# Patient Record
Sex: MALE | Race: WHITE | NOT HISPANIC OR LATINO | Employment: OTHER | URBAN - METROPOLITAN AREA
[De-identification: names, ages, dates, MRNs, and addresses within clinical notes are randomized per-mention and may not be internally consistent; named-entity substitution may affect disease eponyms.]

---

## 2017-01-02 ENCOUNTER — NURSE TRIAGE (OUTPATIENT)
Dept: ADMINISTRATIVE | Facility: CLINIC | Age: 67
End: 2017-01-02

## 2017-01-02 NOTE — TELEPHONE ENCOUNTER
Reason for Disposition   Caller has already spoken with another triager or PCP AND has further questions AND triager able to answer questions.    Protocols used: ST NO CONTACT OR DUPLICATE CONTACT CALL-A-AH

## 2017-01-03 RX ORDER — PREDNISONE 10 MG/1
10 TABLET ORAL DAILY
Qty: 30 TABLET | Refills: 0 | Status: SHIPPED | OUTPATIENT
Start: 2017-01-03 | End: 2017-01-27 | Stop reason: SDUPTHER

## 2017-01-03 RX ORDER — PREDNISONE 10 MG/1
10 TABLET ORAL DAILY
COMMUNITY
End: 2017-01-03 | Stop reason: SDUPTHER

## 2017-01-04 ENCOUNTER — TELEPHONE (OUTPATIENT)
Dept: PSYCHIATRY | Facility: CLINIC | Age: 67
End: 2017-01-04

## 2017-01-04 RX ORDER — BUSPIRONE HYDROCHLORIDE 5 MG/1
5 TABLET ORAL 2 TIMES DAILY
Qty: 1 TABLET | Refills: 1
Start: 2017-01-04 | End: 2017-04-18 | Stop reason: SDUPTHER

## 2017-01-04 NOTE — TELEPHONE ENCOUNTER
Spoke to the patient - he reports high anxiety recently and panic attacks;  High stressors related to mother's back surgery, his own health issues (fatigue, not getting out of bed); his mother has asked him go back to his home in [place]    Pt is only taking buspar 5 mg once daily at 2 pm.   He suspects it makes him dizzy - pt is taking multiple medications including clonazepam from PCP, opiates.       - advised pt to try to titrate buspar to 5 mg BID; current dose is too low; pt is taking multiple medications, unclear if dizziness is related to Buspar or more likely polypharmacy   - recommend follow up with PCP for health issues - he reports he is tired of being referred to other doctors   - Call to report any worsening of symptoms or problems with the medication

## 2017-01-04 NOTE — TELEPHONE ENCOUNTER
Patient called an stated he been having a lot of anxiety attacks lately and not sure if can adjust some of his medication to help with this. Please advise or call

## 2017-01-06 DIAGNOSIS — L03.115 CELLULITIS OF RIGHT LOWER EXTREMITY: ICD-10-CM

## 2017-01-06 RX ORDER — CLINDAMYCIN HYDROCHLORIDE 300 MG/1
CAPSULE ORAL
Qty: 20 CAPSULE | Refills: 0 | Status: SHIPPED | OUTPATIENT
Start: 2017-01-06 | End: 2017-01-27 | Stop reason: SDUPTHER

## 2017-01-06 RX ORDER — CLINDAMYCIN PHOSPHATE 10 UG/ML
LOTION TOPICAL
Qty: 60 ML | Refills: 2 | Status: SHIPPED | OUTPATIENT
Start: 2017-01-06 | End: 2017-09-12

## 2017-01-06 NOTE — TELEPHONE ENCOUNTER
----- Message from Janki Downs sent at 1/6/2017 12:22 PM CST -----  Contact: Patient  Patient needs refill on Clindamycin, 300 mg caps sent to [facility] in [place]. Please  call patient at [phone].

## 2017-01-12 ENCOUNTER — TELEPHONE (OUTPATIENT)
Dept: PHARMACY | Facility: CLINIC | Age: 67
End: 2017-01-12

## 2017-01-12 NOTE — TELEPHONE ENCOUNTER
F/U call placed to patient to assess Harvoni treatment start date    Patient has not initiated Harvoni treatment. He repeatedly states he has a lot on his plate: he has not checked mail in 8 days, his dog, he lost his keys, etc. I inquire if the patient wishes to proceed with treatment and he acknowledges he does but he does know know when he can start. He became quite upset while speaking with me and stated he was having an anxiety attack and requested we disconnect the call which we did. He states he has MD appt 1/20 and may start then. He notes his pain medication doses have been cut in half.  This has been an ongoing issue since his initial consultation in 10/16.    Dr Disla--we need guidance how/if we should continue to follow this patient. He is requesting a call from your office to schedule f/u appointment or speak on phone.      Please advise.    Lowell Moreira, PharmD, [facility]  [facility]  [phone]

## 2017-01-13 NOTE — TELEPHONE ENCOUNTER
Noted.  Pt has appointment with me 1/24/17 and I will make sure to discuss that she should NOT start Harvoni per your instructions.  Pt has severe anxiety and has been very reluctant to follow my recommendations for therapy and medications.

## 2017-01-13 NOTE — TELEPHONE ENCOUNTER
Marilyn Ramos & Bret,    We are entrusting that Mr Serna will decide when he is ready to start Harvoni treatment.  Communication from Specialty Pharmacy to Mr Serna will cease at this time. Please let us know if/when he decides to start treatment.  He is in possession of 28 day supply of Harvoni which he never started and it is IMPERATIVE that he not take this medication. His insurance has changed and Harvoni may not be preferable on his new plan. Taking just 28 days of Harvoni could lead to viral mutations and resistance to the medications we have to treat him with if he decided to move forward. Please reinforce this to the patient.    Please feel free to contact specialty pharmacy if we can be of any further assistance in your patients care.    Lowell Moreira, PharmD, [facility]  [facility]  [phone]

## 2017-01-14 DIAGNOSIS — M50.30 DDD (DEGENERATIVE DISC DISEASE), CERVICAL: Chronic | ICD-10-CM

## 2017-01-14 RX ORDER — LUBIPROSTONE 8 UG/1
CAPSULE, GELATIN COATED ORAL
Qty: 180 CAPSULE | Refills: 4 | Status: SHIPPED | OUTPATIENT
Start: 2017-01-14 | End: 2018-02-12 | Stop reason: SDUPTHER

## 2017-01-14 NOTE — TELEPHONE ENCOUNTER
Dear Dr Etienne and Dr Queen  I have spoken with Mr Serna and has decided against Yaneli at last visit in last December.He understand of how expensive and he affecting his future health. He  Understand that he has a great advantage over thousand of other people that are unable to afford this.  Sincerely  Dr Ramos

## 2017-01-16 ENCOUNTER — CLINICAL SUPPORT (OUTPATIENT)
Dept: UROLOGY | Facility: CLINIC | Age: 67
End: 2017-01-16
Payer: MEDICARE

## 2017-01-16 DIAGNOSIS — R30.0 DYSURIA: Primary | ICD-10-CM

## 2017-01-16 LAB
BILIRUB SERPL-MCNC: ABNORMAL MG/DL
BLOOD URINE, POC: ABNORMAL
COLOR, POC UA: YELLOW
GLUCOSE UR QL STRIP: ABNORMAL
KETONES UR QL STRIP: ABNORMAL
LEUKOCYTE ESTERASE URINE, POC: ABNORMAL
NITRITE, POC UA: ABNORMAL
PH, POC UA: 5
PROTEIN, POC: ABNORMAL
SPECIFIC GRAVITY, POC UA: 1.02
UROBILINOGEN, POC UA: ABNORMAL

## 2017-01-16 PROCEDURE — 87086 URINE CULTURE/COLONY COUNT: CPT

## 2017-01-16 PROCEDURE — 99499 UNLISTED E&M SERVICE: CPT | Mod: S$PBB,,, | Performed by: UROLOGY

## 2017-01-16 PROCEDURE — 81002 URINALYSIS NONAUTO W/O SCOPE: CPT | Mod: PBBFAC,PO

## 2017-01-16 NOTE — PROGRESS NOTES
Pt in clinic today with complaints of UTI. POCT urine shows trace leukocytes and 1+ blood. Urine sent for urine culture

## 2017-01-16 NOTE — TELEPHONE ENCOUNTER
I will discuss this with him and make sure he has a clear understanding at his upcoming appointment.

## 2017-01-17 ENCOUNTER — TELEPHONE (OUTPATIENT)
Dept: FAMILY MEDICINE | Facility: CLINIC | Age: 67
End: 2017-01-17

## 2017-01-17 DIAGNOSIS — B18.2 HEP C W/O COMA, CHRONIC: Primary | ICD-10-CM

## 2017-01-17 DIAGNOSIS — F39 MOOD DISORDER: ICD-10-CM

## 2017-01-17 DIAGNOSIS — F19.20: ICD-10-CM

## 2017-01-17 LAB — BACTERIA UR CULT: NO GROWTH

## 2017-01-17 NOTE — TELEPHONE ENCOUNTER
Patient needs refill of Clonazepam.   Last seen 12/30/16.  Last filled 12/23/16.  Patient requests to start using [facility] in [place] for this medication. Patient advised that if he changes to this pharmacy he cannot change back. Patient verbalized understanding.

## 2017-01-17 NOTE — TELEPHONE ENCOUNTER
----- Message from Rae Rodriguez sent at 1/17/2017 10:56 AM CST -----  Contact: self  Patient wants to speak with a nurse regarding his Rx for KLONOPIN. Please call back at [phone] (home)

## 2017-01-17 NOTE — TELEPHONE ENCOUNTER
Spoken with patient he is interested to start Harvoni and he will start this coming weekend. He refers he is under drug detox.  Notified of Klonopin!

## 2017-01-18 ENCOUNTER — TELEPHONE (OUTPATIENT)
Dept: UROLOGY | Facility: CLINIC | Age: 67
End: 2017-01-18

## 2017-01-18 DIAGNOSIS — F39 MOOD DISORDER: ICD-10-CM

## 2017-01-18 RX ORDER — CLONAZEPAM 2 MG/1
TABLET ORAL
Qty: 90 TABLET | Refills: 0 | Status: SHIPPED | OUTPATIENT
Start: 2017-01-18 | End: 2017-02-07 | Stop reason: SDUPTHER

## 2017-01-18 RX ORDER — CLONAZEPAM 2 MG/1
TABLET ORAL
Qty: 90 TABLET | Refills: 0 | OUTPATIENT
Start: 2017-01-18

## 2017-01-18 NOTE — TELEPHONE ENCOUNTER
----- Message from Negrita Castillo MD sent at 1/18/2017  9:49 AM CST -----  Please let pt know their urine culture showed no bacteria

## 2017-01-20 ENCOUNTER — TELEPHONE (OUTPATIENT)
Dept: FAMILY MEDICINE | Facility: CLINIC | Age: 67
End: 2017-01-20

## 2017-01-20 NOTE — TELEPHONE ENCOUNTER
----- Message from Gian Cardoso sent at 1/20/2017  2:23 PM CST -----  Contact: same  Patient called in and requested a message be sent because patient stated he needs an appt ASAP to go over all of his health issues with Dr. Ramos.    Patient call back number is [phone]

## 2017-01-23 ENCOUNTER — TELEPHONE (OUTPATIENT)
Dept: PSYCHIATRY | Facility: CLINIC | Age: 67
End: 2017-01-23

## 2017-01-23 NOTE — TELEPHONE ENCOUNTER
Pt reports he is having symptoms of opioid w/d from having oxycodone reduced from 30 mg to 20 mg four times daily - he is having GI upset, diarrhea, sweats, increased pain.  He reports the symptoms are manageable currently.  He is still taking Methadone as prescribed.   Discussed options for tx of opiod w/drawal - clonidine, lomotil - he wishes to review his current meds with his PCP - appt 1/27/17 and reduce meds which may be contributing, such as lactulose (he is also taking Amitiza).    He reports Dr Cintron is willing to operate on his spine; but he is not committed to this yet.      He is only taking Buspirone 5 mg once day and is reluctant to increase.  He is still taking Clonazepam TID  He voices continued concern about his mother's pain - she is under the care of pain management and having poor response to interventions thus far.     Pt cancelled his appt 1/24/17 - he will follow up 2/8/17  Pt instructed to go to ER with significant worsening of  symptoms.

## 2017-01-23 NOTE — TELEPHONE ENCOUNTER
Please contact the patient and advise if he is having severe withdrawals to call 911 and go to ER for evaluation.    I will contact him at the end of clinic when I am done seeing patients. Please also ask him to contact his pain management MD to alert him of his w/d from pain medications.   Is patient willing to go to detox facility - typically pain medications would be completely weaned off.  Please confirm what the recent medication changes were through his pain management MD - thanks

## 2017-01-23 NOTE — TELEPHONE ENCOUNTER
Patient states he is aware to go to ER if symptoms get really bad. He states he does not want to go to Detox facility because they will detox him from all pain medication an than he will be miserable in pain. The pain management doctor has cut patients mg down again from 30 mg to 20 mg that's why he is with drawing. He will be awaiting Dr Queen's call after clinic today

## 2017-01-24 DIAGNOSIS — K74.3 PRIMARY BILIARY CHOLANGITIS: Primary | ICD-10-CM

## 2017-01-24 NOTE — TELEPHONE ENCOUNTER
----- Message from Negrita Vazquez sent at 1/24/2017  1:17 PM CST -----  Contact: self  Please call back at  regarding meds.

## 2017-01-24 NOTE — TELEPHONE ENCOUNTER
Patient requesting 4 boxes of the Kristalose at a time.  He states he takes 2 packets twice daily.  He wants enough to last a full month so he doesn't run out.

## 2017-01-25 NOTE — TELEPHONE ENCOUNTER
----- Message from Ally Fonseca sent at 1/24/2017  3:50 PM CST -----  Patient is requesting a call back concerning medication phoned into the wrong pharmacy contact patient at [phone].    Thank you

## 2017-01-26 ENCOUNTER — DOCUMENTATION ONLY (OUTPATIENT)
Dept: FAMILY MEDICINE | Facility: CLINIC | Age: 67
End: 2017-01-26

## 2017-01-26 NOTE — PROGRESS NOTES
Pre-Visit Chart Review  For Appointment Scheduled on 01/27/2017    There are no preventive care reminders to display for this patient.

## 2017-01-27 ENCOUNTER — OFFICE VISIT (OUTPATIENT)
Dept: FAMILY MEDICINE | Facility: CLINIC | Age: 67
End: 2017-01-27
Payer: MEDICARE

## 2017-01-27 ENCOUNTER — TELEPHONE (OUTPATIENT)
Dept: FAMILY MEDICINE | Facility: CLINIC | Age: 67
End: 2017-01-27

## 2017-01-27 VITALS
TEMPERATURE: 98 F | HEIGHT: 68 IN | OXYGEN SATURATION: 95 % | DIASTOLIC BLOOD PRESSURE: 78 MMHG | BODY MASS INDEX: 30.71 KG/M2 | HEART RATE: 89 BPM | SYSTOLIC BLOOD PRESSURE: 136 MMHG | WEIGHT: 202.63 LBS

## 2017-01-27 DIAGNOSIS — F41.1 ANXIETY STATE: Primary | ICD-10-CM

## 2017-01-27 DIAGNOSIS — Z23 NEED FOR PNEUMOCOCCAL VACCINATION: ICD-10-CM

## 2017-01-27 DIAGNOSIS — L03.115 CELLULITIS OF RIGHT LOWER EXTREMITY: ICD-10-CM

## 2017-01-27 DIAGNOSIS — J44.0 CHRONIC OBSTRUCTIVE PULMONARY DISEASE WITH ACUTE LOWER RESPIRATORY INFECTION: ICD-10-CM

## 2017-01-27 DIAGNOSIS — L71.9 ROSACEA: ICD-10-CM

## 2017-01-27 PROCEDURE — 99213 OFFICE O/P EST LOW 20 MIN: CPT | Mod: PBBFAC,PO,25 | Performed by: FAMILY MEDICINE

## 2017-01-27 PROCEDURE — 99214 OFFICE O/P EST MOD 30 MIN: CPT | Mod: S$PBB,,, | Performed by: FAMILY MEDICINE

## 2017-01-27 PROCEDURE — 99999 PR PBB SHADOW E&M-EST. PATIENT-LVL III: CPT | Mod: PBBFAC,,, | Performed by: FAMILY MEDICINE

## 2017-01-27 PROCEDURE — 90670 PCV13 VACCINE IM: CPT | Mod: PBBFAC,PO | Performed by: FAMILY MEDICINE

## 2017-01-27 RX ORDER — CELECOXIB 50 MG/1
100 CAPSULE ORAL 2 TIMES DAILY
Qty: 120 CAPSULE | Refills: 3 | Status: SHIPPED | OUTPATIENT
Start: 2017-01-27 | End: 2017-04-10 | Stop reason: SDUPTHER

## 2017-01-27 RX ORDER — PREDNISONE 5 MG/1
10 TABLET ORAL DAILY
Qty: 30 TABLET | Refills: 2 | Status: SHIPPED | OUTPATIENT
Start: 2017-01-27 | End: 2017-01-27

## 2017-01-27 RX ORDER — CLINDAMYCIN HYDROCHLORIDE 300 MG/1
300 CAPSULE ORAL DAILY
Qty: 30 CAPSULE | Refills: 0 | Status: SHIPPED | OUTPATIENT
Start: 2017-01-27 | End: 2017-03-07

## 2017-01-27 RX ORDER — PREDNISONE 10 MG/1
10 TABLET ORAL DAILY
COMMUNITY
End: 2017-05-11 | Stop reason: SDUPTHER

## 2017-01-27 NOTE — PATIENT INSTRUCTIONS
Life After Combat: Coping with an Anxiety Disorder  In combat, you were under a lot of stress. You were in a strange place. You were away from your home and family. You lived with the constant threat of danger. You may have been asked to do things that challenged you in unexpected ways.  culture can be demanding, with long days, short nights, and little room for emotion. Being brave is standard practice. Anxiety is a healthy response to stress like this. But too much anxiety can be a problem. It may start kicking in at the wrong time. Or it may never go away. This causes physical and emotional discomfort. For some people anxiety can become so severe that it causes problems in daily life, at work or school, and in relationships. When anxiety becomes such a big part of your life, its an anxiety disorder.     Treatment can help you get your anxiety disorder under control. Talk to your healthcare provider about the best treatment options for you.    How anxiety helps keep you safe  Anxiety is an extreme form of worry and stress. When youre threatened, theres rarely time to decide how to react. Instead, the bodys automatic defenses kick in. Your body is flooded with anxiety hormones. This causes physical and emotional responses. Your heart pumps harder. Your muscles tense. You tremble or sweat. Emotionally, you feel intense worry, fear, or dread. These sensations and emotions alert you to danger and help you react quickly. In response to a threat, anxiety prompts you to do what is needed to protect yourself.  When anxiety becomes a problem  In a war zone youre on high alert. Even if there is no immediate threat, danger can present itself at any time. You may be worried for your own safety and the safety of others. Its natural to experience anxiety in this situation. But the anxious feeling may continue after you leave the war zone. Even back at home, you may be on constant alert. Or extreme anxiety may  pop up over minor issues. This unexplained anxiety makes it hard to live and enjoy your daily life. If this sounds like whats happening to you, you may have an anxiety disorder. Someone with an anxiety disorder may:  · Have panic attacks. A panic attack is an abrupt, intense anxiety response. It includes extreme anxiety, severe physical symptoms (like a pounding heart or difficulty breathing), and a strong desire to escape. You may suddenly feel closed in or all alone, even if youre in an open, public place. Most panic attacks start suddenly, for no clear reason. The attack can last around 5 to 20 minutes. During an attack, you may think that youre having a heart attack, going crazy, or dying.  · Feel anxious all the time. You might worry about money, your family and friends, work, war, or the world in general. You might not even be sure what youre anxious about. Whatever the cause of your worry, you have a strong fear that the worst will happen. This generalized anxiety affects your quality of life and makes it hard to function.  · Have intense anxiety in certain situations. For example, you may fear spending time in the dark or in enclosed spaces. These fears (sometimes called phobias) may relate to something that happened in combat. Or they may not. To escape the anxiety, you may try to avoid the situation that prompts it. Such avoidance can have a serious impact on your life.  · Feel a strong need to act on anxiety-provoking thoughts. Sometimes an anxiety disorder makes certain unwanted thoughts invade your mind. You know the thoughts are irrational. But you still feel a compulsive need to act on them, often in unhealthy ways. For example, you may constantly check your doors to make sure theyre locked. Or you might walk a perimeter around your house to make sure no one is watching. Doing so helps temporarily relieve the anxiety, but it can become very disruptive to daily life.  Symptoms of anxiety  disorders  Anxiety makes you feel worried and fearful. It can also cause physical symptoms. In fact, many people with anxiety disorders first go to the doctor to get checked for a physical problem. Symptoms can include:  · A pounding or racing heartbeat  · Shortness of breath  · Chest pain  · Dizziness or lightheadedness  · Restlessness or problems sleeping  · Muscle tension, especially in the neck and shoulders  · Nausea or stomach problems  · Headaches  · Rapid speech  · Trembling  · Feeling irritable or on edge all the time  · Difficulty concentrating  Treatment will help you get your life back  You may think that asking for help is a sign of weakness. In fact, taking action to make your life better takes a lot of courage. With the right treatment, most people learn to manage anxiety. Your treatment may include counseling, a process during which you talk about your anxiety and related problems with a trained professional. Medications may also be prescribed. For many people with anxiety disorders, treatment includes both medication and counseling.  · Counseling involves working with a trained professional to better understand your anxiety and learn skills to manage it. Many types of counseling have been shown to be effective treatments for anxiety disorders. In counseling, youll likely learn new ways of responding to thoughts, feelings, and memories that make you anxious. This can improve your symptoms and help change how you react to situations that make you anxious. Counseling could be done one-on-one. Or you may have group therapy with other Vets who have been in combat.  · Medication may be prescribed. Some medications are used only for a short time, to treat immediate symptoms. Others are taken long-term, to help improve your mood over time. At first, medications and dosages may need to be adjusted. Tell your healthcare provider how a medication affects you. This way you can work together to find what works  best for you. Be aware that a few weeks may pass before you see a medications full impact on your mood. If you dont notice a change at first, you may simply need more time. But if you dont notice results after the first few weeks, tell your doctor.  Moving forward  An anxiety disorder affects your emotions, health, and life. Be assured that treatment will help you get better. Taking the first step can be hard. But once you start treatment and see how much better life can be, youll be glad you did. To learn more, talk to your doctor or your [facility] ([facility]) mental health coordinator. You can also visit the An[employer] Association of [place] at www.adaa.org.   © 3209-2425 [employer]. [address] [place] [address], [place] [address]. All rights reserved. This information is not intended as a substitute for professional medical care. Always follow your healthcare professional's instructions.        Advance Medical Directive  An advance medical directive is a form that lets you plan ahead for the care youd want if you could no longer express your wishes. This statement outlines the medical treatment youd want or names the person youd wish to make healthcare decisions for you. Be aware that laws vary from state to state, and it may be worthwhile to talk with an .  Writing down your wishes    · An advance directive is important whether youre young or old. Injury or illness can strike at any age.  · Decide what is important to you and the kind of treatment youd want, or not want to have.  · Some states allow only one kind of advance directive. Some let you do both a Durable Power of  for Health Care and a Living Will. Some states put both kinds on the same form.  A Durable Power of  for Health Care  · This form lets you name someone else to be your agent.  · This person can decide on treatment for you only when you cant speak for yourself.  · You do not need to be  at the end of your life. He or she could speak for you if you were in a coma but were likely to recover.  A Living Will  · This form lets you list the care you want at the end of your life.  · A living will applies only if you wont live without medical treatment. It would apply if you had advanced cancer, a massive stroke, or other serious illness from which you will not recover.  · It takes effect only when you can no longer express your wishes yourself.  © 5871-3943 [employer]. [address] [place] [address]. All rights reserved. This information is not intended as a substitute for professional medical care. Always follow your healthcare professional's instructions.        Choosing an Agent  A durable power of  for health care is only as good as the person you name to be your agent. If this person knows your treatment wishes and is willing to carry them out, youll probably be well-represented. Be sure to tell your agent whats important to you.  Who to choose  Here are suggestions for choosing an agent:    · You can name a family member, close friend, , , or rabbi.  · You should name one person as your agent. Then name one or two alternates. You need a backup person in case your first choice cant be reached when needed.  · Talk to each person you are thinking of naming as your agent or alternate. Do this before you decide who should carry out your wishes.  Your agent should be...  · A competent adult, 18 years or older.  · Someone you trust and can talk to about the care you want and what is important to you.  · Someone who supports your treatment choices.  In many states, your agent cant be...  · Your healthcare provider.  · An employee of your healthcare provider or of a hospital, nursing home, or hospice program where you receive care.  Some states list other restrictions about who can be named as an agent for an advance directive.  Tip: It's a good idea to  write down your wishes and give a copy to your agent.   © 6556-7465 [employer]. [address] [place] [address]. All rights reserved. This information is not intended as a substitute for professional medical care. Always follow your healthcare professional's instructions.        An Agents Role for Durable Power of   Its impossible to know which medical treatment choices you might face in the future. What if you aren't able to make these decisions for yourself? A durable power of  for health care lets you name an agent to carry out your wishes. This happens only if you cant express your wishes yourself.    An agents duty  Your agent respects your wishes in the following ways:   · Your agents duty is to see that your wishes are followed.  · If your wishes arent known, your agent should try to decide what you want.  · Your agents choices come before anyone elses wishes for you.  · A durable power of  for health care does not give your agent control over your money. Your agent also cant be made to pay your bills.  Find out what your agent can do  Restrictions on what an agent can and cant do vary by state. Check your state laws. In most states your agent can:  · Choose or refuse life-sustaining and other medical treatment on your behalf.  · Consent to and then stop treatment if your condition doesnt improve.  · Access and release your medical records.  · Request an autopsy and donate your organs, unless youve stated otherwise on your advance directive.  Find out whether your state allows your agent to do the following:  · Refuse or withdraw life-enhancing care.  · Refuse or stop tube feeding or other life-sustaining care--even if you havent stated on your advance directive that you dont want these treatments.  · Order sterilization or .  © 0227-4205 [employer]. [address] [place] [address], [place] [address]. All rights reserved. This  information is not intended as a substitute for professional medical care. Always follow your healthcare professional's instructions.        Life Support  If you understand how specific treatments may affect your quality of life, you can decide which ones youd choose or refuse. You may want to talk to your healthcare provider about the possible benefits and risks of treatments. The chance of good results from these therapies varies based on each individual clinical situation and can be very difficult to predict. Medical treatment, if your life is in danger, falls into three main categories.  Life supporting    · This care keeps your heart and lungs going when they can no longer work on their own.  · CPR restarts your heart and lungs if they stop working.  · A respirator (or ventilator) keeps you breathing. Air is pumped into your lungs through a tube thats put into your windpipe.  Life sustaining  · This care keeps you alive longer when you have an illness that cant be cured.  · Tube feeding or TPN (total parenteral nutrition) provides food and fluids through a tube or IV. It is given if you cant chew or swallow on your own.  · Dialysis is a kidney machine that cleans your blood when your kidneys can no longer work on their own.  Life enhancing  · This care controls pain and discomfort, such as nausea or difficulty breathing. This type of care is not designed to prolong your life, but to enhance quality of life. Nothing is done to keep you alive longer.  · Hospice care is comfort care. It might provide food and fluids by mouth or help with bathing. Hospice care is given during the last stages of a terminal illness.  · Strong pain medicine can be given to help keep you comfortable.  Do Not Resuscitate  Would you want CPR if your heart stops while youre a patient in a hospital or nursing home? If not, talk to your healthcare provider about issuing a DNR (Do-Not-Resuscitate) order.  Be aware  DNRs and advance  directives may not apply during anesthesia, in emergency rooms, or when emergency medical teams respond to a 911 call. Ask your healthcare provider how you can make sure your wishes will be followed. Also, a DNR will not prevent you from getting other kinds of needed medical care such as treatment for pain, or bleeding.   © 3492-5978 [employer]. [address] [place] [address], [place] [address]. All rights reserved. This information is not intended as a substitute for professional medical care. Always follow your healthcare professional's instructions.

## 2017-01-27 NOTE — PROGRESS NOTES
2 patient identifiers used (name and ). Administered Prevnar 13 vaccine IM. Patient tolerated well, no bleeding at insertion site noted. Pain scale 1/10. Aseptic technique maintained. Immunization information given to patient.

## 2017-01-27 NOTE — PROGRESS NOTES
SUBJECTIVE:   Hesham Senra is a 66 y.o. male who complains of congestion, swollen glands and post nasal drip for 4-5 days. He denies a history of nausea and weakness. He has a history of asthma. Patient denies smoke cigarettes.     OBJECTIVE:  Vitals as noted above.  Appearance: alert, well appearing, and in no distress, oriented to person, place, and time, ill-appearing and chronically ill appearing.   ENT- ENT exam normal, no neck nodes or sinus tenderness, neck without nodes, pharynx erythematous without exudate and nasal mucosa congested.   Chest - clear to auscultation, no wheezes, rales or rhonchi, symmetric air entry, no tachypnea, retractions or cyanosis.    ASSESSMENT:   bronchitis  associated with  Anxiety state    Chronic obstructive pulmonary disease with acute lower respiratory infection  -     predniSONE (DELTASONE) 5 MG tablet; Take 2 tablets (10 mg total) by mouth once daily.  Dispense: 30 tablet; Refill: 2  -     Comprehensive metabolic panel; Future  -     CBC auto differential; Future    Need for pneumococcal vaccination  -     Pneumococcal Conjugate Vaccine (13 Valent) (IM)    Cellulitis of right lower extremity  -     clindamycin (CLEOCIN) 300 MG capsule; Take 1 capsule (300 mg total) by mouth once daily.  Dispense: 30 capsule; Refill: 0    Rosacea  -     clindamycin (CLEOCIN) 300 MG capsule; Take 1 capsule (300 mg total) by mouth once daily.  Dispense: 30 capsule; Refill: 0    Other orders  -     celecoxib (CELEBREX) 50 MG capsule; Take 2 capsules (100 mg total) by mouth 2 (two) times daily.  Dispense: 120 capsule; Refill: 3        PLAN:  Symptomatic therapy suggested: push fluids, rest, gargle warm salt water and return office visit prn if symptoms persist or worsen. Call or return to clinic prn if these symptoms worsen or fail to improve as anticipated.

## 2017-01-30 NOTE — TELEPHONE ENCOUNTER
Dear Dr Disla,  I have a recent conversation with him regarding Hep C therapy. He has moderate anxiety and has multiple stressors including his ill mother. He is not committed for 3-4 months of Hep C therapy.

## 2017-02-06 ENCOUNTER — TELEPHONE (OUTPATIENT)
Dept: FAMILY MEDICINE | Facility: CLINIC | Age: 67
End: 2017-02-06

## 2017-02-06 ENCOUNTER — OUTPATIENT CASE MANAGEMENT (OUTPATIENT)
Dept: ADMINISTRATIVE | Facility: OTHER | Age: 67
End: 2017-02-06

## 2017-02-06 NOTE — TELEPHONE ENCOUNTER
----- Message from Regina Taveras sent at 2/6/2017  2:40 PM CST -----  Contact:  call//[phone]   Placed a call to  [facility] // pt  Stated   He  Returning the  Nurse // please call

## 2017-02-06 NOTE — PROGRESS NOTES
02/06/17-Received two messages from patient to call him back. Called patient back and he would like some information for his mother. States that his mother needs assistance with getting groceries. Will send to patient's mother [place] [facility] in the mail. Mother-Yuki Collier, [address] [place] [address].

## 2017-02-06 NOTE — TELEPHONE ENCOUNTER
----- Message from Annel Odell sent at 2/6/2017  2:27 PM CST -----  Contact: self  Patient would like to speak to Misti regarding  refills Would not tell me    Please call   Thanks

## 2017-02-06 NOTE — LETTER
February 6, 2017    Hesham Serna  [address]  [place] [address]             [facility] [address] [place] [address] Dear Mrs. Collier,     I received a phone call from your son Hesham Serna that you need information for volunteers to assist you with services at your home. I am enclosing [place] [facility] services information and their phone number.       If you have any questions or concerns, please don't hesitate to call.    Sincerely,        Aleena Díaz

## 2017-02-07 ENCOUNTER — TELEPHONE (OUTPATIENT)
Dept: FAMILY MEDICINE | Facility: CLINIC | Age: 67
End: 2017-02-07

## 2017-02-07 DIAGNOSIS — F39 MOOD DISORDER: ICD-10-CM

## 2017-02-07 RX ORDER — CLONAZEPAM 2 MG/1
TABLET ORAL
Qty: 90 TABLET | Refills: 0 | Status: SHIPPED | OUTPATIENT
Start: 2017-02-07 | End: 2017-03-09 | Stop reason: SDUPTHER

## 2017-02-07 RX ORDER — CLONAZEPAM 2 MG/1
TABLET ORAL
Qty: 90 TABLET | Refills: 0 | OUTPATIENT
Start: 2017-02-07

## 2017-02-07 NOTE — TELEPHONE ENCOUNTER
Call placed to [facility], spoke with Ayad. Informed Mr Lion prescription for Klonopin was sent to pharmacy per on call MD due to PCP being out of the office for next two week but to please not fill this prescription early. Mr Lion verbalized understanding.

## 2017-02-07 NOTE — TELEPHONE ENCOUNTER
----- Message from Gail Alvarez sent at 2/6/2017  4:40 PM CST -----  Contact: [phone]  Patient is returning nurse's phone call stated he need medication.  Please call patient back at [phone].

## 2017-02-07 NOTE — TELEPHONE ENCOUNTER
----- Message from Kyaw Moreno sent at 2/7/2017  9:54 AM CST -----  Contact: Patient  Patient was returning a missed call from your office regarding his medication. Please call the patient back at [phone]. Thanks.

## 2017-02-08 ENCOUNTER — OFFICE VISIT (OUTPATIENT)
Dept: PSYCHIATRY | Facility: CLINIC | Age: 67
End: 2017-02-08
Payer: MEDICARE

## 2017-02-08 VITALS
SYSTOLIC BLOOD PRESSURE: 135 MMHG | HEART RATE: 90 BPM | HEIGHT: 68 IN | BODY MASS INDEX: 29.97 KG/M2 | DIASTOLIC BLOOD PRESSURE: 88 MMHG | WEIGHT: 197.75 LBS

## 2017-02-08 DIAGNOSIS — F41.1 GAD (GENERALIZED ANXIETY DISORDER): ICD-10-CM

## 2017-02-08 DIAGNOSIS — F13.20 SEDATIVE HYPNOTIC OR ANXIOLYTIC DEPENDENCE: ICD-10-CM

## 2017-02-08 DIAGNOSIS — F11.23 OPIOID DEPENDENCE WITH WITHDRAWAL: ICD-10-CM

## 2017-02-08 DIAGNOSIS — F39 MOOD DISORDER: ICD-10-CM

## 2017-02-08 DIAGNOSIS — F60.9 PERSONALITY DISORDER: ICD-10-CM

## 2017-02-08 PROCEDURE — 99212 OFFICE O/P EST SF 10 MIN: CPT | Mod: PBBFAC,PO | Performed by: PSYCHIATRY & NEUROLOGY

## 2017-02-08 PROCEDURE — 99213 OFFICE O/P EST LOW 20 MIN: CPT | Mod: S$PBB,,, | Performed by: PSYCHIATRY & NEUROLOGY

## 2017-02-08 PROCEDURE — 90833 PSYTX W PT W E/M 30 MIN: CPT | Mod: PBBFAC,PO | Performed by: PSYCHIATRY & NEUROLOGY

## 2017-02-08 PROCEDURE — 90833 PSYTX W PT W E/M 30 MIN: CPT | Mod: S$PBB,,, | Performed by: PSYCHIATRY & NEUROLOGY

## 2017-02-08 PROCEDURE — 99999 PR PBB SHADOW E&M-EST. PATIENT-LVL II: CPT | Mod: PBBFAC,,, | Performed by: PSYCHIATRY & NEUROLOGY
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
## 2017-02-09 ENCOUNTER — OFFICE VISIT (OUTPATIENT)
Dept: UROLOGY | Facility: CLINIC | Age: 67
End: 2017-02-09
Payer: MEDICARE

## 2017-02-09 VITALS
HEART RATE: 80 BPM | SYSTOLIC BLOOD PRESSURE: 135 MMHG | DIASTOLIC BLOOD PRESSURE: 77 MMHG | WEIGHT: 198.63 LBS | BODY MASS INDEX: 31.18 KG/M2 | HEIGHT: 67 IN

## 2017-02-09 DIAGNOSIS — N40.0 BENIGN PROSTATIC HYPERPLASIA, PRESENCE OF LOWER URINARY TRACT SYMPTOMS UNSPECIFIED, UNSPECIFIED MORPHOLOGY: Primary | ICD-10-CM

## 2017-02-09 DIAGNOSIS — N52.9 ERECTILE DYSFUNCTION, UNSPECIFIED ERECTILE DYSFUNCTION TYPE: ICD-10-CM

## 2017-02-09 DIAGNOSIS — R33.9 URINARY RETENTION: ICD-10-CM

## 2017-02-09 LAB
BILIRUB SERPL-MCNC: ABNORMAL MG/DL
BLOOD URINE, POC: ABNORMAL
COLOR, POC UA: ABNORMAL
GLUCOSE UR QL STRIP: ABNORMAL
KETONES UR QL STRIP: ABNORMAL
LEUKOCYTE ESTERASE URINE, POC: ABNORMAL
NITRITE, POC UA: ABNORMAL
PH, POC UA: 8
PROTEIN, POC: ABNORMAL
SPECIFIC GRAVITY, POC UA: 1.01
UROBILINOGEN, POC UA: ABNORMAL

## 2017-02-09 PROCEDURE — 81002 URINALYSIS NONAUTO W/O SCOPE: CPT | Mod: PBBFAC,PO | Performed by: UROLOGY

## 2017-02-09 PROCEDURE — 99999 PR PBB SHADOW E&M-EST. PATIENT-LVL III: CPT | Mod: PBBFAC,,, | Performed by: UROLOGY

## 2017-02-09 PROCEDURE — 99213 OFFICE O/P EST LOW 20 MIN: CPT | Mod: PBBFAC,PO | Performed by: UROLOGY

## 2017-02-09 PROCEDURE — 99213 OFFICE O/P EST LOW 20 MIN: CPT | Mod: S$PBB,,, | Performed by: UROLOGY

## 2017-02-09 RX ORDER — SILODOSIN 8 MG/1
8 CAPSULE ORAL DAILY
Qty: 30 CAPSULE | Refills: 11 | Status: SHIPPED | OUTPATIENT
Start: 2017-02-09 | End: 2017-06-21 | Stop reason: SDUPTHER

## 2017-02-09 NOTE — MR AVS SNAPSHOT
[facility]  [address]  [place] [address]  Phone: [phone]                  Hesham Serna   2017 11:15 AM   Office Visit    Description:  Male : 1950   Provider:  Negrita Castillo MD   Department:  [facility]           Reason for Visit     Follow-up           Diagnoses this Visit        Comments    Benign prostatic hyperplasia, presence of lower urinary tract symptoms unspecified, unspecified morphology    -  Primary     Erectile dysfunction, unspecified erectile dysfunction type         Urinary retention                To Do List           Future Appointments        Provider Department Dept Phone    2017 8:20 AM Kareem Randle MD [facility] [phone]    3/28/2017 9:20 AM Debora Cano PA-C [place] [facility] [phone]    5/10/2017 9:30 AM LAB, M[facility] [address] [facility] [phone]    2017 10:30 AM Negrita Castillo MD [facility] [facility] [phone]      Goals (5 Years of Data)     None       These Medications        Disp Refills Start End    mirabegron (MYRBETRIQ) 50 mg Tb24 90 tablet 3 2017    Take 1 tablet (50 mg total) by mouth once daily. - Oral    Pharmacy: [facility] [address] [place] [address] [address] [facility] [address] Ph #: [phone]       silodosin (RAPAFLO) 8 mg Cap capsule 30 capsule 11 2017    Take 1 capsule (8 mg total) by mouth once daily. - Oral    Pharmacy: [facility] [address] [place] [address], [place] [address] AT [facility] [address] Ph #: [phone]         [facility]     [facility] -  Assistance  Registered nurses in the [facility] provide clinical advisement, health education, appointment booking, and other advisory services.  Call for this free service at [phone].             Medications           Message regarding Medications      Verify the changes and/or additions to your medication regime listed below are the same as discussed with your clinician today.  If any of these changes or additions are incorrect, please notify your healthcare provider.        START taking these NEW medications        Refills    silodosin (RAPAFLO) 8 mg Cap capsule 11    Sig: Take 1 capsule (8 mg total) by mouth once daily.    Class: Normal    Route: Oral           Verify that the below list of medications is an accurate representation of the medications you are currently taking.  If none reported, the list may be blank. If incorrect, please contact your healthcare provider. Carry this list with you in case of emergency.           Current Medications     albuterol 90 mcg/actuation inhaler Inhale 2 puffs into the lungs every 6 (six) hours as needed for Wheezing.    albuterol-ipratropium 2.5mg-0.5mg/3mL (DUO-NEB) 0.5 mg-3 mg(2.5 mg base)/3 mL nebulizer solution Take 3 mLs by nebulization every 6 (six) hours while awake.    AMITIZA 8 mcg Cap TAKE 1 CAPSULE BY MOUTH TWICE DAILY WITH FOOD    busPIRone (BUSPAR) 5 MG Tab Take 1 tablet (5 mg total) by mouth 2 (two) times daily.    celecoxib (CELEBREX) 50 MG capsule Take 2 capsules (100 mg total) by mouth 2 (two) times daily.    clindamycin (CLEOCIN T) 1 % lotion As directed    clindamycin (CLEOCIN) 300 MG capsule Take 1 capsule (300 mg total) by mouth once daily.    clobetasol (OLUX) 0.05 % Foam     clobetasol 0.05% (TEMOVATE) 0.05 % Oint     clonazePAM (KLONOPIN) 2 MG Tab TAKE 1 TABLET (2 MG TOTAL) BY MOUTH 3 (THREE) TIMES DAILY AS NEEDED.    econazole nitrate 1 % cream Apply to feet twice daily    fluticasone (FLONASE) 50 mcg/actuation nasal spray SNIFF 1 SPRAY INTO EACH NOSTRIL ONCE DAILY    fluticasone-salmeterol 500-50 mcg/dose (ADVAIR) 500-50 mcg/dose DsDv diskus inhaler Inhale 1 puff into the lungs 2 (two) times daily.    gentamicin (GARAMYCIN) 0.1 % ointment     lactulose (KRISTALOSE) 20 gram Pack TAKE 2 PACKET  "twice DAILY BY MOUTH AS DIRECTED. Needs 4 boxes.    ledipasvir-sofosbuvir  mg Tab Take 1 tablet by mouth once daily.    methadone (DOLOPHINE) 10 MG tablet Take 1 tablet (10 mg total) by mouth every 6 (six) hours as needed. Two tablets every morning, two tablets every 12 pm, one-two tablets every evening    mirabegron (MYRBETRIQ) 50 mg Tb24 Take 1 tablet (50 mg total) by mouth once daily.    oxycodone (ROXICODONE) 30 MG Tab Take 1 tablet (30 mg total) by mouth every 4 (four) hours as needed.    predniSONE (DELTASONE) 10 MG tablet Take 10 mg by mouth once daily.    silodosin (RAPAFLO) 8 mg Cap capsule Take 1 capsule (8 mg total) by mouth once daily.    spironolactone (ALDACTONE) 25 MG tablet Take 1 tablet (25 mg total) by mouth once daily.    triamcinolone acetonide 0.1% (KENALOG) 0.1 % cream     ziprasidone (GEODON) 20 MG Cap Take one to two capsule PO nightly with meal           Clinical Reference Information           Your Vitals Were     BP Pulse Height Weight BMI    135/77 (BP Location: Left arm, Patient Position: Sitting, BP Method: Automatic) 80 5' 7" (1.702 m) 90.1 kg (198 lb 10.2 oz) 31.11 kg/m2      Blood Pressure          Most Recent Value    BP  135/77      Allergies as of 2/9/2017     Codeine    Morphine    Bactrim [Sulfamethoxazole-trimethoprim]    Ciprofloxacin      Immunizations Administered on Date of Encounter - 2/9/2017     None      Orders Placed During Today's Visit     Future Labs/Procedures Expected by Expires    Prostate Specific Antigen, Diagnostic  2/9/2017 2/9/2018      [facility] Sign-Up     Activating your [facility] account is as easy as 1-2-3!     1) Visit [email or web address], select Sign Up Now, enter this activation code and your date of birth, then select Next.  Activation code not generated  Current Patient Portal Status: Account disabled      2) Create a username and password to use when you visit [facility] in the future and select a security question in case you lose your password " and select Next.    3) Enter your e-mail address and click Sign Up!    Additional Information  If you have questions, please e-mail [email or web address] or call [phone] to talk to our [facility] staff. Remember, [facility] is NOT to be used for urgent needs. For medical emergencies, dial 911.         Smoking Cessation     If you would like to quit smoking:   You may be eligible for free services if you are a [place] resident and started smoking cigarettes before September 1, 1988.  Call the Smoking Cessation Trust ([facility]) toll free at (612) 431-3239 or (127) 142-4542.   Call 6-558-QUIT-NOW if you do not meet the above criteria.            Language Assistance Services     ATTENTION: Language assistance services are available, free of charge. Please call [phone].      ATENCIÓN: Si habla valerie, tiene a cooper disposición servicios gratuitos de asistencia lingüística. Llame al [phone].     CHÚ Ý: N?u b?n nói Ti?ng Vi?t, có các d?ch v? h? tr? ngôn ng? mi?n phí dành cho b?n. G?i s? [phone].         [facility] complies with applicable Federal civil rights laws and does not discriminate on the basis of race, color, national origin, age, disability, or sex.

## 2017-02-09 NOTE — PROGRESS NOTES
"[facility] Progress Note  PCP: Dr.Raymond Baez [facility]  Chief Complaint: Follow-up     Subjective:     HPI: Hesham Serna with     Pt states he is going through withdrawals bc his pain medicine has been changed up    Urinary retention  Pt with history of urinary retention, was doing CIC 5-7x a day. UDS showed atonic bladder. He says he is now spontaneously voiding and "feels like he is emptying" but sometimes catheterizes 1x a night. pvr today is 59cc     Gross hematuria  He has a h/o stone. He has a h/o smoking (55 packs, 2-3 packs a day). Recent uti. Never had cysto. Cytology negative. ctu showed bladder herniating into L inguinal canal and left renal stone.  No longer having GH, no c/o pain in this region.      Nephrolithiasis  Also with h/o nephrolithiasis. CTRSS on 6/4/15 showed left 4mm renal stone. KUB on 4/12/16 showed stone is radioopaque.  Scheduled for eswl but never proceeded with surgery. Stated he wants to hold off until his back is fixed    Pt is very distressed today stating that he is going through withdrawals bc they have decreased his pain medicine    AUASSx: 10, mixed  IIEF: 5    Past medical, surgical, social and family hx have been reviewed. There have any changes. Says he broke his back again.        Cataracts? none  Urologic meds: myrbetriq  Anticoagulation: No    Vitals:    02/09/17 1126   BP: 135/77   Pulse: 80          Physical Exam   Constitutional: He is oriented to person, place, and time. He appears well-developed and well-nourished. He is cooperative. No distress.   HENT:   Head: Normocephalic and atraumatic.   Eyes: Pupils are equal, round, and reactive to light.   Cardiovascular: Normal rate and regular rhythm.   Pulmonary/Chest: Effort normal.   Abdominal: Soft. Normal appearance.   Musculoskeletal: Normal range of motion.   Neurological: He is alert and oriented to person, place, and time. He has normal reflexes.   Skin: Skin is intact. "     Psychiatric: He has a normal mood and affect.     MOON today: 20 g prostate  testes descended bilaterally, no masses   left reducible inguinal hernia    Urinalysis: 1.010/8/trace blood    Labs:  Urine culture   1/16/17 ng  10/21/16 E.cloacae  9/19/16 P.mirabilis  7/20/16 s.haemolyticus  3/23/16  Ng  12/16/15 ng    PSA   5/26/15 0.27  7/14/14 0.26     Pathology:   9/26/16 bURINE, VOIDED (CYTOLOGY):  -Negative for malignant cells  -Abundant neutrophils  -Degenerative changes      Rads:   ctu 11/14/16: 4mm let renal stone, no renal mass, no hydro, prtial herniation of the bladder into the LIH. Prostate not enlarged. A fat containing RIH. Severe L1 compression fracture  kub 4/12/16 - left renal stone 5mm  ctrss 6/4/15 - 4mm left midpole stone    pvr by bladder scan today: 59     Assessment:       1. Urinary retention    2. Nephrolithiasis      ED  Plan:     Urinary retention  -pt states he is voiding spontaneously today     Gross hematuria  -ct urogram: herniation into left testicle  -urine culture - + for infxn  -urine cytology - negative  -cystoscopy - pt never scheduled - pt states he cannot schedule this right now, no recent GH.     Nephrolithiasis  -pt wants to wait to schedule eswl bc of transportation issues, he was supposed to get eswl done but cancelled last minute  -need clearance from dr barney prior to surgery.   -he will call us when he wants to schedule     ED  -he decided not to fill his viagra bc he has no partner    psa in may, last one <0.27 1.5 years ago     [facility]: inactive

## 2017-02-09 NOTE — LETTER
February 9, 2017      Samuel Ramos MD  [address]  [place] [address]           [place] [facility] [place] [facility]  [address]  [place] [address]  Phone: [phone]          Patient: Hesham Serna   MR Number: [id number]   YOB: 1950   Date of Visit: 2/9/2017       Dear Dr. Samuel Ramos:    Thank you for referring Hesham Serna to me for evaluation. Attached you will find relevant portions of my assessment and plan of care.    If you have questions, please do not hesitate to call me. I look forward to following Hesham Serna along with you.    Sincerely,    Negrita Castillo MD    Enclosure  CC:  No Recipients    If you would like to receive this communication electronically, please contact [email or web address] or [phone] to request more information on [facility] Link access.    For providers and/or their staff who would like to refer a patient to [facility], please contact us through our one-stop-shop provider referral line, [facility], at [phone].    If you feel you have received this communication in error or would no longer like to receive these types of communications, please e-mail [email or web address]

## 2017-02-16 ENCOUNTER — TELEPHONE (OUTPATIENT)
Dept: FAMILY MEDICINE | Facility: CLINIC | Age: 67
End: 2017-02-16

## 2017-02-16 NOTE — TELEPHONE ENCOUNTER
Patient called office and left message that he was returning a call. Upon further inspection it was noted that no one called patient. Spoke to patient to inform him of above. Patient states the call was actually from several days ago some left a message on his voicemail. Patient believes it was regarding a refill that was sent to pharmacy.

## 2017-02-16 NOTE — TELEPHONE ENCOUNTER
----- Message from Sophia Martinez sent at 2/16/2017 11:34 AM CST -----  Contact: self   Patient returning a call from your office [phone] (home)

## 2017-02-20 ENCOUNTER — TELEPHONE (OUTPATIENT)
Dept: UROLOGY | Facility: CLINIC | Age: 67
End: 2017-02-20

## 2017-02-20 NOTE — TELEPHONE ENCOUNTER
----- Message from Lima Baxter sent at 2/20/2017  2:59 PM CST -----  Patient states he needs to discuss the dates of his xray's for his surgery please call [phone] (home) \

## 2017-02-20 NOTE — TELEPHONE ENCOUNTER
Spoke with patient informed him of psa lab is scheduled for 5/10 with follow up with  on 5/11. Pt verbally voiced understanding.

## 2017-02-23 ENCOUNTER — TELEPHONE (OUTPATIENT)
Dept: UROLOGY | Facility: CLINIC | Age: 67
End: 2017-02-23

## 2017-02-23 NOTE — TELEPHONE ENCOUNTER
Called and spoke with patient, he stated he just needed us to mail to him his last office visit note and imaging for him to take to his appt so he doesn't lose his benefits. He gave his mom's address as [address] [place] [address]. Mailing to patient, he verbally understood.

## 2017-02-23 NOTE — TELEPHONE ENCOUNTER
----- Message from Sheron Bermudez sent at 2/23/2017 12:35 PM CST -----  Contact: Patient  Patient is returning your call and said it's about his operations.  Please call [phone].  Thank you

## 2017-02-24 ENCOUNTER — TELEPHONE (OUTPATIENT)
Dept: UROLOGY | Facility: CLINIC | Age: 67
End: 2017-02-24

## 2017-03-01 ENCOUNTER — TELEPHONE (OUTPATIENT)
Dept: FAMILY MEDICINE | Facility: CLINIC | Age: 67
End: 2017-03-01

## 2017-03-01 DIAGNOSIS — F39 MOOD DISORDER: ICD-10-CM

## 2017-03-01 RX ORDER — DOXYCYCLINE HYCLATE 100 MG
100 TABLET ORAL 2 TIMES DAILY
Qty: 20 TABLET | Refills: 0 | Status: SHIPPED | OUTPATIENT
Start: 2017-03-01 | End: 2017-03-07

## 2017-03-01 NOTE — TELEPHONE ENCOUNTER
----- Message from Jose Sears sent at 3/1/2017  8:42 AM CST -----  Pt left a note for dr. barney and asked for ms. Ngo to call him .

## 2017-03-01 NOTE — TELEPHONE ENCOUNTER
----- Message from Ally Fonseca sent at 3/1/2017  1:53 PM CST -----  Patient is returning nurses call, regarding medication he is taking, contact patient at  [phone].       Thank you

## 2017-03-03 DIAGNOSIS — L71.9 ROSACEA: ICD-10-CM

## 2017-03-03 DIAGNOSIS — F39 MOOD DISORDER: ICD-10-CM

## 2017-03-03 DIAGNOSIS — L03.115 CELLULITIS OF RIGHT LOWER EXTREMITY: ICD-10-CM

## 2017-03-03 NOTE — TELEPHONE ENCOUNTER
----- Message from Sharonda Alvarez sent at 3/3/2017  2:51 PM CST -----  Contact: pt  Returning call, regarding medication  Call placed to pod, no answer  Call back on # [phone]  thanks

## 2017-03-04 RX ORDER — CLINDAMYCIN HYDROCHLORIDE 300 MG/1
300 CAPSULE ORAL DAILY
Qty: 30 CAPSULE | Refills: 0 | OUTPATIENT
Start: 2017-03-04

## 2017-03-04 RX ORDER — CLONAZEPAM 2 MG/1
TABLET ORAL
Qty: 90 TABLET | Refills: 0 | Status: SHIPPED | OUTPATIENT
Start: 2017-03-04 | End: 2017-03-07 | Stop reason: SDUPTHER

## 2017-03-04 RX ORDER — CLONAZEPAM 2 MG/1
TABLET ORAL
Qty: 90 TABLET | Refills: 0 | OUTPATIENT
Start: 2017-03-04

## 2017-03-07 ENCOUNTER — TELEPHONE (OUTPATIENT)
Dept: PSYCHIATRY | Facility: CLINIC | Age: 67
End: 2017-03-07

## 2017-03-07 ENCOUNTER — TELEPHONE (OUTPATIENT)
Dept: FAMILY MEDICINE | Facility: CLINIC | Age: 67
End: 2017-03-07

## 2017-03-07 NOTE — TELEPHONE ENCOUNTER
Patient arrived at 830 for his 9 am appointment. When I went into lobby to get another patient the patient stopped me an asked when he was going to be able to be seen. I explained that doctor was running a little behind an will be right with him as soon as we can get him back. When it was time to bring patient back to begin the rooming process he was loud an complaining as was weighing him on scale which was disturbing to nurses station near by also other patients walking in the area. He was addiment about needing to hurry up an be seen because he had another appointment at 10:30 am and this was a little after 9 am. He began to keep his voice loud an stating he needed to be seen an it was not fair. After rooming process asked patient to have a seat in lobby as we always do until doctor is ready to see them, he proceeded out to the lobby being loud an voicing his opinion that he was suppose to be seen at 9 am even after explained doctor was running behind some. After a few minutes he began knocking on the door to get back into the clinic area and I went out to ask him to calm down and he was in loud raised voice being distributing to all patients in the lobby. He asked me not to raise my voice an I explained I only raised my voice over his to ask him to calm down and stop making a disturbance. He then settled his voice some an continued to ask for a phone call appointment later today since he had to leave at 930 am. I explained doctor does not do phone appointments. Tried to reschedule patient an he only wanted time an day he wanted not what we were able to give him. Dr Queen than was ready for him at 9:20 [place] or so and he refused to have appointment only a 10 minute session of being disruptive about the whole appointment.  Finally he stated he was leaving and Dr Queen explained we will call to reschedule him later today for first available appointment we can give him.

## 2017-03-07 NOTE — TELEPHONE ENCOUNTER
----- Message from Negrita Vazquez sent at 3/6/2017  4:43 PM CST -----  Contact: self  Call placed to pod.  States he is leaving for [place] tomorrow.  Returning your call.  Please call back at

## 2017-03-08 ENCOUNTER — TELEPHONE (OUTPATIENT)
Dept: PSYCHIATRY | Facility: CLINIC | Age: 67
End: 2017-03-08

## 2017-03-08 ENCOUNTER — TELEPHONE (OUTPATIENT)
Dept: UROLOGY | Facility: CLINIC | Age: 67
End: 2017-03-08

## 2017-03-08 NOTE — TELEPHONE ENCOUNTER
Spoke with patient he wants to know how long is the recovery time for the procedure he has to have and will he be able to climb stairs. Please advise

## 2017-03-08 NOTE — TELEPHONE ENCOUNTER
----- Message from Annel Odell sent at 3/8/2017  1:13 PM CST -----  Contact: self  Patient would like to speak to a nurse regarding a surgery date   Please call  to advise.    Thanks

## 2017-03-08 NOTE — TELEPHONE ENCOUNTER
Spoke to the patient about yesterday's visit - I was running late due to earlier appointment running behind.  Pt was very disruptive - yelling at staff, pounding on lobby door.  He had another appointment that he had to get to across [facility] at 10:30 am.  He was not charged for visit - he came into my office briefly; he was still able to leave this clinic prior to 9:30 am (which is when this appt was supposed to end).      I have sent a request to  for warning to be sent to patient for disruptive behavior;  I apologized to the patient for any inconvenience to him, but for safety of staff and other patients, this type of behavior is not appropriate in the lobby.      I have discussed with clinic management and pt - he will be offered appts at either 8:00 am or 1:00 pm, to avoid this repeated behavior in the lobby (this has also happened when I am running on time, right when he checks in).      Pt verbalized an understanding - upset that he feels he is being punished.      Pt reports he has been having black outs, seizures, withdrawals.  He was seen yesterday by pain management; I advised him to go to ER - he declined and states PCP is working him in.

## 2017-03-09 DIAGNOSIS — F39 MOOD DISORDER: ICD-10-CM

## 2017-03-09 RX ORDER — CLONAZEPAM 2 MG/1
TABLET ORAL
Qty: 90 TABLET | Refills: 0 | Status: SHIPPED | OUTPATIENT
Start: 2017-03-09 | End: 2017-04-28 | Stop reason: SDUPTHER

## 2017-03-09 NOTE — TELEPHONE ENCOUNTER
Called patient and rescheduled his appointment for 04/20/17 @ 1pm he did not want anything in the end of March because the pain doctor cut his pain medication down again an he will be having withdrawals an seizures an will not be able to drive until next month. Appointment scheduled he will call if cannot make it 04/20/17

## 2017-03-09 NOTE — TELEPHONE ENCOUNTER
We are not planning on performing any procedures on him. When is his next f/u? Has he been catheterizing?

## 2017-03-09 NOTE — TELEPHONE ENCOUNTER
Spoke with patient informed him of recommendations. Patient verbally voiced understanding. Follow up appointment is scheduled for 5/11 with labs on 5/10. Patient states he is still catheterizing off and on

## 2017-03-11 RX ORDER — ZIPRASIDONE HYDROCHLORIDE 20 MG/1
CAPSULE ORAL
Qty: 30 CAPSULE | Refills: 2 | Status: SHIPPED | OUTPATIENT
Start: 2017-03-11 | End: 2017-03-24 | Stop reason: SDUPTHER

## 2017-03-11 RX ORDER — DOXYCYCLINE HYCLATE 100 MG
TABLET ORAL
Qty: 20 TABLET | Refills: 0 | Status: SHIPPED | OUTPATIENT
Start: 2017-03-11 | End: 2017-05-11

## 2017-03-14 ENCOUNTER — DOCUMENTATION ONLY (OUTPATIENT)
Dept: FAMILY MEDICINE | Facility: CLINIC | Age: 67
End: 2017-03-14

## 2017-03-14 ENCOUNTER — TELEPHONE (OUTPATIENT)
Dept: FAMILY MEDICINE | Facility: CLINIC | Age: 67
End: 2017-03-14

## 2017-03-14 NOTE — PROGRESS NOTES
Pre-Visit Chart Review  For Appointment Scheduled on 03/15/2017    Health Maintenance Due   Topic Date Due    Lipid Panel  01/27/2017                 '

## 2017-03-14 NOTE — TELEPHONE ENCOUNTER
----- Message from Rae Rodriguez sent at 3/14/2017  8:29 AM CDT -----  Contact: self  Patient wants to speak with a nurse regarding his appointment tomorrow. Please call back at [phone] (home)

## 2017-03-15 ENCOUNTER — OFFICE VISIT (OUTPATIENT)
Dept: FAMILY MEDICINE | Facility: CLINIC | Age: 67
End: 2017-03-15
Payer: MEDICARE

## 2017-03-15 VITALS
TEMPERATURE: 98 F | WEIGHT: 198.44 LBS | HEART RATE: 80 BPM | BODY MASS INDEX: 31.15 KG/M2 | HEIGHT: 67 IN | DIASTOLIC BLOOD PRESSURE: 72 MMHG | SYSTOLIC BLOOD PRESSURE: 127 MMHG

## 2017-03-15 DIAGNOSIS — K70.30 ALCOHOLIC CIRRHOSIS OF LIVER WITHOUT ASCITES: Primary | ICD-10-CM

## 2017-03-15 DIAGNOSIS — B18.2 HEP C W/O COMA, CHRONIC: ICD-10-CM

## 2017-03-15 DIAGNOSIS — D63.8 ANEMIA, CHRONIC DISEASE: ICD-10-CM

## 2017-03-15 DIAGNOSIS — E78.00 PURE HYPERCHOLESTEROLEMIA: ICD-10-CM

## 2017-03-15 DIAGNOSIS — I10 ESSENTIAL HYPERTENSION: ICD-10-CM

## 2017-03-15 PROCEDURE — 99213 OFFICE O/P EST LOW 20 MIN: CPT | Mod: PBBFAC,PO | Performed by: FAMILY MEDICINE

## 2017-03-15 PROCEDURE — 99213 OFFICE O/P EST LOW 20 MIN: CPT | Mod: S$PBB,,, | Performed by: FAMILY MEDICINE

## 2017-03-15 PROCEDURE — 99999 PR PBB SHADOW E&M-EST. PATIENT-LVL III: CPT | Mod: PBBFAC,,, | Performed by: FAMILY MEDICINE

## 2017-03-15 NOTE — TELEPHONE ENCOUNTER
Patient called Sakshi and he stated it was just psychiatry He would like to be admitted into a detox center. I told him a  will contact him.

## 2017-03-15 NOTE — TELEPHONE ENCOUNTER
----- Message from Michelle Ramirez sent at 3/15/2017  2:08 PM CDT -----  Contact: devang galvan   Wants information   Admit to nursing home  Call back

## 2017-03-15 NOTE — PROGRESS NOTES
Subjective:       Patient ID: Hesham Serna is a 66 y.o. male.    Chief Complaint: COPD; Hypertension; and Anxiety    COPD   This is a chronic problem. Associated symptoms include arthralgias, a change in bowel habit, coughing, fatigue, myalgias, numbness and weakness. Pertinent negatives include no abdominal pain, anorexia, chest pain, chills, congestion, diaphoresis, fever, headaches, joint swelling, nausea, neck pain, rash, sore throat, swollen glands, vertigo or visual change.   Hypertension   This is a chronic problem. The current episode started more than 1 year ago. The problem is controlled. Associated symptoms include anxiety, palpitations and shortness of breath. Pertinent negatives include no chest pain, headaches or neck pain. Agents associated with hypertension include NSAIDs. Risk factors for coronary artery disease include male gender, sedentary lifestyle, obesity and dyslipidemia.     Review of Systems   Constitutional: Positive for fatigue. Negative for activity change, appetite change, chills, diaphoresis, fever and unexpected weight change.   HENT: Positive for hearing loss and postnasal drip. Negative for congestion, drooling, ear discharge, ear pain, mouth sores, nosebleeds, rhinorrhea, sinus pressure, sore throat, tinnitus, trouble swallowing and voice change.    Eyes: Negative.  Negative for pain, discharge, redness, itching and visual disturbance.   Respiratory: Positive for cough and shortness of breath. Negative for apnea, choking and chest tightness.    Cardiovascular: Positive for palpitations. Negative for chest pain and leg swelling.   Gastrointestinal: Positive for abdominal distention, change in bowel habit and constipation. Negative for abdominal pain, anal bleeding, anorexia and nausea.   Endocrine: Negative.  Negative for cold intolerance, heat intolerance, polydipsia, polyphagia and polyuria.   Genitourinary: Negative.  Negative for difficulty urinating, dysuria, enuresis,  flank pain, frequency, hematuria, scrotal swelling, testicular pain and urgency.   Musculoskeletal: Positive for arthralgias and myalgias. Negative for back pain, gait problem, joint swelling, neck pain and neck stiffness.   Skin: Negative.  Negative for color change, pallor and rash.   Allergic/Immunologic: Negative.  Negative for environmental allergies and food allergies.   Neurological: Positive for weakness and numbness. Negative for dizziness, vertigo, tremors, syncope, facial asymmetry, speech difficulty, light-headedness and headaches.   Hematological: Negative for adenopathy. Does not bruise/bleed easily.   Psychiatric/Behavioral: Positive for agitation and sleep disturbance. Negative for behavioral problems, confusion, decreased concentration, dysphoric mood and hallucinations. The patient is nervous/anxious. The patient is not hyperactive.        Objective:      Physical Exam   Constitutional: He is oriented to person, place, and time. He appears well-developed and well-nourished. No distress.   HENT:   Head: Normocephalic and atraumatic.   Right Ear: External ear normal.   Left Ear: External ear normal.   Nose: Nose normal.   Mouth/Throat: Oropharynx is clear and moist. No oropharyngeal exudate.   Eyes: Conjunctivae and EOM are normal. Pupils are equal, round, and reactive to light. Right eye exhibits no discharge. Left eye exhibits no discharge. No scleral icterus.   Neck: Normal range of motion. Neck supple. No JVD present. No tracheal deviation present. No thyromegaly present.   Cardiovascular: Normal rate, normal heart sounds and intact distal pulses.    No murmur heard.  Pulmonary/Chest: Breath sounds normal. He is in respiratory distress. He has no wheezes. He has no rales.   Abdominal: Soft. Bowel sounds are normal. He exhibits no distension and no mass. There is no tenderness. There is no rebound and no guarding.   Musculoskeletal: He exhibits no edema or tenderness.          Lymphadenopathy:      He has no cervical adenopathy.   Neurological: He is alert and oriented to person, place, and time. No cranial nerve deficit. Coordination normal.   Skin: Skin is warm and dry. No rash noted. He is not diaphoretic. No erythema. No pallor.   Psychiatric: His speech is normal. Thought content normal. His mood appears anxious. He is agitated, aggressive and slowed. Cognition and memory are normal. He expresses impulsivity and inappropriate judgment. He exhibits a depressed mood. He is inattentive.   Vitals reviewed.    Stressors are stable, non suicidal.He has poor self controlled, and no social awareness. He has to consider   Assessment:       1. Alcoholic cirrhosis of liver without ascites    2. Hep C w/o coma, chronic    3. Essential hypertension    4. Pure hypercholesterolemia    5. Anemia, chronic disease        Plan:       Alcoholic cirrhosis of liver without ascites  -     Lipid panel; Future; Expected date: 3/15/17  -     Comprehensive metabolic panel; Future; Expected date: 3/15/17  -     CBC auto differential; Future; Expected date: 3/15/17  -     HEPATITIS C RNA, QUANTITATIVE, PCR; Future; Expected date: 3/15/17  -     Ambulatory referral to Outpatient Case Management    Hep C w/o coma, chronic  -     Lipid panel; Future; Expected date: 3/15/17  -     Comprehensive metabolic panel; Future; Expected date: 3/15/17  -     CBC auto differential; Future; Expected date: 3/15/17  -     HEPATITIS C RNA, QUANTITATIVE, PCR; Future; Expected date: 3/15/17  -     Ambulatory referral to Outpatient Case Management    Essential hypertension  -     Lipid panel; Future; Expected date: 3/15/17  -     Comprehensive metabolic panel; Future; Expected date: 3/15/17  -     Ambulatory referral to Outpatient Case Management    Pure hypercholesterolemia  -     Lipid panel; Future; Expected date: 3/15/17  -     Comprehensive metabolic panel; Future; Expected date: 3/15/17  -     Ambulatory referral to Outpatient Case  Management    Anemia, chronic disease  -     CBC auto differential; Future; Expected date: 3/15/17  -     Ambulatory referral to Outpatient Case Management      Patient readiness: acceptance and barriers:readiness    During the course of the visit the patient was educated and counseled about the following:     Hypertension:   Dietary sodium restriction.  Obesity:   General weight loss/lifestyle modification strategies discussed (elicit support from others; identify saboteurs; non-food rewards, etc).    Goals: Hypertension: Reduce Blood Pressure and Obesity: Reduce calorie intake and BMI    Did patient meet goals/outcomes: Yes    The following self management tools provided: blood pressure log  excercise log    Patient Instructions (the written plan) was given to the patient/family.     Time spent with patient: 30 minutes

## 2017-03-15 NOTE — PATIENT INSTRUCTIONS
Unspecified Anemia (Child)  Red blood cells carry oxygen from the lungs to the other tissues and organs in your childs body. Anemia is a condition in which your child has too few red blood cells. Blood with too few red blood cells cant carry enough oxygen to the rest of the body. Anemia is the most common blood disorder in children. Usually, it is not a disease itself. Instead, its a symptom of another problem.  Some children with anemia may not have symptoms. Other children will be irritable and have a poor appetite. They will gain weight more slowly than normal. Other symptoms include:  · Rapid heart rate  · Shortness of breath or difficulty breathing  · Lack of energy or tiredness  · Pale skin color  There are many types of anemia and many causes. Causes include bleeding, nutritional problems, infections, exposure to a drug or toxin, or an inherited disorder.  Anemia is diagnosed with a blood test. During treatment, your child may need several follow-up blood tests. Treatment for anemia depends on its cause. Severe anemia is treated with oxygen and IV transfusions of red blood cells. Your child may need to stay in the hospital for this. Iron supplements may be used for less severe anemia.  Home care  Your childs healthcare provider may prescribe an iron supplement or certain iron-enriched foods. Follow the healthcare provider's instructions for giving your child this medicine or these foods.  General care:  · Learn your childs normal activity and sleep patterns.  · Allow time for frequent and quiet eating. If your child is not eating well, talk with your childs provider or caregiver about techniques that will help.  · For severe anemia, limit activity. A child who has anemia may get tired more easily.  · Let all caregivers and school officials know about your childs condition.  · Watch your child for signs of infection listed below.  Follow-up care  Follow up with your childs healthcare provider, or as  advised. If lab tests were done, they will be reviewed by a specialist. You will be told of any new findings that may affect your childs care.  When to seek medical advice  Call your child's healthcare provider right away if any of these occur:  · Fever greater than 100.4°F (38°C), or as directed  · Paleness or weakness that gets worse  · Trouble breathing  · Continued poor appetite  · Bleeding that wont stop        Date Last Reviewed: 2/21/2016 © 2000-2016 [employer]. [address] [place] [address], [place] [address]. All rights reserved. This information is not intended as a substitute for professional medical care. Always follow your healthcare professional's instructions.        Anxiety Reaction  Anxiety is the feeling we all get when we think something bad might happen. It is a normal response to stress and usually causes only a mild reaction. When anxiety becomes more severe, it can interfere with daily life. In some cases, you may not even be aware of what it is youre anxious about. There may also be a genetic link or it may be a learned behavior in the home.  Both psychological and physical triggers cause stress reaction. It's often a response to fear or emotional stress, real or imagined. This stress may come from home, family, work, or social relationships.  During an anxiety reaction, you may feel:  · Helpless  · Nervous  · Depressed  · Irritable  Your body may show signs of anxiety in many ways. You may experience:  · Dry mouth  · Shakiness  · Dizziness  · Weakness  · Trouble breathing  · Breathing fast (hyperventilating)  · Chest pressure  · Sweating  · Headache  · Nausea  · Diarrhea  · Tiredness  · Inability to sleep  · Sexual problems  Home care  · Try to locate the sources of stress in your life. They may not be obvious. These may include:  ¨ Daily hassles of life (traffic jams, missed appointments, car troubles, etc.)  ¨ Major life changes, both good (new baby, job promotion) and bad  (loss of job, loss of loved one)  ¨ Overload: feeling that you have too many responsibilities and can't take care of all of them at once  ¨ Feeling helpless, feeling that your problems are beyond what youre able to solve  · Notice how your body reacts to stress. Learn to listen to your body signals. This will help you take action before the stress becomes severe.  · When you can, do something about the source of your stress. (Avoid hassles, limit the amount of change that happens in your life at one time and take a break when you feel overloaded).  · Unfortunately, many stressful situations can't be avoided. It is necessary to learn how to better manage stress. There are many proven methods that will reduce your anxiety. These include simple things like exercise, good nutrition and adequate rest. Also, there are certain techniques that are helpful:  ¨ Relaxation  ¨ Breathing exercises  ¨ Visualization  ¨ Biofeedback  ¨ Meditation  For more information about this, consult your doctor or go to a local bookstore and review the many books and tapes available on this subject.  Follow-up care  If you feel that your anxiety is not responding to self-help measures, contact your doctor or make an appointment with a counselor. You may need short-term psychological counseling and temporary medicine to help you manage stress.  Call 911  Call your healthcare provider right away if any of these occur:  · Trouble breathing  · Confusion  · Drowsiness or trouble wakening  · Fainting or loss of consciousness  · Rapid heart rate  · Seizure  · New chest pain that becomes more severe, lasts longer, or spreads into your shoulder, arm, neck, jaw, or back  When to seek medical advice  Call your healthcare provider right away if any of these occur:  · Your symptoms get worse  · Severe headache not relieved by rest and mild pain reliever  Date Last Reviewed: 9/29/2015  © 0868-9114 [employer]. [address] [facility], [facility]  [place] [address]. All rights reserved. This information is not intended as a substitute for professional medical care. Always follow your healthcare professional's instructions.

## 2017-03-20 ENCOUNTER — TELEPHONE (OUTPATIENT)
Dept: PSYCHIATRY | Facility: CLINIC | Age: 67
End: 2017-03-20

## 2017-03-20 NOTE — TELEPHONE ENCOUNTER
Patient calls and request Dr. Queen to call him. Stating its personal and didn't want to discuss with this nurse

## 2017-03-20 NOTE — TELEPHONE ENCOUNTER
I spoke to the patient - he had wanted to let me know that he has scheduled an appointment with Dr Perdue (his former pain management MD).   Pt also expresses concerns about his mother's health, his lack of coverage on current pain meds. His finances, and transportation issues.     Pt has upcoming appt 4/20/17.

## 2017-03-21 ENCOUNTER — OUTPATIENT CASE MANAGEMENT (OUTPATIENT)
Dept: ADMINISTRATIVE | Facility: OTHER | Age: 67
End: 2017-03-21

## 2017-03-21 ENCOUNTER — TELEPHONE (OUTPATIENT)
Dept: FAMILY MEDICINE | Facility: CLINIC | Age: 67
End: 2017-03-21

## 2017-03-21 NOTE — PROGRESS NOTES
Please note the following patient has been assigned to John Loyd LCSW with [facility] for screening.    Please contact [facility] at ext [phone] with questions.    Thank you,    Debora Castillo, SSC

## 2017-03-21 NOTE — TELEPHONE ENCOUNTER
----- Message from Debora Castillo sent at 3/21/2017  3:22 PM CDT -----  Please note the following patient has been assigned to John Loyd LCSW with [facility] for screening.    Alcoholic cirrhosis of liver without ascites [K70.30]  Hep C w/o coma, chronic [B18.2]  Essential hypertension [I10]  Pure hypercholesterolemia [E78.00]  Anemia, chronic disease [D63.8]    Please contact [facility] at [phone] with questions.    Thank you,    Debora Castillo, SSC

## 2017-03-22 ENCOUNTER — TELEPHONE (OUTPATIENT)
Dept: FAMILY MEDICINE | Facility: CLINIC | Age: 67
End: 2017-03-22

## 2017-03-22 DIAGNOSIS — F41.1 GAD (GENERALIZED ANXIETY DISORDER): Primary | ICD-10-CM

## 2017-03-22 NOTE — TELEPHONE ENCOUNTER
----- Message from RT Alice sent at 3/22/2017 11:59 AM CDT -----  Contact: Eliazar, [phone] [facility]  Eliazar, [phone] [facility], requesting a order for pt to received home health services, and the pt states he is B/P has been elevating, pt did not record, thanks.

## 2017-03-22 NOTE — TELEPHONE ENCOUNTER
Received faxed request from [facility] for orders to admit patient to home health. Forms filled out by Dr. Ramos and faxed to pharmacy. Call placed to [facility] with [facility] for notification at [phone], no answer received. Left message regarding.

## 2017-03-23 ENCOUNTER — OUTPATIENT CASE MANAGEMENT (OUTPATIENT)
Dept: ADMINISTRATIVE | Facility: OTHER | Age: 67
End: 2017-03-23

## 2017-03-23 NOTE — PROGRESS NOTES
"3/23/2017:    This LCSWcontacted patient or caregiver regarding referral.  Patient/Caregiver stated there were no needs at this time.  He reported that he was taking a shower and he was "all right".  He asked for my telephone number and reported that he would contact me if needs arise.  "

## 2017-03-24 ENCOUNTER — TELEPHONE (OUTPATIENT)
Dept: FAMILY MEDICINE | Facility: CLINIC | Age: 67
End: 2017-03-24

## 2017-03-24 RX ORDER — ZIPRASIDONE HYDROCHLORIDE 20 MG/1
20 CAPSULE ORAL NIGHTLY
Qty: 30 CAPSULE | Refills: 2 | Status: SHIPPED | OUTPATIENT
Start: 2017-03-24 | End: 2017-06-26 | Stop reason: SDUPTHER

## 2017-03-24 NOTE — TELEPHONE ENCOUNTER
----- Message from Annel Odell sent at 3/24/2017  1:48 PM CDT -----  Contact: self  Patient would like to speak to a nurse regarding medication that he states was sent the the [facility] in [place]  Please call  to advise.     Thanks

## 2017-03-24 NOTE — TELEPHONE ENCOUNTER
Geodon was originally refilled on 3-11-17 at [facility] in [place], [place]. Patient states this medication was sent to the wrong pharmacy. Requesting rx be sent to [facility] in [place], [place]. Placed call to [facility] canceling prescription. Please advise.

## 2017-03-28 ENCOUNTER — TELEPHONE (OUTPATIENT)
Dept: UROLOGY | Facility: CLINIC | Age: 67
End: 2017-03-28

## 2017-03-28 NOTE — TELEPHONE ENCOUNTER
Spoke with patient informed him he is scheduled for labs on may 10th. Pt verbally voiced understanding.

## 2017-03-28 NOTE — TELEPHONE ENCOUNTER
----- Message from Yamile Charles sent at 3/28/2017 12:21 PM CDT -----  Contact: pt  Pt states that nurse Rosa or Nany needs to call him about the blood work appointment...[phone] (home)

## 2017-03-29 DIAGNOSIS — J44.9 CHRONIC OBSTRUCTIVE PULMONARY DISEASE, UNSPECIFIED COPD TYPE: ICD-10-CM

## 2017-03-29 RX ORDER — IPRATROPIUM BROMIDE AND ALBUTEROL SULFATE 2.5; .5 MG/3ML; MG/3ML
3 SOLUTION RESPIRATORY (INHALATION)
Qty: 180 ML | Refills: 3 | Status: SHIPPED | OUTPATIENT
Start: 2017-03-29 | End: 2017-12-08 | Stop reason: SDUPTHER

## 2017-03-29 NOTE — TELEPHONE ENCOUNTER
----- Message from Levar Cox sent at 3/29/2017  1:51 PM CDT -----  Contact: self-   [phone]  Patient called stating he need orders for breathing equipment. Patient asking to speak with the nurse to discuss.Call was placed to the pod.Thanks!

## 2017-04-03 ENCOUNTER — LAB VISIT (OUTPATIENT)
Dept: LAB | Facility: HOSPITAL | Age: 67
End: 2017-04-03
Attending: FAMILY MEDICINE
Payer: MEDICARE

## 2017-04-03 DIAGNOSIS — D63.8 ANEMIA, CHRONIC DISEASE: ICD-10-CM

## 2017-04-03 DIAGNOSIS — B18.2 HEP C W/O COMA, CHRONIC: ICD-10-CM

## 2017-04-03 DIAGNOSIS — I10 ESSENTIAL HYPERTENSION: ICD-10-CM

## 2017-04-03 DIAGNOSIS — K70.30 ALCOHOLIC CIRRHOSIS OF LIVER WITHOUT ASCITES: ICD-10-CM

## 2017-04-03 DIAGNOSIS — E78.00 PURE HYPERCHOLESTEROLEMIA: ICD-10-CM

## 2017-04-03 LAB
ALBUMIN SERPL BCP-MCNC: 3.7 G/DL
ALP SERPL-CCNC: 59 U/L
ALT SERPL W/O P-5'-P-CCNC: 22 U/L
ANION GAP SERPL CALC-SCNC: 10 MMOL/L
AST SERPL-CCNC: 26 U/L
BASOPHILS # BLD AUTO: 0.01 K/UL
BASOPHILS NFR BLD: 0.1 %
BILIRUB SERPL-MCNC: 0.7 MG/DL
BUN SERPL-MCNC: 13 MG/DL
CALCIUM SERPL-MCNC: 9.5 MG/DL
CHLORIDE SERPL-SCNC: 105 MMOL/L
CHOLEST/HDLC SERPL: 2.9 {RATIO}
CO2 SERPL-SCNC: 25 MMOL/L
CREAT SERPL-MCNC: 1 MG/DL
DIFFERENTIAL METHOD: ABNORMAL
EOSINOPHIL # BLD AUTO: 0.1 K/UL
EOSINOPHIL NFR BLD: 1.3 %
ERYTHROCYTE [DISTWIDTH] IN BLOOD BY AUTOMATED COUNT: 13.1 %
EST. GFR  (AFRICAN AMERICAN): >60 ML/MIN/1.73 M^2
EST. GFR  (NON AFRICAN AMERICAN): >60 ML/MIN/1.73 M^2
GLUCOSE SERPL-MCNC: 84 MG/DL
HCT VFR BLD AUTO: 42.3 %
HDL/CHOLESTEROL RATIO: 34.3 %
HDLC SERPL-MCNC: 236 MG/DL
HDLC SERPL-MCNC: 81 MG/DL
HGB BLD-MCNC: 14.2 G/DL
LDLC SERPL CALC-MCNC: 140 MG/DL
LYMPHOCYTES # BLD AUTO: 1.5 K/UL
LYMPHOCYTES NFR BLD: 19.1 %
MCH RBC QN AUTO: 33.8 PG
MCHC RBC AUTO-ENTMCNC: 33.6 %
MCV RBC AUTO: 101 FL
MONOCYTES # BLD AUTO: 0.5 K/UL
MONOCYTES NFR BLD: 5.8 %
NEUTROPHILS # BLD AUTO: 5.8 K/UL
NEUTROPHILS NFR BLD: 73.3 %
NONHDLC SERPL-MCNC: 155 MG/DL
PLATELET # BLD AUTO: 155 K/UL
PMV BLD AUTO: 10 FL
POTASSIUM SERPL-SCNC: 4.3 MMOL/L
PROT SERPL-MCNC: 6.7 G/DL
RBC # BLD AUTO: 4.2 M/UL
SODIUM SERPL-SCNC: 140 MMOL/L
TRIGL SERPL-MCNC: 75 MG/DL
WBC # BLD AUTO: 7.92 K/UL

## 2017-04-03 PROCEDURE — 36415 COLL VENOUS BLD VENIPUNCTURE: CPT | Mod: PO

## 2017-04-03 PROCEDURE — 80053 COMPREHEN METABOLIC PANEL: CPT

## 2017-04-03 PROCEDURE — 85025 COMPLETE CBC W/AUTO DIFF WBC: CPT

## 2017-04-03 PROCEDURE — 87522 HEPATITIS C REVRS TRNSCRPJ: CPT

## 2017-04-03 PROCEDURE — 80061 LIPID PANEL: CPT

## 2017-04-06 LAB
HCV LOG: 6.49 LOG (10) IU/ML
HCV RNA QUANT PCR: ABNORMAL IU/ML
HCV, QUALITATIVE: DETECTED IU/ML

## 2017-04-07 ENCOUNTER — TELEPHONE (OUTPATIENT)
Dept: FAMILY MEDICINE | Facility: CLINIC | Age: 67
End: 2017-04-07

## 2017-04-07 NOTE — TELEPHONE ENCOUNTER
Received Client Coordination Note Report from [facility] stating patient's pain level is a 9. His pain level is at an unacceptable level and must be reported to MD. Dr. Ramos reviewed this form and recommended the following: Patient has a pain doctor. He needs another technique to reduce his pain besides medication. Form faxed back to [facility] for notification.

## 2017-04-09 DIAGNOSIS — L71.9 ROSACEA: ICD-10-CM

## 2017-04-09 DIAGNOSIS — L03.115 CELLULITIS OF RIGHT LOWER EXTREMITY: ICD-10-CM

## 2017-04-09 RX ORDER — CLINDAMYCIN HYDROCHLORIDE 300 MG/1
CAPSULE ORAL
Qty: 30 CAPSULE | Refills: 0 | OUTPATIENT
Start: 2017-04-09

## 2017-04-10 ENCOUNTER — TELEPHONE (OUTPATIENT)
Dept: FAMILY MEDICINE | Facility: CLINIC | Age: 67
End: 2017-04-10

## 2017-04-10 RX ORDER — CELECOXIB 200 MG/1
200 CAPSULE ORAL DAILY
Qty: 30 CAPSULE | Refills: 3 | Status: SHIPPED | OUTPATIENT
Start: 2017-04-10 | End: 2017-04-10 | Stop reason: SDUPTHER

## 2017-04-10 RX ORDER — SILODOSIN 8 MG/1
CAPSULE ORAL
Qty: 90 CAPSULE | Refills: 3 | OUTPATIENT
Start: 2017-04-10

## 2017-04-10 RX ORDER — CELECOXIB 100 MG/1
200 CAPSULE ORAL 2 TIMES DAILY
Qty: 180 CAPSULE | Refills: 3 | Status: SHIPPED | OUTPATIENT
Start: 2017-04-10 | End: 2017-05-23 | Stop reason: SDUPTHER

## 2017-04-10 NOTE — TELEPHONE ENCOUNTER
Dr. Ramos spoke with patient regarding refill/order for Celecoxib. States will send new prescription to pharmacy.

## 2017-04-10 NOTE — TELEPHONE ENCOUNTER
Patient requesting a refill of Celecoxib. Patient requesting the prescription be written as 100mg capsules.  LR--1-27-17  LOV--3-15-17  FOV--None Noted

## 2017-04-10 NOTE — TELEPHONE ENCOUNTER
----- Message from Janki Downs sent at 4/10/2017  2:50 PM CDT -----  Contact: Patient  Patient states you have sent the Pain medication was sent as 200 mg per tab.  Patient is very distressed because the pharmacy wont take it back and the insurance will not pay for another prescription. Misti please call patient [phone] asap.

## 2017-04-11 ENCOUNTER — TELEPHONE (OUTPATIENT)
Dept: FAMILY MEDICINE | Facility: CLINIC | Age: 67
End: 2017-04-11

## 2017-04-11 NOTE — TELEPHONE ENCOUNTER
----- Message from Negrita Vazquez sent at 4/11/2017  8:59 AM CDT -----  Contact: Tari  Questions regarding Celebrex prescription.  Please call back at .

## 2017-04-11 NOTE — TELEPHONE ENCOUNTER
Received message from pharmacy requesting clarification of Celebrex order. Spoke with Dr. Ramos who states order should read Celebrex 100mg take 1 capsule twice daily. Clarification given to pharmacy as above.

## 2017-04-17 ENCOUNTER — TELEPHONE (OUTPATIENT)
Dept: PSYCHIATRY | Facility: CLINIC | Age: 67
End: 2017-04-17

## 2017-04-17 ENCOUNTER — TELEPHONE (OUTPATIENT)
Dept: UROLOGY | Facility: CLINIC | Age: 67
End: 2017-04-17

## 2017-04-17 RX ORDER — LACTULOSE 20 G/20G
POWDER, FOR SOLUTION ORAL
Qty: 30 PACKET | Refills: 4 | Status: SHIPPED | OUTPATIENT
Start: 2017-04-17 | End: 2017-05-23 | Stop reason: SDUPTHER
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
## 2017-04-17 NOTE — TELEPHONE ENCOUNTER
Please advise the patient that I am not sure what medication he is referring to.  He is already taking high dose benzodiazepine from Dr Ramos, so I cannot call in alprazolam if that is the medication in question.     I can increase his buspirone for anxiety - please confirm what dose he is taking and notify him that there is plenty of room for increase.      Pt had appt 4/20/17 that was cancelled?  Ok to offer pt 1:00 pm appointment tomorrow if he can come.

## 2017-04-17 NOTE — TELEPHONE ENCOUNTER
"Called and spoke with patient. Who spoke loudly. Very anxious. Stated he had to speak with Dr. Queen- "This is the worst I'v been in 3 years." Stated he needed Dr. Queen to call in his medication that started with "A"   Asked if it was alprazolam and he did not know the name.    Patient initially hung up on me when I let him know Dr. Queen was off. When I called back he was short and spoke over me. He just wanted to speak with Dr. Queen so she will call in his medication that starts with "A"    Unable to get patient to describe his feelings or tell me what was going on. He just wanted the medication  Please advise  "

## 2017-04-17 NOTE — TELEPHONE ENCOUNTER
Patient called an left message on the voice mail stating he was having a lot more attacks an has to speak to the doctor. I am forwarding message to Rae PEREIRA so she can call patient and see what he needs to speak to Dr Queen  About so she can send message to doctor.  Please call

## 2017-04-17 NOTE — TELEPHONE ENCOUNTER
Returned call to patient, informed him that the MD wants him to do his lab work the day before so that she can have the results before his appt. Patient verbally understood.

## 2017-04-17 NOTE — TELEPHONE ENCOUNTER
----- Message from Sandra Montoya sent at 4/17/2017 12:53 PM CDT -----  Contact: Patient  Hesham, patient [phone], Patient is requesting to have lab appointment moved to the same day as the doctors appointment. Please advise. Thanks. Call to POD.

## 2017-04-17 NOTE — TELEPHONE ENCOUNTER
Noted - pt has been very resistant to all of my medication recommendations as well as interventions for psychotherapy such as IOP.  Unfortunately, this resistance has limited his ability to have adequate treatment of his anxiety;  He has not met criteria for PEC in office; he is low functioning, but not to the point of grave disability requiring acute inpatient care on commitment status.

## 2017-04-18 ENCOUNTER — TELEPHONE (OUTPATIENT)
Dept: PSYCHIATRY | Facility: CLINIC | Age: 67
End: 2017-04-18

## 2017-04-18 RX ORDER — BUSPIRONE HYDROCHLORIDE 5 MG/1
5 TABLET ORAL 2 TIMES DAILY
Qty: 60 TABLET | Refills: 1 | Status: SHIPPED | OUTPATIENT
Start: 2017-04-18 | End: 2017-08-17 | Stop reason: SINTOL

## 2017-04-18 NOTE — TELEPHONE ENCOUNTER
Patient called at 11:40 am an left message on voice mail wanting a call back from Rae PEREIRA to see if Dr Queen called in medication for him.   Please call

## 2017-04-18 NOTE — TELEPHONE ENCOUNTER
"I was able to reach the patient - he is requesting a refill of Buspar "the medication that starts with a B."  Pt reports high stress and anxiety related to his mother's illness and chronic pain, his lack of transportation, his pain medication regimen (pt now under the care of Dr Predue again) and medical and dental issues.  Dr Perdue has kept the dose of Methadone the same, but gave him a "few extra."  Pt has appt here 5/18/17 - he is unable to come sooner due to limited transportation and need to see other MDs.  He is unable to state how long he has been out of Buspar.  He was not taking it daily despite my recommendations to take it BID for more effective treatment of his anxiety.  "You know we have discussed this."       [facility] of Buspar [facility] to [facility] per his request.   "

## 2017-04-24 RX ORDER — MIRABEGRON 50 MG/1
TABLET, FILM COATED, EXTENDED RELEASE ORAL
Qty: 90 TABLET | Refills: 3 | Status: SHIPPED | OUTPATIENT
Start: 2017-04-24 | End: 2017-07-17 | Stop reason: ALTCHOICE

## 2017-04-26 ENCOUNTER — TELEPHONE (OUTPATIENT)
Dept: FAMILY MEDICINE | Facility: CLINIC | Age: 67
End: 2017-04-26

## 2017-04-26 DIAGNOSIS — F39 MOOD DISORDER: ICD-10-CM

## 2017-04-26 RX ORDER — CLONAZEPAM 2 MG/1
TABLET ORAL
Qty: 90 TABLET | Refills: 0 | OUTPATIENT
Start: 2017-04-26
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
## 2017-04-26 NOTE — TELEPHONE ENCOUNTER
----- Message from Erinn Valadez sent at 4/26/2017  1:11 PM CDT -----  [facility] [phone] is calling concerning making a medication recommendation. He needs a refill Klonipin. He has a lot of insomia. Nurse would like to start him on Travodone 75 mg as needed. Please call for details or submit at:      [facility] [id number] [place] [address] AT [facility] [address]  [address] [place] [address]  Phone: [phone] Fax: [phone]    Please call patient if submitting prescription at [phone].

## 2017-04-26 NOTE — TELEPHONE ENCOUNTER
Spoke to home health nurse who states patient is experiencing insomnia. Requesting an order for Trazodone 75mg. Please advise.

## 2017-04-27 RX ORDER — TRAZODONE HYDROCHLORIDE 50 MG/1
50 TABLET ORAL NIGHTLY PRN
Qty: 30 TABLET | Refills: 1 | Status: SHIPPED | OUTPATIENT
Start: 2017-04-27 | End: 2017-08-17
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
## 2017-04-28 RX ORDER — CLONAZEPAM 2 MG/1
TABLET ORAL
Qty: 90 TABLET | Refills: 0 | Status: SHIPPED | OUTPATIENT
Start: 2017-04-28 | End: 2017-05-10 | Stop reason: SDUPTHER

## 2017-04-28 NOTE — TELEPHONE ENCOUNTER
Prescriptions for Klonopin and Trazodone sent to pharmacy by Dr. Ramos. Received confirmed pharmacy on 4-28-17 at 9:38am. Patient notified. Verbalized understanding.

## 2017-05-02 ENCOUNTER — NURSE TRIAGE (OUTPATIENT)
Dept: ADMINISTRATIVE | Facility: CLINIC | Age: 67
End: 2017-05-02

## 2017-05-02 ENCOUNTER — TELEPHONE (OUTPATIENT)
Dept: FAMILY MEDICINE | Facility: CLINIC | Age: 67
End: 2017-05-02

## 2017-05-02 NOTE — TELEPHONE ENCOUNTER
----- Message from Negrita Vazquez sent at 5/2/2017  8:28 AM CDT -----  Contact: self  Call placed to pod.  Call connected to on call nurse. Patient took some meds and was told by Poison Control to contact doctor.  Please call back at [phone] (home)

## 2017-05-02 NOTE — TELEPHONE ENCOUNTER
Reason for Disposition   Information only question and nurse able to answer    Protocols used: ST NO PROTOCOL AVAILABLE - INFORMATION ONLY-A-[place]  pt called poison control after having to use his albuterol inhaler. They instructed him to skip one of his breathing treatments due to this and the resume his normal scheduled treatments. Pt called us to find out if we agreed. Pt was advised to follow whatever poison control suggested

## 2017-05-02 NOTE — TELEPHONE ENCOUNTER
Patient states he was short of breath this morning, patient used rescue inhaler and then called poison control to ask if he should continue with scheduled nebulizer treatment. Patient was told by poison control to skip a dose of his nebulizer treatment and then resume as normal. Patient states he would like a prescription for ear drops called into pharmacy due to waking up with fluid in his ear. Attempted to schedule appointment, patient states he does not have a car at the moment and can not attend an appointment. Please advise.

## 2017-05-03 ENCOUNTER — TELEPHONE (OUTPATIENT)
Dept: FAMILY MEDICINE | Facility: CLINIC | Age: 67
End: 2017-05-03

## 2017-05-03 NOTE — TELEPHONE ENCOUNTER
Appointment scheduled by Jeni Conde MA for 5-10-17. Patient aware and agreed to appointment date and time.

## 2017-05-03 NOTE — TELEPHONE ENCOUNTER
Patient notified of recommendations per Dr. Ramos regarding SOB and ear congestion. Patient verbalized understanding.

## 2017-05-03 NOTE — TELEPHONE ENCOUNTER
----- Message from Sandra Montoya sent at 5/3/2017  9:45 AM CDT -----  Contact: Patient  Hesham, patient [phone], Returning the nurse's call. Call to POD. Please advise. Thanks.  Wanting an appointment after lab work at 9:30am on 5/10/17 , around 10:30AM for an ear infection. Offered patient other providers, but wants Dr Ramos on same day, travels from [place].

## 2017-05-08 ENCOUNTER — TELEPHONE (OUTPATIENT)
Dept: UROLOGY | Facility: CLINIC | Age: 67
End: 2017-05-08

## 2017-05-08 NOTE — TELEPHONE ENCOUNTER
----- Message from Regina Taveras sent at 5/5/2017  2:16 PM CDT -----  Contact:  call /[phone]    Calling  To  Speak to the  Rosa// please call for details

## 2017-05-08 NOTE — TELEPHONE ENCOUNTER
Spoke with patient wants to have labs done early on 5/10. Labs rescheduled. Patient verbally voiced understanding.

## 2017-05-09 ENCOUNTER — TELEPHONE (OUTPATIENT)
Dept: FAMILY MEDICINE | Facility: CLINIC | Age: 67
End: 2017-05-09

## 2017-05-09 ENCOUNTER — DOCUMENTATION ONLY (OUTPATIENT)
Dept: FAMILY MEDICINE | Facility: CLINIC | Age: 67
End: 2017-05-09

## 2017-05-09 ENCOUNTER — LAB VISIT (OUTPATIENT)
Dept: LAB | Facility: HOSPITAL | Age: 67
End: 2017-05-09
Attending: UROLOGY
Payer: MEDICARE

## 2017-05-09 DIAGNOSIS — R33.9 URINARY RETENTION: ICD-10-CM

## 2017-05-09 DIAGNOSIS — N52.9 ERECTILE DYSFUNCTION, UNSPECIFIED ERECTILE DYSFUNCTION TYPE: ICD-10-CM

## 2017-05-09 DIAGNOSIS — N40.0 BENIGN PROSTATIC HYPERPLASIA, PRESENCE OF LOWER URINARY TRACT SYMPTOMS UNSPECIFIED, UNSPECIFIED MORPHOLOGY: ICD-10-CM

## 2017-05-09 LAB — COMPLEXED PSA SERPL-MCNC: 0.45 NG/ML

## 2017-05-09 PROCEDURE — 36415 COLL VENOUS BLD VENIPUNCTURE: CPT

## 2017-05-09 PROCEDURE — 84153 ASSAY OF PSA TOTAL: CPT

## 2017-05-09 NOTE — TELEPHONE ENCOUNTER
"Patient requesting a call from nurse. Call placed to patient who states he had his labs drawn today and wanted to be seen by Dr. Ramos on today. Informed patient his appointment was scheduled for 5-10-17. Patient verbalized understanding. States "It never dawned on me that I was a day early."   "

## 2017-05-09 NOTE — TELEPHONE ENCOUNTER
----- Message from Annel Odell sent at 5/9/2017 11:25 AM CDT -----  Contact: self  Patient would like to speak to a nurse   He is in [place] today    Please call  [phone]    Thanks

## 2017-05-09 NOTE — PATIENT INSTRUCTIONS
Controlling High Blood Pressure  High blood pressure (hypertension) is often called the silent killer. This is because many people who have it dont know it. High blood pressure is defined as 140/90 mm Hg or higher. Know your blood pressure and remember to check it regularly. Doing so can save your life. Here are some things you can do to help control your blood pressure.    Choose heart-healthy foods  · Select low-salt, low-fat foods. Limit sodium intake to 2,400 mg per day or the amount suggested by your healthcare provider.  · Limit canned, dried, cured, packaged, and fast foods. These can contain a lot of salt.  · Eat 8 to 10 servings of fruits and vegetables every day.  · Choose lean meats, fish, or chicken.  · Eat whole-grain pasta, brown rice, and beans.  · Eat 2 to 3 servings of low-fat or fat-free dairy products.  · Ask your doctor about the DASH eating plan. This plan helps reduce blood pressure.  · When you go to a restaurant, ask that your meal be prepared with no added salt.  Maintain a healthy weight  · Ask your healthcare provider how many calories to eat a day. Then stick to that number.  · Ask your healthcare provider what weight range is healthiest for you. If you are overweight, a weight loss of only 3% to 5% of your body weight can help lower blood pressure. Generally, a good weight loss goal is to lose 10% of your body weight in a year.  · Limit snacks and sweets.  · Get regular exercise.  Get up and get active  · Choose activities you enjoy. Find ones you can do with friends or family. This includes bicycling, dancing, walking, and jogging.  · Park farther away from building entrances.  · Use stairs instead of the elevator.  · When you can, walk or bike instead of driving.  · [place] leaves, garden, or do household repairs.  · Be active at a moderate to vigorous level of physical activity for at least 40 minutes for a minimum of 3 to 4 days a week.   Manage stress  · Make time to relax and enjoy  life. Find time to laugh.  · Communicate your concerns with your loved ones and your healthcare provider.  · Visit with family and friends, and keep up with hobbies.  Limit alcohol and quit smoking  · Men should have no more than 2 drinks per day.  · Women should have no more than 1 drink per day.  · Talk with your healthcare provider about quitting smoking. Smoking significantly increases your risk for heart disease and stroke. Ask your healthcare provider about community smoking cessation programs and other options.  Medicines  If lifestyle changes arent enough, your healthcare provider may prescribe high blood pressure medicine. Take all medicines as prescribed. If you have any questions about your medicines, ask your healthcare provider before stopping or changing them.   Date Last Reviewed: 4/27/2016 © 2000-2016 [employer]. [address] [place] [address], [place] [address]. All rights reserved. This information is not intended as a substitute for professional medical care. Always follow your healthcare professional's instructions.        Weight Management: Getting Started  Healthy bodies come in all shapes and sizes. Not all bodies are made to be thin. For some people, a healthy weight is higher than the average weight listed on weight charts. Your healthcare provider can help you decide on a healthy weight for you.    Reasons to lose weight  Losing weight can help with some health problems, such as high blood pressure, heart disease, diabetes, sleep apnea, and arthritis. You may also feel more energy.  Set your long-term goal  Your goal doesn't even have to be a specific weight. You may decide on a fitness goal (such as being able to walk 10 miles a week), or a health goal (such as lowering your blood pressure). Choose a goal that is measurable and reasonable, so you know when you've reached it. A goal of reaching a BMI of less than 25 is not always reasonable (or possible).   Make an action  plan  Habits dont change overnight. Setting your goals too high can leave you feeling discouraged if you cant reach them. Be realistic. Choose one or two small changes you can make now. Set an action plan for how you are going to make these changes. When you can stick to this plan, keep making a few more small changes. Taking small steps will help you stay on the path to success.  Track your progress  Write down your goals. Then, keep a daily record of your progress. Write down what you eat and how active you are. This record lets you look back on how much youve done. It may also help when youre feeling frustrated. Reward yourself for success. Even if you dont reach every goal, give yourself credit for what you do get done.  Get support  Encouragement from others can help make losing weight easier. Ask your family members and friends for support. They may even want to join you. Also look to your healthcare provider, registered dietitian, and  for help. Your local hospital can give you more information about nutrition, exercise, and weight loss.  Date Last Reviewed: 1/31/2016 © 2000-2016 [employer]. [address] [place] [address]. All rights reserved. This information is not intended as a substitute for professional medical care. Always follow your healthcare professional's instructions.        Walking for Fitness  Fitness walking has something for everyone, even people who are already fit. Walking is one of the safest ways to condition your body aerobically. It can boost energy, help you lose weight, and reduce stress.    Physical benefits  · Walking strengthens your heart and lungs, and tones your muscles.  · When walking, your feet land with less impact than in other sports. This reduces chances of muscle, bone, and joint injury.  · Regular walking improves your cholesterol levels and lowers your risk of heart disease. And it helps you control your blood sugar if  you have diabetes.  · Walking is a weight-bearing activity, which helps maintain bone density. This can help prevent osteoporosis.  Personal rewards  · Taking walks can help you relax and manage stress. And fitness walking may make you feel better about yourself.  · Walking can help you sleep better at night and make you less likely to be depressed.  · Regular walking may help maintain your memory as you get older.  · Walking is a great way to spend extra time with friends and family members. Be sure to invite your dog along!  Q&A about fitness walking  Q: Will walking keep me fit?  A: Yes. Regular walking at the right pace gives you all the benefits of other aerobic activities, such as jogging and swimming.  Q: Will walking help me lose weight and keep it off?  A: Yes. Per mile, walking can burn as many calories as jogging. Your health care provider can help work walking into your weight-loss plan.  Q: Is walking safe for my health?  A: Yes. Walking is safe if you have high blood pressure, diabetes, heart disease, or other conditions. Talk to your health care provider before you start.  Date Last Reviewed: 5/9/2015  © 5182-0007 [employer]. [address] [place] [address], [place] [address]. All rights reserved. This information is not intended as a substitute for professional medical care. Always follow your healthcare professional's instructions.

## 2017-05-09 NOTE — PROGRESS NOTES
Pre-Visit Chart Review  For Appointment Scheduled on 05/10/2017    There are no preventive care reminders to display for this patient.

## 2017-05-10 ENCOUNTER — OFFICE VISIT (OUTPATIENT)
Dept: FAMILY MEDICINE | Facility: CLINIC | Age: 67
End: 2017-05-10
Payer: MEDICARE

## 2017-05-10 VITALS
WEIGHT: 195.56 LBS | SYSTOLIC BLOOD PRESSURE: 137 MMHG | HEART RATE: 76 BPM | HEIGHT: 67 IN | DIASTOLIC BLOOD PRESSURE: 74 MMHG | BODY MASS INDEX: 30.69 KG/M2

## 2017-05-10 DIAGNOSIS — F31.32 BIPOLAR AFFECTIVE DISORDER, CURRENTLY DEPRESSED, MODERATE: ICD-10-CM

## 2017-05-10 DIAGNOSIS — F41.1 ANXIETY STATE: ICD-10-CM

## 2017-05-10 DIAGNOSIS — F39 MOOD DISORDER: ICD-10-CM

## 2017-05-10 DIAGNOSIS — I10 ESSENTIAL HYPERTENSION: Primary | ICD-10-CM

## 2017-05-10 DIAGNOSIS — J44.1 COPD WITH ACUTE EXACERBATION: ICD-10-CM

## 2017-05-10 DIAGNOSIS — K74.60 CIRRHOSIS OF LIVER WITHOUT ASCITES, UNSPECIFIED HEPATIC CIRRHOSIS TYPE: ICD-10-CM

## 2017-05-10 PROCEDURE — 99213 OFFICE O/P EST LOW 20 MIN: CPT | Mod: PBBFAC,PO | Performed by: FAMILY MEDICINE

## 2017-05-10 PROCEDURE — 99213 OFFICE O/P EST LOW 20 MIN: CPT | Mod: S$PBB,,, | Performed by: FAMILY MEDICINE

## 2017-05-10 PROCEDURE — 99999 PR PBB SHADOW E&M-EST. PATIENT-LVL III: CPT | Mod: PBBFAC,,, | Performed by: FAMILY MEDICINE

## 2017-05-10 RX ORDER — CLONAZEPAM 2 MG/1
TABLET ORAL
Qty: 90 TABLET | Refills: 0 | Status: SHIPPED | OUTPATIENT
Start: 2017-05-24 | End: 2017-05-23 | Stop reason: SDUPTHER

## 2017-05-10 RX ORDER — FLUOCINONIDE 0.5 MG/G
CREAM TOPICAL
Status: ON HOLD | COMMUNITY
Start: 2017-03-31 | End: 2018-09-08

## 2017-05-10 RX ORDER — SPIRONOLACTONE 25 MG/1
25 TABLET ORAL DAILY
Qty: 30 TABLET | Refills: 11 | Status: SHIPPED | OUTPATIENT
Start: 2017-05-10 | End: 2017-09-12 | Stop reason: SDUPTHER

## 2017-05-10 RX ORDER — KETOCONAZOLE 20 MG/ML
SHAMPOO, SUSPENSION TOPICAL
COMMUNITY
Start: 2017-04-06 | End: 2018-05-24 | Stop reason: ALTCHOICE

## 2017-05-10 NOTE — MR AVS SNAPSHOT
[facility]  [address]  [place] [address]  Phone: [phone]  Fax: [phone]                  Hesham Serna   5/10/2017 10:00 AM   Office Visit    Description:  Male : 1950   Provider:  Samuel Ramos MD   Department:  [facility]           Diagnoses this Visit        Comments    Essential hypertension    -  Primary     Cirrhosis of liver without ascites, unspecified hepatic cirrhosis type         Anxiety state         Bipolar affective disorder, currently depressed, moderate         COPD with acute exacerbation         Mood disorder                To Do List           Future Appointments        Provider Department Dept Phone    2017 10:30 AM Negrita Castillo MD [facility] [phone]    2017 9:40 AM Kareem Randle MD [facility] Pulmonary [phone]    2017 10:40 AM Samuel Ramos MD [facility] [phone]      Goals (5 Years of Data)     None      Follow-Up and Disposition     Return in about 4 months (around 9/10/2017).       These Medications        Disp Refills Start End    spironolactone (ALDACTONE) 25 MG tablet 30 tablet 11 5/10/2017 5/10/2018    Take 1 tablet (25 mg total) by mouth once daily. - Oral    Pharmacy: [facility] [address] [place] [address] [facility] [address] Ph #: [phone]       clonazePAM (KLONOPIN) 2 MG Tab 90 tablet 0 2017     TAKE 1 TABLET (2 MG TOTAL) BY MOUTH 3 (THREE) TIMES DAILY AS NEEDED.    [facility] [address] [place] [address] [facility] [address] Ph #: [phone]         [facility]     [facility] Nurse Care Line -  Assistance  Unless otherwise directed by your provider, please contact [facility], our nurse care line that is available for  assistance.     Registered nurses in the [facility] provide: appointment scheduling, clinical advisement, health  education, and other advisory services.  Call: [phone] (toll free)               Medications           Message regarding Medications     Verify the changes and/or additions to your medication regime listed below are the same as discussed with your clinician today.  If any of these changes or additions are incorrect, please notify your healthcare provider.        STOP taking these medications     triamcinolone acetonide 0.1% (KENALOG) 0.1 % cream     clobetasol (OLUX) 0.05 % Foam     clobetasol 0.05% (TEMOVATE) 0.05 % Oint            Verify that the below list of medications is an accurate representation of the medications you are currently taking.  If none reported, the list may be blank. If incorrect, please contact your healthcare provider. Carry this list with you in case of emergency.           Current Medications     albuterol 90 mcg/actuation inhaler Inhale 2 puffs into the lungs every 6 (six) hours as needed for Wheezing.    albuterol-ipratropium 2.5mg-0.5mg/3mL (DUO-NEB) 0.5 mg-3 mg(2.5 mg base)/3 mL nebulizer solution Take 3 mLs by nebulization every 6 (six) hours while awake.    AMITIZA 8 mcg Cap TAKE 1 CAPSULE BY MOUTH TWICE DAILY WITH FOOD    busPIRone (BUSPAR) 5 MG Tab Take 1 tablet (5 mg total) by mouth 2 (two) times daily.    celecoxib (CELEBREX) 100 MG capsule Take 2 capsules (200 mg total) by mouth 2 (two) times daily.    clindamycin (CLEOCIN T) 1 % lotion As directed    clonazePAM (KLONOPIN) 2 MG Tab Starting on May 24, 2017. TAKE 1 TABLET (2 MG TOTAL) BY MOUTH 3 (THREE) TIMES DAILY AS NEEDED.    doxycycline (VIBRA-TABS) 100 MG tablet TAKE 1 TABLET (100 MG TOTAL) BY MOUTH 2 (TWO) TIMES DAILY.    econazole nitrate 1 % cream Apply to feet twice daily    fluocinonide 0.05% (LIDEX) 0.05 % cream     fluticasone (FLONASE) 50 mcg/actuation nasal spray SNIFF 1 SPRAY INTO EACH NOSTRIL ONCE DAILY    fluticasone-salmeterol 500-50 mcg/dose (ADVAIR) 500-50 mcg/dose DsDv diskus inhaler Inhale 1 puff into  "the lungs 2 (two) times daily.    gentamicin (GARAMYCIN) 0.1 % ointment     ketoconazole (NIZORAL) 2 % shampoo     KRISTALOSE 20 gram Pack TAKE ONE PACKET ONCE DAILY BY MOUTH AS DIRECTED    ledipasvir-sofosbuvir  mg Tab Take 1 tablet by mouth once daily.    methadone (DOLOPHINE) 10 MG tablet Take 1 tablet (10 mg total) by mouth every 6 (six) hours as needed. Two tablets every morning, two tablets every 12 pm, one-two tablets every evening    MYRBETRIQ 50 mg Tb24 TAKE ONE TABLET BY MOUTH ONCE DAILY    oxycodone (ROXICODONE) 20 mg Tab immediate release tablet     predniSONE (DELTASONE) 10 MG tablet Take 10 mg by mouth once daily.    silodosin (RAPAFLO) 8 mg Cap capsule Take 1 capsule (8 mg total) by mouth once daily.    spironolactone (ALDACTONE) 25 MG tablet Take 1 tablet (25 mg total) by mouth once daily.    trazodone (DESYREL) 50 MG tablet Take 1 tablet (50 mg total) by mouth nightly as needed for Insomnia.    ziprasidone (GEODON) 20 MG Cap Take 1 capsule (20 mg total) by mouth every evening.           Clinical Reference Information           Your Vitals Were     BP Pulse Height Weight BMI    137/74 76 5' 7" (1.702 m) 88.7 kg (195 lb 8.8 oz) 30.63 kg/m2      Blood Pressure          Most Recent Value    BP  137/74      Allergies as of 5/10/2017     Codeine    Morphine    Bactrim [Sulfamethoxazole-trimethoprim]    Ciprofloxacin      Immunizations Administered on Date of Encounter - 5/10/2017     None      [facility] Sign-Up     Activating your [facility] account is as easy as 1-2-3!     1) Visit [email or web address], select Sign Up Now, enter this activation code and your date of birth, then select Next.  Activation code not generated  Current Patient Portal Status: Account disabled      2) Create a username and password to use when you visit [facility] in the future and select a security question in case you lose your password and select Next.    3) Enter your e-mail address and click Sign Up!    Additional " Information  If you have questions, please e-mail [email or web address] or call [phone] to talk to our [facility] staff. Remember, [facility] is NOT to be used for urgent needs. For medical emergencies, dial 911.         Instructions      Controlling High Blood Pressure  High blood pressure (hypertension) is often called the silent killer. This is because many people who have it dont know it. High blood pressure is defined as 140/90 mm Hg or higher. Know your blood pressure and remember to check it regularly. Doing so can save your life. Here are some things you can do to help control your blood pressure.    Choose heart-healthy foods  · Select low-salt, low-fat foods. Limit sodium intake to 2,400 mg per day or the amount suggested by your healthcare provider.  · Limit canned, dried, cured, packaged, and fast foods. These can contain a lot of salt.  · Eat 8 to 10 servings of fruits and vegetables every day.  · Choose lean meats, fish, or chicken.  · Eat whole-grain pasta, brown rice, and beans.  · Eat 2 to 3 servings of low-fat or fat-free dairy products.  · Ask your doctor about the DASH eating plan. This plan helps reduce blood pressure.  · When you go to a restaurant, ask that your meal be prepared with no added salt.  Maintain a healthy weight  · Ask your healthcare provider how many calories to eat a day. Then stick to that number.  · Ask your healthcare provider what weight range is healthiest for you. If you are overweight, a weight loss of only 3% to 5% of your body weight can help lower blood pressure. Generally, a good weight loss goal is to lose 10% of your body weight in a year.  · Limit snacks and sweets.  · Get regular exercise.  Get up and get active  · Choose activities you enjoy. Find ones you can do with friends or family. This includes bicycling, dancing, walking, and jogging.  · Park farther away from building entrances.  · Use stairs instead of the elevator.  · When you can, walk or bike  instead of driving.  · [place] leaves, garden, or do household repairs.  · Be active at a moderate to vigorous level of physical activity for at least 40 minutes for a minimum of 3 to 4 days a week.   Manage stress  · Make time to relax and enjoy life. Find time to laugh.  · Communicate your concerns with your loved ones and your healthcare provider.  · Visit with family and friends, and keep up with hobbies.  Limit alcohol and quit smoking  · Men should have no more than 2 drinks per day.  · Women should have no more than 1 drink per day.  · Talk with your healthcare provider about quitting smoking. Smoking significantly increases your risk for heart disease and stroke. Ask your healthcare provider about community smoking cessation programs and other options.  Medicines  If lifestyle changes arent enough, your healthcare provider may prescribe high blood pressure medicine. Take all medicines as prescribed. If you have any questions about your medicines, ask your healthcare provider before stopping or changing them.   Date Last Reviewed: 4/27/2016  © 5312-6524 [employer]. [address] [place] [address]. All rights reserved. This information is not intended as a substitute for professional medical care. Always follow your healthcare professional's instructions.        Weight Management: Getting Started  Healthy bodies come in all shapes and sizes. Not all bodies are made to be thin. For some people, a healthy weight is higher than the average weight listed on weight charts. Your healthcare provider can help you decide on a healthy weight for you.    Reasons to lose weight  Losing weight can help with some health problems, such as high blood pressure, heart disease, diabetes, sleep apnea, and arthritis. You may also feel more energy.  Set your long-term goal  Your goal doesn't even have to be a specific weight. You may decide on a fitness goal (such as being able to walk 10 miles a week), or a  health goal (such as lowering your blood pressure). Choose a goal that is measurable and reasonable, so you know when you've reached it. A goal of reaching a BMI of less than 25 is not always reasonable (or possible).   Make an action plan  Habits dont change overnight. Setting your goals too high can leave you feeling discouraged if you cant reach them. Be realistic. Choose one or two small changes you can make now. Set an action plan for how you are going to make these changes. When you can stick to this plan, keep making a few more small changes. Taking small steps will help you stay on the path to success.  Track your progress  Write down your goals. Then, keep a daily record of your progress. Write down what you eat and how active you are. This record lets you look back on how much youve done. It may also help when youre feeling frustrated. Reward yourself for success. Even if you dont reach every goal, give yourself credit for what you do get done.  Get support  Encouragement from others can help make losing weight easier. Ask your family members and friends for support. They may even want to join you. Also look to your healthcare provider, registered dietitian, and  for help. Your local hospital can give you more information about nutrition, exercise, and weight loss.  Date Last Reviewed: 1/31/2016  © 7519-1050 [employer], [employer]. [address] [place] [address], [place] [address]. All rights reserved. This information is not intended as a substitute for professional medical care. Always follow your healthcare professional's instructions.        Walking for Fitness  Fitness walking has something for everyone, even people who are already fit. Walking is one of the safest ways to condition your body aerobically. It can boost energy, help you lose weight, and reduce stress.    Physical benefits  · Walking strengthens your heart and lungs, and tones your muscles.  · When walking, your feet  land with less impact than in other sports. This reduces chances of muscle, bone, and joint injury.  · Regular walking improves your cholesterol levels and lowers your risk of heart disease. And it helps you control your blood sugar if you have diabetes.  · Walking is a weight-bearing activity, which helps maintain bone density. This can help prevent osteoporosis.  Personal rewards  · Taking walks can help you relax and manage stress. And fitness walking may make you feel better about yourself.  · Walking can help you sleep better at night and make you less likely to be depressed.  · Regular walking may help maintain your memory as you get older.  · Walking is a great way to spend extra time with friends and family members. Be sure to invite your dog along!  Q&A about fitness walking  Q: Will walking keep me fit?  A: Yes. Regular walking at the right pace gives you all the benefits of other aerobic activities, such as jogging and swimming.  Q: Will walking help me lose weight and keep it off?  A: Yes. Per mile, walking can burn as many calories as jogging. Your health care provider can help work walking into your weight-loss plan.  Q: Is walking safe for my health?  A: Yes. Walking is safe if you have high blood pressure, diabetes, heart disease, or other conditions. Talk to your health care provider before you start.  Date Last Reviewed: 5/9/2015 © 2000-2016 [employer]. [address] [place] [address]. All rights reserved. This information is not intended as a substitute for professional medical care. Always follow your healthcare professional's instructions.             Smoking Cessation     If you would like to quit smoking:   You may be eligible for free services if you are a [place] resident and started smoking cigarettes before September 1, 1988.  Call the Smoking Cessation Trust (SCT) toll free at (257) 507-7753 or (117) 434-9915.   Call 1-800-QUIT-NOW if you do not meet the  above criteria.   Contact us via email: [email or web address]   View our website for more information: [email or web address]        Language Assistance Services     ATTENTION: Language assistance services are available, free of charge. Please call [phone].      ATENCIÓN: Si donta padilla, tiene a cooper disposición servicios gratuitos de asistencia lingüística. Llame al [phone].     CHÚ Ý: N?u b?n nói Ti?ng Vi?t, có các d?ch v? h? tr? ngôn ng? mi?n phí dành cho b?n. G?i s? [phone].         [facility] complies with applicable Federal civil rights laws and does not discriminate on the basis of race, color, national origin, age, disability, or sex.

## 2017-05-11 ENCOUNTER — OFFICE VISIT (OUTPATIENT)
Dept: UROLOGY | Facility: CLINIC | Age: 67
End: 2017-05-11
Payer: MEDICARE

## 2017-05-11 VITALS
DIASTOLIC BLOOD PRESSURE: 82 MMHG | HEART RATE: 66 BPM | HEIGHT: 67 IN | WEIGHT: 196.19 LBS | TEMPERATURE: 98 F | SYSTOLIC BLOOD PRESSURE: 140 MMHG | BODY MASS INDEX: 30.79 KG/M2

## 2017-05-11 DIAGNOSIS — R31.0 GROSS HEMATURIA: ICD-10-CM

## 2017-05-11 DIAGNOSIS — R33.9 URINARY RETENTION: ICD-10-CM

## 2017-05-11 DIAGNOSIS — N20.0 NEPHROLITHIASIS: Primary | ICD-10-CM

## 2017-05-11 LAB
BILIRUB SERPL-MCNC: ABNORMAL MG/DL
BLOOD URINE, POC: ABNORMAL
COLOR, POC UA: ABNORMAL
GLUCOSE UR QL STRIP: ABNORMAL
KETONES UR QL STRIP: ABNORMAL
LEUKOCYTE ESTERASE URINE, POC: ABNORMAL
NITRITE, POC UA: ABNORMAL
PH, POC UA: 7
PROTEIN, POC: ABNORMAL
SPECIFIC GRAVITY, POC UA: 1005
UROBILINOGEN, POC UA: ABNORMAL

## 2017-05-11 PROCEDURE — 99213 OFFICE O/P EST LOW 20 MIN: CPT | Mod: PBBFAC,PO | Performed by: UROLOGY

## 2017-05-11 PROCEDURE — 99213 OFFICE O/P EST LOW 20 MIN: CPT | Mod: S$PBB,,, | Performed by: UROLOGY

## 2017-05-11 PROCEDURE — 81002 URINALYSIS NONAUTO W/O SCOPE: CPT | Mod: PBBFAC,PO | Performed by: UROLOGY

## 2017-05-11 PROCEDURE — 87086 URINE CULTURE/COLONY COUNT: CPT

## 2017-05-11 PROCEDURE — 99999 PR PBB SHADOW E&M-EST. PATIENT-LVL III: CPT | Mod: PBBFAC,,, | Performed by: UROLOGY

## 2017-05-11 RX ORDER — PREDNISONE 10 MG/1
10 TABLET ORAL DAILY
Qty: 30 TABLET | Refills: 2 | Status: SHIPPED | OUTPATIENT
Start: 2017-05-11 | End: 2017-06-30 | Stop reason: SDUPTHER
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
## 2017-05-11 NOTE — MR AVS SNAPSHOT
[facility]  [address]  [place] [address]  Phone: [phone]                  Hesham Serna   2017 10:30 AM   Office Visit    Description:  Male : 1950   Provider:  Negrita Castillo MD   Department:  [facility]           Reason for Visit     Follow-up           Diagnoses this Visit        Comments    Nephrolithiasis    -  Primary     Urinary retention         Gross hematuria                To Do List           Future Appointments        Provider Department Dept Phone    2017 9:40 AM [facility] [phone]    2017 10:40 AM Samuel Ramos MD [facility] [phone]    2017 9:00 AM [facility] [phone]    2017 9:15 AM Negrita Castillo MD [facility] [phone]      Goals (5 Years of Data)     None      Follow-Up and Disposition     Follow-up and Disposition History      [facility]     [facility] -  Assistance  Unless otherwise directed by your provider, please contact [facility], our nurse care line that is available for  assistance.     Registered nurses in the [facility] provide: appointment scheduling, clinical advisement, health education, and other advisory services.  Call: [phone] (toll free)               Medications           Message regarding Medications     Verify the changes and/or additions to your medication regime listed below are the same as discussed with your clinician today.  If any of these changes or additions are incorrect, please notify your healthcare provider.        STOP taking these medications     doxycycline (VIBRA-TABS) 100 MG tablet TAKE 1 TABLET (100 MG TOTAL) BY MOUTH 2 (TWO) TIMES DAILY.           Verify that the below list of medications is an accurate representation of the medications you are currently taking.  If none reported, the  list may be blank. If incorrect, please contact your healthcare provider. Carry this list with you in case of emergency.           Current Medications     clonazePAM (KLONOPIN) 2 MG Tab Starting on May 24, 2017. TAKE 1 TABLET (2 MG TOTAL) BY MOUTH 3 (THREE) TIMES DAILY AS NEEDED.    oxycodone (ROXICODONE) 20 mg Tab immediate release tablet     albuterol 90 mcg/actuation inhaler Inhale 2 puffs into the lungs every 6 (six) hours as needed for Wheezing.    albuterol-ipratropium 2.5mg-0.5mg/3mL (DUO-NEB) 0.5 mg-3 mg(2.5 mg base)/3 mL nebulizer solution Take 3 mLs by nebulization every 6 (six) hours while awake.    AMITIZA 8 mcg Cap TAKE 1 CAPSULE BY MOUTH TWICE DAILY WITH FOOD    busPIRone (BUSPAR) 5 MG Tab Take 1 tablet (5 mg total) by mouth 2 (two) times daily.    celecoxib (CELEBREX) 100 MG capsule Take 2 capsules (200 mg total) by mouth 2 (two) times daily.    clindamycin (CLEOCIN T) 1 % lotion As directed    econazole nitrate 1 % cream Apply to feet twice daily    fluocinonide 0.05% (LIDEX) 0.05 % cream     fluticasone (FLONASE) 50 mcg/actuation nasal spray SNIFF 1 SPRAY INTO EACH NOSTRIL ONCE DAILY    fluticasone-salmeterol 500-50 mcg/dose (ADVAIR) 500-50 mcg/dose DsDv diskus inhaler Inhale 1 puff into the lungs 2 (two) times daily.    gentamicin (GARAMYCIN) 0.1 % ointment     ketoconazole (NIZORAL) 2 % shampoo     KRISTALOSE 20 gram Pack TAKE ONE PACKET ONCE DAILY BY MOUTH AS DIRECTED    ledipasvir-sofosbuvir  mg Tab Take 1 tablet by mouth once daily.    methadone (DOLOPHINE) 10 MG tablet Take 1 tablet (10 mg total) by mouth every 6 (six) hours as needed. Two tablets every morning, two tablets every 12 pm, one-two tablets every evening    MYRBETRIQ 50 mg Tb24 TAKE ONE TABLET BY MOUTH ONCE DAILY    predniSONE (DELTASONE) 10 MG tablet Take 10 mg by mouth once daily.    silodosin (RAPAFLO) 8 mg Cap capsule Take 1 capsule (8 mg total) by mouth once daily.    spironolactone (ALDACTONE) 25 MG tablet Take 1 tablet  "(25 mg total) by mouth once daily.    trazodone (DESYREL) 50 MG tablet Take 1 tablet (50 mg total) by mouth nightly as needed for Insomnia.    ziprasidone (GEODON) 20 MG Cap Take 1 capsule (20 mg total) by mouth every evening.           Clinical Reference Information           Your Vitals Were     BP Pulse Temp Height Weight BMI    140/82 66 97.7 °F (36.5 °C) 5' 7" (1.702 m) 89 kg (196 lb 3.4 oz) 30.73 kg/m2      Blood Pressure          Most Recent Value    BP  (!)  140/82      Allergies as of 5/11/2017     Codeine    Morphine    Bactrim [Sulfamethoxazole-trimethoprim]    Ciprofloxacin      Immunizations Administered on Date of Encounter - 5/11/2017     None      Orders Placed During Today's Visit      Normal Orders This Visit    POCT URINE DIPSTICK WITHOUT MICROSCOPE     Urine culture     Future Labs/Procedures Expected by Expires    X-Ray Abdomen AP 1 View  5/11/2017 5/11/2018    X-Ray Abdomen AP 1 View  6/8/2017 6/8/2017      [facility] Sign-Up     Activating your [facility] account is as easy as 1-2-3!     1) Visit [email or web address], select Sign Up Now, enter this activation code and your date of birth, then select Next.  Activation code not generated  Current Patient Portal Status: Account disabled      2) Create a username and password to use when you visit [facility] in the future and select a security question in case you lose your password and select Next.    3) Enter your e-mail address and click Sign Up!    Additional Information  If you have questions, please e-mail [email or web address] or call [phone] to talk to our [facility] staff. Remember, [facility] is NOT to be used for urgent needs. For medical emergencies, dial 911.         Smoking Cessation     If you would like to quit smoking:   You may be eligible for free services if you are a [place] resident and started smoking cigarettes before September 1, 1988.  Call the Smoking Cessation Trust (SCT) toll free at (425) 396-2581 or (139) " 558-2035.   Call 1-800-QUIT-NOW if you do not meet the above criteria.   Contact us via email: [email or web address]   View our website for more information: [email or web address]        Language Assistance Services     ATTENTION: Language assistance services are available, free of charge. Please call [phone].      ATENCIÓN: Si habla español, tiene a cooper disposición servicios gratuitos de asistencia lingüística. Llame al [phone].     CHÚ Ý: N?u b?n nói Ti?ng Vi?t, có các d?ch v? h? tr? ngôn ng? mi?n phí dành cho b?n. G?i s? [phone].         [facility] complies with applicable Federal civil rights laws and does not discriminate on the basis of race, color, national origin, age, disability, or sex.

## 2017-05-11 NOTE — TELEPHONE ENCOUNTER
----- Message from Rae Rodriguez sent at 5/11/2017 10:11 AM CDT -----  Contact: self  Patient is requesting a refill on prednisone      Please send to    [facility] [address] [address] [address]  [address] [address]  Phone: [phone] Fax: [phone]

## 2017-05-12 LAB — BACTERIA UR CULT: NO GROWTH

## 2017-05-12 NOTE — TELEPHONE ENCOUNTER
----- Message from Gian Cardsoo sent at 5/12/2017  4:06 PM CDT -----  Contact: same  Patient called in and stated he is really sick and needs to speak to nurse.  Call back number is [phone]

## 2017-05-15 ENCOUNTER — TELEPHONE (OUTPATIENT)
Dept: FAMILY MEDICINE | Facility: CLINIC | Age: 67
End: 2017-05-15

## 2017-05-15 NOTE — TELEPHONE ENCOUNTER
Patient states he feels like he has pneumonia. Is requesting a chest x ray.  Advised patient to go to ER and he refused. Please advise.

## 2017-05-15 NOTE — TELEPHONE ENCOUNTER
----- Message from Anushka Saldivar sent at 5/15/2017 12:45 PM CDT -----  Contact: self                             attn:  Misti  Patient [phone] is calling to speak with Nurse Misti to discuss him possibly having pneumonia/please call

## 2017-05-16 NOTE — TELEPHONE ENCOUNTER
Please have Dr. Ramos review and recommend.  Patient does not have a UTI.  We do not treat pneumonia.   He has not gotten a recent Rx from any providers here.

## 2017-05-16 NOTE — TELEPHONE ENCOUNTER
Patient states Lea Rome sent in bactrim for him and he cannot take this medication.  He does not know why this was called in for him.

## 2017-05-16 NOTE — TELEPHONE ENCOUNTER
----- Message from Ally Fonseca sent at 5/16/2017  2:11 PM CDT -----  Patient is requesting a return call form Misti only,  he states he received the wrong medication (Sulfameth /160)  today and can not take,  contact him at [phone].    Thank you

## 2017-05-16 NOTE — TELEPHONE ENCOUNTER
----- Message from Sharonda Alvarez sent at 5/16/2017  9:13 AM CDT -----  Contact: pt  Message for Ally, Pt states the wrong Antibiotic (Bactrium) was called into pharmacy, pt states he is allergic  Please send another Antibiotic    Call placed to pod, no answer  Call back on# [phone]  thanks    [facility] [address] [place] [address] AT [facility] [address]  [address] [place] [address]  Phone: [phone] Fax: [phone]

## 2017-05-16 NOTE — TELEPHONE ENCOUNTER
This antibiotic was called in by Lea Chaudhry. Patient cannot take Bactrim/sulfa Patient states he has a bad cough and feels like he has pneumonia.  I advised patient to go to ER he declined.  Patient requesting Jaimee Ramos call in an antibiotic.

## 2017-05-17 ENCOUNTER — TELEPHONE (OUTPATIENT)
Dept: FAMILY MEDICINE | Facility: CLINIC | Age: 67
End: 2017-05-17

## 2017-05-17 NOTE — TELEPHONE ENCOUNTER
----- Message from Gian MORAN Frird sent at 5/17/2017  7:49 AM CDT -----  Contact: same  Patient called in and stated that Dr. Ramos called in the wrong medication yesterday 5/16/17 (Bactrim).  Patient stated he needs another Rx called in.      [facility] [address] [place] [address] AT [facility] [address]  [address] [place] [address]  Phone: [phone] Fax: [phone]    Patient call back number [phone]

## 2017-05-17 NOTE — TELEPHONE ENCOUNTER
Patient states he cannot come in . He has medication and wants to know if he can take his doxycycline 100 mg.

## 2017-05-19 ENCOUNTER — TELEPHONE (OUTPATIENT)
Dept: FAMILY MEDICINE | Facility: CLINIC | Age: 67
End: 2017-05-19

## 2017-05-19 NOTE — TELEPHONE ENCOUNTER
----- Message from Anushka Walters sent at 5/19/2017  9:08 AM CDT -----  Contact: Hesham Whyte is asking to be seen Monday 5/22/17. Please call [phone]. Thanks!

## 2017-05-19 NOTE — TELEPHONE ENCOUNTER
Patient requesting to change his appointment from 5-23-17 to 5-22-17. No available appointments noted on 5-22-17. Patient notified. Verbalized understanding.

## 2017-05-22 ENCOUNTER — TELEPHONE (OUTPATIENT)
Dept: FAMILY MEDICINE | Facility: CLINIC | Age: 67
End: 2017-05-22

## 2017-05-22 NOTE — TELEPHONE ENCOUNTER
----- Message from Rae Rodriguez sent at 5/22/2017  2:32 PM CDT -----  Contact: self  Patient wants to speak with a nurse regarding moving his appointment from 3:00 pm to 2:00pm. Please call back at [phone]

## 2017-05-22 NOTE — TELEPHONE ENCOUNTER
Patient requesting to have his appointment changed from 3pm to 2pm on 5-23-17. Advised patient other patient's are scheduled at 2pm and there is no opening at that time. Patient states he has to try and that he will be arriving at 2pm for his appointment because he has to be home at 3pm to take his medication.

## 2017-05-23 ENCOUNTER — OFFICE VISIT (OUTPATIENT)
Dept: FAMILY MEDICINE | Facility: CLINIC | Age: 67
End: 2017-05-23
Payer: MEDICARE

## 2017-05-23 ENCOUNTER — DOCUMENTATION ONLY (OUTPATIENT)
Dept: FAMILY MEDICINE | Facility: CLINIC | Age: 67
End: 2017-05-23

## 2017-05-23 VITALS
HEIGHT: 67 IN | WEIGHT: 196.19 LBS | BODY MASS INDEX: 30.79 KG/M2 | SYSTOLIC BLOOD PRESSURE: 128 MMHG | DIASTOLIC BLOOD PRESSURE: 79 MMHG | HEART RATE: 75 BPM | TEMPERATURE: 99 F

## 2017-05-23 DIAGNOSIS — K74.60 CIRRHOSIS OF LIVER WITHOUT ASCITES, UNSPECIFIED HEPATIC CIRRHOSIS TYPE: Primary | ICD-10-CM

## 2017-05-23 DIAGNOSIS — F39 MOOD DISORDER: ICD-10-CM

## 2017-05-23 DIAGNOSIS — B18.2 HEP C W/O COMA, CHRONIC: ICD-10-CM

## 2017-05-23 DIAGNOSIS — F41.1 ANXIETY STATE: ICD-10-CM

## 2017-05-23 PROCEDURE — 99214 OFFICE O/P EST MOD 30 MIN: CPT | Mod: S$PBB,,, | Performed by: FAMILY MEDICINE

## 2017-05-23 PROCEDURE — 99213 OFFICE O/P EST LOW 20 MIN: CPT | Mod: PBBFAC,PO | Performed by: FAMILY MEDICINE

## 2017-05-23 PROCEDURE — 99999 PR PBB SHADOW E&M-EST. PATIENT-LVL III: CPT | Mod: PBBFAC,,, | Performed by: FAMILY MEDICINE

## 2017-05-23 RX ORDER — CLONAZEPAM 2 MG/1
TABLET ORAL
Qty: 90 TABLET | Refills: 0 | Status: SHIPPED | OUTPATIENT
Start: 2017-06-08 | End: 2017-06-30 | Stop reason: SDUPTHER

## 2017-05-23 RX ORDER — DOXYCYCLINE 100 MG/1
100 CAPSULE ORAL 2 TIMES DAILY
COMMUNITY
End: 2017-06-19

## 2017-05-23 RX ORDER — CELECOXIB 100 MG/1
200 CAPSULE ORAL 2 TIMES DAILY
Qty: 180 CAPSULE | Refills: 3 | Status: SHIPPED | OUTPATIENT
Start: 2017-05-23 | End: 2017-09-12 | Stop reason: SDUPTHER

## 2017-05-23 NOTE — PROGRESS NOTES
Pre-Visit Chart Review  For Appointment Scheduled on 05/23/2017  There are no preventive care reminders to display for this patient.

## 2017-05-24 NOTE — PROGRESS NOTES
"Anxiety: Patient complains of anxiety disorder, panic attacks and post traumatic stress  disorder.  He has the following symptoms: chest pain, difficulty concentrating, dizziness, fatigue, feelings of losing control, insomnia, irritable, palpitations, psychomotor agitation, racing thoughts, shortness of breath, sweating. Onset of symptoms was approximately several years ago, unchanged since that time. He denies current suicidal and homicidal ideation. Family history significant for anxiety, depression and substance abuse.Possible organic causes contributing are: medications, drug abuse, endocrine/metabolic, neuro. Risk factors: negative life event mother illness, jail record, and lost income., previous episode of depression and personality disorder Previous treatment includes benzodiazepines Klonopin. and Trazedone, and Geodon. and individual therapy and medication.  He complains of the following side effects from the treatment: dry mouth, GI disturbance, insomnia and weight gain.  Patient Active Problem List   Diagnosis    Arthritis    Degenerative disc disease    Hypertension    Rosacea    Anxiety state    Episodic mood disorder    Hep C w/o coma, chronic    Cluster headaches    BPH with urinary obstruction    DDD (degenerative disc disease), cervical    Cirrhosis    Obese    Bipolar affective disorder    DDD (degenerative disc disease), lumbar    Chronic narcotic dependence    Bipolar affective disorder, currently depressed, moderate    Unspecified drug dependence, continuous    Alcoholic cirrhosis    Need for hepatitis C screening test    Urinary retention    Nephrolithiasis   The patient appears.vs  /74   Pulse 76   Ht 5' 7" (1.702 m)   Wt 88.7 kg (195 lb 8.8 oz)   BMI 30.63 kg/m²    oriented to person, place, and time, obese, anxious, in mild to moderate distress and ill-appearing. ENT: ENT exam normal, no neck nodes or sinus tenderness, neither TM fluid noted, neck without " nodes, throat normal without erythema or exudate and post nasal drip noted. Chest:chest wall tenderness noted , wheezing noted , decreased air entry noted , rhonchi noted b ases. Heart sounds are normal. Abdomen soft, nontender, no masses or organomegaly.  He declined Hepatologist but has active Hepatitis c, no apparent cirrhosis, mild viral load. Pulmonary HTN!  Essential hypertension    Cirrhosis of liver without ascites, unspecified hepatic cirrhosis type    Anxiety state    Bipolar affective disorder, currently depressed, moderate    COPD with acute exacerbation  -     spironolactone (ALDACTONE) 25 MG tablet; Take 1 tablet (25 mg total) by mouth once daily.  Dispense: 30 tablet; Refill: 11    Mood disorder  -     Discontinue: clonazePAM (KLONOPIN) 2 MG Tab; TAKE 1 TABLET (2 MG TOTAL) BY MOUTH 3 (THREE) TIMES DAILY AS NEEDED.  Dispense: 90 tablet; Refill: 0      Patient readiness: acceptance and barriers:readiness, social stressors, environmental, economic, household issues, occupational issues and poor sleep habits    During the course of the visit the patient was educated and counseled about the following:     Hypertension:   Dietary sodium restriction.  Check blood pressures daily and record.  Follow up: 3 weeks and as needed.  Obesity:   General weight loss/lifestyle modification strategies discussed (elicit support from others; identify saboteurs; non-food rewards, etc).  Behavioral treatment: stress management.  Diet interventions: diet diary indefinitely, low calorie (1000 kCal/d) deficit diet, ovo-lactarian diet and qualitative changes (increase low-fat,  high-fiber foods).  Informal exercise measures discussed, e.g. taking stairs instead of elevator.  Regular aerobic exercise program discussed.  Medication: bulk-forming agents.    Goals: Hypertension: Reduce Blood Pressure and Obesity: Reduce calorie intake and BMI    Did patient meet goals/outcomes: No    The following self management tools provided:  blood pressure log  excercise log    Patient Instructions (the written plan) was given to the patient/family.     Time spent with patient: 45 minutes

## 2017-05-25 ENCOUNTER — TELEPHONE (OUTPATIENT)
Dept: FAMILY MEDICINE | Facility: CLINIC | Age: 67
End: 2017-05-25

## 2017-05-25 RX ORDER — AMOXICILLIN 875 MG/1
875 TABLET, FILM COATED ORAL EVERY 12 HOURS
Qty: 20 TABLET | Refills: 0 | Status: SHIPPED | OUTPATIENT
Start: 2017-05-25 | End: 2017-06-19

## 2017-05-25 NOTE — PROGRESS NOTES
Subjective:       Patient ID: Hesham Serna is a 66 y.o. male.    Chief Complaint: Hypertension; COPD; Fever; and Cough    Hypertension   This is a recurrent problem. The problem has been resolved since onset. The problem is controlled. Associated symptoms include anxiety, chest pain, headaches, neck pain, orthopnea, shortness of breath and sweats. Risk factors for coronary artery disease include obesity, stress, smoking/tobacco exposure, sedentary lifestyle and dyslipidemia. The current treatment provides moderate improvement. Compliance problems include exercise.  Hypertensive end-organ damage includes angina. cirrhosis, emphysema, FRANCISCO.   COPD   Associated symptoms include abdominal pain, arthralgias, chest pain, coughing, a fever, headaches, joint swelling, myalgias, neck pain, numbness and weakness.   Fever    Associated symptoms include abdominal pain, chest pain, coughing and headaches.   Cough   Associated symptoms include chest pain, a fever, headaches, myalgias, postnasal drip, rhinorrhea, shortness of breath and sweats. His past medical history is significant for COPD.     Review of Systems   Constitutional: Positive for activity change, fever and unexpected weight change.   HENT: Positive for postnasal drip and rhinorrhea.    Eyes: Positive for discharge.   Respiratory: Positive for cough and shortness of breath.    Cardiovascular: Positive for chest pain and orthopnea.   Gastrointestinal: Positive for abdominal pain.   Endocrine: Positive for cold intolerance.   Genitourinary: Positive for difficulty urinating, enuresis and frequency.   Musculoskeletal: Positive for arthralgias, back pain, gait problem, joint swelling, myalgias, neck pain and neck stiffness.   Neurological: Positive for tremors, weakness, numbness and headaches.   Psychiatric/Behavioral: Positive for agitation, behavioral problems, confusion and decreased concentration.       Objective:      Physical Exam   Constitutional: Vital  signs are normal. He appears listless. He is uncooperative. He has a sickly appearance. He appears ill.   HENT:   Nose: Mucosal edema, rhinorrhea, nasal deformity and septal deviation present.   Eyes: Conjunctivae are normal. Pupils are equal, round, and reactive to light. Right eye exhibits chemosis and exudate. Left eye exhibits chemosis.   Neck: Trachea normal. No JVD present. Spinous process tenderness present. Neck rigidity present. Decreased range of motion present.   Cardiovascular: S1 normal, S2 normal and intact distal pulses.  An irregular rhythm present. Frequent extrasystoles are present. PMI is displaced.  Exam reveals distant heart sounds.    Pulmonary/Chest: He is in respiratory distress. He has decreased breath sounds in the right lower field and the left lower field. He has wheezes in the right upper field and the left upper field. He has rhonchi. He has no rales.   Abdominal: Soft. Normal appearance and bowel sounds are normal. He exhibits distension. He exhibits no ascites. There is hepatosplenomegaly. There is tenderness in the epigastric area. There is negative Freeman's sign. No hernia.   Musculoskeletal:        Right shoulder: He exhibits decreased range of motion and tenderness.        Left shoulder: He exhibits decreased range of motion and tenderness.        Lumbar back: He exhibits decreased range of motion and tenderness.   Neurological: He appears listless. He displays tremor. No cranial nerve deficit or sensory deficit. Coordination and gait abnormal.       Assessment:       1. Cirrhosis of liver without ascites, unspecified hepatic cirrhosis type    2. Mood disorder    3. Hep C w/o coma, chronic    4. Anxiety state        Plan:       Cirrhosis of liver without ascites, unspecified hepatic cirrhosis type    Mood disorder  -     clonazePAM (KLONOPIN) 2 MG Tab; TAKE 1 TABLET (2 MG TOTAL) BY MOUTH 3 (THREE) TIMES DAILY AS NEEDED.  Dispense: 90 tablet; Refill: 0    Hep C w/o coma,  chronic    Anxiety state    Other orders  -     celecoxib (CELEBREX) 100 MG capsule; Take 2 capsules (200 mg total) by mouth 2 (two) times daily.  Dispense: 180 capsule; Refill: 3      Patient readiness: acceptance and barriers:readiness    During the course of the visit the patient was educated and counseled about the following:     Hypertension:   Dietary sodium restriction.  Obesity:   General weight loss/lifestyle modification strategies discussed (elicit support from others; identify saboteurs; non-food rewards, etc).    Goals: Hypertension: Reduce Blood Pressure and Obesity: Reduce calorie intake and BMI    Did patient meet goals/outcomes: No    The following self management tools provided: blood pressure log  excercise log    Patient Instructions (the written plan) was given to the patient/family.     Time spent with patient: 30 minutes

## 2017-05-25 NOTE — TELEPHONE ENCOUNTER
Spoke to patient who states at his appointment on 5-23-17 Dr. Ramos told him to continue with the antibiotics he has at home. States these antibiotics are not strong enough. Requesting a prescription for a stronger antibiotic. Assured patient his concerns will be forwarded to PCP for review.

## 2017-05-29 ENCOUNTER — TELEPHONE (OUTPATIENT)
Dept: FAMILY MEDICINE | Facility: CLINIC | Age: 67
End: 2017-05-29

## 2017-05-29 NOTE — TELEPHONE ENCOUNTER
----- Message from Ally Campo sent at 5/26/2017  1:54 PM CDT -----  Patient requesting to speak with nurse concerning the ordering of medication: Lactulose (laxative)stated was ordered at wrong pharmacy/should have been ordered at [facility] in [place], [place]/please call patient back at [phone] to advise.

## 2017-05-29 NOTE — TELEPHONE ENCOUNTER
Patient has been called and advised that his medication has been sent to the pharmacy.he has verbalized full understanding.

## 2017-05-30 ENCOUNTER — TELEPHONE (OUTPATIENT)
Dept: FAMILY MEDICINE | Facility: CLINIC | Age: 67
End: 2017-05-30

## 2017-05-30 NOTE — TELEPHONE ENCOUNTER
Spoke to patient who wanted to be certain his prescription for Lactulose was in order at the pharmacy. Writer assured patient his prescription was in order. Patient verbalized understanding.

## 2017-05-30 NOTE — TELEPHONE ENCOUNTER
Office notes dated 3-15-17 faxed to [phone] [facility] as requested.

## 2017-05-30 NOTE — TELEPHONE ENCOUNTER
Advised patient that rx will be filled at the [facility] for June 1st per his request. Patient verbalized understanding.

## 2017-05-30 NOTE — TELEPHONE ENCOUNTER
----- Message from Johana Daniels sent at 5/30/2017 11:59 AM CDT -----  Contact: Negin from [employer] called to give you the status o Kristalose was filled and [facility] at [facility].

## 2017-05-30 NOTE — TELEPHONE ENCOUNTER
----- Message from Michelle Ramirez sent at 5/30/2017  8:58 AM CDT -----  Contact: devang   Wants to discuss laxitive   and where to call in   [phone]

## 2017-05-30 NOTE — TELEPHONE ENCOUNTER
----- Message from Letty Ziegler sent at 5/30/2017 12:36 PM CDT -----  Signed office visit notes from 3-15 faxed to [phone].  Any questions call [facility] at [phone]

## 2017-05-30 NOTE — TELEPHONE ENCOUNTER
----- Message from Shayla Collins LPN sent at 5/30/2017 11:57 AM CDT -----      ----- Message -----  From: Ally Fonseca  Sent: 5/30/2017   8:52 AM  To: Rachael Bhatti is requesting a return call he states [facility] will not fill lactulose (KRISTALOSE) 20 gram Pack because it was original [facility] to the [facility] and run thru his insurance, contact patient [phone],     Thank you

## 2017-05-30 NOTE — TELEPHONE ENCOUNTER
----- Message from Jovana Banks sent at 5/30/2017  1:17 PM CDT -----  Received a  note / still needs Dr's signature / please resend to [facility]  / [place] / fax [phone]  [phone]

## 2017-06-14 ENCOUNTER — TELEPHONE (OUTPATIENT)
Dept: FAMILY MEDICINE | Facility: CLINIC | Age: 67
End: 2017-06-14

## 2017-06-14 NOTE — TELEPHONE ENCOUNTER
----- Message from Erinn Valadez sent at 6/14/2017 10:49 AM CDT -----  Patient is calling to get a refill on Lactulose Solution. He states that his pharmacy does not send in refill requests. Please submit to:    [id number] - [place], [place] - [address] [address]  Phone: [phone] Fax: [phone]    Please call when completed at [phone].

## 2017-06-14 NOTE — TELEPHONE ENCOUNTER
Received message from patient requesting a refill of Lactulose. Upon further inspection it was noted this medication was refilled on 5-29-17 with 1 additional refill. Patient was notified. Verbalized understanding.

## 2017-06-16 ENCOUNTER — TELEPHONE (OUTPATIENT)
Dept: FAMILY MEDICINE | Facility: CLINIC | Age: 67
End: 2017-06-16

## 2017-06-16 NOTE — TELEPHONE ENCOUNTER
Spoke to patient who is requesting a prescription for Lactulose. Upon further inspection it was noted this medication was e-scribed on 5-29-17 with 1 additional refill. Call placed to [facility] in [place], [place] regarding. Writer notified patient filled a prescription for Lactulose at a different pharmacy on 6-14-17 and this medication can not be filled at this time. Call placed to patient for notification, patient states he did not fill this prescription. Patient continues to request a refill of this medication.  Call placed to other pharmacy on fill for patient ([facility]) writer advised patient has not refilled this medication since 6-1-17 and another prescription was needed. Writer will send refill request to provider at this time.

## 2017-06-16 NOTE — TELEPHONE ENCOUNTER
----- Message from Ally Campo sent at 6/16/2017  8:03 AM CDT -----  Patient is returning nurse's call/please call patient back at [phone]

## 2017-06-16 NOTE — TELEPHONE ENCOUNTER
Patient notified request for Lactulose prescription was sent to provider. Awaiting response. Verbalized understanding.

## 2017-06-16 NOTE — TELEPHONE ENCOUNTER
----- Message from Lettyedy Ziegler sent at 6/16/2017 10:21 AM CDT -----  Patient states that he was speaking to the nurse on the phone and had an emergency call and had to hang up.  Please call patient at [phone].

## 2017-06-16 NOTE — TELEPHONE ENCOUNTER
----- Message from Prabha Zimmer sent at 6/16/2017  8:09 AM CDT -----  Contact: Patient  Patient called requesting a call back. Please call back at [phone]. Thanks,

## 2017-06-19 ENCOUNTER — TELEPHONE (OUTPATIENT)
Dept: FAMILY MEDICINE | Facility: CLINIC | Age: 67
End: 2017-06-19

## 2017-06-19 ENCOUNTER — OFFICE VISIT (OUTPATIENT)
Dept: PSYCHIATRY | Facility: CLINIC | Age: 67
End: 2017-06-19
Payer: MEDICARE

## 2017-06-19 ENCOUNTER — TELEPHONE (OUTPATIENT)
Dept: PSYCHIATRY | Facility: CLINIC | Age: 67
End: 2017-06-19

## 2017-06-19 VITALS
DIASTOLIC BLOOD PRESSURE: 81 MMHG | BODY MASS INDEX: 30.97 KG/M2 | HEIGHT: 67 IN | HEART RATE: 77 BPM | WEIGHT: 197.31 LBS | SYSTOLIC BLOOD PRESSURE: 142 MMHG

## 2017-06-19 DIAGNOSIS — F41.1 GAD (GENERALIZED ANXIETY DISORDER): Primary | ICD-10-CM

## 2017-06-19 DIAGNOSIS — F39 MOOD DISORDER: ICD-10-CM

## 2017-06-19 PROCEDURE — 99212 OFFICE O/P EST SF 10 MIN: CPT | Mod: PBBFAC,PO | Performed by: PSYCHIATRY & NEUROLOGY

## 2017-06-19 PROCEDURE — 99213 OFFICE O/P EST LOW 20 MIN: CPT | Mod: S$PBB,,, | Performed by: PSYCHIATRY & NEUROLOGY

## 2017-06-19 PROCEDURE — 99999 PR PBB SHADOW E&M-EST. PATIENT-LVL II: CPT | Mod: PBBFAC,,, | Performed by: PSYCHIATRY & NEUROLOGY

## 2017-06-19 RX ORDER — LACTULOSE 10 G/15ML
20 SOLUTION ORAL 3 TIMES DAILY
Qty: 900 ML | Refills: 3 | Status: SHIPPED | OUTPATIENT
Start: 2017-06-19 | End: 2017-06-29

## 2017-06-19 NOTE — TELEPHONE ENCOUNTER
Patient just wanted to let Dr Queen know that after she reads his letter he left today if she needs to reach him please call at her convience

## 2017-06-19 NOTE — TELEPHONE ENCOUNTER
----- Message from Glenna Walters sent at 6/16/2017  4:05 PM CDT -----  Patient states that the wrong medication was called into the pharmacy. States that the powder was called into the pharmacy and he doesn't take the powder. Please call patient back at [phone]. Thank you.

## 2017-06-19 NOTE — TELEPHONE ENCOUNTER
Patient states prescription for Lactulose was sent as a powder, states he does not use the powder. Requesting prescription be sent to [facility] as a liquid. Please advise.

## 2017-06-20 NOTE — TELEPHONE ENCOUNTER
"I spoke to patient to follow up on the letter he wrote to me about severe anxiety.  Pt reports he worries uncontrollably about losing his mother.  He is not taking Buspar and refuses to increase Geodon.  He does feel 20 mg "relaxes" him in evening.  Discussed trial of antidepressant to target anxiety and depression.  He requests that I first write a letter to his pain management MD Dr Perdue oking this; he has an appt with him 7/12/17;  He asks that I mail this to his PO box which I have agreed to do.    "

## 2017-06-21 DIAGNOSIS — N40.0 BENIGN NON-NODULAR PROSTATIC HYPERPLASIA WITHOUT LOWER URINARY TRACT SYMPTOMS: ICD-10-CM

## 2017-06-21 RX ORDER — SILODOSIN 8 MG/1
CAPSULE ORAL
Qty: 90 CAPSULE | Refills: 3 | Status: SHIPPED | OUTPATIENT
Start: 2017-06-21 | End: 2017-10-13 | Stop reason: SDUPTHER
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
## 2017-06-21 NOTE — TELEPHONE ENCOUNTER
----- Message from Johana Daniels sent at 6/21/2017  1:39 PM CDT -----  Contact: pt [phone]  Patient called and states  He needs to talk to Rae he needs a call about the  X- ray he needs to know when you want him to come into  the office and when and where do  you want him to have the X- ray. Thank you.

## 2017-06-21 NOTE — TELEPHONE ENCOUNTER
Called pt , no answer left vm with information regarding appt and xray. Advise pt to call office with any questions.

## 2017-06-22 ENCOUNTER — TELEPHONE (OUTPATIENT)
Dept: FAMILY MEDICINE | Facility: CLINIC | Age: 67
End: 2017-06-22

## 2017-06-22 ENCOUNTER — TELEPHONE (OUTPATIENT)
Dept: PSYCHIATRY | Facility: CLINIC | Age: 67
End: 2017-06-22

## 2017-06-22 NOTE — TELEPHONE ENCOUNTER
----- Message from Herbert Cox sent at 6/22/2017  1:23 PM CDT -----  Contact: [phone]  Pt is requesting to speak with the nurse.// States his appt on 6/30 is supposed to be for an xray not an appt with the doctor./ Pt can be reached at [phone] (home)

## 2017-06-22 NOTE — TELEPHONE ENCOUNTER
"----- Message from Ally Fonseca sent at 6/22/2017  1:55 PM CDT -----  Patient states "he needs an xray done prior to his appointment on 06/30/17" contact patient at [phone] to advise.     Thank you   "

## 2017-06-22 NOTE — TELEPHONE ENCOUNTER
Patient called an stated that his prescription for Geodon is messed up. He states that he discussed with Dr Queen that he was going to be taking one in the morning an one at night and get a quantity of 180 like he has gotten in the past. I explained looking in his chart that she has him to continue the one Geodon 20 mg nightly and only sent #30 with refill to the pharmacy. Patient insisted I call Edmund at the pharmacy an discuss it with him. I explained I will call the pharmacy but that Dr Queen was out of clinic until Monday and the prescription will have to stay like it is until she is able to review it when she is back in clinic. Patient stated he will deal with it like this until she gets back in clinic Monday.  Called an spoke to Edmund at the pharmacy an he stated that hew was just wanting to know if can fill the 3 mnth supply for the patient at once instead of monthly I explained to Edmund also that Dr Queen was out of clinic and will not be available to do that until Monday. He stated he will just fill the medication as they received it for this month so the patient will have his medication.   Please advise on this when back in clinic on Monday

## 2017-06-22 NOTE — TELEPHONE ENCOUNTER
Received message from patient stating his appointment on 6-30-17 is supposed to be for an x-ray. Upon further inspection there is no order for an x-ray placed by Dr. Ramos. Patient notified no order for x-ray noted, appointment on 6-30 is to see Dr. Ramos. Patient verbalized understanding.

## 2017-06-23 DIAGNOSIS — J30.9 CHRONIC ALLERGIC RHINITIS: ICD-10-CM

## 2017-06-23 RX ORDER — FLUTICASONE PROPIONATE 50 MCG
SPRAY, SUSPENSION (ML) NASAL
Qty: 16 G | Refills: 3 | Status: SHIPPED | OUTPATIENT
Start: 2017-06-23 | End: 2017-09-19 | Stop reason: SDUPTHER

## 2017-06-23 NOTE — TELEPHONE ENCOUNTER
----- Message from Brooklyn Marshall sent at 6/23/2017  2:44 PM CDT -----  Contact: self [phone]  He is requesting a prescription for Flonase be sent to:   [facility] [address] [place] [address] [address]  [address] [address]  Phone: [phone] Fax: [phone]    Thank you!

## 2017-06-26 RX ORDER — ZIPRASIDONE HYDROCHLORIDE 20 MG/1
20 CAPSULE ORAL 2 TIMES DAILY
Qty: 180 CAPSULE | Refills: 0 | Status: SHIPPED | OUTPATIENT
Start: 2017-06-26 | End: 2017-07-06 | Stop reason: SDUPTHER

## 2017-06-26 NOTE — TELEPHONE ENCOUNTER
Spoke to patient an he wants to go to BID dosing and he will check with pharmacy for the prescription.   PLease send in BID dosage for 90 day supply

## 2017-06-26 NOTE — TELEPHONE ENCOUNTER
Please clarify with patient if he is willing to take Geodon 20 mg twice a day.  This dosing is preferable to once a day dosing. I was under the impression that he was not willing to increase the dose.  If he is (which is my recommendation), then I can refill Geodon 20 mg BID, 90 day supply.

## 2017-06-28 ENCOUNTER — TELEPHONE (OUTPATIENT)
Dept: PSYCHIATRY | Facility: CLINIC | Age: 67
End: 2017-06-28

## 2017-06-28 NOTE — TELEPHONE ENCOUNTER
Called patient an explained what Dr Queen advised and patient understood an he will monitor it an will call if still having the sedation after 1-2 weeks

## 2017-06-28 NOTE — TELEPHONE ENCOUNTER
Please advise the patient that most side effects, including tiredness/sedation, should get better within 1-2 weeks.  If it does not, please let us know.       I don't have a definite time period to advise him to wait for driving, but if he feels any sedation, then he should not drive until this resolves.  He should have a better idea of how the medication will effect him over the next week, as he gets used to it.

## 2017-06-28 NOTE — TELEPHONE ENCOUNTER
Patient called an stated he tooke the medication last night and slept well. Woke this morning an drove to his mothers an unpacked than became sleeping an had to lay down and sleep and just getting back awake. He wants to know if this tiredness and sleeping so much will wear off as he gets used to the medication? Also he wants to know how long after taking it in the morning should he wait to drive so that he will know nothing will happen while he is driving?    Please advise as he will await my call back to him with what you recommend

## 2017-06-29 ENCOUNTER — DOCUMENTATION ONLY (OUTPATIENT)
Dept: FAMILY MEDICINE | Facility: CLINIC | Age: 67
End: 2017-06-29

## 2017-06-29 NOTE — PROGRESS NOTES
Pre-Visit Chart Review  For Appointment Scheduled on  06/30/2017    There are no preventive care reminders to display for this patient.

## 2017-06-30 ENCOUNTER — OFFICE VISIT (OUTPATIENT)
Dept: FAMILY MEDICINE | Facility: CLINIC | Age: 67
End: 2017-06-30
Payer: MEDICARE

## 2017-06-30 ENCOUNTER — TELEPHONE (OUTPATIENT)
Dept: FAMILY MEDICINE | Facility: CLINIC | Age: 67
End: 2017-06-30

## 2017-06-30 VITALS
DIASTOLIC BLOOD PRESSURE: 72 MMHG | OXYGEN SATURATION: 97 % | SYSTOLIC BLOOD PRESSURE: 136 MMHG | WEIGHT: 200.63 LBS | HEART RATE: 72 BPM | BODY MASS INDEX: 31.49 KG/M2 | HEIGHT: 67 IN | TEMPERATURE: 98 F

## 2017-06-30 DIAGNOSIS — E78.2 MIXED HYPERLIPIDEMIA: ICD-10-CM

## 2017-06-30 DIAGNOSIS — J42 CHRONIC BRONCHITIS, UNSPECIFIED CHRONIC BRONCHITIS TYPE: ICD-10-CM

## 2017-06-30 DIAGNOSIS — B18.2 HEP C W/O COMA, CHRONIC: ICD-10-CM

## 2017-06-30 DIAGNOSIS — F31.0 BIPOLAR AFFECTIVE DISORDER, CURRENT EPISODE HYPOMANIC: Primary | ICD-10-CM

## 2017-06-30 DIAGNOSIS — F39 MOOD DISORDER: ICD-10-CM

## 2017-06-30 DIAGNOSIS — R73.02 GLUCOSE INTOLERANCE (IMPAIRED GLUCOSE TOLERANCE): ICD-10-CM

## 2017-06-30 DIAGNOSIS — K71.7 HEPATIC CIRRHOSIS DUE TO TOXIN: ICD-10-CM

## 2017-06-30 DIAGNOSIS — F11.20 CHRONIC NARCOTIC DEPENDENCE: ICD-10-CM

## 2017-06-30 PROCEDURE — 99213 OFFICE O/P EST LOW 20 MIN: CPT | Mod: PBBFAC,PO | Performed by: FAMILY MEDICINE

## 2017-06-30 PROCEDURE — 99213 OFFICE O/P EST LOW 20 MIN: CPT | Mod: S$PBB,,, | Performed by: FAMILY MEDICINE

## 2017-06-30 PROCEDURE — 99999 PR PBB SHADOW E&M-EST. PATIENT-LVL III: CPT | Mod: PBBFAC,,, | Performed by: FAMILY MEDICINE

## 2017-06-30 RX ORDER — CLONAZEPAM 2 MG/1
TABLET ORAL
Qty: 90 TABLET | Refills: 0 | Status: SHIPPED | OUTPATIENT
Start: 2017-06-30 | End: 2017-07-20 | Stop reason: SDUPTHER

## 2017-06-30 RX ORDER — PREDNISONE 10 MG/1
5 TABLET ORAL DAILY
Qty: 45 TABLET | Refills: 3 | Status: SHIPPED | OUTPATIENT
Start: 2017-06-30 | End: 2017-09-28

## 2017-06-30 NOTE — TELEPHONE ENCOUNTER
----- Message from Jovana Banks sent at 6/30/2017 10:35 AM CDT -----  Requesting refill for lactulose  20 gram/30 mL Soln / call [phone] (home)       [id number] - [place], [place] - [address] [address]  Phone: [phone] Fax: [phone]

## 2017-06-30 NOTE — PATIENT INSTRUCTIONS
What Is Acute Bronchitis?  Acute or short-term bronchitis last for days or weeks. It occurs when the bronchial tubes (airways in the lungs) are irritated by a virus, bacteria, or allergen. This causes a cough that produces yellow or greenish mucus.  Inside healthy lungs    Air travels in and out of the lungs through the airways. The linings of these airways produce sticky mucus. This mucus traps particles that enter the lungs. Tiny structures called cilia then sweep the particles out of the airways.     Healthy airway: Airways are normally open. Air moves in and out easily.      Healthy cilia: Tiny, hairlike cilia sweep mucus and particles up and out of the airways.   Lungs with bronchitis  Bronchitis often occurs with a cold or the flu virus. The airways become inflamed (red and swollen). There is a deep hacking cough from the extra mucus. Other symptoms may include:  · Wheezing  · Chest discomfort  · Shortness of breath  · Mild fever  A second infection, this time due to bacteria, may then occur. And airways irritated by allergens or smoke are more likely to get infected.        Inflamed airway: Inflammation and extra mucus narrow the airway, causing shortness of breath.      Impaired cilia: Extra mucus impairs cilia, causing congestion and wheezing. Smoking makes the problem worse.   Making a diagnosis  A physical exam, health history, and certain tests help your healthcare provider make the diagnosis.  Health history  Your healthcare provider will ask you about your symptoms.  The exam  Your provider listens to your chest for signs of congestion. He or she may also check your ears, nose, and throat.  Possible tests  · A sputum test for bacteria. This requires a sample of mucus from the lungs.  · A nasal or throat swab for bacterial infection.  · A chest X-ray if your healthcare provider thinks you have pneumonia.  · Tests to check for an underlying condition, such as allergies, asthma, or COPD. You may need  to see a specialist for more lung function testing.  Treating a cough  The main treatment for bronchitis is easing symptoms. Avoiding smoke, allergens, and other things that trigger coughing can often help. If the infection is bacterial, you may be given antibiotics. During the illness, it's important to get plenty of sleep. To ease symptoms:  · Dont smoke, and avoid secondhand smoke.  · Use a humidifier, or breathe in steam from a hot shower. This may help loosen mucus.  · Drink a lot of water and juice. They can soothe the throat and may help thin mucus.  · Sit up or use extra pillows when in bed to help lessen coughing and congestion.  · Ask your provider about using cough medicine, pain and fever medicine, or a decongestant.  Antibiotics  Most cases of bronchitis are caused by cold or flu viruses. Antibiotics dont treat viral illness. Taking antibiotics when they are not needed increases your risk of getting an infection later that is antibiotic-resistant. Your provider will prescribe antibiotics if the infection is caused by bacteria. If they are prescribed:  · Take the medicine until it is used up, even if symptoms have improved. If you dont, the bronchitis may come back.  · Take them as directed. For instance, some medicines should be taken with food.  · Ask your provider or pharmacist what side effects are common, and what to do about them.  Follow-up care  You should see your provider again in 2 to 3 weeks. By this time, symptoms should have improved. An infection that lasts longer may mean you have a more serious problem.  Prevention  · Avoid tobacco smoke. If you smoke, quit. Stay away from smoky places. Ask friends and family not to smoke around you, or in your home or car.  · Get checked for allergies.  · Ask your provider about getting a yearly flu shot, and pneumococcal or pneumonia shots.  · Wash your hands often. This helps reduce the chance of picking up viruses that cause colds and flu.  Call  your healthcare provider if:  · Symptoms worsen, or new symptoms develop.  · Breathing problems worsen or  become severe.  · Symptoms dont get better within a week, or within 3 days of taking antibiotics.   Date Last Reviewed: 2014 [employer]. [address] [place] [address]. All rights reserved. This information is not intended as a substitute for professional medical care. Always follow your healthcare professional's instructions.        Environmental Tobacco Smoke  There are two types of environmental tobacco smoke (ETS):  · Smoke that is breathed out by a smoker of cigarettes, cigars, or pipes (secondhand smoke);  · Smoke that comes from the burning tip of a cigarette or cigar or from a pipe bowl (side-stream smoke). ETS contains more than 4,000 chemicals. Some of these chemicals have been proven to cause cancer and other serious health problems, especially for children.  Symptoms  ETS may result in symptoms such as the following:  · Cough or stuffy nose  · Sore throat or hoarseness  · Eye irritation  · Headache or dizziness  · Fussiness  · Loss of appetite  · Lack of energy  Diseases in children  · When children breathe in tobacco smoke, they are at higher risk for health problems. Infants under 2 years old are at greatest risk. Health problems that may result from ETS exposure include:  ¨ Ear and sinus infections, hearing problems  ¨ Colds, bronchitis, pneumonia, croup, influenza, bronchiolitis  ¨ Asthma (exposure to only 10 cigarettes a day increases the chance of getting asthma)  ¨ Sudden Infant Death Syndrome (SIDS) for newborns exposed to ETS  · In children who already have asthma, ETS increases the number and severity of asthma attacks and trips to the hospital.  · Smoking during pregnancy doubles the risk of having a premature baby. It also doubles the risk of  complications. Nursing mothers pass some of the chemicals in ETS through breast milk to  their infants.  · ETS puts children at greater risk for getting health problems as adults, as well. These health problems include:  ¨ Lung cancer  ¨ Heart disease  ¨ Cataracts  What to do for your children  The more smokers there are in a home and the more they smoke, the worse the effects on your child. If you smoke, it is very important to the health of your child that you stop smoking. This can be difficult to do alone. Find a stop-smoking class or talk to your health care provider for assistance.  If you cannot stop smoking completely, then avoid smoking inside your home. You should also adopt the following practices:  · Never smoke while holding or feeding your baby.  · Never smoke in a car with your child.  · Do not leave your child with caretakers who smoke.  Date Last Reviewed: 4/28/2015  © 8110-2424 [employer] [address] [place] [address]. All rights reserved. This information is not intended as a substitute for professional medical care. Always follow your healthcare professional's instructions.        Kicking the Smoking Habit  If you smoke, quitting is one of the best changes you can make for your heart and your overall health. Your risk of heart attack goes down within one day of putting out that last cigarette. As you go longer without smoking, your risk goes down even more. Quitting isnt easy, but millions of people have done it. You can, too. Its never too late to quit.  Getting started  Boost your chances of success by deciding on your quit plan. Your health care provider and cardiac rehab team can help you develop this plan. Even if youve already quit, its easy to slip back into smoking.  Your plan can help you avoid and recover from relapse.  In any case, start by setting a date to quit within a month, and do it.    Keys to your quit plan  · Talk to your healthcare provider about prescription medicines and nicotine replacement products that help stop the urge to  smoke.   · Join a support group or quit smoking program. Talking with others about the challenges of quitting can help you get through them.  · Ask other smokers in your household to quit with you.  · Look for the cues in your life that you associate with smoking and avoid them.  Track your triggers  What gives you that T-qdvt-j-cigarette feeling? List all the situations that make you want a cigarette. Then think of other ways to deal with these situations. Here are some examples:  Situation How I'll handle it   Finishing a meal Get up from the table and take a walk   Having an argument Find a quiet place and breathe deeply   Feeling lonely or bored Call a friend to talk         Tips for quitting successfully  · List the benefits of quitting such as reducing heart risks and saving money. Keep this list and review it whenever you feel like smoking.  · Get support. Let your friends know you may call them to chat when you have an urge to smoke.  · If youve tried to quit before without success, this time avoid the triggers that may cause the relapse.  · Make the most of slip-ups. Try to learn from them, and then get back on track.  · Be accountable to your friends and your calendar so that you stay on track.  For family and friends  · Be supportive and patient. Quitting smoking can be difficult and stressful.  · If you smoke, nows a great time to quit. Even if you dont quit, never smoke around your loved one. Secondhand smoke is dangerous to his or her heart.  · The best goals are accomplished in teams. Remember that when your loved one states he or she wants to stop smoking.  Date Last Reviewed: 7/1/2016  © 3017-7287 [employer]. [address] [place] [address], [place] [address]. All rights reserved. This information is not intended as a substitute for professional medical care. Always follow your healthcare professional's instructions.        Metabolic Syndrome: Lowering Your Blood Pressure    Metabolic  syndrome is a set of five health factors that can lead to serious health problems. The factors greatly increase your risk for diabetes, heart attack, or stroke. High blood pressure (hypertension) is one of the factors of metabolic syndrome.  What is high blood pressure?  High blood pressure is when the force of blood against the inside of your blood vessels is higher than it should be. This makes your heart work harder. And it may damage your blood vessels. High blood pressure means a level of 130 mmHg/85 mm Hg or more.  Your healthcare provider will usually check your blood pressure each time you have an office visit. Having a high reading at one office visit does not mean that you have high blood pressure. High blood pressure means that your blood pressure is high over a period of time. That is why your healthcare provider checks it at each office visit. He or she can then look at the pattern of your blood pressure. Try to arrive early enough to your appointment so you can rest before your blood pressure is taken. Avoid caffeine and smoking before your pressure is measured.   Making lifestyle changes  Lifestyle changes can help lower your blood pressure. These changes can include:  · Losing weight. Even a small amount of weight loss can lower your blood pressure. As you slowly lose weight, you may see your blood pressure gradually get lower.  · Quitting smoking. The nicotine in tobacco raises blood pressure. Smoking also increases the risk for other heart and blood vessel diseases, many cancers, and most lung conditions. The first step is to set a quit date. Talk with your healthcare provider about ways to quit smoking. There are programs and medicines that can help.  · Eating healthy. Eat plenty of fruits and vegetables. Eat fat-free or low-fat dairy foods, and drink less alcohol.  · Eating less salt (sodium). Salt can raise blood pressure. Try salt-free seasoning, or herbs and spices. Choose low-salt or no-salt  snacks, deli meats, and canned foods.  · Being more physically active. Physical activity can help lower blood pressure. Get at least 30 minutes of activity on most days. If you are not very active, talk with your healthcare provider before you get started. He or she can help you figure out what is best for you.  · Managing stress. Stress can raise blood pressure. Talk with your healthcare provider about lowering your stress level. He or she may advise relaxation methods, a counselor, health , or class.  Taking medicine  Your healthcare provider may also prescribe medicine to help lower your blood pressure. The medicine will help lessen the strain on your heart and help to protect your blood vessels. If you lose weight, you may be able to take less medicine. Or you may no longer need to take it.  Date Last Reviewed: 7/1/2016 © 2000-2016 [employer]. [address] [place] [address], [place] [address]. All rights reserved. This information is not intended as a substitute for professional medical care. Always follow your healthcare professional's instructions.

## 2017-06-30 NOTE — TELEPHONE ENCOUNTER
Received message from patient requesting a refill of Lactulose. Upon further inspection it was noted this medication was refilled on 6-19-17 with 3 additional refills. Patient notified. Verbalized understanding.

## 2017-06-30 NOTE — PROGRESS NOTES
Subjective:       Patient ID: Hesham Serna is a 66 y.o. male.    Chief Complaint: Hypertension and COPD    COPD   This is a chronic problem. The current episode started more than 1 year ago. The problem occurs constantly. The problem has been waxing and waning. Associated symptoms include arthralgias, congestion, coughing, fatigue, headaches, joint swelling, myalgias, nausea, neck pain, a rash, swollen glands and weakness. Pertinent negatives include no visual change or vomiting. The symptoms are aggravated by stress and smoking. He has tried oral narcotics and sleep (Prednisone dependent.) for the symptoms. The treatment provided no relief.     Review of Systems   Constitutional: Positive for fatigue.   HENT: Positive for congestion, postnasal drip and rhinorrhea.    Eyes: Positive for visual disturbance.   Respiratory: Positive for cough, shortness of breath and wheezing.    Cardiovascular: Positive for leg swelling.   Gastrointestinal: Positive for abdominal distention and nausea. Negative for vomiting.   Genitourinary: Positive for frequency and urgency.   Musculoskeletal: Positive for arthralgias, back pain, gait problem, joint swelling, myalgias and neck pain.   Skin: Positive for rash.   Neurological: Positive for tremors, weakness, light-headedness and headaches.   Psychiatric/Behavioral: Positive for agitation, behavioral problems, decreased concentration, dysphoric mood and sleep disturbance. The patient is nervous/anxious and is hyperactive.        Objective:      Physical Exam   Constitutional: He appears well-developed. He appears listless. He has a sickly appearance.   HENT:   Right Ear: Tympanic membrane, external ear and ear canal normal. Decreased hearing is noted.   Left Ear: Tympanic membrane, external ear and ear canal normal. Decreased hearing is noted.   Nose: Mucosal edema, rhinorrhea and septal deviation present.   Mouth/Throat: Uvula is midline. Mucous membranes are pale. He has  dentures. Posterior oropharyngeal edema present.   Eyes: Conjunctivae and EOM are normal. Right eye exhibits chemosis. Left eye exhibits chemosis.   Neck: Trachea normal. Normal carotid pulses and no hepatojugular reflux present. Spinous process tenderness present. Carotid bruit is not present. Decreased range of motion present. No thyroid mass present.   Cardiovascular: Intact distal pulses and normal pulses.  An irregular rhythm present.   Pulmonary/Chest: Tachypnea noted. No apnea. No respiratory distress. He has decreased breath sounds in the right lower field and the left lower field. He has wheezes in the right upper field and the left upper field. He has no rhonchi. He has no rales.   Abdominal: Soft. Normal aorta and bowel sounds are normal. He exhibits no pulsatile liver and no ascites. There is no hepatosplenomegaly. There is no tenderness. A hernia is present. Hernia confirmed positive in the left inguinal area.   Musculoskeletal:        Cervical back: He exhibits decreased range of motion and tenderness.        Lumbar back: He exhibits decreased range of motion, tenderness, bony tenderness, deformity and pain.   Neurological: He has normal strength. He appears listless. No cranial nerve deficit or sensory deficit. He displays a negative Romberg sign. Gait abnormal.       Assessment:       1. Bipolar affective disorder, current episode hypomanic    2. Mood disorder    3. Chronic narcotic dependence    4. Hep C w/o coma, chronic    5. 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 Glucose intolerance (impaired glucose tolerance)    7. Chronic bronchitis, unspecified chronic bronchitis type    8. Mixed hyperlipidemia        Plan:       Bipolar affective disorder, current episode hypomanic  -     clonazePAM (KLONOPIN) 2 MG Tab; TAKE 1 TABLET (2 MG TOTAL) BY MOUTH 3 (THREE) TIMES DAILY AS NEEDED.  Dispense: 90 tablet; Refill: 0    Mood disorder  -     clonazePAM (KLONOPIN) 2 MG Tab; TAKE 1 TABLET (2 MG TOTAL) BY MOUTH  3 (THREE) TIMES DAILY AS NEEDED.  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  Dispense: 45 tablet; Refill: 3    Mixed hyperlipidemia  -     Lipid panel; Future; Expected date: 06/30/2017      Patient readiness: acceptance and eager and barriers:readiness    During the course of the visit the patient was educated and counseled about the following:     Hypertension:   Dietary sodium restriction.  Regular aerobic exercise.  Check blood pressures daily and record.  Follow up: 6 weeks and as needed.  Obesity:   General weight loss/lifestyle modification strategies discussed (elicit support from others; identify saboteurs; non-food rewards, etc).  Diet interventions: low calorie (1000 kCal/d) deficit diet.  Regular aerobic exercise program discussed.  Medication: bulk-forming agents.    Goals: Hypertension: Reduce Blood Pressure and Obesity: Reduce calorie intake and BMI    Did patient meet goals/outcomes: Yes    The following self management tools provided: blood pressure log  excercise log    Patient Instructions (the written plan) was given to the patient/family.     Time spent with patient: 30 minutes

## 2017-07-03 ENCOUNTER — TELEPHONE (OUTPATIENT)
Dept: FAMILY MEDICINE | Facility: CLINIC | Age: 67
End: 2017-07-03

## 2017-07-03 RX ORDER — LACTULOSE 10 G/15ML
SOLUTION ORAL; RECTAL
Qty: 1200 ML | Refills: 0 | Status: SHIPPED | OUTPATIENT
Start: 2017-07-03 | End: 2017-09-18 | Stop reason: SDUPTHER

## 2017-07-03 NOTE — TELEPHONE ENCOUNTER
----- Message from Annel Odell sent at 7/3/2017 12:35 PM CDT -----  Contact: Negin Lopez'roxanna Lopez's would like to speak to a nurse  Has questions on a medication    Please call    Thanks

## 2017-07-03 NOTE — TELEPHONE ENCOUNTER
----- Message from Ally Fonseca sent at 7/3/2017 12:01 PM CDT -----  Patient states the Incorrect medication Lactalose  was sent in he states he takes 6 tablespoons a day and the incorrect amount was sent he states he will only have 3 days of medication instead of a months worth  contact patient at [phone].    Thank you

## 2017-07-03 NOTE — TELEPHONE ENCOUNTER
----- Message from Erinn Valadez sent at 7/3/2017  2:38 PM CDT -----  Patient would like to speak to office because he is having trouble taking too much medication. Please call back for details at [phone].

## 2017-07-03 NOTE — TELEPHONE ENCOUNTER
The pharmacy has been called and the extra 300 ml has been called in to give the patient a total of 1200 ml of lactulose.

## 2017-07-03 NOTE — TELEPHONE ENCOUNTER
Patient states he only received 235 ml of Lactulose on his last refill and he is now running out of medication. Call placed to [facility], spoke to Edmund who states patient received a refill of Lactulose on 6-19-17 and 6-26-17 for the amount of 1200 ml. Writer asked Mr. Shaffer if he received prescription e-scribed on 6-19-17 for Lactulose 30 ml three times daily, disp 900 ml with 3 additional refills. Writer advised this prescription was not received by pharmacy. Writer called in order as written on 6-19-17 by Dr. Ramos. Patient notified. Verbalized understanding.

## 2017-07-06 ENCOUNTER — TELEPHONE (OUTPATIENT)
Dept: PSYCHIATRY | Facility: CLINIC | Age: 67
End: 2017-07-06

## 2017-07-06 RX ORDER — ZIPRASIDONE HYDROCHLORIDE 20 MG/1
CAPSULE ORAL
Qty: 1 CAPSULE | Refills: 0
Start: 2017-07-06 | End: 2017-08-17 | Stop reason: SDUPTHER

## 2017-07-06 NOTE — TELEPHONE ENCOUNTER
----- Message from Dara Hinojosa sent at 7/5/2017  6:50 PM CDT -----  Contact: pt  Pt requests the nurse to call him in the morning at  [phone] . Pt didn't go into details as to why he needs the nurse to call.

## 2017-07-06 NOTE — TELEPHONE ENCOUNTER
Patient called and he stated that he is not doing well he is very sick and not sleeping at night and sleeping all day. He is having nightmares at night so cannot sleep. He would like to have a call back from nurse.  Please call when available

## 2017-07-06 NOTE — TELEPHONE ENCOUNTER
I spoke to the patient - he reports he is too sedated during the day on Geodon 20 mg BID.  He is napping for hours during the day and having sleep disturbances at night with repetitious nightmares which is a new problem for him.  He is dealing with a lot of health issues and MD visits;  Nightmares relate to bad things that have happened in the past.  Pt is NOT taking trazodone until it is approved by Dr [facility] 7/12/17.  I advised pt to stop AM geodon and take 40 mg of Geodon in the evening for now. We will follow up with him on Monday to see if this is helping.  I would like to start pt on SSRI; he has been very reluctant to add new meds.  Will first stabilize current dosing schedule with Geodon.     Pt is calm and appropriate on the phone

## 2017-07-06 NOTE — TELEPHONE ENCOUNTER
Patient states he hasnt been feeling good- difficulty sleeping. Can't fall asleep till 2 AM and having vivid dreams  Please advise

## 2017-07-10 NOTE — TELEPHONE ENCOUNTER
Attempted to call patient; no answer. Left voicemail to return our call at [phone].  Thank you

## 2017-07-10 NOTE — TELEPHONE ENCOUNTER
Spoke with patient. Advised to take geodon 40 mg in evening. Right before he goes to bed. And to stop taking it in the morning.    Patient verbalized understanding.   Will start that tomorrow- per patient

## 2017-07-12 ENCOUNTER — TELEPHONE (OUTPATIENT)
Dept: PSYCHIATRY | Facility: CLINIC | Age: 67
End: 2017-07-12

## 2017-07-12 NOTE — TELEPHONE ENCOUNTER
Patient states he had a appointment with Dr. Foster this morning and when patient mentioned his recurrent nightmares- dr states he would like to see shanthi on buspirone for his anxiety  Please advise

## 2017-07-12 NOTE — TELEPHONE ENCOUNTER
Patient called an left message on voice mail  @ 12:33 pm needing a call back from [facility]. Please call patient.  Routed message to Rae PEREIRA

## 2017-07-12 NOTE — TELEPHONE ENCOUNTER
Please advise the patient that we can restart his Buspar (this was prescribed in previous appointment), but I would like to wait to discuss this at our next appointment 8/15/17.  Please allow time for adjustment to increased Geodon dose.

## 2017-07-12 NOTE — TELEPHONE ENCOUNTER
Spoke to patient and he was not happy about not getting to start any medication now. He states he was at his mothers house today and its bad he cannot keep doing it. He has to go there tomorrow and he cannot handle it. He states the nightmares are keeping him up and waking him at night screaming and shaking. Last night he had someone spend the night on the sofa and they had to go in his room all night long checking on him because he was up with nightmares screaming. He wants to start something sooner than his next appointment. Please advise

## 2017-07-12 NOTE — TELEPHONE ENCOUNTER
Called patient and offered him tomorrow appointment and one on 07/20/17 he stated he will keep his one in Aug and discuss the medication at that visit

## 2017-07-12 NOTE — TELEPHONE ENCOUNTER
Attempted to call patient; no answer. Left voicemail to return our call at [phone].  Thank you

## 2017-07-17 ENCOUNTER — APPOINTMENT (OUTPATIENT)
Dept: LAB | Facility: HOSPITAL | Age: 67
End: 2017-07-17
Attending: UROLOGY
Payer: MEDICARE

## 2017-07-17 ENCOUNTER — TELEPHONE (OUTPATIENT)
Dept: UROLOGY | Facility: CLINIC | Age: 67
End: 2017-07-17

## 2017-07-17 ENCOUNTER — TELEPHONE (OUTPATIENT)
Dept: FAMILY MEDICINE | Facility: CLINIC | Age: 67
End: 2017-07-17

## 2017-07-17 ENCOUNTER — OFFICE VISIT (OUTPATIENT)
Dept: UROLOGY | Facility: CLINIC | Age: 67
End: 2017-07-17
Payer: MEDICARE

## 2017-07-17 VITALS
BODY MASS INDEX: 31.21 KG/M2 | WEIGHT: 198.88 LBS | TEMPERATURE: 98 F | HEART RATE: 67 BPM | SYSTOLIC BLOOD PRESSURE: 128 MMHG | DIASTOLIC BLOOD PRESSURE: 76 MMHG | HEIGHT: 67 IN

## 2017-07-17 DIAGNOSIS — N20.0 NEPHROLITHIASIS: ICD-10-CM

## 2017-07-17 DIAGNOSIS — R31.0 GROSS HEMATURIA: Primary | ICD-10-CM

## 2017-07-17 DIAGNOSIS — R33.9 URINARY RETENTION: ICD-10-CM

## 2017-07-17 LAB
BILIRUB SERPL-MCNC: ABNORMAL MG/DL
BLOOD URINE, POC: 250
COLOR, POC UA: ABNORMAL
GLUCOSE UR QL STRIP: ABNORMAL
KETONES UR QL STRIP: ABNORMAL
LEUKOCYTE ESTERASE URINE, POC: ABNORMAL
NITRITE, POC UA: ABNORMAL
PH, POC UA: 8
PROTEIN, POC: 30
SPECIFIC GRAVITY, POC UA: 1.01
UROBILINOGEN, POC UA: ABNORMAL

## 2017-07-17 PROCEDURE — 87088 URINE BACTERIA CULTURE: CPT

## 2017-07-17 PROCEDURE — 88112 CYTOPATH CELL ENHANCE TECH: CPT | Performed by: PATHOLOGY

## 2017-07-17 PROCEDURE — 81002 URINALYSIS NONAUTO W/O SCOPE: CPT | Mod: PBBFAC,PO | Performed by: UROLOGY

## 2017-07-17 PROCEDURE — 51701 INSERT BLADDER CATHETER: CPT | Mod: S$PBB,,, | Performed by: UROLOGY

## 2017-07-17 PROCEDURE — 99214 OFFICE O/P EST MOD 30 MIN: CPT | Mod: S$PBB,25,, | Performed by: UROLOGY

## 2017-07-17 PROCEDURE — 88112 CYTOPATH CELL ENHANCE TECH: CPT | Mod: 26,,, | Performed by: PATHOLOGY

## 2017-07-17 PROCEDURE — 99999 PR PBB SHADOW E&M-EST. PATIENT-LVL III: CPT | Mod: PBBFAC,,, | Performed by: UROLOGY

## 2017-07-17 PROCEDURE — 87077 CULTURE AEROBIC IDENTIFY: CPT

## 2017-07-17 PROCEDURE — 51701 INSERT BLADDER CATHETER: CPT | Mod: PBBFAC,PO | Performed by: UROLOGY

## 2017-07-17 PROCEDURE — 87186 SC STD MICRODIL/AGAR DIL: CPT

## 2017-07-17 PROCEDURE — 87086 URINE CULTURE/COLONY COUNT: CPT

## 2017-07-17 PROCEDURE — 99213 OFFICE O/P EST LOW 20 MIN: CPT | Mod: PBBFAC,PO | Performed by: UROLOGY

## 2017-07-17 RX ORDER — LACTULOSE 20 G/20G
POWDER, FOR SOLUTION ORAL
COMMUNITY
Start: 2017-07-11 | End: 2018-04-02

## 2017-07-17 RX ORDER — DOXYCYCLINE HYCLATE 100 MG
TABLET ORAL
Qty: 20 TABLET | Refills: 0 | OUTPATIENT
Start: 2017-07-17

## 2017-07-17 RX ORDER — DOXYCYCLINE 100 MG/1
100 CAPSULE ORAL 2 TIMES DAILY
Qty: 20 CAPSULE | Refills: 0 | Status: SHIPPED | OUTPATIENT
Start: 2017-07-17 | End: 2017-07-19 | Stop reason: ALTCHOICE

## 2017-07-17 RX ORDER — OXYCODONE HYDROCHLORIDE 15 MG/1
15 TABLET ORAL EVERY 8 HOURS PRN
COMMUNITY
Start: 2017-07-13 | End: 2018-10-03 | Stop reason: ALTCHOICE

## 2017-07-17 NOTE — TELEPHONE ENCOUNTER
----- Message from Prabha Zimmer sent at 7/17/2017 10:49 AM CDT -----  Contact: Patient  Patient called regarding directions on script antibiotics. Stated that pharmacy stated directions is not clear on scipt. Please call back at [phone] to advise. Thanks,

## 2017-07-17 NOTE — TELEPHONE ENCOUNTER
Spoke with patient directions for antibiotics patient is start taking antibiotics today. Patient verbally voiced understanding.

## 2017-07-17 NOTE — PATIENT INSTRUCTIONS
Gross hematuria  -ct urogram 11/14/16: herniation into left testicle, 4mm left renal stone  -urine culture 1/16/17 - + for infxn  -urine cytology 9/26/16 - negative, repeat todasy  -cystoscopy 9/26/16 - pt never scheduled - pt states he cannot schedule this right now, no recent GH.   -repeat urine cytology, obtain urine culture and repeat kub to check on stone (denies falnk pain today)    Elevated residuals  -continue to CIC as needed 5-6x a day, also voiding spontaneously   -uses Smart Plate 14fr catheter  -will fill out diary with voided amount and catheterized amounts after urinating at least once or twice as well as catheterized amounts throughout the day.   -discontinue myrbetriq and continue rapalfo for now  -at some point may consider repeat cysto and trus and consider turp as pt does state he can feel a full bladder.     Nephrolithiasis  -pt wants to wait to schedule eswl bc of transportation issues, he was supposed to get eswl done but cancelled last minute  -need clearance from dr barney prior to surgery.   -he will call us when he wants to schedule  -will repeat kub today to ensure stone not continuing to enlarge     ED  -he decided not to fill his viagra bc he has no partner

## 2017-07-17 NOTE — PROGRESS NOTES
"[facility] Progress Note  PCP: Dr.Raymond Baez [facility]  Chief Complaint: Follow-up     Subjective:     HPI: Hesham Serna with     Last seen 5/11/17 -  months ago    Gross hematuria  -ct urogram 11/14/16: herniation into left testicle, 4mm left renal stone  -urine cultures have been positive in the past, ua today suspicious. Pt does CIC.   -urine cytology 9/26/16 - negative, repeat today. He has a h/o smoking (55 packs, 2-3 packs a day).   -cystoscopy 9/26/16 - pt never scheduled - pt states he cannot schedule this right now, no recent GH.     Elevated residuals  Pt with history of urinary retention. UDS showed atonic bladder.   spontaneously voids 4-5x a day "a normal amount" and "feels like he is emptying" but sometimes catheterizes 1x a night. CIC about 5-6x a day and does it based on how full he feels.     Pt comes in today saying that he tried to catheterize on Thursday but could not advance and since then has been having blood in urine. Has not catheterized all weekend bc he was concerned about the blood.  He says he has been urinating ever 1 hour over the weekend and it has been burning. He says the bleeding has increased but he's had no fevers.     He also states that he catheterizes      Nephrolithiasis  h/o nephrolithiasis. CTRSS on 6/4/15 showed left 4mm renal stone. KUB on 4/12/16 showed stone is radioopaque.  Scheduled for eswl but never proceeded with surgery. Stated he wants to hold off until his back is fixed      AUASSx: 10, mixed  IIEF: 5    REVIEW OF SYSTEMS:  General ROS: no fevers, no chills  Psychological ROS: no depression  Endocrine ROS: no heat or cold  Respiratory ROS: no SOB  Cardiovascular ROS: no CP  Gastrointestinal ROS: no abdominal pain, + constipation (bm every day with lactulose bid), no diarrhea, no BRBPR  Musculoskeletal ROS: no muscle pain  Neurological ROS: no headaches  Dermatological ROS: no rashes  HEENT: +glasses, + sinus   ROS: " per HPI      Past medical, surgical, social and family hx have been reviewed. There have any changes.       Cataracts? none  Urologic meds: myrbetriq  Anticoagulation: No    Vitals:    07/17/17 0911   BP: 128/76   Pulse: 67   Temp: 97.7 °F (36.5 °C)          Physical Exam   Constitutional: He is oriented to person, place, and time. He appears well-developed and well-nourished. He is cooperative. No distress.   HENT:   Head: Normocephalic and atraumatic.   Eyes: Pupils are equal, round, and reactive to light.   Cardiovascular: Normal rate and regular rhythm.   Pulmonary/Chest: Effort normal.   Abdominal: Soft. Normal appearance.   Musculoskeletal: Normal range of motion.   Neurological: He is alert and oriented to person, place, and time. He has normal reflexes.   Skin: Skin is intact.     Psychiatric: He has a normal mood and affect.     MOON 2/9/17: 20 g prostate  testes descended bilaterally, no masses   left reducible inguinal hernia    In and out cath today done post void by me: catheter went easily and 50cc red urine drained.     Urinalysis: 1.010/8/1+leukoycte/30 protein/250 blood - on doxy, sending for culture    Labs:  Urine culture   1/16/17 ng  10/21/16 E.cloacae  9/19/16 P.mirabilis  7/20/16 s.haemolyticus  3/23/16  Ng  12/16/15 ng    PSA   5/9/17  0.45  5/26/15 0.27  7/14/14 0.26    Cr  4/3/17 1.0    Pathology:   9/26/16 URINE, VOIDED (CYTOLOGY):  -Negative for malignant cells  -Abundant neutrophils  -Degenerative changes      Rads:   ctu 11/14/16: 4mm left renal stone, no renal mass, no hydro, prtial herniation of the bladder into the LIH. Prostate not enlarged. A fat containing RIH. Severe L1 compression fracture  kub 4/12/16 - left renal stone 5mm  ctrss 6/4/15 - 4mm left midpole stone       Assessment:     Gross hematuria  Urinary retention  Nephrolithiasis  ED    Plan:     Gross hematuria/uti  -ct urogram 11/14/16: herniation into left testicle, 4mm left renal stone  -urine culture 1/16/17 - + for  infxn  -urine cytology 9/26/16 - negative, repeat todasy  -cystoscopy 9/26/16 - pt never scheduled - pt states he cannot schedule this right now, no recent GH.   -repeat urine cytology, obtain urine culture and repeat kub to check on stone (denies falnk pain today)  -pt says he cannot take bactrim or ciprofloxacin with medicine. Starting on doxy 10 bid x 10days for now.     Elevated residuals  -continue to CIC as needed 5-6x a day, also voiding spontaneously   -uses [employer] 14fr catheter  -will fill out diary with voided amount and catheterized amounts after urinating at least once or twice as well as catheterized amounts throughout the day. I asked him not to catheterize while he has an infection.   -discontinue myrbetriq and continue rapalfo for now  -at some point may consider repeat cysto and trus and consider turp as pt does state he can feel a full bladder.     Nephrolithiasis  -pt wants to wait to schedule eswl bc of transportation issues, he was supposed to get eswl done but cancelled last minute  -need clearance from dr barney prior to surgery.   -he will call us when he wants to schedule  -will repeat kub today to ensure stone not continuing to enlarge     ED  -he decided not to fill his viagra bc he has no partner    [facility]

## 2017-07-17 NOTE — TELEPHONE ENCOUNTER
Received message from patient stating he has blood in his urine, requesting an appointment with PCP.  Upon further inspection patient was seen by urology on today. Jeni Conde MA spoke to patient to confirm. Patient states he submitted urine sample today at urology appointment. Patient assured PCP will be notified. Verbalized understanding.

## 2017-07-17 NOTE — TELEPHONE ENCOUNTER
----- Message from Brooklyn Marshall sent at 7/17/2017  2:30 PM CDT -----  Contact: self [phone]  He is requesting an appt for tomorrow for blood in his urine.  Please call him.  Thank you!

## 2017-07-18 ENCOUNTER — TELEPHONE (OUTPATIENT)
Dept: FAMILY MEDICINE | Facility: CLINIC | Age: 67
End: 2017-07-18

## 2017-07-18 NOTE — TELEPHONE ENCOUNTER
Received message from patient stating he dropped off a urine sample and is waiting for a prescription to stop bleeding. Upon further inspection it was noted patient was seen by urology on yesterday regarding this issue. Doxycycline antibiotic was prescribed at that time. Spoke with Dr. Ramos who states patient was seen by urology and needs to follow instructions per urology. Call placed to patient for notification. No answer received. Left message to return call to office.

## 2017-07-18 NOTE — TELEPHONE ENCOUNTER
----- Message from Lyn Gary sent at 7/18/2017 10:01 AM CDT -----  Contact: Jeykelly Dahl is calling to advise he went to put in the urine sample. He is waiting on his prescription to stop bleeding. He states he will be leaving town to go back to [facility] in a couple of hours. Please call him [phone] (home)

## 2017-07-18 NOTE — TELEPHONE ENCOUNTER
Patient notified to take Doxycycline as ordered on yesterday by Dr. Ace. Patient verbalized understanding.

## 2017-07-19 ENCOUNTER — TELEPHONE (OUTPATIENT)
Dept: UROLOGY | Facility: CLINIC | Age: 67
End: 2017-07-19

## 2017-07-19 LAB — BACTERIA UR CULT: NORMAL

## 2017-07-20 DIAGNOSIS — F39 MOOD DISORDER: ICD-10-CM

## 2017-07-20 DIAGNOSIS — F31.0 BIPOLAR AFFECTIVE DISORDER, CURRENT EPISODE HYPOMANIC: ICD-10-CM

## 2017-07-21 ENCOUNTER — TELEPHONE (OUTPATIENT)
Dept: UROLOGY | Facility: CLINIC | Age: 67
End: 2017-07-21

## 2017-07-21 ENCOUNTER — TELEPHONE (OUTPATIENT)
Dept: FAMILY MEDICINE | Facility: CLINIC | Age: 67
End: 2017-07-21

## 2017-07-21 RX ORDER — CLONAZEPAM 2 MG/1
TABLET ORAL
Qty: 90 TABLET | Refills: 0 | Status: SHIPPED | OUTPATIENT
Start: 2017-07-21 | End: 2017-08-21 | Stop reason: SDUPTHER

## 2017-07-21 NOTE — TELEPHONE ENCOUNTER
Spoke with patient wants to know if he should contact his pain management doctor. Reassured patient to increase fluid intake and continue antibiotics. Patient verbally voiced understanding.

## 2017-07-21 NOTE — TELEPHONE ENCOUNTER
Received message from patient stating he needed to speak to the nurse regarding refill needed. Spoke to patient who states he called pharmacy and refill was not sent. Writer notified patient refill was sent to pharmacy on today's date. Patient requested writer call pharmacy to confirm. Writer called pharmacy, advised prescription was received but it was too soon to fill and was placed on hold. Patient notified. Verbalized understanding.

## 2017-07-21 NOTE — TELEPHONE ENCOUNTER
----- Message from Anushka Saldivar sent at 7/21/2017  1:24 PM CDT -----  Contact: self  Patient [phone] is calling concerning his medication that is due/he ask to speak with Nurse Canales or Misti/please call

## 2017-07-21 NOTE — TELEPHONE ENCOUNTER
----- Message from Michelle Ramirez sent at 7/21/2017  9:45 AM CDT -----  Contact: devang Hook call   Call back

## 2017-07-23 ENCOUNTER — NURSE TRIAGE (OUTPATIENT)
Dept: ADMINISTRATIVE | Facility: CLINIC | Age: 67
End: 2017-07-23

## 2017-07-23 NOTE — TELEPHONE ENCOUNTER
"  Reason for Disposition   [1] Taking antibiotic for urinary tract infection (UTI) AND [2] male   [1] SEVERE pain (e.g., excruciating) AND [2] no improvement 2 hours after pain medications    Answer Assessment - Initial Assessment Questions  1. SYMPTOM: "What's the main symptom you're concerned about?" (e.g., frequency, incontinence)      Frequency with back pain  2. ONSET: "When did the  ________  start?"      today  3. PAIN: "Is there any pain?" If so, ask: "How bad is it?" (Scale: 1-10; mild, moderate, severe)      7  4. CAUSE: "What do you think is causing the symptoms?"      unsure  5. OTHER SYMPTOMS: "Do you have any other symptoms?" (e.g., fever, flank pain, blood in urine, pain with urination)      No,testicles and where penis goes into his bladder  6. PREGNANCY: "Is there any chance you are pregnant?" "When was your last menstrual period?"      n/a    Answer Assessment - Initial Assessment Questions  1. ANTIBIOTIC: "What antibiotic are you taking?" "How many times per day?"      doxycyclin  2. DURATION: "When was the antibiotic started?"      thursday  3. MAIN SYMPTOM: "What is the main symptom you are concerned about?"      pain  4. FEVER: "Do you have a fever?" If so, ask: "What is it, how was it measured, and when did it start?"      no  5. OTHER SYMPTOMS: "Do you have any other symptoms?" (e.g., flank pain, penile discharge, scrotal pain, blood in urine)      Back pain    Protocols used: ST URINARY SYMPTOMS-A-, ST URINARY TRACT INFECTION ON ANTIBIOTIC FOLLOW-UP CALL - MALE-A-    "

## 2017-07-24 ENCOUNTER — TELEPHONE (OUTPATIENT)
Dept: FAMILY MEDICINE | Facility: CLINIC | Age: 67
End: 2017-07-24

## 2017-07-24 ENCOUNTER — TELEPHONE (OUTPATIENT)
Dept: UROLOGY | Facility: CLINIC | Age: 67
End: 2017-07-24

## 2017-07-24 NOTE — TELEPHONE ENCOUNTER
----- Message from Lyn Gary sent at 7/24/2017 11:46 AM CDT -----  Contact: Hesham Whyte is calling back about being seen today. He wants to advise he is in [place] at his mother's home. I advised the nurse did not forget about him and she will call him back. He wanted to know also if he needs to give a urine sample. Please [phone] (home)   Thanks!

## 2017-07-24 NOTE — TELEPHONE ENCOUNTER
----- Message from Lettyedy Ziegler sent at 7/24/2017  3:59 PM CDT -----  Patient states that he is very ill and need to see the doctor/NP as soon as possible.  Call [phone].

## 2017-07-24 NOTE — TELEPHONE ENCOUNTER
Per  informed patient to continue rapaflo and restart self catheterization once in the morning and one at night. Patient verbally voiced understanding.

## 2017-07-24 NOTE — TELEPHONE ENCOUNTER
Spoke with patient he states he is having trouble urinating at night and in the morning he thinks drinking too much water. Urine is clear no more pain. Patient was advised not to catheterize at last appointment. Please advise

## 2017-07-24 NOTE — TELEPHONE ENCOUNTER
Received message from patient requesting an appointment for tomorrow, states he is experiencing abdominal pain/possible allergic reaction. Writer spoke to patient regarding. Asked patient if he spoke/contacted Dr Ace's office regarding above concerns. Patient states he spoke to Rosa at Dr Ace's office on today and was advised to slow down on water consumption and to start back using catheters. Writer advised patient to follow advise given by doctor. Patient states she would like an appointment in [place] Office. Appointment scheduled for 7-28-17. Patient agreed to appointment date and time.

## 2017-07-26 RX ORDER — SERTRALINE HYDROCHLORIDE 50 MG/1
TABLET, FILM COATED ORAL
Qty: 30 TABLET | Refills: 0 | OUTPATIENT
Start: 2017-07-26

## 2017-07-27 NOTE — TELEPHONE ENCOUNTER
Patient left voicemail stating that [facility] messed up his prescription for geodon and he had none left and needed a new prescription sent to [facility]    Called [facility] and they state that the patient just left the pharmacy with a 40 day supply of geodon- which equaled to 80 tablets    Called patient who states he was filling his weekly pill dispenser and noticed he was low on medication. He likes to keep three month supply but lost some- unable to clarify how much was missing- and he would like dr. eric to send a three month refill to the pharmacy    Advised patient that he just filled the prescription we sent  -patient states he would like me to ask dr. eric because he has anxiety issues and is afraid he will run out

## 2017-07-27 NOTE — TELEPHONE ENCOUNTER
90 day supply was sent:     ziprasidone (GEODON) 20 MG Cap (Discontinued) 180 capsule 0 6/26/2017 7/6/2017 No   Sig - Route: Take 1 capsule (20 mg total) by mouth 2 (two) times daily. - Oral   Class: Normal   Reason for Discontinue: Reorder   Order: [id number]   Date/Time Signed: 6/26/2017 16:14       E-Prescribing Status: Receipt confirmed by pharmacy (6/26/2017  4:14 PM CDT)   Pharmacy     [facility] [address] [place] [address] [facility] [address] [facility] [address]        Please confirm did pharmacy get this - why are they only giving him 40 days worth?

## 2017-07-28 ENCOUNTER — TELEPHONE (OUTPATIENT)
Dept: FAMILY MEDICINE | Facility: CLINIC | Age: 67
End: 2017-07-28

## 2017-07-28 ENCOUNTER — TELEPHONE (OUTPATIENT)
Dept: PSYCHIATRY | Facility: CLINIC | Age: 67
End: 2017-07-28

## 2017-07-28 ENCOUNTER — TELEPHONE (OUTPATIENT)
Dept: UROLOGY | Facility: CLINIC | Age: 67
End: 2017-07-28

## 2017-07-28 NOTE — TELEPHONE ENCOUNTER
Called and spoke with patient, he stated that he needs  A refill on rapaflo, informed patient that he should have received 90 pills. Patient states he only received 30 pill. Call placed to his pharmacy, they stated they gave him a 90 pill bottle of med and it can't be refilled until August 28th. Returned call and informed the patient, he verbally understood.

## 2017-07-28 NOTE — TELEPHONE ENCOUNTER
Spoke to patient who requested writer call pharmacy to see if he has a refill there for Aldactone. Writer contacted pharmacy and was advised patient picked up prescription for this medication on 7-26-17 and has 7 additional refills available. Writer notified patient of above. Patient verbalized understanding. Patient also requested to know if he has any x-rays ordered by Dr. Ramos. Patient has a x-ray ordered in November for Dr. Ace. Patient notified. Verbalized understanding.

## 2017-07-28 NOTE — TELEPHONE ENCOUNTER
----- Message from Sandra Montoya sent at 7/28/2017  1:09 PM CDT -----  Contact: Patient  Hesham, patient [phone], Calling speak with the nurse. Please advise. thanks.

## 2017-07-28 NOTE — TELEPHONE ENCOUNTER
Spoke with pharmacy who states that patient LOST 14 days worth of his geodon so the pharmacy called his insurance and got a override and was able to get 40 days worth of his medication early.    It is to early for patient to get a 90 day refill    Patient notified and verbalized understanding    Request refill for rapaflo- advised patient that he has 3 refills at medicine [facility] on [address] in [place]

## 2017-07-28 NOTE — TELEPHONE ENCOUNTER
90 day supply was sent:               ziprasidone (GEODON) 20 MG Cap (Discontinued) 180 capsule 0 6/26/2017 7/6/2017 No   Sig - Route: Take 1 capsule (20 mg total) by mouth 2 (two) times daily. - Oral   Class: Normal   Reason for Discontinue: Reorder   Order: [id number]   Date/Time Signed: 6/26/2017 16:14           E-Prescribing Status: Receipt confirmed by pharmacy (6/26/2017  4:14 PM CDT)   Pharmacy      [facility] [address] [place] [address] [address]               Please confirm did pharmacy get this - why are they only giving him 40 days worth?                     My NoteSigned Yesterday   Addend              Patient left voicemail stating that [facility] messed up his prescription for geodon and he had none left and needed a new prescription sent to [facility]     [facility] and [facility] state that the patient just left the pharmacy with a 40 day supply of geodon- which equaled to 80 tablets     Called patient who states he was filling his weekly pill dispenser and noticed he was low on medication. He likes to keep three month supply but lost some- unable to clarify how much was missing- and he would like dr. eric to send a three month refill to the pharmacy     Advised patient that he just filled the prescription we sent  -patient states he would like me to ask dr. eric because he has anxiety issues and is afraid he will run out

## 2017-07-28 NOTE — TELEPHONE ENCOUNTER
Patient called and left message on voice mail needs to speak to someone about needing another medication from Dr Queen .  Also needs to talk about getting the Geodon medication fixed becaue they still did not give him the right amount.  Please call when available

## 2017-07-28 NOTE — TELEPHONE ENCOUNTER
----- Message from Annel Odell sent at 7/28/2017 10:28 AM CDT -----  Contact: self  Patient would like to speak to a nurse  He has questions regarding his medications    Please call     Thanks

## 2017-07-31 ENCOUNTER — TELEPHONE (OUTPATIENT)
Dept: PSYCHIATRY | Facility: CLINIC | Age: 67
End: 2017-07-31

## 2017-07-31 NOTE — TELEPHONE ENCOUNTER
Patient states he is taking 1 in the morning. It is not on his list for night time. Will call the pharmacy about a refill because he can not find a bottle for this medication.

## 2017-07-31 NOTE — TELEPHONE ENCOUNTER
Patient called and he states he has been having a lot of anxiety attacks and he needs to talk to nurse Mcbride to see if he can take the Buspar with all his other medications  Please call when available

## 2017-07-31 NOTE — TELEPHONE ENCOUNTER
Please advise the patient that he can restart Buspar at 5 mg twice daily.  This will help with anxiety.  Does he still have med at home?  Please confirm how many doses (caps) of Geodon he is taking daily?

## 2017-07-31 NOTE — TELEPHONE ENCOUNTER
patient called up set and anxious. States his anxiety is high.   I asked if patient was taking his busprion and patient unable to tell me. He states he doesn't know    Klonopin he takes three times daily and geodon he takes nightly    He states he is upset because his mom has a broken heart. They got into a argument on her birthday and he ended up only staying half a day. He states he broke her heart and there is nothing he can do    We went over his oxygen use- continuous flow.  We discussed his albuterol tx    He states he cant go in-patient (He brought this topic up) for his anxiety because he's afraid they wont give him his pain medication like he gets outpatient which will throw off his body. He afraid if he goes inpatient that it will make him go through detox and he will seizure and die.     We discussed previous treatments that hes discussed with Dr. Queen. Breathing exercises     At that time, patient states he needs to go because his lunch is getting cold. Thanks me and hangs up    Patient no longer anxious at end of call. He did sound calmer and steady.

## 2017-08-01 NOTE — TELEPHONE ENCOUNTER
Patient continues to feel anxious. States he missed his Buspar this morning    States he had a neighbor walking his dog this morning and noticed his klonopin 30 tablets missing from home. Did not make a police report. States the police don't come out for him anymore.    States klonopin to help with his anxis he took hiety. So he still has a few left (Unsure of how many)  He states he knows he probably wont be able to get it refilled

## 2017-08-01 NOTE — TELEPHONE ENCOUNTER
I spoke to patient - he requests that we notify Dr Perdue of his taking buspar b/c he signed a contract with him about not starting new medications without first notifying him. Agreed I will reach out to Dr Perdue and we will notify him of Dr Perdue's response.

## 2017-08-02 ENCOUNTER — TELEPHONE (OUTPATIENT)
Dept: UROLOGY | Facility: CLINIC | Age: 67
End: 2017-08-02

## 2017-08-02 DIAGNOSIS — R30.0 DYSURIA: Primary | ICD-10-CM

## 2017-08-02 RX ORDER — FLUTICASONE PROPIONATE AND SALMETEROL 50; 500 UG/1; UG/1
POWDER RESPIRATORY (INHALATION)
Qty: 180 EACH | Refills: 3 | Status: SHIPPED | OUTPATIENT
Start: 2017-08-02 | End: 2018-01-15 | Stop reason: SDUPTHER

## 2017-08-02 NOTE — TELEPHONE ENCOUNTER
Pt is stating that he has run out of abx, x 5 days. Pt is stating that he is having painful urination. Please advise if pt needs to have urine culture prior to scheduled appt.

## 2017-08-02 NOTE — TELEPHONE ENCOUNTER
----- Message from Gail Alvarez sent at 8/2/2017  4:00 PM CDT -----  Contact: [phone]  Patient is requesting a call back from the nurse in regards to instructions for operation, he forgot instructions.    Please call the patient upon request at phone number [phone].

## 2017-08-02 NOTE — TELEPHONE ENCOUNTER
Left message with Dr Perdue's staff requesting clearance for pt to start Buspar.  Dr Perdue is with a patient, someone from his staff will return my call regarding pt's concern about Buspar.

## 2017-08-02 NOTE — TELEPHONE ENCOUNTER
Yes he can come in for a urinalysis, urine cytology and urine culture. Please pend orders (dx:dsyuria or other casts and cells in urine) and cc back to me for me to sign. Also find out if he is catheterizing    Ask him to bring a 3 day voiding diary with the following  1. Write down the time he urinates, how much he urinates and then have him catheterize immediately after urinating and write down how much comes out when he catherizes after urinating. We are trying to see what he has left in his bladder and if he still  Needs to continue catheterizing.     Note to self:   If pain persists and he has a negative culture may need cysto to look for stricture

## 2017-08-02 NOTE — TELEPHONE ENCOUNTER
----- Message from Zaida Teixeira sent at 8/2/2017 12:22 PM CDT -----  Contact: self  Patient is looking for orders for blood work or urine test. Please call patient at [phone]. Thanks

## 2017-08-02 NOTE — TELEPHONE ENCOUNTER
Pt is is cathing three times daily morning, evening and middle of night, getting 300-400 ml at a time. Pt is also urinating by himself daily. Pt instructed in regards to voiding diary and was able to give readback.   Urine orders pended. Pt scheduled to come 8/10/2017 to drop off, but is not certain he will be able to make it due to lack of transportation. Pt will call that morning if he cannot make it in.

## 2017-08-02 NOTE — TELEPHONE ENCOUNTER
Spoke with Dr. Nette Menjivar's nurse who states that it is okay for patient to start buspirone. She will add to patient's medication list in his chart.    Patient notified and verbalized understanding

## 2017-08-03 ENCOUNTER — TELEPHONE (OUTPATIENT)
Dept: FAMILY MEDICINE | Facility: CLINIC | Age: 67
End: 2017-08-03

## 2017-08-03 ENCOUNTER — TELEPHONE (OUTPATIENT)
Dept: UROLOGY | Facility: CLINIC | Age: 67
End: 2017-08-03

## 2017-08-03 ENCOUNTER — NURSE TRIAGE (OUTPATIENT)
Dept: ADMINISTRATIVE | Facility: CLINIC | Age: 67
End: 2017-08-03

## 2017-08-03 RX ORDER — DOXYCYCLINE 100 MG/1
100 CAPSULE ORAL EVERY 12 HOURS
Qty: 20 CAPSULE | Refills: 0 | Status: SHIPPED | OUTPATIENT
Start: 2017-08-03 | End: 2017-08-16 | Stop reason: ALTCHOICE

## 2017-08-03 NOTE — TELEPHONE ENCOUNTER
----- Message from Gian Cardoso sent at 8/3/2017  1:56 PM CDT -----  Contact: same  Patient called in and stated he needs a call back from nurse about something personal.  Patient then went on about some meds that some other physicians were prescribing to him and did not know if that was going to interfere with his surgery.  Patient call back number is [phone]

## 2017-08-03 NOTE — TELEPHONE ENCOUNTER
Returned call and spoke with patient, he states that he has a appt with another doctor on 8/4/17, so he will come in then to do his urine sample and bring voiding diary. Patient verbally understood.

## 2017-08-03 NOTE — TELEPHONE ENCOUNTER
"Patient states he walked his dog in the rain today and has come down with a lung infection. States needs a prescription for antibiotics called into the pharmacy. Advised patient to be seen in ER, patient declined stating he doesn't have transportation. Patient refuses to be taken to ER by ambulance and states taxis can not cross state lines. Offered patient an appointment on tomorrow with Dr. Ramos at 1:20 pm, patient declined stating he does not have transportation. States he only needs antibiotics called into the pharmacy so that the pharmacy can deliver them to his home. Patient also states he has some left over Doxycycline that was prescribed by Dr. Cope who patient states is his lung doctor. Writer did not advise patient to take this medication, advised patient to be seen for evaluation. Patient stated "Ok goodbye I have to go."  Please be advised.   "

## 2017-08-03 NOTE — TELEPHONE ENCOUNTER
----- Message from Sophia Martinez sent at 8/3/2017  2:34 PM CDT -----  Contact: self   Placed call to pod, patient want to speak with a nurse regarding calling something in for breathing and infection in lungs please send to [facility], any questions please call back at [phone] (home) patient sound like he was out of breath and breathing hard I sent patient to on call nurse    [facility] [address] [place] [address] AT [facility] [address]  [address] [address]  Phone: [phone] Fax: [phone]

## 2017-08-03 NOTE — TELEPHONE ENCOUNTER
"    Reason for Disposition   MODERATE difficulty breathing (e.g., speaks in phrases, SOB even at rest, pulse 100-120) of new onset or worse than normal     Patient states that he was walking dog in the rain today and "I think that I got an infection."  States that he is spitting up sputum, and was short of breath.  Noted patient speaking in short phrases.  Patient stated "I am not going to the emergency room because I do not have a way there." Advised patient to call 911 due to him stating that he is unable to get a ride to the emergency room.  Patient refused stating "I cannot afford it." Patient asked me to call him back after calling the doctor. Informed patient that I would send a message to his doctor, but explained to same that I am not advising a doctor's visit instead of him going to the ER, due to current situation and symptoms as per protocol.  Patient would like doctor to give him a call.    Protocols used: ST BREATHING DIFFICULTY-A-OH      "

## 2017-08-03 NOTE — TELEPHONE ENCOUNTER
----- Message from Levar Cox sent at 8/3/2017 12:35 PM CDT -----  Contact: [phone]  Patient called asking to speak with the nurse. Patient is coughing up phlegm. Patient asking to get antibiotics.Thanks!

## 2017-08-04 ENCOUNTER — TELEPHONE (OUTPATIENT)
Dept: FAMILY MEDICINE | Facility: CLINIC | Age: 67
End: 2017-08-04

## 2017-08-04 ENCOUNTER — TELEPHONE (OUTPATIENT)
Dept: UROLOGY | Facility: CLINIC | Age: 67
End: 2017-08-04

## 2017-08-04 NOTE — TELEPHONE ENCOUNTER
----- Message from Anushka Walters sent at 8/3/2017 12:56 PM CDT -----  Contact: Hesham  Patient is asking for medication for cough until appointment 8/16/17.     [facility] [address] [place] [address] AT [facility] [address]  [address] [place] [address]  Phone: [phone] Fax: [phone]

## 2017-08-04 NOTE — TELEPHONE ENCOUNTER
Received message stating patient was calling about appointment scheduled for 12-7-17. Call placed to patient who requested to confirm his upcoming visits. Writer confirmed all upcoming office and lab appointments. Patient verbalized understanding.

## 2017-08-04 NOTE — TELEPHONE ENCOUNTER
----- Message from Sandra Montoya sent at 8/4/2017  2:19 PM CDT -----  Contact: Patient  Hesham, patient [phone], Calling about appointment for 12/7/17, he has is on his paperwork from the office. Advised as to appointments already on schedule. Please advise. Thanks.

## 2017-08-04 NOTE — TELEPHONE ENCOUNTER
Message given to NP about patient taking Doxy for lung infection and will it also treat urine infection in which she stated that it would. Patient would not take my reply back he request to speak to NP. Message to be given when she returns from lunch to call him back. Patient verbally understood.

## 2017-08-04 NOTE — TELEPHONE ENCOUNTER
----- Message from Anushka Saldivar sent at 8/4/2017 10:34 AM CDT -----  Contact: self  Patient [phone] is taking antibiotics and is asking if he should continue with the urinalysis/please advise

## 2017-08-04 NOTE — TELEPHONE ENCOUNTER
----- Message from Anushka Walters sent at 8/4/2017  1:20 PM CDT -----  Contact: Hesham  Patient is asking if he is to stop taking Rx antibiotics that he is taking for his lungs before he begins the diary for voiding to bring to appointment for Thursday 8/10/17; (did finish Rx antibiotic was given by Dr Castillo). Please call [phone]. Thanks!

## 2017-08-04 NOTE — TELEPHONE ENCOUNTER
Pt requesting a return call from a provider today concerning his current use of Doxycycline.  The patient was recently placed on Doxycycline for a lung infection and wanted to know would the Doxycycline also treat any urinary symptoms going on at the same time.    I explained to the patient that Doxycycline is one of the antibiotics that we do use to treat common utis, but since we do not have a ua on him today, there is a chance doxy may be resistant to any current UTI at this time.  Pt is coming in next week to check a urine.    Pt verbalized understanding at this time.

## 2017-08-04 NOTE — TELEPHONE ENCOUNTER
"Received message stating patient was requesting cough medication. Writer noted patient was prescribed Doxycycline on yesterday by Dr. Ramos. Call placed to patient who states he did not call office for cough medication. Patient states " No, I did not call the office requesting cough medicine but I will take it if you want to give it to me."  Patient verbalized picking up prescription for antibiotics today. Patient states " There is 3 different Hesham Kareem' in your system."  "

## 2017-08-04 NOTE — TELEPHONE ENCOUNTER
----- Message from Sandra Montoya sent at 8/4/2017 11:20 AM CDT -----  Contact: Patient  Hesham, patient [phone], Calling for advise on taking additional Antibodics for a lung infectional and needs to know whether or not this will be a problem with his urine sample.    Doxycyline 100 mg every twelve hours.     Requesting to speak with Rosa. Please advise. Thanks.

## 2017-08-10 ENCOUNTER — CLINICAL SUPPORT (OUTPATIENT)
Dept: UROLOGY | Facility: CLINIC | Age: 67
End: 2017-08-10
Payer: MEDICARE

## 2017-08-10 ENCOUNTER — APPOINTMENT (OUTPATIENT)
Dept: LAB | Facility: HOSPITAL | Age: 67
End: 2017-08-10
Attending: UROLOGY
Payer: MEDICARE

## 2017-08-10 DIAGNOSIS — R30.0 DYSURIA: ICD-10-CM

## 2017-08-10 LAB
BACTERIA #/AREA URNS HPF: ABNORMAL /HPF
BILIRUB UR QL STRIP: NEGATIVE
CAOX CRY URNS QL MICRO: ABNORMAL
CLARITY UR: CLEAR
COLOR UR: YELLOW
GLUCOSE UR QL STRIP: NEGATIVE
HGB UR QL STRIP: ABNORMAL
KETONES UR QL STRIP: ABNORMAL
LEUKOCYTE ESTERASE UR QL STRIP: NEGATIVE
MICROSCOPIC COMMENT: ABNORMAL
NITRITE UR QL STRIP: NEGATIVE
PH UR STRIP: 6 [PH] (ref 5–8)
PROT UR QL STRIP: ABNORMAL
RBC #/AREA URNS HPF: 8 /HPF (ref 0–4)
SP GR UR STRIP: 1.02 (ref 1–1.03)
SQUAMOUS #/AREA URNS HPF: 2 /HPF
URN SPEC COLLECT METH UR: ABNORMAL
UROBILINOGEN UR STRIP-ACNC: 1 EU/DL
WBC #/AREA URNS HPF: 1 /HPF (ref 0–5)

## 2017-08-10 PROCEDURE — 88112 CYTOPATH CELL ENHANCE TECH: CPT | Performed by: PATHOLOGY

## 2017-08-10 PROCEDURE — 87086 URINE CULTURE/COLONY COUNT: CPT

## 2017-08-10 PROCEDURE — 88112 CYTOPATH CELL ENHANCE TECH: CPT | Mod: 26,,, | Performed by: PATHOLOGY

## 2017-08-10 PROCEDURE — 81000 URINALYSIS NONAUTO W/SCOPE: CPT

## 2017-08-10 NOTE — PROGRESS NOTES
Patient arrived to clinic, urine specimen given, voiding diary given and placed on MD desk, specimen prepared for lab pick-up.

## 2017-08-11 ENCOUNTER — TELEPHONE (OUTPATIENT)
Dept: PSYCHIATRY | Facility: CLINIC | Age: 67
End: 2017-08-11

## 2017-08-11 LAB — BACTERIA UR CULT: NO GROWTH

## 2017-08-11 NOTE — TELEPHONE ENCOUNTER
Patient called. States he is very anxious. Got in a argument with his mother and she kicked him out and made him go home. He questions if he can take his buspirone with the klonopin. Advised he can. The klonopin works fast acting for anxiety where-as the buspirone builds in your system to help with anxiety all day  Patient verbalized understanding  We also verified his appointment 8/15/2017 at 9AM

## 2017-08-14 ENCOUNTER — DOCUMENTATION ONLY (OUTPATIENT)
Dept: FAMILY MEDICINE | Facility: CLINIC | Age: 67
End: 2017-08-14

## 2017-08-14 ENCOUNTER — TELEPHONE (OUTPATIENT)
Dept: UROLOGY | Facility: CLINIC | Age: 67
End: 2017-08-14

## 2017-08-14 NOTE — TELEPHONE ENCOUNTER
----- Message from Negrita Castillo MD sent at 8/14/2017  9:04 AM CDT -----  Please let pt know their urine culture showed no bacteria

## 2017-08-14 NOTE — PROGRESS NOTES
Pre-Visit Chart Review  For Appointment Scheduled on 8/16/17    Health Maintenance Due   Topic Date Due    Abdominal Aortic Aneurysm Screening  07/31/2017    Influenza Vaccine  08/01/2017

## 2017-08-15 ENCOUNTER — LAB VISIT (OUTPATIENT)
Dept: LAB | Facility: HOSPITAL | Age: 67
End: 2017-08-15
Attending: FAMILY MEDICINE
Payer: MEDICARE

## 2017-08-15 DIAGNOSIS — R73.02 GLUCOSE INTOLERANCE (IMPAIRED GLUCOSE TOLERANCE): ICD-10-CM

## 2017-08-15 DIAGNOSIS — K71.7 HEPATIC CIRRHOSIS DUE TO TOXIN: ICD-10-CM

## 2017-08-15 DIAGNOSIS — E78.2 MIXED HYPERLIPIDEMIA: ICD-10-CM

## 2017-08-15 DIAGNOSIS — B18.2 HEP C W/O COMA, CHRONIC: ICD-10-CM

## 2017-08-15 LAB
ALBUMIN SERPL BCP-MCNC: 3.8 G/DL
ALP SERPL-CCNC: 57 U/L
ALT SERPL W/O P-5'-P-CCNC: 21 U/L
ANION GAP SERPL CALC-SCNC: 12 MMOL/L
AST SERPL-CCNC: 28 U/L
BASOPHILS # BLD AUTO: 0.02 K/UL
BASOPHILS NFR BLD: 0.3 %
BILIRUB SERPL-MCNC: 0.8 MG/DL
BUN SERPL-MCNC: 11 MG/DL
CALCIUM SERPL-MCNC: 9.8 MG/DL
CHLORIDE SERPL-SCNC: 104 MMOL/L
CHOLEST/HDLC SERPL: 2.8 {RATIO}
CO2 SERPL-SCNC: 24 MMOL/L
CREAT SERPL-MCNC: 1 MG/DL
DIFFERENTIAL METHOD: ABNORMAL
EOSINOPHIL # BLD AUTO: 0.1 K/UL
EOSINOPHIL NFR BLD: 1.4 %
ERYTHROCYTE [DISTWIDTH] IN BLOOD BY AUTOMATED COUNT: 13.5 %
EST. GFR  (AFRICAN AMERICAN): >60 ML/MIN/1.73 M^2
EST. GFR  (NON AFRICAN AMERICAN): >60 ML/MIN/1.73 M^2
ESTIMATED AVG GLUCOSE: 100 MG/DL
GLUCOSE SERPL-MCNC: 102 MG/DL
HBA1C MFR BLD HPLC: 5.1 %
HCT VFR BLD AUTO: 41.2 %
HDL/CHOLESTEROL RATIO: 35.7 %
HDLC SERPL-MCNC: 235 MG/DL
HDLC SERPL-MCNC: 84 MG/DL
HGB BLD-MCNC: 13.9 G/DL
LDLC SERPL CALC-MCNC: 137.6 MG/DL
LYMPHOCYTES # BLD AUTO: 1 K/UL
LYMPHOCYTES NFR BLD: 14.2 %
MCH RBC QN AUTO: 33.6 PG
MCHC RBC AUTO-ENTMCNC: 33.7 G/DL
MCV RBC AUTO: 100 FL
MONOCYTES # BLD AUTO: 0.5 K/UL
MONOCYTES NFR BLD: 6.6 %
NEUTROPHILS # BLD AUTO: 5.6 K/UL
NEUTROPHILS NFR BLD: 77.5 %
NONHDLC SERPL-MCNC: 151 MG/DL
PLATELET # BLD AUTO: 116 K/UL
PMV BLD AUTO: 10.5 FL
POTASSIUM SERPL-SCNC: 4 MMOL/L
PROT SERPL-MCNC: 6.8 G/DL
RBC # BLD AUTO: 4.14 M/UL
SODIUM SERPL-SCNC: 140 MMOL/L
TRIGL SERPL-MCNC: 67 MG/DL
WBC # BLD AUTO: 7.25 K/UL

## 2017-08-15 PROCEDURE — 83036 HEMOGLOBIN GLYCOSYLATED A1C: CPT

## 2017-08-15 PROCEDURE — 80061 LIPID PANEL: CPT

## 2017-08-15 PROCEDURE — 87522 HEPATITIS C REVRS TRNSCRPJ: CPT

## 2017-08-15 PROCEDURE — 36415 COLL VENOUS BLD VENIPUNCTURE: CPT | Mod: PO

## 2017-08-15 PROCEDURE — 85025 COMPLETE CBC W/AUTO DIFF WBC: CPT

## 2017-08-15 PROCEDURE — 80053 COMPREHEN METABOLIC PANEL: CPT

## 2017-08-16 ENCOUNTER — OFFICE VISIT (OUTPATIENT)
Dept: FAMILY MEDICINE | Facility: CLINIC | Age: 67
End: 2017-08-16
Payer: MEDICARE

## 2017-08-16 VITALS
WEIGHT: 198.44 LBS | OXYGEN SATURATION: 95 % | BODY MASS INDEX: 31.15 KG/M2 | SYSTOLIC BLOOD PRESSURE: 113 MMHG | RESPIRATION RATE: 14 BRPM | HEART RATE: 82 BPM | HEIGHT: 67 IN | TEMPERATURE: 99 F | DIASTOLIC BLOOD PRESSURE: 71 MMHG

## 2017-08-16 DIAGNOSIS — J44.9 CHRONIC OBSTRUCTIVE PULMONARY DISEASE, UNSPECIFIED COPD TYPE: ICD-10-CM

## 2017-08-16 DIAGNOSIS — F41.1 ANXIETY STATE: ICD-10-CM

## 2017-08-16 DIAGNOSIS — F39 EPISODIC MOOD DISORDER: ICD-10-CM

## 2017-08-16 DIAGNOSIS — R05.8 PRODUCTIVE COUGH: ICD-10-CM

## 2017-08-16 DIAGNOSIS — I10 ESSENTIAL HYPERTENSION: Primary | ICD-10-CM

## 2017-08-16 PROCEDURE — 3078F DIAST BP <80 MM HG: CPT | Mod: ,,, | Performed by: PHYSICIAN ASSISTANT

## 2017-08-16 PROCEDURE — 3074F SYST BP LT 130 MM HG: CPT | Mod: ,,, | Performed by: PHYSICIAN ASSISTANT

## 2017-08-16 PROCEDURE — 99999 PR PBB SHADOW E&M-EST. PATIENT-LVL V: CPT | Mod: PBBFAC,,, | Performed by: PHYSICIAN ASSISTANT

## 2017-08-16 PROCEDURE — 99215 OFFICE O/P EST HI 40 MIN: CPT | Mod: PBBFAC,PO | Performed by: PHYSICIAN ASSISTANT

## 2017-08-16 PROCEDURE — 99213 OFFICE O/P EST LOW 20 MIN: CPT | Mod: S$PBB,,, | Performed by: PHYSICIAN ASSISTANT

## 2017-08-16 RX ORDER — DOXYCYCLINE 100 MG/1
100 CAPSULE ORAL EVERY 12 HOURS
Qty: 20 CAPSULE | Refills: 0 | Status: SHIPPED | OUTPATIENT
Start: 2017-08-16 | End: 2017-09-12

## 2017-08-16 NOTE — PROGRESS NOTES
Subjective:       Patient ID: Hesahm Serna is a 67 y.o. male.    Chief Complaint: Cough    Cough   This is a chronic problem. The problem has been gradually worsening. The cough is productive of purulent sputum. Associated symptoms include myalgias, nasal congestion, shortness of breath and wheezing. Pertinent negatives include no chest pain, chills, fever, headaches, postnasal drip, rhinorrhea, sore throat or sweats. Associated symptoms comments: Chronic shortness of breath due to COPD. Nothing aggravates the symptoms. He has tried OTC inhaler, steroid inhaler and oral steroids for the symptoms. The treatment provided mild relief. His past medical history is significant for COPD and environmental allergies.     Review of Systems   Constitutional: Negative for activity change, appetite change, chills and fever.   HENT: Negative for congestion, postnasal drip, rhinorrhea, sneezing and sore throat.    Eyes: Negative for visual disturbance.   Respiratory: Positive for cough, shortness of breath and wheezing.    Cardiovascular: Negative for chest pain.   Gastrointestinal: Negative for abdominal distention and abdominal pain.   Genitourinary: Positive for difficulty urinating. Negative for dysuria.        Chronic urinary problems, followed by urology   Musculoskeletal: Positive for arthralgias, back pain, gait problem and myalgias.        Chronic pain- followed by Dr. Perdue, pain management   Allergic/Immunologic: Positive for environmental allergies.   Neurological: Negative for headaches.   Hematological: Negative for adenopathy.   Psychiatric/Behavioral: The patient is not nervous/anxious.        Objective:      Physical Exam   Constitutional: He is oriented to person, place, and time.   HENT:   Nasal cannula in place   Cardiovascular: Normal rate and regular rhythm.    Pulmonary/Chest: Effort normal. He has no wheezes.    supplemental oxygen being used today  Decreased breath sounds- chronic due to COPD. No  wheezes present   Musculoskeletal: He exhibits no edema.   Back brace in place     Neurological: He is alert and oriented to person, place, and time.   Skin: No erythema.   Psychiatric: His behavior is normal.   Patient anxious       Assessment:       1. Essential hypertension    2. Anxiety state    3. Episodic mood disorder    4. Chronic obstructive pulmonary disease, unspecified COPD type    5. Productive cough        Plan:   Hesham was seen today for cough.    Diagnoses and all orders for this visit:    Essential hypertension  Controlled  Low salt diet  Continue current medication  Anxiety state  Continue follow up with Dr. Queen  Episodic mood disorder  Continue follow up with Dr. Queen  Chronic obstructive pulmonary disease, unspecified COPD type  -     doxycycline (VIBRAMYCIN) 100 MG Cap; Take 1 capsule (100 mg total) by mouth every 12 (twelve) hours.  Avoid sun exposure while taking this medication  Productive cough  -     doxycycline (VIBRAMYCIN) 100 MG Cap; Take 1 capsule (100 mg total) by mouth every 12 (twelve) hours.

## 2017-08-16 NOTE — PATIENT INSTRUCTIONS
Established High Blood Pressure    High blood pressure (hypertension) is a chronic disease. Often health care providers dont know what causes it. But it can be caused by certain health conditions and medicines.  If you have high blood pressure, you may not have any symptoms. If you do have symptoms, they may include headache, dizziness, changes in your vision, chest pain, and shortness of breath. But even without symptoms, high blood pressure thats not treated raises your risk for heart attack and stroke. High blood pressure is a serious health risk and shouldnt be ignored.  A blood pressure reading is made up of two numbers: a higher number over a lower number. The top number is the systolic pressure. The bottom number is the diastolic pressure. A normal blood pressure is less than 120 over less than 80.  High blood pressure is when either the top number is 140 or higher, or the bottom number is 90 or higher. This must be the result when taking your blood pressure a number of times. The blood pressures between normal and high are called prehypertension.  Home care  If you have high blood pressure, you should do what is listed below to lower your blood pressure. If you are taking medicines for high blood pressure, these methods may reduce or end your need for medicines in the future.  · Begin a weight-loss program if you are overweight.  · Cut back on how much salt you get in your diet. Heres how to do this:  ¨ Dont eat foods that have a lot of salt. These include olives, pickles, smoked meats, and salted potato chips.  ¨ Dont add salt to your food at the table.  ¨ Use only small amounts of salt when cooking.  · Begin an exercise program. Talk with your health care provider about the type of exercise program that would be best for you. It doesn't have to be hard. Even brisk walking for 20 minutes 3 times a week is a good form of exercise.  · Dont take medicines that have heart stimulants. This includes many  cold and sinus decongestant pills and sprays, as well as diet pills. Check the warnings about hypertension on the label. Stimulants such as amphetamine or cocaine could be lethal for someone with high blood pressure. Never take these.  · Limit how much caffeine you get in your diet. Switch to caffeine-free products.  · Stop smoking. If you are a long-time smoker, this can be hard. Enroll in a stop-smoking program to make it more likely that you will quit for good.  · Learn how to handle stress. This is an important part of any program to lower blood pressure. Learn about relaxation methods like meditation, yoga, or biofeedback.  · If your provider prescribed medicines, take them exactly as directed. Missing doses may cause your blood pressure get out of control.  · Consider buying an automatic blood pressure machine. You can get one of these at most pharmacies. Use this to watch your blood pressure at home. Give the results to your provider.  Follow-up care  You will need to make regular visits to your health care provider. This is to check your blood pressure and to make changes to your medicines. Make a follow-up appointment as directed.  When to seek medical advice  Call your health care provider right away if any of these occur:  · Chest pain or shortness of breath  · Severe headache  · Throbbing or rushing sound in the ears  · Nosebleed  · Sudden severe pain in your belly (abdomen)  · Extreme drowsiness, confusion, or fainting  · Dizziness or dizziness with a spinning sensation (vertigo)  · Weakness of an arm or leg or one side of the face  · You have problems speaking or seeing   Date Last Reviewed: 11/25/2014  © 7141-5864 [employer]. [address] [place] [address], [place] [address]. All rights reserved. This information is not intended as a substitute for professional medical care. Always follow your healthcare professional's instructions.            Walking for Fitness  Fitness walking has something  for everyone, even people who are already fit. Walking is one of the safest ways to condition your body aerobically. It can boost energy, help you lose weight, and reduce stress.    Physical benefits  · Walking strengthens your heart and lungs, and tones your muscles.  · When walking, your feet land with less impact than in other sports. This reduces chances of muscle, bone, and joint injury.  · Regular walking improves your cholesterol levels and lowers your risk of heart disease. And it helps you control your blood sugar if you have diabetes.  · Walking is a weight-bearing activity, which helps maintain bone density. This can help prevent osteoporosis.  Personal rewards  · Taking walks can help you relax and manage stress. And fitness walking may make you feel better about yourself.  · Walking can help you sleep better at night and make you less likely to be depressed.  · Regular walking may help maintain your memory as you get older.  · Walking is a great way to spend extra time with friends and family members. Be sure to invite your dog along!  Q&A about fitness walking  Q: Will walking keep me fit?  A: Yes. Regular walking at the right pace gives you all the benefits of other aerobic activities, such as jogging and swimming.  Q: Will walking help me lose weight and keep it off?  A: Yes. Per mile, walking can burn as many calories as jogging. Your health care provider can help work walking into your weight-loss plan.  Q: Is walking safe for my health?  A: Yes. Walking is safe if you have high blood pressure, diabetes, heart disease, or other conditions. Talk to your health care provider before you start.  Date Last Reviewed: 5/9/2015 © 2000-2016 [employer]. [address] [place] [address], [place] [address]. All rights reserved. This information is not intended as a substitute for professional medical care. Always follow your healthcare professional's instructions.            Weight Management: Getting  Started  Healthy bodies come in all shapes and sizes. Not all bodies are made to be thin. For some people, a healthy weight is higher than the average weight listed on weight charts. Your healthcare provider can help you decide on a healthy weight for you.    Reasons to lose weight  Losing weight can help with some health problems, such as high blood pressure, heart disease, diabetes, sleep apnea, and arthritis. You may also feel more energy.  Set your long-term goal  Your goal doesn't even have to be a specific weight. You may decide on a fitness goal (such as being able to walk 10 miles a week), or a health goal (such as lowering your blood pressure). Choose a goal that is measurable and reasonable, so you know when you've reached it. A goal of reaching a BMI of less than 25 is not always reasonable (or possible).   Make an action plan  Habits dont change overnight. Setting your goals too high can leave you feeling discouraged if you cant reach them. Be realistic. Choose one or two small changes you can make now. Set an action plan for how you are going to make these changes. When you can stick to this plan, keep making a few more small changes. Taking small steps will help you stay on the path to success.  Track your progress  Write down your goals. Then, keep a daily record of your progress. Write down what you eat and how active you are. This record lets you look back on how much youve done. It may also help when youre feeling frustrated. Reward yourself for success. Even if you dont reach every goal, give yourself credit for what you do get done.  Get support  Encouragement from others can help make losing weight easier. Ask your family members and friends for support. They may even want to join you. Also look to your healthcare provider, registered dietitian, and  for help. Your local hospital can give you more information about nutrition, exercise, and weight loss.  Date Last  Reviewed: 1/31/2016  © 6958-2041 [employer]. [address] [place] [address], [place] [address]. All rights reserved. This information is not intended as a substitute for professional medical care. Always follow your healthcare professional's instructions.

## 2017-08-17 ENCOUNTER — APPOINTMENT (OUTPATIENT)
Dept: LAB | Facility: HOSPITAL | Age: 67
End: 2017-08-17
Attending: UROLOGY
Payer: MEDICARE

## 2017-08-17 ENCOUNTER — TELEPHONE (OUTPATIENT)
Dept: PSYCHIATRY | Facility: CLINIC | Age: 67
End: 2017-08-17

## 2017-08-17 ENCOUNTER — OFFICE VISIT (OUTPATIENT)
Dept: PSYCHIATRY | Facility: CLINIC | Age: 67
End: 2017-08-17
Payer: MEDICARE

## 2017-08-17 ENCOUNTER — OFFICE VISIT (OUTPATIENT)
Dept: UROLOGY | Facility: CLINIC | Age: 67
End: 2017-08-17
Payer: MEDICARE

## 2017-08-17 VITALS
SYSTOLIC BLOOD PRESSURE: 128 MMHG | TEMPERATURE: 98 F | WEIGHT: 199.06 LBS | HEIGHT: 67 IN | HEART RATE: 69 BPM | DIASTOLIC BLOOD PRESSURE: 73 MMHG | BODY MASS INDEX: 31.24 KG/M2

## 2017-08-17 VITALS
WEIGHT: 199.31 LBS | HEART RATE: 67 BPM | DIASTOLIC BLOOD PRESSURE: 82 MMHG | HEIGHT: 67 IN | BODY MASS INDEX: 31.28 KG/M2 | SYSTOLIC BLOOD PRESSURE: 136 MMHG

## 2017-08-17 DIAGNOSIS — R82.89 ABNORMAL URINE CYTOLOGY: ICD-10-CM

## 2017-08-17 DIAGNOSIS — N40.1 BPH WITH OBSTRUCTION/LOWER URINARY TRACT SYMPTOMS: ICD-10-CM

## 2017-08-17 DIAGNOSIS — F41.1 GAD (GENERALIZED ANXIETY DISORDER): ICD-10-CM

## 2017-08-17 DIAGNOSIS — N13.8 BPH WITH OBSTRUCTION/LOWER URINARY TRACT SYMPTOMS: ICD-10-CM

## 2017-08-17 DIAGNOSIS — N40.0 BPH (BENIGN PROSTATIC HYPERPLASIA): ICD-10-CM

## 2017-08-17 DIAGNOSIS — R31.0 GROSS HEMATURIA: ICD-10-CM

## 2017-08-17 DIAGNOSIS — F39 MOOD DISORDER: ICD-10-CM

## 2017-08-17 DIAGNOSIS — F11.20 OPIATE DEPENDENCE, CONTINUOUS: ICD-10-CM

## 2017-08-17 DIAGNOSIS — R39.198 INCREASING RESIDUAL URINE: Primary | ICD-10-CM

## 2017-08-17 LAB
HCV LOG: 6.39 LOG (10) IU/ML
HCV RNA QUANT PCR: ABNORMAL IU/ML
HCV, QUALITATIVE: DETECTED IU/ML

## 2017-08-17 PROCEDURE — 3074F SYST BP LT 130 MM HG: CPT | Mod: ,,, | Performed by: UROLOGY

## 2017-08-17 PROCEDURE — 81002 URINALYSIS NONAUTO W/O SCOPE: CPT | Mod: PBBFAC,PO | Performed by: UROLOGY

## 2017-08-17 PROCEDURE — 99999 PR PBB SHADOW E&M-EST. PATIENT-LVL III: CPT | Mod: PBBFAC,,, | Performed by: PSYCHIATRY & NEUROLOGY

## 2017-08-17 PROCEDURE — 99999 PR PBB SHADOW E&M-EST. PATIENT-LVL III: CPT | Mod: PBBFAC,,, | Performed by: UROLOGY

## 2017-08-17 PROCEDURE — 99213 OFFICE O/P EST LOW 20 MIN: CPT | Mod: S$PBB,,, | Performed by: PSYCHIATRY & NEUROLOGY

## 2017-08-17 PROCEDURE — 3078F DIAST BP <80 MM HG: CPT | Mod: ,,, | Performed by: UROLOGY

## 2017-08-17 PROCEDURE — 99213 OFFICE O/P EST LOW 20 MIN: CPT | Mod: PBBFAC,27,PO | Performed by: PSYCHIATRY & NEUROLOGY

## 2017-08-17 PROCEDURE — 3075F SYST BP GE 130 - 139MM HG: CPT | Mod: ,,, | Performed by: PSYCHIATRY & NEUROLOGY

## 2017-08-17 PROCEDURE — 99214 OFFICE O/P EST MOD 30 MIN: CPT | Mod: S$PBB,,, | Performed by: UROLOGY

## 2017-08-17 PROCEDURE — 88112 CYTOPATH CELL ENHANCE TECH: CPT | Mod: 26,,, | Performed by: PATHOLOGY

## 2017-08-17 PROCEDURE — 99213 OFFICE O/P EST LOW 20 MIN: CPT | Mod: PBBFAC,PO | Performed by: UROLOGY

## 2017-08-17 PROCEDURE — 3079F DIAST BP 80-89 MM HG: CPT | Mod: ,,, | Performed by: PSYCHIATRY & NEUROLOGY

## 2017-08-17 PROCEDURE — 88112 CYTOPATH CELL ENHANCE TECH: CPT | Performed by: PATHOLOGY

## 2017-08-17 RX ORDER — ZIPRASIDONE HYDROCHLORIDE 20 MG/1
CAPSULE ORAL
Qty: 180 CAPSULE | Refills: 0 | Status: SHIPPED | OUTPATIENT
Start: 2017-08-17 | End: 2017-09-14 | Stop reason: SDUPTHER

## 2017-08-17 RX ORDER — VENLAFAXINE HYDROCHLORIDE 37.5 MG/1
37.5 CAPSULE, EXTENDED RELEASE ORAL DAILY
Qty: 30 CAPSULE | Refills: 0 | Status: SHIPPED | OUTPATIENT
Start: 2017-08-17 | End: 2017-09-08 | Stop reason: SDUPTHER

## 2017-08-17 NOTE — TELEPHONE ENCOUNTER
I spoke to the patient - reviewed his lab results today with Urology.  Repeat urine cytology  Is pending.  Pt reports he will have urology procedure next month.   Pt calm on phone.  He has picked up Effexor XR Rx; he did not receive his Geodon Rx - he will check with pharmacy (explained may be too soon to refill per insurance).   Will forward letter of medication change to Dr Perdue.

## 2017-08-17 NOTE — TELEPHONE ENCOUNTER
Patient called and left message on voice mail needs a call back.  Called patient and he states that he just got the bad news that he has bladder cancer and he wants to talk to Dr Queen about this upsetting news. He stated he is leaving his dog with his mother and he is going home to secure and lock up his home and be alone for a couple of days. But he wants to talk to Dr Queen about this  Please advise

## 2017-08-17 NOTE — PROGRESS NOTES
"[facility] Progress Note - [place]  Urology Group: Anna/Robert/Cassie/Radha  PCP: Dr[facility]Bae[facility]: [facility]    Chief Complaint: GH, elevated residuals    Subjective:        HPI: Hesham Serna is a 67 y.o. male presents with     Last seen 7/17/17 - 1 months ago     Gross hematuria  -ct urogram 11/14/16: herniation into left testicle, 4mm left renal stone  -urine cultures have been positive in the past, ua today suspicious. Pt does CIC.   -urine cytology 9/26/16 - negative .7/17/17 - negative,  8/10/17 - urothelial cell atypia, supicious for urothelial high grade tumor.He has a h/o smoking (55 packs, 2-3 packs a day).   -cystoscopy 9/26/16 - pt never scheduled - pt states he cannot schedule this right now   He has not seen any more blood in his urine.  ua today with 1+ blood     Elevated residuals  Pt with history of urinary retention. UDS showed atonic bladder.   spontaneously voids 4-5x a day "a normal amount" and "feels like he is emptying" but sometimes catheterizes 1x a night. CIC about 5-6x a day and does it based on how full he feels.      Seen 7/17/17 and stated that he tried to catheterize but could not and continued to have blood when he catheterized.  Was also concerned about frequency q1h and dysuria. ua had shown 1+luek and blood. cx grew serratia. I cath'd him and it went easy-had bloody urine. Urine cytology was suspicious.  I also had him discontinue the myrbetriq and continue flomax.     I also had him fill out a voiding diary.  He brings a diary in showing that he voids every 2-3 hours with essentially no residual. He catheterizes 1x in the middle of the night, once in the morning. He catheterizes bc he has the urge to void but he can't. He is voiding about 3x a day.        Nephrolithiasis  h/o nephrolithiasis. CTRSS on 6/4/15 showed left 4mm renal stone. KUB on 4/12/16 showed stone is radioopaque.  Scheduled for eswl but never proceeded with surgery. Stated " he wants to hold off until his back is fixed        AUASSx: 11, mostly dissatisfied  IIEF: 5      REVIEW OF SYSTEMS:  General ROS: no fevers, no chills  Psychological ROS: no depression  Endocrine ROS: no heat or cold  Respiratory ROS: no SOB  Cardiovascular ROS: no CP  Gastrointestinal ROS: no abdominal pain, no constipation, no diarrhea, no BRBPR  Musculoskeletal ROS: no muscle pain  Neurological ROS: no headaches  Dermatological ROS: no rashes  HEENT: no glasses, no sinus   ROS: per HPI     The past medical, surgical, social and family hx were reviewed. There have not been any changes.   Cataracts? none    Urologic meds: rapaflo  Anticoagulation: No    Objective:     Vitals:    08/17/17 0914   BP: 128/73   Pulse: 69   Temp: 97.7 °F (36.5 °C)          Physical Exam   Constitutional: He is oriented to person, place, and time. He appears well-developed and well-nourished. He is cooperative. No distress.   HENT:   Head: Normocephalic and atraumatic.   Eyes: Pupils are equal, round, and reactive to light.   Cardiovascular: Normal rate and regular rhythm.    Pulmonary/Chest: Effort normal.   Abdominal: Soft. Normal appearance.   Musculoskeletal: Normal range of motion.   Neurological: He is alert and oriented to person, place, and time. He has normal reflexes.   Skin: Skin is intact.     Psychiatric: He has a normal mood and affect.         MOON 2/9/17: 20 g prostate  testes descended bilaterally, no masses   left reducible inguinal hernia     Labs:  urinalysis today: 1.020/6/1+blood    Urine culture   8/10/17 ng  1/16/17 ng  10/21/16 E.cloacae  9/19/16 P.mirabilis  7/20/16 s.haemolyticus  3/23/16  Ng  12/16/15 ng     PSA   5/9/17                                  0.45  5/26/15                      0.27  7/14/14                      0.26     Cr  4/3/17 1.0     Pathology:   URINE (VOIDED) 8/17/17  Urothelial cell atypia, suspicious for urothelial high-grade dysplasia/carcinoma in situ    Voided urine 7/17/17:  Negative  for malignant cells.    9/26/16 URINE, VOIDED (CYTOLOGY):  -Negative for malignant cells  -Abundant neutrophils  -Degenerative changes        Rads:   ctu 11/14/16: 4mm left renal stone, no renal mass, no hydro, prtial herniation of the bladder into the LIH. Prostate not enlarged. A fat containing RIH. Severe L1 compression fracture  kub 4/12/16 - left renal stone 5mm  ctrss 6/4/15 - 4mm left midpole stone       Assessment:       1. Increasing residual urine    2. BPH (benign prostatic hyperplasia)    3. Gross hematuria    4. Abnormal urine cytology        Plan:     Bph, Increasing urine residual  -will have pt fill out voiding diary again with pvr   -continue rapaflo  -previous uds showed atonic bladder but diary shows he is voiding every few hours with minimal residual and he also states that he has the urge to void but cannot about 3x a day  -return for uroflow and pvr  -will do cysto/ttrus    Gross hematuria and abnormal urine cytology  -repeat urine cytology today, previous 2 normal, most recent abnormal  -already has had a ct and urine culture (alwasys + cause he does CIC)  -schedule cysto/trus    F/u for uroflow, pvr nurse visit  F/u for urinalysis culture 1 week prior to cyst  F/u for cysto and trus

## 2017-08-17 NOTE — PATIENT INSTRUCTIONS
Bph, Increasing urine residual  -will have pt fill out voiding diary again with pvr   -continue rapaflo  -previous uds showed atonic bladder but diary shows he is voiding every few hours with minimal residual and he also states that he has the urge to void but cannot about 3x a day  -return for uroflow and pvr  -will do cysto/ttrus    Gross hematuria and abnormal urine cytology  -repeat urine cytology today, previous 2 normal, most recent abnormal  -already has had a ct and urine culture (alwasys + cause he does CIC)  -schedule cysto/trus    F/u for uroflow, pvr nurse visit  F/u for urinalysis culture 1 week prior to cyst  F/u for cysto and trus
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
## 2017-08-21 ENCOUNTER — HOSPITAL ENCOUNTER (OUTPATIENT)
Dept: RADIOLOGY | Facility: HOSPITAL | Age: 67
Discharge: HOME | End: 2017-08-21
Attending: UROLOGY
Payer: MEDICARE

## 2017-08-21 ENCOUNTER — CLINICAL SUPPORT (OUTPATIENT)
Dept: UROLOGY | Facility: CLINIC | Age: 67
End: 2017-08-21
Payer: MEDICARE

## 2017-08-21 ENCOUNTER — OFFICE VISIT (OUTPATIENT)
Dept: UROLOGY | Facility: CLINIC | Age: 67
End: 2017-08-21
Payer: MEDICARE

## 2017-08-21 ENCOUNTER — TELEPHONE (OUTPATIENT)
Dept: FAMILY MEDICINE | Facility: CLINIC | Age: 67
End: 2017-08-21

## 2017-08-21 ENCOUNTER — NURSE TRIAGE (OUTPATIENT)
Dept: ADMINISTRATIVE | Facility: CLINIC | Age: 67
End: 2017-08-21

## 2017-08-21 ENCOUNTER — TELEPHONE (OUTPATIENT)
Dept: UROLOGY | Facility: CLINIC | Age: 67
End: 2017-08-21

## 2017-08-21 VITALS
WEIGHT: 195.13 LBS | DIASTOLIC BLOOD PRESSURE: 75 MMHG | BODY MASS INDEX: 30.63 KG/M2 | SYSTOLIC BLOOD PRESSURE: 134 MMHG | TEMPERATURE: 99 F | HEIGHT: 67 IN | HEART RATE: 85 BPM

## 2017-08-21 DIAGNOSIS — N20.0 NEPHROLITHIASIS: ICD-10-CM

## 2017-08-21 DIAGNOSIS — N40.0 BENIGN PROSTATIC HYPERPLASIA, PRESENCE OF LOWER URINARY TRACT SYMPTOMS UNSPECIFIED: Primary | ICD-10-CM

## 2017-08-21 DIAGNOSIS — N40.0 BENIGN PROSTATIC HYPERPLASIA, PRESENCE OF LOWER URINARY TRACT SYMPTOMS UNSPECIFIED: ICD-10-CM

## 2017-08-21 DIAGNOSIS — R39.198 INCREASING RESIDUAL URINE: ICD-10-CM

## 2017-08-21 DIAGNOSIS — R31.0 GROSS HEMATURIA: ICD-10-CM

## 2017-08-21 DIAGNOSIS — F39 MOOD DISORDER: ICD-10-CM

## 2017-08-21 DIAGNOSIS — F31.0 BIPOLAR AFFECTIVE DISORDER, CURRENT EPISODE HYPOMANIC: ICD-10-CM

## 2017-08-21 DIAGNOSIS — R31.0 GROSS HEMATURIA: Primary | ICD-10-CM

## 2017-08-21 PROCEDURE — 3078F DIAST BP <80 MM HG: CPT | Mod: ,,, | Performed by: UROLOGY

## 2017-08-21 PROCEDURE — 99213 OFFICE O/P EST LOW 20 MIN: CPT | Mod: S$PBB,25,, | Performed by: UROLOGY

## 2017-08-21 PROCEDURE — 74000 XR ABDOMEN AP 1 VIEW: CPT | Mod: 26,,, | Performed by: RADIOLOGY

## 2017-08-21 PROCEDURE — 74178 CT ABD&PLV WO CNTR FLWD CNTR: CPT | Mod: TC

## 2017-08-21 PROCEDURE — 3075F SYST BP GE 130 - 139MM HG: CPT | Mod: ,,, | Performed by: UROLOGY

## 2017-08-21 PROCEDURE — 74000 XR ABDOMEN AP 1 VIEW: CPT | Mod: TC

## 2017-08-21 PROCEDURE — 74178 CT ABD&PLV WO CNTR FLWD CNTR: CPT | Mod: 26,,, | Performed by: RADIOLOGY

## 2017-08-21 PROCEDURE — 99999 PR PBB SHADOW E&M-EST. PATIENT-LVL III: CPT | Mod: PBBFAC,,, | Performed by: UROLOGY

## 2017-08-21 PROCEDURE — 99499 UNLISTED E&M SERVICE: CPT | Mod: S$PBB,,, | Performed by: UROLOGY

## 2017-08-21 PROCEDURE — 25500020 PHARM REV CODE 255

## 2017-08-21 PROCEDURE — 51701 INSERT BLADDER CATHETER: CPT | Mod: S$PBB,,, | Performed by: UROLOGY

## 2017-08-21 RX ORDER — CLONAZEPAM 2 MG/1
TABLET ORAL
Qty: 90 TABLET | Refills: 0 | Status: SHIPPED | OUTPATIENT
Start: 2017-08-21 | End: 2017-09-08 | Stop reason: SDUPTHER

## 2017-08-21 RX ADMIN — IOHEXOL 100 ML: 350 INJECTION, SOLUTION INTRAVENOUS at 01:08

## 2017-08-21 NOTE — PROGRESS NOTES
Uroflow results today:  Peak flow: 13 mL/s  Mean flow: 7mL/s  Voided time: 16s  Flow time: 14s  TTP flow: 3s  Voided volume: 100 mL    Pattern of curve: to be determined by physician    PVR 0ml

## 2017-08-21 NOTE — TELEPHONE ENCOUNTER
Received message stating patient was complaining of tremors and indigestion. Call placed to patient who states he suffers from indigestion normally. Asked patient if he took anything for this such as Mylanta, patient states not yet. Asked patient if he was having tremors. Patient states he was not having any tremors. Writer asked patient if he went to ER as instructed earlier this morning, patient states he did not but he was seen by Dr. Castillo who ordered some test for him to complete today at the hospital. States the test are scheduled for 1 pm so he needed to take a nap before this time.

## 2017-08-21 NOTE — TELEPHONE ENCOUNTER
----- Message from Brooklyn Marshall sent at 8/21/2017  7:34 AM CDT -----  Contact: self [phone]  Please call him, he is urinating blood since yesterday.  Thank you!

## 2017-08-21 NOTE — TELEPHONE ENCOUNTER
Pt reports that he is passing pure blood in his urine since Sunday. Advised to go to ER for further eval  Reason for Disposition   Passing pure blood or large blood clots (i.e., size > a dime) (Exception: lilian or small strands)    Protocols used: ST URINE - BLOOD IN-A-AH

## 2017-08-21 NOTE — TELEPHONE ENCOUNTER
----- Message from Zeenat Talamantes sent at 8/21/2017  9:25 AM CDT -----  Contact: self [phone]  States that he needs to speak to nurse regarding some medication. Please call back at [phone]//thank you acc

## 2017-08-21 NOTE — PROGRESS NOTES
"[facility] Progress Note - [place]  Urology Group: Anna/Robert/Cassie/Radha  PCP: Dr[facility]ae[facility]: inactive    Chief Complaint: GH, elevated residuals    Subjective:        HPI: Hesham Serna is a 67 y.o. male presents with     Last seen 8/17/17 - 4 days ago     Gross hematuria  -ct urogram 11/14/16: herniation into left testicle, 4mm left renal stone  -urine cultures have been positive in the past, ua today suspicious. Pt does CIC.   -urine cytology 9/26/16 - negative .7/17/17 - negative,  8/10/17 - urothelial cell atypia, supicious for urothelial high grade tumor.He has a h/o smoking (55 packs, 2-3 packs a day).   -cystoscopy 9/26/16 - pt never scheduled - pt states he cannot schedule this right now   -ua on 8/17/17 showed 1+ blood and had no c/o blood in urine    Today pt states that he started urinating blood on Saturday but has since cleared. Denies any flank pain.      Elevated residuals  Pt with history of urinary retention. UDS showed atonic bladder.   spontaneously voids 4-5x a day "a normal amount" and "feels like he is emptying" but sometimes catheterizes 1x a night. CIC about 5-6x a day and does it based on how full he feels.      Seen 7/17/17 and stated that he tried to catheterize but could not and continued to have blood when he catheterized.  Was also concerned about frequency q1h and dysuria. ua had shown 1+luek and blood. cx grew serratia. I cath'd him and it went easy-had bloody urine. Urine cytology was suspicious.  I also had him discontinue the myrbetriq and continue flomax.     I also had him fill out a voiding diary.  He brought a handwritten diary in on 8/17/17 showing that he was voiding every 2-3 hours with essentially no residual. Catheterizing 1x in the middle of the night, once in the morning. He catheterizes bc he has the urge to void but he can't. He is voiding about 3x a day.  Plan was to have him fill out a more formal diary. He returns " today stating he lost it.    Today he says he catheterizes more than 3x a day and sometimes cannot pass the catheter.  He says he has been having some dysuria.      Nephrolithiasis  h/o nephrolithiasis. CTRSS on 6/4/15 showed left 4mm renal stone. KUB on 4/12/16 showed stone is radioopaque. Ctu 11/14/16 showed this again.   Scheduled for eswl but never proceeded with surgery. Stated he wants to hold off until his back is fixed. No c/o left flank pain  but has been having gross hematuria and anbormal urine cytology        AUASSx: 11, mostly dissatisfied  IIEF: 5      REVIEW OF SYSTEMS:  General ROS: no fevers, no chills  Psychological ROS: no depression  Endocrine ROS: no heat or cold  Respiratory ROS: no SOB  Cardiovascular ROS: no CP  Gastrointestinal ROS: no abdominal pain, no constipation, no diarrhea, no BRBPR  Musculoskeletal ROS: no muscle pain  Neurological ROS: no headaches  Dermatological ROS: no rashes  HEENT: no glasses, no sinus   ROS: per HPI     The past medical, surgical, social and family hx were reviewed. There have not been any changes.   Cataracts? none    Urologic meds: rapaflo  Anticoagulation: No    Objective:     Vitals:    08/21/17 1015   BP: 134/75   Pulse: 85   Temp: 98.5 °F (36.9 °C)          Physical Exam   Constitutional: He is oriented to person, place, and time. He appears well-developed and well-nourished. He is cooperative. No distress.   HENT:   Head: Normocephalic and atraumatic.   Eyes: Pupils are equal, round, and reactive to light.   Cardiovascular: Normal rate and regular rhythm.    Pulmonary/Chest: Effort normal.   Abdominal: Soft. Normal appearance.   Musculoskeletal: Normal range of motion.   Neurological: He is alert and oriented to person, place, and time. He has normal reflexes.   Skin: Skin is intact.     Psychiatric: He has a normal mood and affect.         MOON 2/9/17: 20 g prostate  testes descended bilaterally, no masses   left reducible inguinal hernia    In and  out cath performed by me with a 16fr in and out straight and 16 fr coude. Met some resistance at the prostate but did not feel like it was a stricture. Pt states that he never pushes the catheter in all the way like I just did.      Labs:  urinalysis today:  1.025/5/tr leukocyte/tr protein/50 blood - sending for culture    Urine culture   8/10/17 ng  1/16/17 ng  10/21/16 E.cloacae  9/19/16 P.mirabilis  7/20/16 s.haemolyticus  3/23/16  Ng  12/16/15 ng     PSA   5/9/17                                  0.45  5/26/15                      0.27  7/14/14                      0.26     Cr  4/3/17 1.0     Pathology:   URINE (VOIDED) 8/17/17  Urothelial cell atypia, suspicious for urothelial high-grade dysplasia/carcinoma in situ    Voided urine 7/17/17:  Negative for malignant cells.    9/26/16 URINE, VOIDED (CYTOLOGY):  -Negative for malignant cells  -Abundant neutrophils  -Degenerative changes     Rads:   ctu 11/14/16: 4mm left renal stone, no renal mass, no hydro, prtial herniation of the bladder into the LIH. Prostate not enlarged. A fat containing RIH. Severe L1 compression fracture  kub 4/12/16 - left renal stone 5mm  ctrss 6/4/15 - 4mm left midpole stone       Assessment:       1. Gross hematuria    2. Benign prostatic hyperplasia, presence of lower urinary tract symptoms unspecified    3. Increasing residual urine    4. Nephrolithiasis        Plan:     Gross hematuria and abnormal urine cytology, left renal stone  -awaiting repeat urine cytology from 8/17/17, previous 2 normal, most recent abnormal  -already has had a ct and urine culture (always + cause he does CIC). However will repeat ctu with abnormal cytology, h/o stone and gross hematuria. Will also send a urine culture.   - cysto/trus scheduled for sept 18th, move to sept 11th    Tried to explain to pt that we are not going to give abx until I confirm an infxn and only if he is having sx. I tried to explain that the blood could possibly be from an infection,  stone, his prostae or tumor.     Bph, Increasing urine residual  -will have pt fill out voiding diary again with pvr   -continue rapaflo  -previous uds showed atonic bladder but diary shows he is voiding every few hours with minimal residual and he also states that he has the urge to void but cannot about 3x a day  -return for uroflow and pvr (hard to schedule), pt only comes when he has an issue  -will do cysto/trus    Urine culture today and will treat if pt having sx, repeat ctu, and cytology still pending from 8/17/17  F/u for uroflow, pvr nurse visit  F/u for urinalysis culture 1 week prior to cysto on the 11th (moving earlier)  F/u for cysto and trus on the 11th

## 2017-08-21 NOTE — TELEPHONE ENCOUNTER
Patient present in office. Handed a note to  Jose Sears requesting a refill of Klonopin. Refill request was forwarded to patient's provider for review/approval.

## 2017-08-21 NOTE — TELEPHONE ENCOUNTER
----- Message from Gail Alvarez sent at 8/18/2017 12:03 PM CDT -----  Contact: [phone]  Patient is requesting a call back from the nurse stated its very important, stated he have indigestion and tremors.    Please call the patient upon request at phone number [phone].

## 2017-08-21 NOTE — TELEPHONE ENCOUNTER
Received message from Beatriz Olivares RN (triage nurse) stating the following:    Pt reports that he is passing pure blood in his urine since Sunday. Advised to go to ER for further eval.    Call placed to patient to follow up and insure he was seen in ER for evaluation. Patient states he did not go to the ER. Writer again stressed the importance of patient being seen in ER and to follow up with his urologist for urinary issues. Patient verbalized understanding.

## 2017-08-22 ENCOUNTER — TELEPHONE (OUTPATIENT)
Dept: UROLOGY | Facility: CLINIC | Age: 67
End: 2017-08-22

## 2017-08-22 NOTE — TELEPHONE ENCOUNTER
Spoke with patient he states he has blood in his urine again. Per  cystoscopy rescheduled from 9/11 to 8/28. Patient verbally voiced understanding.

## 2017-08-22 NOTE — TELEPHONE ENCOUNTER
----- Message from Erinn Valadez sent at 8/22/2017  2:05 PM CDT -----  Patient started passing blood in his urine but with no urine. Patient went to the [facility] ER yesterday concerning this. Xrays and cat scan were done. Patient states that they told him he had cancer in his bladder and prostate. Please call to advise at [phone]. He would like to speak to Ms. Hankins. I placed a call to pod.

## 2017-08-22 NOTE — TELEPHONE ENCOUNTER
Spoke with patient he is requesting a letter from  to be written to pain doctor  pain management doctor whom wants to the patient to have test on his back. Patient wants letter to state that at this time he is not able to do test due to being tied up up with possible cancer and bladder and prostate issues. Please advise    Fax # [phone]

## 2017-08-22 NOTE — TELEPHONE ENCOUNTER
Spoke with patient he states he is unable to urinate and unable to get catheter in his bladder. Informed patient to drink fluids and if unable to urinate or cathaterize he will go the emergency room.  informed

## 2017-08-23 ENCOUNTER — TELEPHONE (OUTPATIENT)
Dept: UROLOGY | Facility: CLINIC | Age: 67
End: 2017-08-23

## 2017-08-23 LAB
BILIRUB SERPL-MCNC: ABNORMAL MG/DL
BLOOD URINE, POC: 50
COLOR, POC UA: ABNORMAL
GLUCOSE UR QL STRIP: ABNORMAL
KETONES UR QL STRIP: ABNORMAL
LEUKOCYTE ESTERASE URINE, POC: ABNORMAL
NITRITE, POC UA: ABNORMAL
PH, POC UA: 5
PROTEIN, POC: ABNORMAL
SPECIFIC GRAVITY, POC UA: 1025
UROBILINOGEN, POC UA: ABNORMAL

## 2017-08-23 RX ORDER — AMOXICILLIN AND CLAVULANATE POTASSIUM 875; 125 MG/1; MG/1
1 TABLET, FILM COATED ORAL 2 TIMES DAILY
Qty: 20 TABLET | Refills: 0 | Status: SHIPPED | OUTPATIENT
Start: 2017-08-23 | End: 2017-09-02

## 2017-08-23 NOTE — TELEPHONE ENCOUNTER
Please let pt know his urine culture is growing infection and I would like to start him on augmentin.  We may have to change it once the culture results.   Also find out what his allergies to cipro and bactrim are.

## 2017-08-23 NOTE — TELEPHONE ENCOUNTER
Spoke with patient informed him to hold off on antibiotics taking antibiotics until urine culture comes back. Patient verbally voiced understanding and states there are 8 antibiotics that will kill him due to being on methadone

## 2017-08-24 ENCOUNTER — TELEPHONE (OUTPATIENT)
Dept: FAMILY MEDICINE | Facility: CLINIC | Age: 67
End: 2017-08-24

## 2017-08-24 ENCOUNTER — TELEPHONE (OUTPATIENT)
Dept: UROLOGY | Facility: CLINIC | Age: 67
End: 2017-08-24

## 2017-08-24 LAB — BACTERIA UR CULT: NORMAL

## 2017-08-24 NOTE — TELEPHONE ENCOUNTER
Received message from patient stating patient requesting call regarding medication. Call placed to patient who requested to know if his prescription for Klonopin was refilled. Upon further inspection it was noted this medication was e-scribed to [facility] on 8-21-17. Patient notified. Verbalized understanding.

## 2017-08-24 NOTE — TELEPHONE ENCOUNTER
----- Message from Zaida Teixeira sent at 8/24/2017  9:09 AM CDT -----  Contact: self  Patient is returning call regarding his medication. Patient states he has a cathter and blood is coming out of it. Please call patient at [phone].  Thanks! Call placed to pod.

## 2017-08-24 NOTE — TELEPHONE ENCOUNTER
----- Message from Sandra Montoya sent at 8/24/2017 12:09 PM CDT -----  Contact: Patient  Hesham, patient [phone], Calling to speak with the nurse, Ally, regarding medication. Please advise. Thanks.    Stated very important.

## 2017-08-25 ENCOUNTER — TELEPHONE (OUTPATIENT)
Dept: UROLOGY | Facility: CLINIC | Age: 67
End: 2017-08-25

## 2017-08-25 ENCOUNTER — TELEPHONE (OUTPATIENT)
Dept: FAMILY MEDICINE | Facility: CLINIC | Age: 67
End: 2017-08-25

## 2017-08-25 NOTE — TELEPHONE ENCOUNTER
Uroflow results 8/21/17:  Peak flow: 13 mL/s  Mean flow: 7 mL/s  Voided time:16 s  Flow time: 14 s  TTP flow:3 s  Voided volume: 100 mL    Pattern of curve: low qmax, bell curve  Pvr: 20cc

## 2017-08-25 NOTE — TELEPHONE ENCOUNTER
----- Message from Gian MORAN Frisard sent at 8/25/2017  2:57 PM CDT -----  Contact: same  Patient called in and stated he would like a call back today to discuss his procedure he is having Monday 8/28.  Patient call back number is [phone]

## 2017-08-25 NOTE — TELEPHONE ENCOUNTER
Patient states he is having an outpatient procedure done on Monday 8-28-17 by Dr. Ace. States Dr. Ace told him she can not give him anesthesia or anything for pain. Patient wants to discuss this matter with Dr. Ramos. Requesting a call from Dr. Ramos personally to discuss this matter. Assured patient his request/concerns will be forwarded to Dr. Ramos.

## 2017-08-26 ENCOUNTER — HOSPITAL ENCOUNTER (EMERGENCY)
Facility: HOSPITAL | Age: 67
Discharge: HOME | End: 2017-08-26
Attending: EMERGENCY MEDICINE
Payer: MEDICARE

## 2017-08-26 VITALS
TEMPERATURE: 98 F | HEIGHT: 68 IN | OXYGEN SATURATION: 94 % | SYSTOLIC BLOOD PRESSURE: 144 MMHG | DIASTOLIC BLOOD PRESSURE: 81 MMHG | BODY MASS INDEX: 28.79 KG/M2 | RESPIRATION RATE: 20 BRPM | WEIGHT: 190 LBS | HEART RATE: 77 BPM

## 2017-08-26 DIAGNOSIS — T83.9XXA URINARY CATHETER COMPLICATION, INITIAL ENCOUNTER: Primary | ICD-10-CM

## 2017-08-26 PROCEDURE — 51702 INSERT TEMP BLADDER CATH: CPT

## 2017-08-26 PROCEDURE — 99283 EMERGENCY DEPT VISIT LOW MDM: CPT | Mod: 25

## 2017-08-27 ENCOUNTER — HOSPITAL ENCOUNTER (EMERGENCY)
Facility: HOSPITAL | Age: 67
Discharge: HOME | End: 2017-08-27
Attending: EMERGENCY MEDICINE
Payer: MEDICARE

## 2017-08-27 VITALS
WEIGHT: 200 LBS | HEART RATE: 78 BPM | RESPIRATION RATE: 16 BRPM | BODY MASS INDEX: 31.39 KG/M2 | HEIGHT: 67 IN | TEMPERATURE: 98 F | DIASTOLIC BLOOD PRESSURE: 76 MMHG | SYSTOLIC BLOOD PRESSURE: 126 MMHG | OXYGEN SATURATION: 96 %

## 2017-08-27 DIAGNOSIS — R34 DECREASED URINATION: Primary | ICD-10-CM

## 2017-08-27 PROCEDURE — 51702 INSERT TEMP BLADDER CATH: CPT

## 2017-08-27 PROCEDURE — 99284 EMERGENCY DEPT VISIT MOD MDM: CPT | Mod: 25

## 2017-08-27 NOTE — ED NOTES
Pt reports he had leaking around his  Rocha catheter and wants it changed out. Catheter removed without difficulty.

## 2017-08-27 NOTE — ED PROVIDER NOTES
Encounter Date: 8/26/2017    SCRIBE #1 NOTE: I, Karen Kitchen, am scribing for, and in the presence of, Dr. Munson.       History     Chief Complaint   Patient presents with    Other     Urinary cath - complications      08/26/2017  8:34 PM     Pt is a 67 y.o. male who has a pmhx of arthritis; asthma; CA; COPD;DDD; depression; fracture of vertebra due to osteoporosis; hepatitis C; HTN; liver disease; nasal bone fx-closed; nephrolith; rosacea; and skin cancer is presenting to ED for evaluation of catheter dysfunction today. Pt states that his catheter is leaking. He also reports abd pain and abd distention this morning, so he increased his water intake. He denies fever. Pt also a hx of chronic intermittent hematuria and dysuria. Pt has a pshx that includes knee surgery; tonsillectomy; jaw surgey; rhizotomy w/ radiofrequency ablation; neck surgery; and tumors.        The history is provided by the patient.     Review of patient's allergies indicates:   Allergen Reactions    Codeine Other (See Comments)    Morphine      Pt denies this 1-10-13     Bactrim [sulfamethoxazole-trimethoprim] Other (See Comments)     Pt cannot take with Methadone    Ciprofloxacin Other (See Comments)     Patient cannot take with methadone     Past Medical History:   Diagnosis Date    Allergy     Arthritis     Asthma     Cancer     skin DR Isrrael Yun    COPD (chronic obstructive pulmonary disease)     Degenerative disc disease     Depression     bipolar dis    Fracture of vertebra due to osteoporosis     Hepatitis C     Hypertension     Liver disease     Nasal bone fx-closed     Nephrolith     Rosacea     Skin cancer      Past Surgical History:   Procedure Laterality Date    jaw surgey      KNEE SURGERY      NECK SURGERY      8 procedures on neck    RHIZOTOMY W/ RADIOFREQUENCY ABLATION      TONSILLECTOMY      tumors      10 removed from scalp     Family History   Problem Relation Age of Onset    Heart disease  Father     No Known Problems Mother     Cancer Paternal Uncle      prostate    Cancer Paternal Aunt      breast    Eczema Neg Hx     Lupus Neg Hx     Psoriasis Neg Hx     Melanoma Neg Hx     Glaucoma Neg Hx      Social History   Substance Use Topics    Smoking status: Current Every Day Smoker     Packs/day: 0.50     Years: 30.00     Types: Cigarettes    Smokeless tobacco: Never Used    Alcohol use No     Review of Systems   Constitutional: Negative for fever.   HENT: Negative for sore throat.    Respiratory: Negative for shortness of breath.    Cardiovascular: Negative for chest pain.   Gastrointestinal: Positive for abdominal pain. Negative for nausea.   Genitourinary: Positive for dysuria and hematuria.   Musculoskeletal: Negative for back pain.   Skin: Negative for rash.   Neurological: Negative for weakness.   Hematological: Does not bruise/bleed easily.       Physical Exam     Initial Vitals [08/26/17 2025]   BP Pulse Resp Temp SpO2   (!) 144/81 77 20 97.8 °F (36.6 °C) (!) 94 %      MAP       102         Physical Exam    Nursing note and vitals reviewed.  Constitutional: He appears well-developed and well-nourished.   Pt is ambulatory.   HENT:   Head: Normocephalic and atraumatic.   Eyes: Conjunctivae are normal.   Neck: Neck supple.   Cardiovascular: Normal rate, regular rhythm, normal heart sounds and intact distal pulses. Exam reveals no gallop and no friction rub.    No murmur heard.  Pulmonary/Chest: Breath sounds normal. He has no wheezes. He has no rhonchi. He has no rales.   Abdominal: Soft. He exhibits distension (mildly). There is no tenderness.   No flank tenderness. No TTP.    Genitourinary: Right testis shows no tenderness. Left testis shows no tenderness. Circumcised. No penile tenderness. No discharge found.   Genitourinary Comments: Catheter in place, no urination in catheter bag. No penile lesions. No epididymal tenderness.    Musculoskeletal: Normal range of motion.   Neurological:  He is alert and oriented to person, place, and time.   Skin: No rash noted. No erythema.   Psychiatric: He has a normal mood and affect.         ED Course   Procedures  Labs Reviewed - No data to display                       Attending Attestation:           Physician Attestation for Scribe:  Physician Attestation Statement for Scribe #1: I, Dr. Munson, reviewed documentation, as scribed by Karen Kitchen in my presence, and it is both accurate and complete.         Hesham Serna is a 67 y.o. male presenting with  Likely obstructed urinary catheter changed with resolution of leaking. Low suspicion for UTI as patient has no other symptoms. He declined flushing of present catheter, wishing it immediately changed to a new catheter. This was done without event.  I do not think further diagnostic tests are indicated. I doubt renal insult or volume overload.  F/u with urology early next week as planned. Return precautions reviewed.          ED Course     Clinical Impression:     1. Urinary catheter complication, initial encounter                                 Daron Munson MD  08/27/17 5041

## 2017-08-28 ENCOUNTER — TELEPHONE (OUTPATIENT)
Dept: SURGERY | Facility: CLINIC | Age: 67
End: 2017-08-28

## 2017-08-28 ENCOUNTER — SURGERY (OUTPATIENT)
Age: 67
End: 2017-08-28

## 2017-08-28 ENCOUNTER — HOSPITAL ENCOUNTER (OUTPATIENT)
Facility: AMBULARY SURGERY CENTER | Age: 67
Discharge: HOME | End: 2017-08-28
Attending: UROLOGY | Admitting: UROLOGY
Payer: MEDICARE

## 2017-08-28 ENCOUNTER — TELEPHONE (OUTPATIENT)
Dept: UROLOGY | Facility: CLINIC | Age: 67
End: 2017-08-28

## 2017-08-28 ENCOUNTER — APPOINTMENT (OUTPATIENT)
Dept: LAB | Facility: HOSPITAL | Age: 67
End: 2017-08-28
Attending: UROLOGY
Payer: MEDICARE

## 2017-08-28 DIAGNOSIS — R39.198 INCREASING RESIDUAL URINE: ICD-10-CM

## 2017-08-28 DIAGNOSIS — N40.0 BPH (BENIGN PROSTATIC HYPERPLASIA): ICD-10-CM

## 2017-08-28 LAB
BILIRUB SERPL-MCNC: NORMAL MG/DL
BLOOD URINE, POC: 250
COLOR, POC UA: NORMAL
GLUCOSE UR QL STRIP: NORMAL
KETONES UR QL STRIP: NORMAL
LEUKOCYTE ESTERASE URINE, POC: NORMAL
NITRITE, POC UA: NORMAL
PH, POC UA: 7
PROTEIN, POC: 30
SPECIFIC GRAVITY, POC UA: 1.01
UROBILINOGEN, POC UA: NORMAL

## 2017-08-28 PROCEDURE — 87086 URINE CULTURE/COLONY COUNT: CPT

## 2017-08-28 PROCEDURE — 52234 CYSTOSCOPY AND TREATMENT: CPT | Performed by: UROLOGY

## 2017-08-28 PROCEDURE — G8907 PT DOC NO EVENTS ON DISCHARG: HCPCS | Performed by: UROLOGY

## 2017-08-28 PROCEDURE — 52000 CYSTOURETHROSCOPY: CPT | Performed by: UROLOGY

## 2017-08-28 PROCEDURE — 52204 CYSTOSCOPY W/BIOPSY(S): CPT | Mod: ,,, | Performed by: UROLOGY

## 2017-08-28 PROCEDURE — G8918 PT W/O PREOP ORDER IV AB PRO: HCPCS | Performed by: UROLOGY

## 2017-08-28 PROCEDURE — 88305 TISSUE EXAM BY PATHOLOGIST: CPT | Performed by: PATHOLOGY

## 2017-08-28 PROCEDURE — 76872 US TRANSRECTAL: CPT | Performed by: UROLOGY

## 2017-08-28 PROCEDURE — 76872 US TRANSRECTAL: CPT | Mod: 26,,, | Performed by: UROLOGY

## 2017-08-28 RX ORDER — WATER 1000 ML/1000ML
INJECTION, SOLUTION INTRAVENOUS
Status: DISCONTINUED | OUTPATIENT
Start: 2017-08-28 | End: 2017-08-28 | Stop reason: HOSPADM

## 2017-08-28 RX ORDER — LIDOCAINE HYDROCHLORIDE 20 MG/ML
JELLY TOPICAL
Status: DISCONTINUED | OUTPATIENT
Start: 2017-08-28 | End: 2017-08-28 | Stop reason: HOSPADM

## 2017-08-28 RX ORDER — LIDOCAINE HYDROCHLORIDE 20 MG/ML
JELLY TOPICAL
Status: DISCONTINUED
Start: 2017-08-28 | End: 2017-08-28 | Stop reason: HOSPADM

## 2017-08-28 RX ADMIN — LIDOCAINE HYDROCHLORIDE 5 ML: 20 JELLY TOPICAL at 02:08

## 2017-08-28 RX ADMIN — WATER 500 ML: 1000 INJECTION, SOLUTION INTRAVENOUS at 03:08

## 2017-08-28 RX ADMIN — LIDOCAINE HYDROCHLORIDE 5 ML: 20 JELLY TOPICAL at 03:08

## 2017-08-28 NOTE — ED PROVIDER NOTES
Encounter Date: 8/27/2017    SCRIBE #1 NOTE: I, Radha Guillen, am scribing for, and in the presence of, Dr. Munson.       History     Chief Complaint   Patient presents with    Other     Pt requesting another catheter be placed.  Pt had catheter placed yesterday        08/27/2017 8:28 PM     Chief Complaint: Catheter Dysfunction      Hesham Serna is a 67 y.o. male with a history of HTN, COPD, hep C, compression fractures, who presents to the ED with complaint of catheter dysfunction. He was seen in the ED yesterday and had chronic indwelling Rocha catheter changed yesterday after it was leaking around the catheter. He had resolution of the leaking with the change. He states that the catheter drained last night, but today he has had very little output from the catheter. Reports urinary urgency and states his abdomen feels distended. He is requesting placement of another Rocha catheter. He has a planned upcoming procedure this week (states it is for the bladder and prostate, but unsure the procedure). He suffers with intermittent hematuria and has known history of BPH. He denies fever, vomiting, or acute back pain (does endorse significant chronic back pain).      The history is provided by the patient.     Review of patient's allergies indicates:   Allergen Reactions    Codeine Other (See Comments)    Morphine      Pt denies this 1-10-13     Bactrim [sulfamethoxazole-trimethoprim] Other (See Comments)     Pt cannot take with Methadone    Ciprofloxacin Other (See Comments)     Patient cannot take with methadone     Past Medical History:   Diagnosis Date    Allergy     Arthritis     Asthma     Cancer     skin DR Isrrael Yun    COPD (chronic obstructive pulmonary disease)     Degenerative disc disease     Depression     bipolar dis    Fracture of vertebra due to osteoporosis     Hepatitis C     Hypertension     Liver disease     Nasal bone fx-closed     Nephrolith     Rosacea     Skin  cancer      Past Surgical History:   Procedure Laterality Date    jaw surgey      KNEE SURGERY      NECK SURGERY      8 procedures on neck    RHIZOTOMY W/ RADIOFREQUENCY ABLATION      TONSILLECTOMY      tumors      10 removed from scalp     Family History   Problem Relation Age of Onset    Heart disease Father     No Known Problems Mother     Cancer Paternal Uncle      prostate    Cancer Paternal Aunt      breast    Eczema Neg Hx     Lupus Neg Hx     Psoriasis Neg Hx     Melanoma Neg Hx     Glaucoma Neg Hx      Social History   Substance Use Topics    Smoking status: Current Every Day Smoker     Packs/day: 0.50     Years: 30.00     Types: Cigarettes    Smokeless tobacco: Never Used    Alcohol use No     Review of Systems   Constitutional: Negative for fever.   HENT: Negative for sore throat.    Respiratory: Negative for shortness of breath.    Cardiovascular: Negative for chest pain.   Gastrointestinal: Positive for abdominal distention.   Genitourinary: Positive for decreased urine volume and urgency.   Musculoskeletal: Positive for back pain.   Skin: Negative for rash.   Neurological: Negative for weakness.   Hematological: Does not bruise/bleed easily.       Physical Exam     Initial Vitals [08/27/17 1956]   BP Pulse Resp Temp SpO2   126/76 78 16 97.9 °F (36.6 °C) 96 %      MAP       92.67         Physical Exam    Nursing note and vitals reviewed.  Constitutional: He appears well-developed and well-nourished. He is not diaphoretic. No distress.   HENT:   Head: Normocephalic and atraumatic.   Mouth/Throat: Oropharynx is clear and moist.   Eyes: Conjunctivae are normal.   Neck: Neck supple.   Cardiovascular: Normal rate, regular rhythm, normal heart sounds and intact distal pulses. Exam reveals no gallop and no friction rub.    No murmur heard.  Pulmonary/Chest: Breath sounds normal. He has no wheezes. He has no rhonchi. He has no rales.   Abdominal: Soft. He exhibits distension. There is  tenderness (mild lower abdominal tenderness).   No flank tenderness   Genitourinary:   Genitourinary Comments: Rocha in place. No penile lesions or erosion. Dark urine in the leg bag. Bilateral distended testes, non-tender. No epididymal tenderness.    Musculoskeletal: Normal range of motion.   Neurological: He is alert and oriented to person, place, and time.   Skin: No rash noted. No erythema.   Psychiatric: He has a normal mood and affect.         ED Course   Procedures  Labs Reviewed - No data to display          Medical Decision Making:   History:   Old Medical Records: I decided to obtain old medical records.            Scribe Attestation:   Scribe #1: I performed the above scribed service and the documentation accurately describes the services I performed. I attest to the accuracy of the note.    Attending Attestation:           Physician Attestation for Scribe:  Physician Attestation Statement for Scribe #1: I, Dr. Munson, reviewed documentation, as scribed by Radha Guillen in my presence, and it is both accurate and complete.         Hesham Serna is a 67 y.o. male presenting with complaint of decreased urination despite catheter changed yesterday.  He denies other complaints other than feeling of lower abdominal fullness and discomfort with decreased output and urinary leg bag.  Catheter was changed today his request without relief in symptoms.  I did urge further diagnostic workup to explore other potential medical illness.  The patient has refused.  Proposed additional workup including but not limited to bladder ultrasound to rule out persistent retention, laboratories to assess renal function among others, urinalysis to assess for infection, and possible further abdominal imaging to rule out other emergent intra-abdominal process.  Patient does understand that there may be occult disease that if untreated could lead to worsening disease process, permanent disability, or death.  He does refuse  to stay citing need to get home to his family.  He does understand he has the option to return at any point and should follow up closely with urology this week.  He is able to repeat the risks back to me in his own words and ask appropriate questions.  He has the capacity to refuse and will be discharged at this point.  Detailed return precautions reviewed.  Discharged AMA.        ED Course     Clinical Impression:   The encounter diagnosis was Decreased urination.                           Daron Munson MD  08/28/17 0417

## 2017-08-28 NOTE — H&P
"[facility] H&P Note - [place]  Urology Group: Anna/Robert/Cassie/Radha  [facility]     Chief Complaint: GH, elevated residuals     Subjective:        HPI: Hesham Serna is a 67 y.o. male presents with      Last seen 8/17/17 - 4 days ago     Gross hematuria  -ct urogram 11/14/16: herniation into left testicle, 4mm left renal stone. Repeat ctu 8/21/17 showed a 6mm stone in his left kidney and herniating bladder into testicel. .    -urine cultures have been positive in the past, ua today suspicious. Pt does CIC.   -urine cytology 9/26/16 - negative .7/17/17 - negative,  8/10/17 - urothelial cell atypia, supicious for urothelial high grade tumor.He has a h/o smoking (55 packs, 2-3 packs a day). Repeat urine cytology 8/17/17 abnormal  -cystoscopy 9/26/16 - pt never scheduled - pt states he cannot schedule this right now   -ua on 8/17/17 showed 1+ blood and had no c/o blood in urine     Pt went to the ER a few days ago for "retention" however no recorded volumes  Then he said that his catheter stopped draining and went back to the er over t he weekend      Elevated residuals  Pt with history of urinary retention. UDS showed atonic bladder.   spontaneously voids 4-5x a day "a normal amount" and "feels like he is emptying" but sometimes catheterizes 1x a night. CIC about 5-6x a day and does it based on how full he feels.      Seen 7/17/17 and stated that he tried to catheterize but could not and continued to have blood when he catheterized.  Was also concerned about frequency q1h and dysuria. ua had shown 1+luek and blood. cx grew serratia. I cath'd him and it went easy-had bloody urine. Urine cytology was suspicious.  I also had him discontinue the myrbetriq and continue flomax.      I also had him fill out a voiding diary.  He brought a handwritten diary in on 8/17/17 showing that he was voiding every 2-3 hours with essentially no residual. Catheterizing 1x in the " middle of the night, once in the morning. He catheterizes bc he has the urge to void but he can't. He is voiding about 3x a day.  Plan was to have him fill out a more formal diary. He returns today stating he lost it.     Seen 8/21/17 and he catheterizes more than 3x a day and sometimes cannot pass the catheter.  He says he has been having some dysuria.     Uroflow results 8/21/17:  Peak flow: 13 mL/s  Mean flow: 7mL/s  Voided time: 16s  Flow time: 14s  TTP flow: 3s  Voided volume: 100 mL     Pattern of curve: to be determined by physician     PVR 0ml     Nephrolithiasis  h/o nephrolithiasis. CTRSS on 6/4/15 showed left 4mm renal stone. KUB on 4/12/16 showed stone is radioopaque. Ctu 11/14/16 showed this again.   Scheduled for eswl but never proceeded with surgery. Stated he wants to hold off until his back is fixed. No c/o left flank pain  but has been having gross hematuria and anbormal urine cytology        AUASSx: 11, mostly dissatisfied  IIEF: 5        REVIEW OF SYSTEMS:  General ROS: no fevers, no chills  Psychological ROS: no depression  Endocrine ROS: no heat or cold  Respiratory ROS: no SOB  Cardiovascular ROS: no CP  Gastrointestinal ROS: no abdominal pain, no constipation, no diarrhea, no BRBPR  Musculoskeletal ROS: no muscle pain  Neurological ROS: no headaches  Dermatological ROS: no rashes  HEENT: no glasses, no sinus   ROS: per HPI     The past medical, surgical, social and family hx were reviewed. There have not been any changes.   Cataracts? none     Urologic meds: rapaflo  Anticoagulation: No     Vitals:    08/28/17 1429   BP: 117/68   Pulse: 75   Resp: (!) 21   Temp: 97.6 °F (36.4 °C)          Physical Exam   Constitutional: He is oriented to person, place, and time. He appears well-developed and well-nourished. He is cooperative. No distress.   HENT:   Head: Normocephalic and atraumatic.   Eyes: Pupils are equal, round, and reactive to light.   Cardiovascular: Normal rate and regular rhythm.     Pulmonary/Chest: Effort normal.   Abdominal: Soft. Normal appearance.   Musculoskeletal: Normal range of motion.   Neurological: He is alert and oriented to person, place, and time. He has normal reflexes.   Skin: Skin is intact.     Psychiatric: He has a normal mood and affect.          MOON 2/9/17: 20 g prostate  testes descended bilaterally, no masses   left reducible inguinal hernia     In and out cath performed by me with a 16fr in and out straight and 16 fr coude. Met some resistance at the prostate but did not feel like it was a stricture. Pt states that he never pushes the catheter in all the way like I just did.      Labs:  ua from catheter: 2+ leukocytes -send for culture     Urine culture   8/21/17 k.pneumonia  8/10/17                      ng  1/16/17 ng  10/21/16 E.cloacae  9/19/16 P.mirabilis  7/20/16 s.haemolyticus  3/23/16  Ng  12/16/15 ng     PSA   5/9/17                                  0.45  5/26/15                      0.27  7/14/14                      0.26     Cr  4/3/17 1.0     Pathology:   Urine, voided, cytology 8/21/17:  - Urothelial atypia, suspicious cells present, see comment.    URINE (VOIDED) 8/10/17  Urothelial cell atypia, suspicious for urothelial high-grade dysplasia/carcinoma in situ     Voided urine 7/17/17:  Negative for malignant cells.     9/26/16 URINE, VOIDED (CYTOLOGY):  -Negative for malignant cells  -Abundant neutrophils  -Degenerative changes     Rads:   ctu 8/21/17  1.  Stable examination demonstrating bilateral inguinal hernias, the left of which is a direct inguinal hernia containing a portion of the urinary bladder. There is urinary bladder wall thickening involving the anterior bladder wall and herniated portion of the bladder which is nonspecific but likely reactive in this setting. Right indirect fat-containing inguinal hernia also again noted.    2. Additional stable findings:  -Nonobstructive left nephrolithiasis  -Colonic diverticulosis  -Chronic severe L1  compression fracture status post vertebroplasty/kyphoplasty    kub 8/21/17  Left 4mm (6mm on ct) mid pole stone (L3)  Note to self: recommend eswl, stone growing    ctu 11/14/16: 4mm left renal stone, no renal mass, no hydro, prtial herniation of the bladder into the LIH. Prostate not enlarged. A fat containing RIH. Severe L1 compression fracture  kub 4/12/16 - left renal stone 5mm  ctrss 6/4/15 - 4mm left midpole stone        Assessment:       1. Gross hematuria    2. Benign prostatic hyperplasia, presence of lower urinary tract symptoms unspecified    3. Increasing residual urine    4. Nephrolithiasis        Plan:      Gross hematuria and abnormal urine cytology, left renal stone  -repeat urine cytology abnormal. , previous 2 normal, most recent abnormal  -already has had a ct and urine culture (always + cause he does CIC). ctu shows left renal stone slightly larger and herniating bladder into left testicle. cx 8/21/17 + for k.pneumonia and has been onaugmentin  -sending culture again today  - cysto/trus  Today    Bph, Increasing urine residual  -will have pt fill out voiding diary again with pvr   -continue rapaflo  -previous uds showed atonic bladder but diary shows he is voiding every few hours with minimal residual and he also states that he has the urge to void but cannot about 3x a day  -return for uroflow and pvr (hard to schedule), pt only comes when he has an issue  -uroflow showed mean flow of 7 with pvr of 0  -will do cysto/trus       H&P update  I have reviewed the relevant H&P and reviewed the relevant labs and radiology and updated today's H&P.  The pt is on no anticoagulation and has not been for the past 7+ days  Most recent urine culture was on 8/21/17 and was klebsiella. Has been on augmentin.  The patient denies any uti symptoms including fever or burning.    This patient has been cleared for surgery in ambulatory surgical facility.

## 2017-08-28 NOTE — ED NOTES
At D/C, left AMA' AA & O x 3, skin warm and dry, follow up care discussed at length with patient/family to include meds and follow-up with MD; patient/family given discharge instructions along with prescriptions as indicated and care sheets, leg bag in place with few drops of urine in tubing, does not want to stay any longer.

## 2017-08-28 NOTE — PLAN OF CARE
Patient's brother left to go to the clinic. Patient wants to go outside and smoke a cigarette. Patient instructed that he cannot smoke on oxygen while on hospital grounds. Patient removed hospital oxygen and walked outside. His brother is driving him home and has his home oxygen.

## 2017-08-28 NOTE — TELEPHONE ENCOUNTER
----- Message from Negrita Castillo MD sent at 8/28/2017  3:47 PM CDT -----  Pt with herniating bladder into left testicle  Needs to be seen for hernia repair

## 2017-08-28 NOTE — DISCHARGE INSTRUCTIONS
Decreased  urination - Uncertain reason.  Possible  infection, kidney injury, or other life threatening illness.  It is difficult to say further as you will not wait for further workup.

## 2017-08-28 NOTE — TELEPHONE ENCOUNTER
I did speak with patient but he was upset and wanted me to call back and leave a message so I did.  Advised that I was calling from Dr Ogden office about scheduling an appointment regarding his inguinal hernias and to call back at his convenience.

## 2017-08-28 NOTE — TELEPHONE ENCOUNTER
Spoke with patient he states he had to go to ER over the weekend had catheter changed due to catheter not draining.. Informed patient to keep procedure appointment today with . Patient verbally voiced understanding.
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
## 2017-08-28 NOTE — OP NOTE
[facility] [place] Procedure Note  Date: 2017      TRANSRECTAL PROSTATIC ULTRASOUND and Cystoscopy    Staff surgeon: Anna  Date: 2017  Anesthesia: lidocaine jelly  EBL: minimal  Complications: none    NAME:Hesham Serna  : 1950  Diagnosis:BPH  Current PSA:     Indications: Hesham Serna is a 67 y.o. male with gross hematuria, recurrent uti, intermittent urinary retention and abnormal urine cytology    FLEXIBLE CYSTOSCOPY    Anesthesia: 2% uro-jet lidocaine jelly for local analgesia    Flexible cysto-urethroscopy was performed after consent was obtained.  The risks and benefits were explained.    2% lidocaine urojet was used for local analgesia.  The genitalia was prepped and draped in the sterile fashion with betadine.    The flexible scope was advanced into the urethra and into the bladder.  Bilateral ureteral orifice were evaluated and noted to be normal with clear efflux.  The bladder was completely surveyed in a systematic fashion.   No bladder tumors or lesions were seen.  No strictures were noted.  The prostate showed No hypertrophy.  There was No median lobe present.    Urethral meatus with tear  Normal urethra  Small prostate and no bilateral lobar hypertrophy  +inflammation on posterior bladder wall and floor biopsied an fulgurated in 2 separate areas. Likely related to indwelling catheter.     TRANSRECTAL ULTRASOUND  Measurements:   Height:  19.3 mm  Width:  27.9 mm  Length:  35.2 mm  Volume: 10 cm3  PSAD:     Urinalysis: 2+leukocyte - send urine for culture    Seminal vesicles/Ejaculatory Ducts: Normal  Outline/Symmetry of Prostate: WNL  Central Gland/Transition Zone: Well demarcated  Peripheral Zone: WNL  Bladder: Normal  MedianLobe: Normal    The patient tolerated the procedures well without complication.    Findings: (pictures wer uploaded into media)  Urethral meatus with tear  Normal urethra  Small prostate and no bilateral lobar hypertrophy  +inflammation on  posterior bladder wall and floor biopsied an fulgurated in 2 separate areas. Likely related to indwelling catheter.    Plan:  1. The pt is using coloplast 16 Dominican in and out catheter. I tried to advance this but definitely met resistance. Pt is not passing this into bladder completely. I told him a 16 fr red rubber would be better. I had him pass this and it went all the way into the bladder. I told him that instead of going to the ER if he cannot pass a catheter he should try the red rubber but he says its too flimsy. I explained he is causing trauma to his prostate with the other when. He said he just ordered 200 of the other kind and I offered to obrtain samples for him.  2. The inflammation could have been from the ronquillo but it could also but form the bladder herniating into the left testicle and so I senta referral for hernia repair which may help his symptoms as well  3. F/u in 3 weeks with me to discuss the patholog      Negrita Castillo MD

## 2017-08-28 NOTE — TELEPHONE ENCOUNTER
----- Message from Brooklyn Marshall sent at 8/28/2017  1:18 PM CDT -----  Contact: self [phone]  Call placed to pod.  He is waiting to have his ultrasound performed, he said they don't know if he can have something to drink or not.  Please call him.  Thank you!

## 2017-08-28 NOTE — DISCHARGE SUMMARY
[facility]  Discharge Note  Short Stay    Admit Date: 8/28/2017    Discharge Date and Time: No discharge date for patient encounter.     Attending Physician: Negrita Castillo,*     Discharge Provider: Negrita Castillo    Diagnoses:  Active Hospital Problems    Diagnosis  POA    BPH (benign prostatic hyperplasia) [N40.0]  Yes      Resolved Hospital Problems    Diagnosis Date Resolved POA   No resolved problems to display.       Discharged Condition: good    Hospital Course: Patient was admitted for an outpatient procedure and tolerated the procedure well with no complications.    Final Diagnoses: Same as principal problem.    Disposition: Home or Self Care    Follow up/Patient Instructions:    Medications:  Reconciled Home Medications:   Current Discharge Medication List      CONTINUE these medications which have NOT CHANGED    Details   ADVAIR DISKUS 500-50 mcg/dose DsDv diskus inhaler INHALE 1 PUFF TWO TIMES A DAY  Qty: 180 each, Refills: 3      albuterol-ipratropium 2.5mg-0.5mg/3mL (DUO-NEB) 0.5 mg-3 mg(2.5 mg base)/3 mL nebulizer solution Take 3 mLs by nebulization every 6 (six) hours while awake.  Qty: 180 mL, Refills: 3    Associated Diagnoses: Chronic obstructive pulmonary disease, unspecified COPD type      amoxicillin-clavulanate 875-125mg (AUGMENTIN) 875-125 mg per tablet Take 1 tablet by mouth 2 (two) times daily.  Qty: 20 tablet, Refills: 0      methadone (DOLOPHINE) 10 MG tablet Take 1 tablet (10 mg total) by mouth every 6 (six) hours as needed. Two tablets every morning, two tablets every 12 pm, one-two tablets every evening      oxycodone (ROXICODONE) 15 MG Tab       predniSONE (DELTASONE) 10 MG tablet Take 0.5 tablets (5 mg total) by mouth once daily.  Qty: 45 tablet, Refills: 3    Associated Diagnoses: Chronic bronchitis, unspecified chronic bronchitis type      RAPAFLO 8 mg Cap capsule TAKE ONE CAPSULE BY MOUTH ONCE DAILY  Qty: 90 capsule, Refills: 3    Associated Diagnoses:  Benign non-nodular prostatic hyperplasia without lower urinary tract symptoms      venlafaxine (EFFEXOR-XR) 37.5 MG 24 hr capsule Take 1 capsule (37.5 mg total) by mouth once daily.  Qty: 30 capsule, Refills: 0      albuterol 90 mcg/actuation inhaler Inhale 2 puffs into the lungs every 6 (six) hours as needed for Wheezing.  Qty: 1 each, Refills: 5      AMITIZA 8 mcg Cap TAKE 1 CAPSULE BY MOUTH TWICE DAILY WITH FOOD  Qty: 180 capsule, Refills: 4    Associated Diagnoses: DDD (degenerative disc disease), cervical      celecoxib (CELEBREX) 100 MG capsule Take 2 capsules (200 mg total) by mouth 2 (two) times daily.  Qty: 180 capsule, Refills: 3      clindamycin (CLEOCIN T) 1 % lotion As directed  Qty: 60 mL, Refills: 2      clonazePAM (KLONOPIN) 2 MG Tab TAKE 1 TABLET (2 MG TOTAL) BY MOUTH 3 (THREE) TIMES DAILY AS NEEDED.  Qty: 90 tablet, Refills: 0    Associated Diagnoses: Mood disorder; Bipolar affective disorder, current episode hypomanic      doxycycline (VIBRAMYCIN) 100 MG Cap Take 1 capsule (100 mg total) by mouth every 12 (twelve) hours.  Qty: 20 capsule, Refills: 0    Associated Diagnoses: Chronic obstructive pulmonary disease, unspecified COPD type; Productive cough      econazole nitrate 1 % cream Apply to feet twice daily  Qty: 85 g, Refills: 2      fluocinonide 0.05% (LIDEX) 0.05 % cream       fluticasone (FLONASE) 50 mcg/actuation nasal spray SNIFF 1 SPRAY INTO EACH NOSTRIL ONCE DAILY  Qty: 16 g, Refills: 3    Associated Diagnoses: Chronic allergic rhinitis      GENERLAC 10 gram/15 mL solution TAKE 30 ML BY MOUTH THREE TIMES DAILY  Qty: 1200 mL, Refills: 0      gentamicin (GARAMYCIN) 0.1 % ointment       ketoconazole (NIZORAL) 2 % shampoo       KRISTALOSE 20 gram Pack       ledipasvir-sofosbuvir  mg Tab Take 1 tablet by mouth once daily.  Qty: 28 tablet, Refills: 1    Associated Diagnoses: Hep C w/o coma, chronic      spironolactone (ALDACTONE) 25 MG tablet Take 1 tablet (25 mg total) by mouth once  daily.  Qty: 30 tablet, Refills: 11    Associated Diagnoses: COPD with acute exacerbation      ziprasidone (GEODON) 20 MG Cap Take one capsule PO BID  Qty: 180 capsule, Refills: 0             Discharge Procedure Orders  Diet general           Discharge Procedure Orders (must include Diet, Follow-up, Activity):    Discharge Procedure Orders (must include Diet, Follow-up, Activity)  Diet general

## 2017-08-29 ENCOUNTER — TELEPHONE (OUTPATIENT)
Dept: UROLOGY | Facility: CLINIC | Age: 67
End: 2017-08-29

## 2017-08-29 VITALS
TEMPERATURE: 98 F | HEART RATE: 76 BPM | DIASTOLIC BLOOD PRESSURE: 68 MMHG | HEIGHT: 67 IN | BODY MASS INDEX: 30.63 KG/M2 | OXYGEN SATURATION: 99 % | RESPIRATION RATE: 20 BRPM | WEIGHT: 195.13 LBS | SYSTOLIC BLOOD PRESSURE: 125 MMHG

## 2017-08-29 LAB — BACTERIA UR CULT: NO GROWTH

## 2017-08-29 NOTE — TELEPHONE ENCOUNTER
----- Message from Levar Cox sent at 8/29/2017  1:53 PM CDT -----  Contact: Self---[phone]  `Patient called asking to speak with the nurse.Thanks!

## 2017-08-31 ENCOUNTER — TELEPHONE (OUTPATIENT)
Dept: UROLOGY | Facility: CLINIC | Age: 67
End: 2017-08-31

## 2017-08-31 NOTE — TELEPHONE ENCOUNTER
Spoke with patient he states the sample coude catheters makes him bleed and the other catheters do not go all the way into the bladder. Please advise

## 2017-08-31 NOTE — TELEPHONE ENCOUNTER
Please let pt know that he should try to continue to use th red rubber catheters   As long as the bleeding is not significant this is his best option right now  Also he needs f/u with general surgery to discuss repair of his hernia  His bladder herniates into his testicle and could be causing the inflammation we saw on his cytoscopy or pain in his testicles when he has a full bladder

## 2017-08-31 NOTE — TELEPHONE ENCOUNTER
Phoned patient no answer left message on voicemail to give the office a call back. Please advise on patient bleeding through his penis.

## 2017-08-31 NOTE — TELEPHONE ENCOUNTER
Spoke with patient informed him of recommendations. Patient states he cannot continue to catheterize. He wants to know he can urinate on his on. Please advise

## 2017-08-31 NOTE — TELEPHONE ENCOUNTER
----- Message from Levar Cox sent at 8/31/2017 10:49 AM CDT -----  Contact: self [phone]  Patient called stating he had a procedure, he is now bleeding.  Thanks!

## 2017-09-01 NOTE — TELEPHONE ENCOUNTER
Absolutely  Please ask him to keep a diary with voided amounts  everytime we do uroflow or he catheterizes he empties

## 2017-09-05 ENCOUNTER — TELEPHONE (OUTPATIENT)
Dept: UROLOGY | Facility: CLINIC | Age: 67
End: 2017-09-05

## 2017-09-05 NOTE — TELEPHONE ENCOUNTER
Spoke with patient wants to know date of appointment. Clarified appointments with the patient on 9/21. Patient verbally voiced understanding.

## 2017-09-05 NOTE — TELEPHONE ENCOUNTER
----- Message from Erinn Valadez sent at 9/5/2017  2:11 PM CDT -----  Patient is returning office call from Rosa. Please call back at [phone]. He said he is bleeding from his penis. I tried calling the pod.

## 2017-09-05 NOTE — TELEPHONE ENCOUNTER
----- Message from Jovana Banks sent at 9/5/2017  1:22 PM CDT -----  Please call pt at [phone] asking if he can do a urine sample on 09/12 ... He will be at the office seeing Dr Ogden

## 2017-09-05 NOTE — TELEPHONE ENCOUNTER
----- Message from Ally Campo sent at 2/24/2017 10:42 AM CST -----  Patient requesting to speak with nurse (Rosa) concerning letter needed for health issues/please call back at [phone] to advise.  
Spoke with patient informed him RANDALL Griffin placed last office note and imaging results in the mail. Pt verbally voiced understanding.  
Stable.

## 2017-09-06 ENCOUNTER — TELEPHONE (OUTPATIENT)
Dept: UROLOGY | Facility: CLINIC | Age: 67
End: 2017-09-06

## 2017-09-06 DIAGNOSIS — N40.0 BENIGN PROSTATIC HYPERPLASIA, PRESENCE OF LOWER URINARY TRACT SYMPTOMS UNSPECIFIED: Primary | ICD-10-CM

## 2017-09-06 NOTE — TELEPHONE ENCOUNTER
Spoke with patient he states he is out out of antibiotics and thinks he may need more. Please advise

## 2017-09-06 NOTE — TELEPHONE ENCOUNTER
Spoke with patient he states he is experiencing pain in his penis. Offered patient appointment with nurse practitioner patient declined wants to just give the office a urine sample.

## 2017-09-06 NOTE — TELEPHONE ENCOUNTER
He needs to come in and give a urinalysis and culture and have us document his sx if he thinks he has an infection. Should be seen by NP.

## 2017-09-06 NOTE — TELEPHONE ENCOUNTER
----- Message from Jess Lewis RT sent at 9/6/2017  2:28 PM CDT -----  Contact: pt    pt , requesting to inform he is completely out of antibiotics, would like a call back from Nurse Toya to explain trouble he is having with his penis, thanks.

## 2017-09-07 ENCOUNTER — OUTPATIENT CASE MANAGEMENT (OUTPATIENT)
Dept: ADMINISTRATIVE | Facility: OTHER | Age: 67
End: 2017-09-07

## 2017-09-07 NOTE — PROGRESS NOTES
Thank you for the referral.   For your information:    The following patient has been assigned to Norm Paul Rn with Outpatient Complex Care Management for high risk screening.    Reason: Benign prostatic hyperplasia, presence of lower urinary tract symptoms unspecified     Please contact [facility] at [phone] with any questions.    Thank you,  Debora Castillo

## 2017-09-08 ENCOUNTER — TELEPHONE (OUTPATIENT)
Dept: UROLOGY | Facility: CLINIC | Age: 67
End: 2017-09-08

## 2017-09-08 ENCOUNTER — TELEPHONE (OUTPATIENT)
Dept: PSYCHIATRY | Facility: CLINIC | Age: 67
End: 2017-09-08

## 2017-09-08 DIAGNOSIS — F31.0 BIPOLAR AFFECTIVE DISORDER, CURRENT EPISODE HYPOMANIC: ICD-10-CM

## 2017-09-08 DIAGNOSIS — F39 MOOD DISORDER: ICD-10-CM

## 2017-09-08 RX ORDER — VENLAFAXINE HYDROCHLORIDE 37.5 MG/1
37.5 CAPSULE, EXTENDED RELEASE ORAL DAILY
Qty: 30 CAPSULE | Refills: 0 | Status: SHIPPED | OUTPATIENT
Start: 2017-09-08 | End: 2017-11-16 | Stop reason: SDUPTHER

## 2017-09-08 NOTE — TELEPHONE ENCOUNTER
----- Message from Zaida Teixeira sent at 9/8/2017  7:58 AM CDT -----  Contact: self  Patient called Mine asking for advice about still being seen on 09/21/17.  Please call patient at [phone]. Thanks!

## 2017-09-08 NOTE — TELEPHONE ENCOUNTER
Spoke with patient requesting appointment with nurse practitioner. Appointment scheduled at 8:30am on Tuesday 9/12 per patient request
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
## 2017-09-08 NOTE — TELEPHONE ENCOUNTER
pATIENT CALLED AND LEFT MESSAGE ON VOICE MAIL @ 02:15 PM. HE STATES HE NEEDS A CALL BACK TODAY FROM DR INTERIANO.  SENDING MESSAGE TO FARA PEREIRA TO TALK TO PATIENT.  PLEASE CALL WHEN AVAILABLE.

## 2017-09-09 ENCOUNTER — NURSE TRIAGE (OUTPATIENT)
Dept: ADMINISTRATIVE | Facility: CLINIC | Age: 67
End: 2017-09-09

## 2017-09-09 RX ORDER — CLONAZEPAM 2 MG/1
TABLET ORAL
Qty: 90 TABLET | Refills: 0 | Status: SHIPPED | OUTPATIENT
Start: 2017-09-09 | End: 2017-10-12 | Stop reason: SDUPTHER

## 2017-09-09 NOTE — TELEPHONE ENCOUNTER
"  Reason for Disposition   Passing pure blood or large blood clots (i.e., size > a dime) (Exception: lilian or small strands)    Answer Assessment - Initial Assessment Questions  1. COLOR of URINE: "Describe the color of the urine."  (e.g., tea-colored, pink, red, blood clots, bloody)      Dark red, pure blood  2. ONSET: "When did the bleeding start?"       This morning  3. EPISODES: "How many times has there been blood in the urine?" or "How many times today?"      2  4. PAIN with URINATION: "Is there any pain with passing your urine?" If so, ask: "How bad is the pain?"  (Scale 1-10; or mild, moderate, severe)     - MILD - complains slightly about urination hurting     - MODERATE - interferes with normal activities       - SEVERE - excruciating, unwilling or unable to urinate because of the pain       No pain  5. FEVER: "Do you have a fever?" If so, ask: "What is your temperature, how was it measured, and when did it start?"      No  6. ASSOCIATED SYMPTOMS: "Are you passing urine more frequently than usual?"      No  7. OTHER SYMPTOMS: "Do you have any other symptoms?" (e.g., back/flank pain, abdominal pain, vomiting)      No  8. PREGNANCY: "Is there any chance you are pregnant?" "When was your last menstrual period?"      n/a    Protocols used:  URINE - BLOOD IN-A-    Patient advised to go to ED now. Patient voices understanding but states he will wait until tomorrow. Please contact caller directly to discuss any further care advice.  "

## 2017-09-11 ENCOUNTER — TELEPHONE (OUTPATIENT)
Dept: UROLOGY | Facility: CLINIC | Age: 67
End: 2017-09-11

## 2017-09-11 ENCOUNTER — DOCUMENTATION ONLY (OUTPATIENT)
Dept: FAMILY MEDICINE | Facility: CLINIC | Age: 67
End: 2017-09-11

## 2017-09-11 ENCOUNTER — OUTPATIENT CASE MANAGEMENT (OUTPATIENT)
Dept: ADMINISTRATIVE | Facility: OTHER | Age: 67
End: 2017-09-11

## 2017-09-11 NOTE — TELEPHONE ENCOUNTER
Pt needs to see a gastronetnerologist, if he does  Not arleady have one then we can refer him to one.

## 2017-09-11 NOTE — TELEPHONE ENCOUNTER
Ok but alissa will not treat his blood in his stool  He could have rectal cancer or colon cancer or hemhorroids  She does not treat or evaluate for this  Nor do I  He can discuss this with the general surgeon who should also be seeing him for his hernia ()  I see he cancelled that appt on 10/10/17  If he does not see dr munson he may continue to have blood in his urine because of his bladder herniating into his testicle and blood in his stool from we don't know what since it needs to be evaluated. Tell him it could be cancer  He should see dr munson to have this evaluated

## 2017-09-11 NOTE — TELEPHONE ENCOUNTER
Spoke with patient informed him of recommendations. Patient verbally voiced understanding and states he doesn't want to see GI doctor. Will keep appointment with HOUSTON maxwell

## 2017-09-11 NOTE — PROGRESS NOTES
9/11/17-RN CM Called patient in attempt to complete initial assessment for [facility]. Patient expressed interest in the program, but stated that he is unable to complete assessment at this time. Inquired about meeting RN CM in person after appts tomorrow in [place]. Patient did discuss interest in detox program. RN CM will meet with patient in [place] [facility] along with [facility] LCSW Louis Brumfield.

## 2017-09-11 NOTE — PROGRESS NOTES
Pre-Visit Chart Review  For Appointment Scheduled on 09/12/2017  Health Maintenance Due   Topic Date Due    Influenza Vaccine  08/01/2017

## 2017-09-11 NOTE — TELEPHONE ENCOUNTER
Spoke with patient he states he has blood in his urine again. Was advised on 9/9 by nurse triage to go the emergency room. Patient states he did not go to ER. Patient already has existing appointment tomorrow with jag maxwell. Wants to know if he should go to ER or keep appointment. Please advise

## 2017-09-12 ENCOUNTER — OFFICE VISIT (OUTPATIENT)
Dept: FAMILY MEDICINE | Facility: CLINIC | Age: 67
End: 2017-09-12
Payer: MEDICARE

## 2017-09-12 ENCOUNTER — OFFICE VISIT (OUTPATIENT)
Dept: UROLOGY | Facility: CLINIC | Age: 67
End: 2017-09-12
Payer: MEDICARE

## 2017-09-12 ENCOUNTER — OFFICE VISIT (OUTPATIENT)
Dept: SURGERY | Facility: CLINIC | Age: 67
End: 2017-09-12
Payer: MEDICARE

## 2017-09-12 ENCOUNTER — OUTPATIENT CASE MANAGEMENT (OUTPATIENT)
Dept: ADMINISTRATIVE | Facility: OTHER | Age: 67
End: 2017-09-12

## 2017-09-12 VITALS
HEIGHT: 67 IN | WEIGHT: 196.19 LBS | OXYGEN SATURATION: 96 % | BODY MASS INDEX: 30.79 KG/M2 | SYSTOLIC BLOOD PRESSURE: 128 MMHG | DIASTOLIC BLOOD PRESSURE: 76 MMHG | HEART RATE: 75 BPM | TEMPERATURE: 98 F

## 2017-09-12 VITALS
DIASTOLIC BLOOD PRESSURE: 82 MMHG | HEART RATE: 73 BPM | SYSTOLIC BLOOD PRESSURE: 125 MMHG | BODY MASS INDEX: 30.39 KG/M2 | WEIGHT: 194 LBS

## 2017-09-12 VITALS
HEART RATE: 75 BPM | HEIGHT: 67 IN | SYSTOLIC BLOOD PRESSURE: 121 MMHG | TEMPERATURE: 98 F | WEIGHT: 191 LBS | BODY MASS INDEX: 29.98 KG/M2 | DIASTOLIC BLOOD PRESSURE: 73 MMHG

## 2017-09-12 DIAGNOSIS — Z72.0 TOBACCO ABUSE: ICD-10-CM

## 2017-09-12 DIAGNOSIS — M51.36 DDD (DEGENERATIVE DISC DISEASE), LUMBAR: ICD-10-CM

## 2017-09-12 DIAGNOSIS — J44.1 COPD WITH ACUTE EXACERBATION: ICD-10-CM

## 2017-09-12 DIAGNOSIS — J41.8 MIXED SIMPLE AND MUCOPURULENT CHRONIC BRONCHITIS: Chronic | ICD-10-CM

## 2017-09-12 DIAGNOSIS — I10 ESSENTIAL HYPERTENSION: ICD-10-CM

## 2017-09-12 DIAGNOSIS — K40.20 NON-RECURRENT BILATERAL INGUINAL HERNIA WITHOUT OBSTRUCTION OR GANGRENE: Primary | ICD-10-CM

## 2017-09-12 DIAGNOSIS — N30.01 ACUTE CYSTITIS WITH HEMATURIA: Primary | ICD-10-CM

## 2017-09-12 DIAGNOSIS — M50.30 DDD (DEGENERATIVE DISC DISEASE), CERVICAL: Chronic | ICD-10-CM

## 2017-09-12 DIAGNOSIS — F39 MOOD DISORDER: ICD-10-CM

## 2017-09-12 DIAGNOSIS — F31.32 BIPOLAR AFFECTIVE DISORDER, CURRENTLY DEPRESSED, MODERATE: Primary | ICD-10-CM

## 2017-09-12 DIAGNOSIS — F31.0 BIPOLAR AFFECTIVE DISORDER, CURRENT EPISODE HYPOMANIC: ICD-10-CM

## 2017-09-12 LAB
BILIRUB SERPL-MCNC: NORMAL MG/DL
BLOOD URINE, POC: 50
COLOR, POC UA: NORMAL
GLUCOSE UR QL STRIP: NORMAL
KETONES UR QL STRIP: NORMAL
LEUKOCYTE ESTERASE URINE, POC: NORMAL
NITRITE, POC UA: NORMAL
PH, POC UA: 7
PROTEIN, POC: NORMAL
SPECIFIC GRAVITY, POC UA: 1
UROBILINOGEN, POC UA: NORMAL

## 2017-09-12 PROCEDURE — 87088 URINE BACTERIA CULTURE: CPT

## 2017-09-12 PROCEDURE — 3074F SYST BP LT 130 MM HG: CPT | Mod: ,,, | Performed by: NURSE PRACTITIONER

## 2017-09-12 PROCEDURE — 99406 BEHAV CHNG SMOKING 3-10 MIN: CPT | Mod: S$PBB,,, | Performed by: SURGERY

## 2017-09-12 PROCEDURE — 99204 OFFICE O/P NEW MOD 45 MIN: CPT | Mod: 25,S$PBB,, | Performed by: SURGERY

## 2017-09-12 PROCEDURE — 87186 SC STD MICRODIL/AGAR DIL: CPT

## 2017-09-12 PROCEDURE — 99999 PR PBB SHADOW E&M-EST. PATIENT-LVL V: CPT | Mod: PBBFAC,,, | Performed by: NURSE PRACTITIONER

## 2017-09-12 PROCEDURE — 87086 URINE CULTURE/COLONY COUNT: CPT

## 2017-09-12 PROCEDURE — 3079F DIAST BP 80-89 MM HG: CPT | Mod: ,,, | Performed by: SURGERY

## 2017-09-12 PROCEDURE — 99215 OFFICE O/P EST HI 40 MIN: CPT | Mod: PBBFAC,PO | Performed by: NURSE PRACTITIONER

## 2017-09-12 PROCEDURE — 3078F DIAST BP <80 MM HG: CPT | Mod: ,,, | Performed by: FAMILY MEDICINE

## 2017-09-12 PROCEDURE — 99999 PR PBB SHADOW E&M-EST. PATIENT-LVL III: CPT | Mod: PBBFAC,,, | Performed by: FAMILY MEDICINE

## 2017-09-12 PROCEDURE — 99214 OFFICE O/P EST MOD 30 MIN: CPT | Mod: S$PBB,,, | Performed by: FAMILY MEDICINE

## 2017-09-12 PROCEDURE — 99999 PR PBB SHADOW E&M-EST. PATIENT-LVL III: CPT | Mod: PBBFAC,,, | Performed by: SURGERY

## 2017-09-12 PROCEDURE — 81001 URINALYSIS AUTO W/SCOPE: CPT | Mod: PBBFAC,PO | Performed by: NURSE PRACTITIONER

## 2017-09-12 PROCEDURE — 3074F SYST BP LT 130 MM HG: CPT | Mod: ,,, | Performed by: FAMILY MEDICINE

## 2017-09-12 PROCEDURE — 3074F SYST BP LT 130 MM HG: CPT | Mod: ,,, | Performed by: SURGERY

## 2017-09-12 PROCEDURE — 3078F DIAST BP <80 MM HG: CPT | Mod: ,,, | Performed by: NURSE PRACTITIONER

## 2017-09-12 PROCEDURE — 99213 OFFICE O/P EST LOW 20 MIN: CPT | Mod: PBBFAC,27,PO,25 | Performed by: FAMILY MEDICINE

## 2017-09-12 PROCEDURE — 87077 CULTURE AEROBIC IDENTIFY: CPT

## 2017-09-12 PROCEDURE — 99213 OFFICE O/P EST LOW 20 MIN: CPT | Mod: PBBFAC,27,PO | Performed by: SURGERY

## 2017-09-12 PROCEDURE — 99213 OFFICE O/P EST LOW 20 MIN: CPT | Mod: S$PBB,,, | Performed by: NURSE PRACTITIONER

## 2017-09-12 RX ORDER — CELECOXIB 100 MG/1
200 CAPSULE ORAL 2 TIMES DAILY
Qty: 180 CAPSULE | Refills: 3 | Status: SHIPPED | OUTPATIENT
Start: 2017-09-12 | End: 2017-12-08 | Stop reason: SDUPTHER

## 2017-09-12 RX ORDER — AMOXICILLIN AND CLAVULANATE POTASSIUM 875; 125 MG/1; MG/1
1 TABLET, FILM COATED ORAL 2 TIMES DAILY
Qty: 28 TABLET | Refills: 0 | Status: SHIPPED | OUTPATIENT
Start: 2017-09-12 | End: 2017-09-15 | Stop reason: ALTCHOICE

## 2017-09-12 RX ORDER — SPIRONOLACTONE 25 MG/1
25 TABLET ORAL DAILY
Qty: 30 TABLET | Refills: 11 | Status: SHIPPED | OUTPATIENT
Start: 2017-09-12 | End: 2017-12-27 | Stop reason: SDUPTHER

## 2017-09-12 RX ORDER — CLONAZEPAM 2 MG/1
TABLET ORAL
Qty: 90 TABLET | Refills: 0 | OUTPATIENT
Start: 2017-09-12

## 2017-09-12 NOTE — LETTER
September 12, 2017      Negrita Castillo MD  [address]  [address]  [facility] [address]           [facility] - General Surgery  [address]  [facility] [address]  Phone: [phone]          Patient: Hesham Serna   MR Number: [id number]   YOB: 1950   Date of Visit: 9/12/2017       Dear Dr. Negrita Castillo:    Thank you for referring Hesham Serna to me for evaluation. Attached you will find relevant portions of my assessment and plan of care.    If you have questions, please do not hesitate to call me. I look forward to following Hesham Serna along with you.    Sincerely,    Thien Ogden MD    Enclosure  CC:  No Recipients    If you would like to receive this communication electronically, please contact [email or web address] or [phone] to request more information on [employer] access.    For providers and/or their staff who would like to refer a patient to [facility], please contact us through our one-stop-shop provider referral line, [facility], at [phone].    If you feel you have received this communication in error or would no longer like to receive these types of communications, please e-mail [email or web address]

## 2017-09-12 NOTE — PROGRESS NOTES
9/12/17-RN CM called patient to reschedule visit. Patient currently at appt with several other appts today. Discussed completing phone assessment at a later date. Patient in agreement. RN CM will attempt to reach out to patient via phone at a later date this week.

## 2017-09-12 NOTE — PROGRESS NOTES
Subjective:       Patient ID: Hesham Serna is a 67 y.o. male.    Chief Complaint: Consult and Hernia    HPI  Pleasant 68 yo M referred to me for evaluation of  Inguinal hrenia.  Pt notes that he has bilateral inguinal hernias and that one contains his bladder.  He notes that hernias give him minimal discomfort but do not prevent him from any activity.  He reports that his back pain is much worse than his inguinal pain.  His biggest complaint is that he has to strain slightly more to urinate.  He denies fever/chills. Pt with history of COPD and is oxygen dependent.  Pt currently smoking 1/2 to 1 ppd.  Pt also suffers with chronic back pain, HTN and Hep C.  He has not had any major abdominal surgery.    Review of Systems   Constitutional: Negative for activity change, appetite change, diaphoresis, fever and unexpected weight change.   Respiratory: Positive for shortness of breath and wheezing. Negative for cough and chest tightness.    Cardiovascular: Negative for chest pain and palpitations.   Gastrointestinal: Negative for abdominal distention, abdominal pain, anal bleeding, blood in stool, constipation, diarrhea, nausea and vomiting.   Genitourinary: Positive for difficulty urinating. Negative for dysuria and hematuria.   Musculoskeletal: Positive for back pain and gait problem.   Skin: Negative for color change and wound.   Neurological: Negative for tremors and seizures.   Hematological: Negative for adenopathy. Does not bruise/bleed easily.       Objective:      Physical Exam   Constitutional: He is oriented to person, place, and time. He appears well-developed and well-nourished.   HENT:   Head: Normocephalic and atraumatic.   Eyes: Pupils are equal, round, and reactive to light.   Neck: Normal range of motion. No tracheal deviation present. No thyromegaly present.   Cardiovascular: Normal rate, regular rhythm and normal heart sounds.  Exam reveals no gallop and no friction rub.    No murmur  heard.  Pulmonary/Chest: Effort normal. He has wheezes. He exhibits tenderness.   Abdominal: He exhibits no distension and no mass. There is no tenderness. There is no rebound and no guarding. A hernia is present. Hernia confirmed positive in the right inguinal area and confirmed positive in the left inguinal area.       Musculoskeletal: He exhibits no edema.   Neurological: He is alert and oriented to person, place, and time. Coordination normal.   Skin: Skin is warm.   Psychiatric: He has a normal mood and affect.         CT scan reviewed demonstrating bilateral inguinal hernia, bladder noted within L inguinal hernia  Assessment:     Bilateral inguinal hernia  No diagnosis found.    Plan:       Lengthy d/w pt.  He does have bilateral inguinal hernias.  He has minimal pain from hernia but does have some difficulty with urination.  Pt COPD requiring oxygen and currently smoking making him a very high risk surgical candidate.  IN current state I would be reluctant to pursue surgery and would not repair until he stops smoking at least.  This was d/w pt in detail.  Pt COPD would make repair at high rate or recurrence given coughing associated with this.  At present pt will cont conservative measures.  Smoking cessation d/w pt.       The patient was counseled on the dangers of tobacco use, and was encouraged to stop smoking.  .  Reviewed strategies to maximize success, including complete cessation, classes, ecigs, and nicoderm.  Time spent 5 mins.  .

## 2017-09-12 NOTE — PATIENT INSTRUCTIONS
4 Steps for Eating Healthier  Changing the way you eat can improve your health. It can lower your cholesterol and blood pressure, and help you stay at a healthy weight. Your diet doesnt have to be bland and boring to be healthy. Just watch your calories and follow these steps:    1. Eat fewer unhealthy fats  · Choose more fish and lean meats instead of fatty cuts of meat.  · Skip butter and lard, and use less margarine.  · Pass on foods that have palm, coconut, or hydrogenated oils.  · Eat fewer high-fat dairy foods like cheese, ice cream, and whole milk.  · Get a heart-healthy cookbook and try some low-fat recipes.  2. Go light on salt  · Keep the saltshaker off the table.  · Limit high-salt ingredients, such as soy sauce, bouillon, and garlic salt.  · Instead of adding salt when cooking, season your food with herbs and flavorings. Try lemon, garlic, and onion.  · Limit convenience foods, such as boxed or canned foods and restaurant food.  · Read food labels and choose lower-sodium options.  3. Limit sugar  · Pause before you add sugars to pancakes, cereal, coffee, or tea. This includes white and brown table sugar, syrup, honey, and molasses. Cut your usual amount by half.  · Use non-sugar sweeteners. Stevia, aspartame, and sucralose can satisfy a sweet tooth without adding calories.  · Swap out sugar-filled soda and other drinks. Buy sugar-free or low-calorie beverages. Remember water is always the best choice.  · Read labels and choose foods with less added sugar. Keep in mind that dairy foods and foods with fruit will have some natural sugar.  · Cut the sugar in recipes by 1/3 to 1/2. Boost the flavor with extracts like almond, vanilla, or orange. Or add spices such as cinnamon or nutmeg.  4. Eat more fiber  · Eat fresh fruits and vegetables every day.  · Boost your diet with whole grains. Go for oats, whole-grain rice, and bran.  · Add beans and lentils to your meals.  · Drink more water to match your fiber  increase. This is to help prevent constipation.  Date Last Reviewed: 5/11/2015  © 7353-6598 [employer]. [address] [place] [address], [place] [address]. All rights reserved. This information is not intended as a substitute for professional medical care. Always follow your healthcare professional's instructions.        How to Quit Smoking  Smoking is one of the hardest habits to break. About half of all people who have ever smoked have been able to quit. Most people who still smoke want to quit. Here are some of the best ways to stop smoking.    Keep trying  Most smokers make many attempts at quitting before they are successful. Its important not to give up.  Go cold turkey  Most former smokers quit cold turkey (all at once). Trying to cut back gradually doesn't seem to work as well, perhaps because it continues the smoking habit. Also, it is possible to inhale more while smoking fewer cigarettes. This results in the same amount of nicotine in your body.  Get support  Support programs can be a big help, especially for heavy smokers. These groups offer lectures, ways to change behavior, and peer support. Here are some ways to find a support program:  · Free national quitline: 800-QUIT-NOW (170-056-8893).  · Hospital quit-smoking programs.  · American Lung Association: (865.913.8822).  · American Cancer Society (168-245-4897).  Support at home is important too. Nonsmokers can offer praise and encouragement. If the smoker in your life finds it hard to quit, encourage them to keep trying.  Over-the-counter medicines  Nicotine replacement therapy may make quitting easier. Certain aids, such as the nicotine patch, gum, and lozenges, are available without a prescription. It is best to use these under a doctors care, though. The skin patch provides a steady supply of nicotine. Nicotine gum and lozenges give temporary bursts of low levels of nicotine. Both methods reduce the craving for cigarettes. Warning: If  "you have nausea, vomiting, dizziness, weakness, or a fast heartbeat, stop using these products and see your doctor.  Prescription medicines  After reviewing your smoking patterns and past attempts to quit, your doctor may offer a prescription medicine such as bupropion, varenicline, a nicotine inhaler, or nasal spray. Each has advantages and side effects. Your doctor can review these with you.  Health benefits of quitting  The benefits of quitting start right away and keep improving the longer you go without smoking. These benefits occur at any age.  So whether you are 17 or 70, quitting is a good decision. Some of the benefits include:  · 20 minutes: Blood pressure and pulse return to normal.  · 8 hours: Oxygen levels return to normal.  · 2 days: Ability to smell and taste begin to improve as damaged nerves regrow.  · 2 to 3 weeks: Circulation and lung function improve.  · 1 to 9 months: Coughing, congestion, and shortness of breath decrease; tiredness decreases.  · 1 year: Risk of heart attack decreases by half.  · 5 years: Risk of lung cancer decreases by half; risk of stroke becomes the same as a nonsmokers.  For more on how to quit smoking, try these online resources:   · Smokefree.gov  · "Clearing the Air" booklet from the National Cancer [place]: smokefree.gov/sites/default/files/pdf/clearing-the-air-accessible.pdf  Date Last Reviewed: 3/1/2017  © 4613-9198 [employer]. [address] [place] [address]. All rights reserved. This information is not intended as a substitute for professional medical care. Always follow your healthcare professional's instructions.        Getting Support for Quitting Smoking  You dont have to go through the process of quitting smoking without support. Tell people you are quitting. The support of friends, coworkers, and family members can make a big difference. Face-to-face or telephone counseling can also be helpful, as can a stop-smoking class or an " ex-smokers group.    Set a quit date  If youre serious about quitting smoking, choose a date within the next 2 to 4 weeks. Andres it in bright, bold letters on a calendar you use often. Tell people about your quit date. Ask for their support. Let your friends and family know how they can help you quit.  Make a contract  A quit-smoking contract gives you a goal. Write out the contract and sign it. Have it witnessed, if you like. Then keep the contract where youll see it often, or carry it with you. Read the contract when youre tempted to smoke.  Take action  On the day you quit, reread your quit contract. Think about the benefits you gain by quitting, such as better health and an improved sense of taste.  · Remove cigarettes from your home, car, or any other place where you stash them.  · Throw away all smoking materials, including matches, lighters, and ashtrays.  · Review your list of triggers and your plan for coping with them.  · Stay away from people or settings you link with smoking.  · Make a survival kit that includes gum, mints, carrot sticks, and things to keep your hands busy.  · Talk to your healthcare provider about using quit-smoking products, such as medication or a nicotine patch, inhaler, nasal spray, gum, or lozenges.  Ask for help  Sometimes you may just need to talk when you miss smoking. Ex-smokers are good to talk to, because theyre likely to know how you feel. You may need extra support in the first few weeks after you quit. Ask a friend to call you each day to see how youre doing. Telephone counseling can also help you keep on track. Ask your healthcare provider, local hospital, or public health department to put you in touch with a phone counselor. You may also have to deal with doubters when you decide to quit. Explain to any doubters why you are quitting. Tell them that quitting is important to you. Ask for their support. Tell your smoking buddies that you can walk together instead of  smoking together. If someone thinks you wont succeed, say that you have a good quit plan and ask for their support. Let him or her know youre sticking with it.     For more information  · https://smokefree.gov/fcdo-kt-vo-expert  · National Cancer [place] Smoking Quitline: 877-44U-QUIT (488-177-4222)   Date Last Reviewed: 2/1/2017  © 7994-1029 [employer]. [address] [place] [address]. All rights reserved. This information is not intended as a substitute for professional medical care. Always follow your healthcare professional's instructions.        Getting Support for Quitting Smoking  You dont have to go through the process of quitting smoking without support. Tell people you are quitting. The support of friends, coworkers, and family members can make a big difference. Face-to-face or telephone counseling can also be helpful, as can a stop-smoking class or an ex-smokers group.    Set a quit date  If youre serious about quitting smoking, choose a date within the next 2 to 4 weeks. Andres it in bright, bold letters on a calendar you use often. Tell people about your quit date. Ask for their support. Let your friends and family know how they can help you quit.  Make a contract  A quit-smoking contract gives you a goal. Write out the contract and sign it. Have it witnessed, if you like. Then keep the contract where youll see it often, or carry it with you. Read the contract when youre tempted to smoke.  Take action  On the day you quit, reread your quit contract. Think about the benefits you gain by quitting, such as better health and an improved sense of taste.  · Remove cigarettes from your home, car, or any other place where you stash them.  · Throw away all smoking materials, including matches, lighters, and ashtrays.  · Review your list of triggers and your plan for coping with them.  · Stay away from people or settings you link with smoking.  · Make a survival kit that includes  gum, mints, carrot sticks, and things to keep your hands busy.  · Talk to your healthcare provider about using quit-smoking products, such as medication or a nicotine patch, inhaler, nasal spray, gum, or lozenges.  Ask for help  Sometimes you may just need to talk when you miss smoking. Ex-smokers are good to talk to, because theyre likely to know how you feel. You may need extra support in the first few weeks after you quit. Ask a friend to call you each day to see how youre doing. Telephone counseling can also help you keep on track. Ask your healthcare provider, local hospital, or public health department to put you in touch with a phone counselor. You may also have to deal with doubters when you decide to quit. Explain to any doubters why you are quitting. Tell them that quitting is important to you. Ask for their support. Tell your smoking buddies that you can walk together instead of smoking together. If someone thinks you wont succeed, say that you have a good quit plan and ask for their support. Let him or her know youre sticking with it.     For more information  · https://smokefree.gov/fknh-kj-et-expert  · National Cancer [place] Smoking Quitline: 877-44U-QUIT (846-436-1004)   Date Last Reviewed: 2/1/2017 © 2000-2017 [employer]. [address] [place] [address], [place] [address]. All rights reserved. This information is not intended as a substitute for professional medical care. Always follow your healthcare professional's instructions.

## 2017-09-12 NOTE — PROGRESS NOTES
[facility] Note  Staff: EMILY Temple    Referring provider and please cc:   PCP: Dr. Samuel Ramos    Chief Complaint: Urinary tract infection    Subjective:        HPI: Hesham Serna is a 67 y.o. male presents with complaints of having a current urinary tract infection-dysuria, hematuria.    The patient was last seen by Dr. Negrita Castillo on 08/28/17 for Cystoscopy and TRUS.due to gross hematuria, recurrent uti, intermittent urinary retention and abnormal urine cytology.  Findings: (pictures wer uploaded into media)  Urethral meatus with tear  Normal urethra  Small prostate and no bilateral lobar hypertrophy  +inflammation on posterior bladder wall and floor biopsied an fulgurated in 2 separate areas. Likely related to indwelling catheter.    Last PSA Screening:   Lab Results   Component Value Date    PSA 0.39 04/08/2009    PSADIAG 0.45 05/09/2017    PSADIAG 0.27 05/26/2015    PSADIAG 0.26 07/14/2014    PSADIAG 0.26 07/14/2014     REVIEW OF SYSTEMS:  Review of Systems   Constitutional: Negative for chills, diaphoresis, fever and weight loss.   HENT: Negative for congestion, hearing loss, nosebleeds and sore throat.    Eyes: Negative for blurred vision and pain.   Respiratory: Negative for cough and wheezing.    Cardiovascular: Negative for chest pain, palpitations and leg swelling.   Gastrointestinal: Negative for abdominal pain, heartburn, nausea and vomiting.   Genitourinary: Positive for dysuria, frequency, hematuria and urgency. Negative for flank pain.   Musculoskeletal: Negative for back pain, joint pain, myalgias and neck pain.   Skin: Negative for itching and rash.   Neurological: Negative for dizziness, tremors, sensory change, seizures, loss of consciousness, weakness and headaches.   Endo/Heme/Allergies: Does not bruise/bleed easily.   Psychiatric/Behavioral: Negative for depression and suicidal ideas. The patient is not nervous/anxious.      Physical  Exam    PMHx:  Past Medical History:   Diagnosis Date    Allergy     Arthritis     Asthma     Cancer     skin DR Isrrael Yun    COPD (chronic obstructive pulmonary disease)     Degenerative disc disease     Depression     bipolar dis    Fracture of vertebra due to osteoporosis     Hepatitis C     Hypertension     Liver disease     Nasal bone fx-closed     Nephrolith     Rosacea     Skin cancer      PSHx:  Past Surgical History:   Procedure Laterality Date    jaw surgey      KNEE SURGERY      NECK SURGERY      8 procedures on neck    RHIZOTOMY W/ RADIOFREQUENCY ABLATION      TONSILLECTOMY      tumors      10 removed from scalp     Allergies:  Codeine; Morphine; Bactrim [sulfamethoxazole-trimethoprim]; and Ciprofloxacin    Medications: reviewed     Objective:     Vitals:    09/12/17 0825   BP: 121/73   Pulse: 75   Temp: 98 °F (36.7 °C)     General:WDWN in NAD  Eyes: PERRLA, normal conjunctiva  Respiratory: no increased work on breathing, clear to auscultation  Cardiovascular: regular rate and rhythm. No obvious extremity edema.  GI: palpation of masses. No tenderness. No hepatosplenomegaly to palpation.  Musculoskeletal: normal range of motion of bilateral upper extremities. Normal muscle strength and tone.  Skin: no obvious rashes or lesions. No tightening of skin noted.  Neurologic: CN grossly normal. Normal sensation.   Psychiatric: awake, alert and oriented x 3. Mood and affect normal. Cooperative.    LABS REVIEW:  UA today:  Color, UA cloudy    Spec Grav UA 1.005    pH, UA 7    WBC, UA 2+    Nitrite, UA +    Protein trace    Glucose, UA neg    Ketones, UA neg    Urobilinogen, UA neg    Bilirubin neg    Blood, UA 50      Assessment:       1. Acute cystitis with hematuria          Plan:     1.  We will send urine for culture testing.  2.  In the meantime I will prescribe him Augmentin twice daily until I receive the culture results.  3.  Pt was notified that we are moving tomorrow to another  location.  All the information was given to him by me today.  He is ok with this.    F/u with Dr. Castillo next week as scheduled.    CHRISTIANO ChouC

## 2017-09-13 ENCOUNTER — TELEPHONE (OUTPATIENT)
Dept: UROLOGY | Facility: CLINIC | Age: 67
End: 2017-09-13

## 2017-09-13 NOTE — TELEPHONE ENCOUNTER
----- Message from Kaycee Moore sent at 9/13/2017  4:02 PM CDT -----  Contact: Manyd/[facility]  Patient called [facility] today wanting to change his catheter's size, that he has had some problems with the old ones. They needs verification on these changes. Please call Mandy back at [phone]

## 2017-09-13 NOTE — TELEPHONE ENCOUNTER
Verbal order given to randall with [facility] to dispense Bard Red rubber 14F straight catheters. Will fax order once signed by physician

## 2017-09-14 ENCOUNTER — TELEPHONE (OUTPATIENT)
Dept: FAMILY MEDICINE | Facility: CLINIC | Age: 67
End: 2017-09-14

## 2017-09-14 LAB — BACTERIA UR CULT: NORMAL

## 2017-09-14 RX ORDER — ZIPRASIDONE HYDROCHLORIDE 20 MG/1
CAPSULE ORAL
Qty: 180 CAPSULE | Refills: 0 | Status: SHIPPED | OUTPATIENT
Start: 2017-09-14 | End: 2017-10-13 | Stop reason: SDUPTHER

## 2017-09-14 NOTE — TELEPHONE ENCOUNTER
Spoke to patient who requested refill of Geodon. Assured patient his request was forwarded to provider for approval. Patient verbalized understanding.

## 2017-09-14 NOTE — TELEPHONE ENCOUNTER
----- Message from Brooklyn Marshall sent at 9/14/2017  1:24 PM CDT -----  Contact: self [phone]  Please call him regarding his medication.  Thank you!

## 2017-09-14 NOTE — TELEPHONE ENCOUNTER
----- Message from Zaida Teixeira sent at 9/14/2017 10:52 AM CDT -----  Contact: self  Patient called requesting a medication refill. Please call patient at [phone]. Thanks!

## 2017-09-15 ENCOUNTER — TELEPHONE (OUTPATIENT)
Dept: UROGYNECOLOGY | Facility: CLINIC | Age: 67
End: 2017-09-15

## 2017-09-15 RX ORDER — DOXYCYCLINE HYCLATE 100 MG
100 TABLET ORAL 2 TIMES DAILY
Qty: 28 TABLET | Refills: 0 | Status: SHIPPED | OUTPATIENT
Start: 2017-09-15 | End: 2017-09-29

## 2017-09-15 NOTE — PROGRESS NOTES
Subjective:       Patient ID: Hesham Serna is a 67 y.o. male.    Chief Complaint: Hypertension and COPD    Hypertension   This is a chronic problem. The problem has been resolved since onset. The problem is uncontrolled. Associated symptoms include anxiety, malaise/fatigue, peripheral edema and shortness of breath. Pertinent negatives include no blurred vision, chest pain, headaches or palpitations. Agents associated with hypertension include steroids and NSAIDs. Risk factors for coronary artery disease include sedentary lifestyle, male gender, obesity, dyslipidemia and smoking/tobacco exposure. Compliance problems include exercise.  Hypertensive end-organ damage includes CAD/MI. Identifiable causes of hypertension include hypercortisolism, a hypertension causing med and sleep apnea.   COPD   Associated symptoms include coughing. Pertinent negatives include no abdominal pain, arthralgias, chest pain, fatigue or headaches.     Review of Systems   Constitutional: Positive for malaise/fatigue. Negative for fatigue and unexpected weight change.   Eyes: Negative for blurred vision.   Respiratory: Positive for cough, shortness of breath and wheezing. Negative for chest tightness.    Cardiovascular: Negative for chest pain, palpitations and leg swelling.   Gastrointestinal: Negative for abdominal pain.   Musculoskeletal: Negative for arthralgias.   Neurological: Negative for dizziness, syncope, light-headedness and headaches.       Patient Active Problem List   Diagnosis    Arthritis    Degenerative disc disease    Hypertension    Rosacea    Anxiety state    Episodic mood disorder    Hep C w/o coma, chronic    Cluster headaches    BPH with urinary obstruction    DDD (degenerative disc disease), cervical    Cirrhosis    Obese    Bipolar affective disorder    DDD (degenerative disc disease), lumbar    Chronic narcotic dependence    Bipolar affective disorder, currently depressed, moderate     Unspecified drug dependence, continuous    Alcoholic cirrhosis    Need for hepatitis C screening test    Urinary retention    Nephrolithiasis    BPH (benign prostatic hyperplasia)    Mixed simple and mucopurulent chronic bronchitis       Objective:      Physical Exam   Constitutional: He is oriented to person, place, and time. He appears well-developed and well-nourished.   Cardiovascular: Normal rate, regular rhythm and normal heart sounds.    Pulmonary/Chest: Effort normal and breath sounds normal.   Musculoskeletal: He exhibits no edema.   Neurological: He is alert and oriented to person, place, and time.   Skin: Skin is warm and dry.   Psychiatric: He has a normal mood and affect.   Nursing note and vitals reviewed.      Lab Results   Component Value Date    WBC 7.25 08/15/2017    HGB 13.9 (L) 08/15/2017    HCT 41.2 08/15/2017     (L) 08/15/2017    CHOL 235 (H) 08/15/2017    TRIG 67 08/15/2017    HDL 84 (H) 08/15/2017    ALT 21 08/15/2017    AST 28 08/15/2017     08/15/2017    K 4.0 08/15/2017     08/15/2017    CREATININE 1.0 08/15/2017    BUN 11 08/15/2017    CO2 24 08/15/2017    TSH 1.345 04/01/2015    PSA 0.39 04/08/2009    INR 0.9 02/29/2016    HGBA1C 5.1 08/15/2017     The 10-year ASCVD risk score (Nadira BACK Jr., et al., 2013) is: 16.9%    Values used to calculate the score:      Age: 67 years      Sex: Male      Is Non- : No      Diabetic: No      Tobacco smoker: Yes      Systolic Blood Pressure: 128 mmHg      Is BP treated: Yes      HDL Cholesterol: 84 mg/dL      Total Cholesterol: 235 mg/dL    Assessment:       1. Bipolar affective disorder, currently depressed, moderate    2. Essential hypertension    3. DDD (degenerative disc disease), cervical    4. DDD (degenerative disc disease), lumbar    5. Mixed simple and mucopurulent chronic bronchitis    6. Tobacco abuse        Plan:       Bipolar affective disorder, currently depressed, moderate    Essential  hypertension  -     Basic metabolic panel; Future; Expected date: 09/12/2017    DDD (degenerative disc disease), cervical    DDD (degenerative disc disease), lumbar    Mixed simple and mucopurulent chronic bronchitis    Tobacco abuse  -     Ambulatory referral to Smoking Cessation Program      Patient readiness: acceptance and barriers:readiness, social stressors and environmental    During the course of the visit the patient was educated and counseled about the following:     Hypertension:   Medication: no change.  Obesity:   General weight loss/lifestyle modification strategies discussed (elicit support from others; identify saboteurs; non-food rewards, etc).  Behavioral treatment: stress management.  Diet interventions: low calorie (1000 kCal/d) deficit diet.    Goals: Hypertension: Reduce Blood Pressure and Obesity: Reduce calorie intake and BMI    Did patient meet goals/outcomes: Yes    The following self management tools provided: blood pressure log  excercise log    Patient Instructions (the written plan) was given to the patient/family.     Time spent with patient: 30 minutes

## 2017-09-15 NOTE — TELEPHONE ENCOUNTER
----- Message from Yamile Charles sent at 9/15/2017  3:15 PM CDT -----  Contact: pt  Pt returning phone call..[phone] (pt state keep calling or leave a voicemail)

## 2017-09-15 NOTE — TELEPHONE ENCOUNTER
Spoke with patient re informed of results . Instructed patient to stop Augmentin and start Doxycycline x 2 weeks  . Patient requesting to know if he still needs his appt on 9/21/17 or can he wait until he completes this Doxycycline.

## 2017-09-15 NOTE — TELEPHONE ENCOUNTER
----- Message from Jovana Banks sent at 9/15/2017  3:27 PM CDT -----  Call pt at [phone]  Returning call

## 2017-09-15 NOTE — TELEPHONE ENCOUNTER
Spoke with patient informed awaiting an answer from Dr Castillo if his 9/21/17 is needed since he will be taking his doxycycline medication.  Informed awaiting answer from provider

## 2017-09-15 NOTE — TELEPHONE ENCOUNTER
----- Message from Ally Fonseca sent at 9/15/2017  3:48 PM CDT -----  Patient is requesting a return call regarding his next appointment contact patient at [phone].    Thank you

## 2017-09-15 NOTE — TELEPHONE ENCOUNTER
----- Message from Dennis Harman sent at 9/15/2017 11:49 AM CDT -----  Contact: pt   Pt is requesting a callback, needs to know test results  Call Back#[phone]  Thanks

## 2017-09-18 ENCOUNTER — TELEPHONE (OUTPATIENT)
Dept: UROLOGY | Facility: CLINIC | Age: 67
End: 2017-09-18

## 2017-09-18 ENCOUNTER — TELEPHONE (OUTPATIENT)
Dept: FAMILY MEDICINE | Facility: CLINIC | Age: 67
End: 2017-09-18

## 2017-09-18 ENCOUNTER — OUTPATIENT CASE MANAGEMENT (OUTPATIENT)
Dept: ADMINISTRATIVE | Facility: OTHER | Age: 67
End: 2017-09-18

## 2017-09-18 DIAGNOSIS — Z72.0 TOBACCO ABUSE: Primary | ICD-10-CM

## 2017-09-18 DIAGNOSIS — M51.37 DDD (DEGENERATIVE DISC DISEASE), LUMBOSACRAL: Primary | ICD-10-CM

## 2017-09-18 NOTE — TELEPHONE ENCOUNTER
----- Message from Lima Bush sent at 9/18/2017  3:35 PM CDT -----  Contact: self  Patient called regarding recent appt with Dr. Castillo. Stating was referred to Dr. Randle for surgery but was advised would not perform surgery because the patient smokes. Please contact [phone] (home)

## 2017-09-18 NOTE — TELEPHONE ENCOUNTER
Spoke with patient he has appointment on 9/21 with  patient states he was just seen by HOUSTON Tate and was prescribed antibiotics. Patient wants to reschedule appointment. Appointment rescheduled for 10/3. Patient wants to know what he is following up for and do he really need to be seen. Please advise

## 2017-09-18 NOTE — TELEPHONE ENCOUNTER
----- Message from Norm Paul RN sent at 9/18/2017 12:33 PM CDT -----  Contact: Norm Paul RN Outpatient Case Management  Good Afternoon,     I recently enrolled Mr. Serna in Outpatient Case Management. He expressed the need for a rolling walker. If you feel this is an appropriate request, can you please send orders to his DME provider of choice?  Please feel free to contact me if you have any questions.     I also completed a PHQ9 with him and he scored a 22. I know taht he is established with Dr. Queen and is currently on medications. I will also place a referral to the [facility] LCSW Louis Brumfield for additional assistance.    Thank you!  Norm Paul RN
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
## 2017-09-18 NOTE — TELEPHONE ENCOUNTER
----- Message from Mallorie Chambers sent at 9/18/2017  8:15 AM CDT -----  Contact: self  Patient want to speak with a nurse regarding his appointment for September 21, patient is taking new antibiotics and wanted to speak with a nurse about it. Please call back at [phone] (home)

## 2017-09-18 NOTE — PATIENT INSTRUCTIONS
Preventing Falls in the Home  An adult or child can fall for many reasons. If you are an older adult, you may fall because your reaction time slows down. Your muscles and joints may get stiff, weak, or less flexible because of illness, medicines, or a physical condition. These things can also make a child more likely to fall or be injured in a fall.  Other health problems that make falls more likely include:  · Arthritis  · Dizziness or lightheadedness when you get out of bed (orthostatic hypotension)  · History of a stroke  · Dizziness  · Anemia  · Certain medicines taken for mental illness  · Problems with balance or gait  · History of falls with or without an injury  · Changes in vision (vision impairment)  · Changes in thinking skills and memory (cognitive impairment)  Injuries from a fall can include broken bones, dislocated joints, and cuts. When these injuries are serious enough, they can make it impossible for you or a child who is injured in a fall to live on his or her own.  Prevention tips  To help prevent falls and fall-related injuries, follow the tips below.   Floors  Make floors safer by doing the following:   · Put nonskid pads under area rugs.  · Remove throw rugs.  · Replace worn floor coverings.  · Tack carpets firmly to each step on carpeted stairs. Put nonskid strips on the edges of uncarpeted stairs.  · Keep floors and stairs free of clutter and cords.  · Arrange furniture so there are clear pathways.  · Clean up any spills right away.  · Wear shoes that fit.  Bathrooms    Make bathrooms safer by doing the following:   · Install grab bars in the tub or shower.  · Apply nonskid strips or put a nonskid rubber mat in the tub or shower.  · Sit on a bath chair to bathe.  · Use bathmats with nonskid backing.  Lighting and the environment  Improve lighting in your home by doing the following:   · Keep a flashlight in each room. Or put a lamp next to the bed within easy reach.  · Put nightlights in  the bedrooms, hallways, kitchen, and bathrooms.  · Make sure all stairways have good lighting.  · Take your time when going up and down stairs.  · Put handrails on both sides of stairs and in walkways for more support. To prevent injury to your wrist or arm, dont use handrails to pull yourself up.  · Install grab bars to pull yourself up.  · Move or rearrange items that you use often. This will make them easier to find or reach.  · Look at your home to find any safety hazards. Especially look at doorways, walkways, and the driveway. Remove or repair any safety problems that you find.  Date Last Reviewed: 8/1/2016 © 2000-2017 [employer]. [address] [place] [address], [place] [address]. All rights reserved. This information is not intended as a substitute for professional medical care. Always follow your healthcare professional's instructions.        Exercises to Prevent Falls  Certain types of exercises may help make you less likely to fall. Try the ones below. Or do other exercises that your health care provider suggests. Depending on your health, you may need to start slowly. Don't let that stop you. Even small amounts of exercise can help you. Be sure to talk to your health care provider before starting any exercise program.       Improve balance  Many types of exercise can help improve balance. Ankur chi and yoga are good examples. Here's another one to try. You can do it anytime and almost anywhere.  · Stand next to a counter or solid support.  · Push yourself up onto your tiptoes.  · Hold for 5 seconds. If you start to lose your balance, hold on to the counter.  · Rest and repeat 5 times. Work up to holding for 20 to 30 seconds, if you can. Increase flexibility  Being more flexible makes it easier for you to move around safely. Try exercises like the seated hamstring stretch.  · Sit in a chair and put one foot on a stool.  · Straighten your leg and reach with both hands down either side of your leg.  "Reach as far down your leg as you can.  · Hold for about 20 seconds.  · Go back to the starting position. Then repeat 5 times. Switch legs. Build strength  "Resistance" exercises help build strength. You can do them without equipment. Or you can use weights, elastic bands, or special machines. One such exercise is called the biceps curl. You can hold a 1-pound weight or even a can of soup. Do this exercise at least 3 times a week. Strive for every day.  · Sit up straight in a chair.  · Keep your elbow close to your body and your wrist straight.  · Bend your arm, moving your hand up to your shoulder. Then slowly lower your arm.  · Repeat 5 times. Switch to the other arm.   Build your staying power  Aerobic exercises make your heart and lungs stronger so you can keep moving longer. Walking and swimming are two of the best types of exercises you can do. Using a stationary bike is great, too. Find an aerobic exercise that you enjoy. Start slowly and build up. Even 5 minutes is helpful. Aim for a goal of 30 minutes, at least 3 times a week. You don't have to do 30 minutes in 1 session. Break it up and walk a little throughout the day.  More helpful tips  · Start easy. Slowly work up to doing more.  · Talk with your health care provider about the best exercises for you.  · Call senior centers or health clubs about exercise programs.  · If needed, have a family member watch you walk every so often to check your stability.  · Exercise with a friend. Choose an activity you both enjoy.  · Consider zoe chi or yoga to strengthen your balance.  · Try exercises that you can do anytime, anywhere. Here are 2 examples. Have someone with you when you first try these:  ¨ Practice walking by placing 1 foot right in front of the other.  ¨ Stand up and sit down 10 times. Repeat this throughout the day.   Date Last Reviewed: 6/13/2015  © 0938-6439 [employer]. [address] [place] [address], [place] [address]. All rights reserved. " This information is not intended as a substitute for professional medical care. Always follow your healthcare professional's instructions.        Preventing Falls: How to Prepare and What to Do    Falling is not something you want to think about. But it can make a big difference to plan ahead. If you're prepared, you'll know how to get help. And you'll be less likely to panic if you fall. This means you'll be able to do what's needed to get help right away.  How to prepare  · Have someone check on you daily.  · Keep a list of emergency numbers near the phone.  · Always have a way to call for help. Keep a cell phone with you at all times. Or talk with your healthcare provider about how to set up a home monitoring service. This involves wearing a small device around your neck or wrist. If you fall, you can press the button on the device. This alerts emergency responders.  · Talk with your healthcare provider about an exercise program that's right for you. Regular exercise may reduce the risk of falling and the risk for injury related to a fall.  · It's important to have good lighting in your home. Avoid using throw rugs, because they can raise your risk of tripping and falling. Add grab bars in the bathroom to help reduce the risk of falling. Small changes can make your home safer. Talk with your healthcare provider about making your home safer.  What to do if you fall  Above all, try to stay calm:  · If you start to fall, try to relax your body. This will reduce the impact of the fall.  · After you fall, press your monitor button, or phone for help.  · Don't rush to get up. First, make sure you're not hurt.  · Roll onto your side, then crawl to a chair. Pull yourself up onto the chair slowly.  · You should be checked if you struck your head, lost consciousness, were confused afterward, or have any other concerns for injury.  · Be sure to tell your doctor that you fell.  A note to family and friends  If you're with a  loved one when he or she starts to fall, don't try to stop the fall. Ease the person to the floor carefully, so neither of you gets hurt. Don't leave the person alone. And don't try to move him or her. Put a pillow under his or her head. Check for injuries. If help is needed right away, be sure to call 911.   Date Last Reviewed: 6/13/2015  © 7667-6555 [employer]. [address] [place] [address], [place] [address]. All rights reserved. This information is not intended as a substitute for professional medical care. Always follow your healthcare professional's instructions.        Preventing Falls: Moving Safely Using a Cane or Walker     When using a cane, keep it away from your feet so you dont trip.     A walking aid, such as a cane or walker, can help you stay more independent and avoid falls. Remember to keep your walking aid within easy reach when you're in a chair or in bed. And learn how to use it safely so you don't injure yourself. Be sure the cane or walker is the correct height. Hang your arm loosely at your side, and measure the distance from your wrist to the floor. The distance should be the same as the height of your cane or walker.  Using a cane  If you have a stronger side, hold the cane on the side of your stronger leg.  1. Get your balance.  2. Move the cane and your weaker leg forward.  3. Support your weight on both the cane and your weaker side.  4. Step with your stronger leg.  5. Start again from step 1.  Using a walker  1. Roll the walker (or lift it, if you're using one without wheels) forward about 12 inches.  2. Step forward with your weaker leg first.  3. Use the walker to help keep your balance.  4. Bring your other foot forward to the center of the walker.  5. Start again from step 1.  Helpful tips  · Check with your health care provider about the right walking aid to use. Ask about a walker with a seat attached.  · Check the tips of your cane or walker to make sure they have  nonskid covers.  · Move slowly from room to room. Don't rush.  · Sit down to get dressed.  · Use a kole pack or backpack to keep your hands free.  · Get help for jobs that mean climbing, even on a stepstool.  · Do not try going up or down stairs using a walker.   Date Last Reviewed: 6/12/2015  © 4698-9608 [employer]. [address] [place] [address], [place] [address]. All rights reserved. This information is not intended as a substitute for professional medical care. Always follow your healthcare professional's instructions.        Chronic Lung Disease: Preventing Lung Infections  Chronic lung diseases include chronic obstructive pulmonary disease (COPD), which includes chronic bronchitis and emphysema. Other chronic lung diseases include pulmonary fibrosis, sarcoidosis, and other conditions. When you have chronic lung diseases, it's very important to protect yourself from respiratory infections, like colds, the flu, and lung infections. Infections may cause your lung condition to worsen. Although you can't completely avoid them, there are things you can do to lessen the chance of infections.    Take precautions  Taking the following precautions can help you avoid illness:  · Remember to keep your hands away from your nose and mouth. Germs on your hands get into your respiratory system this way.  · Wash your hands often. When you wash them:  ¨ Use soap and warm water.  ¨ Rub your hands together well for at least 20 seconds.  ¨ Make sure to rinse them well.  ¨ Dry your hands on clean towels or air-dry them.  · Use hand  containing alcohol, if you are unable to wash your hands. Use the  after touching doorknobs, handles, and supermarket carts, for example, since lots of people touch them. Then wash your hands as soon as you can.  · To help prevent the flu, get a flu vaccination every year. This may be given at your healthcare provider's office, a drugstore, or pharmacy, or at work. Get your  flu shot as soon as the vaccines are available in your area. This is usually around September each year.  · To help prevent pneumococcal pneumonia, get pneumonia vaccinations. Talk with your healthcare provider about which pneumococcal vaccinations you need.  · Try to stay away from people with respiratory infections, such as colds or the flu. Stay away from crowded places, like shopping centers or movie theatres during cold and flu season.  · If you smoke, think about quitting. In addition to causing or worsening many lung conditions, the lung damage from smoking increases your risk of infections. Stay away from others who smoke, too. This is also harmful and increases your chance of infections.  Date Last Reviewed: 4/14/2016  © 5946-7588 [employer]. [address] [place] [address], [place] [address]. All rights reserved. This information is not intended as a substitute for professional medical care. Always follow your healthcare professional's instructions.        COPD Flare    You have had a flare-up of your COPD.  COPD, or chronic obstructive pulmonary disease, is a common lung disease. It causes your airways to become irritated and narrower. This makes it harder for you to breathe. Emphysema and chronic bronchitis are both types of COPD. This is a chronic condition, which means you always have it. Sometimes it gets worse. When this happens, it is called a flare-up.  Symptoms of COPD  People with COPD may have symptoms most of the time. In a flare-up, your symptoms get worse. These symptoms may mean you are having a flare-up:  · Shortness of breath, shallow or rapid breathing, or wheezing that gets worse  · Lung infection  · Cough that gets worse  · More mucus, thicker mucus or mucus of a different color  · Tiredness, decreased energy, or trouble doing your usual activities  · Fever  · Chest tightness  · Your symptoms dont get better even when you use your usual medicines, inhalers, and  nebulizer  · Trouble talking  · You feel confused  Causes of flare-ups  Unfortunately, a flare-up can happen even though you did everything right, and you followed your doctors instructions. Some causes of flare-ups are:  · Smoking or secondhand smoke  · Colds, the flu, or respiratory infections  · Air pollution  · Sudden change in the weather  · Dust, irritating chemicals, or strong fumes  · Not taking your medicines as prescribed  Home care  Here are some things you can do at home to treat a flare-up:  · Try not to panic. This makes it harder to breathe, and keeps you from doing the right things.  · Dont smoke or be around others who are smoking.  · Try to drink more fluids than usual during a flare-up, unless your doctor has told you not to because of heart and kidney problems. More fluids can help loosen the mucus.  · Use your inhalers and nebulizer, if you have one, as you have been told to.  · If you were given antibiotics, take them until they are used up or your doctor tells you to stop. Its important to finish the antibiotics, even though you feel better. This will make sure the infection has cleared.  · If you were given prednisone or another steroid, finish it even if you feel better.  Preventing a flare-up  Even though flare-ups happen, the best way to treat one is to prevent it before it starts. Here are some pointers:  · Dont smoke or be around others who are smoking.  · Take your medicines as you have been told.  · Talk with your doctor about getting a flu shot every year. Also find out if you need a pneumonia shot.  · If there is a weather advisory warning to stay indoors, try to stay inside when possible.  · Try to eat healthy and get plenty of sleep.  · Try to avoid things that usually set you off, like dust, chemical fumes, hairsprays, or strong perfumes.  Follow-up care  Follow up with your healthcare provider, or as advised.  If a culture was done, you will be told if your treatment needs to  be changed. You can call as directed for the results.  If X-rays were done, you will be notified of any new findings that may affect your care.  Call 911  Call 911 if any of these occur:  · You have trouble breathing  · You feel confused or its difficult to wake you up  · You faint or lose consciousness  · You have a rapid heart rate  · You have new pain in your chest, arm, shoulder, neck or upper back  When to seek medical advice  Call your healthcare provider right away if any of these occur:  · Wheezing or shortness of breath gets worse  · You need to use your inhalers more often than usual without relief  · Fever of 100.4°F (38ºC) or higher, or as directed by your healthcare provider  · Coughing up lots of dark-colored or bloody mucus (sputum)  · Chest pain with each breath  · You do not start to get better within 24 hours  · Swelling of your ankles gets worse  · Dizziness or weakness  Date Last Reviewed: 9/1/2016  © 7953-2908 [employer]. [address] [place] [address]. All rights reserved. This information is not intended as a substitute for professional medical care. Always follow your healthcare professional's instructions.        Chronic Lung Disease: Controlling Stress    Stress and anxiety can make breathing harder. When its hard to breathe, its natural to get anxious and start to panic. This makes you even more short of breath. This sequence is known as the dyspnea cycle, and its common among people with chronic lung disease. Talk with your healthcare provider about how you are feeling. It's important for him or her to understand what is going on and how it is affecting your life. Breathing training and coping strategies can help you manage stress and anxiety.  Understanding the cycle  When youre short of breath, your breathing muscles get tense. Its hard to take a deep breath. You may worry that youre not getting enough air. Then you start breathing faster and become more  short of breath. You may even start to panic, which makes symptoms seem worse. Often, people with chronic lung disease try to prevent this cycle by limiting activity, staying at home, and avoiding anything that could cause shortness of breath. You dont have to live this way.   Ways to relax  When you find yourself getting stressed or anxious, make an effort to relax. Doing so will help break the dyspnea cycle. Sit in a quiet, comfortable place. Do pursed-lip and diaphragmatic breathing. You may also find the following helpful:  · Certain activities can help you relax. These can include reading a good book, listening to music or relaxation tapes, practicing yoga or zoe chi, meditating, and praying. Find activities that work for you.  · Try visualization. Picture yourself in a peaceful place, such as the beach. Feel the warm sand. Hear the waves. Smell the ocean. Doing this may help you feel more relaxed.  · Your healthcare provider may advise using a bronchodilator along with these or other relaxation techniques.     What you can do to break the cycle  To prevent shortness of breath from limiting your life:  · Right now. Learn to stop an attack with pursed-lip breathing, diaphragmatic breathing, and relaxation techniques. If you dont know how to do these, ask your healthcare provider.  · In day-to-day life. Learn to maximize your energy and to breathe during activity, so you can do more.  · Over time. Start exercising, so your body can start to handle more activity.   Date Last Reviewed: 5/1/2016  © 6848-8916 [employer] [address] [place] [address], [place] [address]. All rights reserved. This information is not intended as a substitute for professional medical care. Always follow your healthcare professional's instructions.        Chronic Lung Disease: Maximizing Your Energy  Fear of shortness of breath may stop you from being as active as you once were. You dont have to live this way. Managing your  time and pacing yourself can help you conserve energy and do more. Its even OK if youre short of breath sometimes. You can learn to work through this without limiting your activities.    Manage your time  Shortness of breath can make everyday tasks take longer. This means theres less time to do the things you enjoy. You can help prevent this by managing your time. Try these tips:  · Plan ahead so your tasks are spaced throughout the day. As you plan, keep in mind the times of day you tend to have the most energy.  · Do only one thing at a time. Finish one task before starting another.  · Assemble everything you need before you start a task. This cuts out unnecessary steps while youre working.  · Think about what you really need to do. Be realistic about what you can get done in a day.  Balance activity and rest  When youre tired, your activities will take longer. Fatigue also makes you more prone to infection. To avoid fatigue:  · Stop and rest when you need to. Dont wait until youre overtired  · Alternate between hard tasks and easy ones.  · Give yourself plenty of time for each task, so you dont have to hurry.  · Take 20 to 30-minute rest breaks after meals and throughout the day.  · If an activity takes a lot of energy, break it into smaller parts. For instance, fold the laundry first. Then take a break before putting it away.  · Try not to exert yourself in extreme cold or heat.   Find ways to conserve energy  The way you use your body during a task can help you conserve energy. For some tasks, you can also use special aids designed to reduce the amount of energy needed. Here are some tips:  · Sit to dress and undress, shave, brush your teeth, and comb your hair. Use a long-handled reacher to pull on socks and shoes.  · Sit on a bench to shower. Use warm water, not hot. (Steam can make breathing harder.) Dry off by wrapping yourself in a terrycloth robe.  · Use energy-saving appliances, such as an  electric can opener, a power toothbrush, and a .  · Use a cart with wheels to move groceries, laundry, and other items around the house. Some carts have seats so you can rest when you need to.  · Keep the things you use most at waist level, so you can get them without reaching or bending.  Remember to breathe  People with chronic lung disease often try to avoid shortness of breath by rushing through tasks. This uses more energy and can actually increase shortness of breath. Instead, slow down and pace your breathing. These tips may help:  · Move slowly during tasks that take a lot of effort, such as climbing stairs or pushing a shopping cart.  · Use pursed-lip and diaphragmatic breathing while you go about a task.  · Exhale when you exert effort. For example, breathe out as you lift up a grocery bag. Once youre holding the bag, breathe in.  · Concentrate on taking slow, deep breaths. If your breathing is shallow, you dont take in as much air.  · Remember that its OK to be short of breath. Just pace yourself and do pursed-lip breathing.  Pursed-lip breathing  This type of breathing helps you exhale better. Breathing this way during activity will help you reduce shortness of breath:  · Relax your neck and shoulder muscles. Inhale slowly through your nose for 2 counts or more.  · Pucker your lips as if you are going to blow out a candle. Exhale slowly and gently through your lips for at least twice as long as you inhaled.   Date Last Reviewed: 5/1/2016  © 8430-2618 [employer]. [address] [place] [address], [place] [address]. All rights reserved. This information is not intended as a substitute for professional medical care. Always follow your healthcare professional's instructions.        Chronic Lung Disease: Maximizing Your Energy  Fear of shortness of breath may stop you from being as active as you once were. You dont have to live this way. Managing your time and pacing yourself can help you  conserve energy and do more. Its even OK if youre short of breath sometimes. You can learn to work through this without limiting your activities.    Manage your time  Shortness of breath can make everyday tasks take longer. This means theres less time to do the things you enjoy. You can help prevent this by managing your time. Try these tips:  · Plan ahead so your tasks are spaced throughout the day. As you plan, keep in mind the times of day you tend to have the most energy.  · Do only one thing at a time. Finish one task before starting another.  · Assemble everything you need before you start a task. This cuts out unnecessary steps while youre working.  · Think about what you really need to do. Be realistic about what you can get done in a day.  Balance activity and rest  When youre tired, your activities will take longer. Fatigue also makes you more prone to infection. To avoid fatigue:  · Stop and rest when you need to. Dont wait until youre overtired  · Alternate between hard tasks and easy ones.  · Give yourself plenty of time for each task, so you dont have to hurry.  · Take 20 to 30-minute rest breaks after meals and throughout the day.  · If an activity takes a lot of energy, break it into smaller parts. For instance, fold the laundry first. Then take a break before putting it away.  · Try not to exert yourself in extreme cold or heat.   Find ways to conserve energy  The way you use your body during a task can help you conserve energy. For some tasks, you can also use special aids designed to reduce the amount of energy needed. Here are some tips:  · Sit to dress and undress, shave, brush your teeth, and comb your hair. Use a long-handled reacher to pull on socks and shoes.  · Sit on a bench to shower. Use warm water, not hot. (Steam can make breathing harder.) Dry off by wrapping yourself in a terrycloth robe.  · Use energy-saving appliances, such as an electric can opener, a power toothbrush, and  a .  · Use a cart with wheels to move groceries, laundry, and other items around the house. Some carts have seats so you can rest when you need to.  · Keep the things you use most at waist level, so you can get them without reaching or bending.  Remember to breathe  People with chronic lung disease often try to avoid shortness of breath by rushing through tasks. This uses more energy and can actually increase shortness of breath. Instead, slow down and pace your breathing. These tips may help:  · Move slowly during tasks that take a lot of effort, such as climbing stairs or pushing a shopping cart.  · Use pursed-lip and diaphragmatic breathing while you go about a task.  · Exhale when you exert effort. For example, breathe out as you lift up a grocery bag. Once youre holding the bag, breathe in.  · Concentrate on taking slow, deep breaths. If your breathing is shallow, you dont take in as much air.  · Remember that its OK to be short of breath. Just pace yourself and do pursed-lip breathing.  Pursed-lip breathing  This type of breathing helps you exhale better. Breathing this way during activity will help you reduce shortness of breath:  · Relax your neck and shoulder muscles. Inhale slowly through your nose for 2 counts or more.  · Pucker your lips as if you are going to blow out a candle. Exhale slowly and gently through your lips for at least twice as long as you inhaled.   Date Last Reviewed: 5/1/2016  © 3445-9538 [employer]. [address] [place] [address], [place] [address]. All rights reserved. This information is not intended as a substitute for professional medical care. Always follow your healthcare professional's instructions.

## 2017-09-19 ENCOUNTER — TELEPHONE (OUTPATIENT)
Dept: FAMILY MEDICINE | Facility: CLINIC | Age: 67
End: 2017-09-19

## 2017-09-19 ENCOUNTER — TELEPHONE (OUTPATIENT)
Dept: UROLOGY | Facility: CLINIC | Age: 67
End: 2017-09-19

## 2017-09-19 ENCOUNTER — OUTPATIENT CASE MANAGEMENT (OUTPATIENT)
Dept: ADMINISTRATIVE | Facility: OTHER | Age: 67
End: 2017-09-19

## 2017-09-19 RX ORDER — LACTULOSE 10 G/15ML
SOLUTION ORAL; RECTAL
Qty: 1200 ML | Refills: 0 | Status: SHIPPED | OUTPATIENT
Start: 2017-09-19 | End: 2017-10-13 | Stop reason: SDUPTHER

## 2017-09-19 NOTE — TELEPHONE ENCOUNTER
----- Message from Zaida Teixeira sent at 9/19/2017 12:17 PM CDT -----  Contact: self  Patient called requesting a medication refill. Please see details below.  Medication Name:Fluticasone  Medication Strength:50 mcg  Preferred pharmacy: he does not know who to send it to and wants a call back  First time calling about this refill (y/n):y  Call Back Number:[phone]

## 2017-09-19 NOTE — TELEPHONE ENCOUNTER
----- Message from Ally Campo sent at 9/19/2017 12:00 PM CDT -----  Patient needs a refill of medications:Spireva, Celebrex, Janeric (three bottles)called into [facility]'s pharmacy. Please order and call patient back at [phone]. Thanks!

## 2017-09-19 NOTE — PROGRESS NOTES
9/19/17: LCSW attempted to contact pt to complete SW assessment; pt said he was eating and asked LCSW if he could call back.  Pt called LCSW back to complete  SW assessment.  Pt lives alone and has a hx of anxiety state, episodic mood disorder, bipolar affective disorder, chronic narcotic dependence, bipolar affective disorder, currently depressed, moderate, unspecified drug dependence continuous.  Pt is currently being treated by in-clinic Psychiatrist; he stated he is not seeing a counselor or therapist.  Pt inquired about a Rolator walker; will consult with RN.  Pt talked about having one caregiver that he pays who assist him with ADL's and IADL's; he said his caregiver will be leaving him soon to care for her father.  Pt talked about trying to care for his 90 yo mother who lives in [place]; he said he spends time at his house in [place] and time at his mother's house.  Pt stated he was not feeling well; LCSW will follow up with pt next week.

## 2017-09-19 NOTE — TELEPHONE ENCOUNTER
----- Message from RT Alice sent at 9/19/2017  2:59 PM CDT -----  Contact: pt    Called pod, pt , requesting a call back from Nurse Rosa concerning his appt schedule, thanks.

## 2017-09-19 NOTE — TELEPHONE ENCOUNTER
Spoke with patient informed him to continue antibiotics for uti. Patient verbally voiced understanding.

## 2017-09-19 NOTE — TELEPHONE ENCOUNTER
Call placed to patient for notification that Geodon was e-scribed to pharmacy by Dr. Queen on 9-14-17 and Celebrex was e-scribed 9-12-17. Patient requesting refill of Spiriva

## 2017-09-20 ENCOUNTER — OUTPATIENT CASE MANAGEMENT (OUTPATIENT)
Dept: ADMINISTRATIVE | Facility: OTHER | Age: 67
End: 2017-09-20

## 2017-09-20 RX ORDER — FLUTICASONE PROPIONATE 50 MCG
SPRAY, SUSPENSION (ML) NASAL
Qty: 16 G | Refills: 3 | Status: SHIPPED | OUTPATIENT
Start: 2017-09-20 | End: 2018-08-13 | Stop reason: SDUPTHER

## 2017-09-20 NOTE — PROGRESS NOTES
9/20/17-RN JIMMY called left message requesting return call, stated it was important that I return his phone call. RN CM returned patient call as requested. Patient stated that he was currently in the middle of a breathing treatment. Encouraged patient to contact RN JIMMY when treatment is completed. Verbalized understanding.

## 2017-09-20 NOTE — TELEPHONE ENCOUNTER
Patient requesting 2 business cards be mailed to his home for smoking cessation. Also requested his information be forwarded to smoking cessation. Business cards were mailed to patient as requested. Writer gave patient's information to Mrs. Gonzalez Diamond with patient's request to be contacted.

## 2017-09-21 ENCOUNTER — TELEPHONE (OUTPATIENT)
Dept: FAMILY MEDICINE | Facility: CLINIC | Age: 67
End: 2017-09-21

## 2017-09-21 ENCOUNTER — APPOINTMENT (OUTPATIENT)
Dept: LAB | Facility: HOSPITAL | Age: 67
End: 2017-09-21
Attending: UROLOGY
Payer: MEDICARE

## 2017-09-21 ENCOUNTER — OFFICE VISIT (OUTPATIENT)
Dept: UROLOGY | Facility: CLINIC | Age: 67
End: 2017-09-21
Payer: MEDICARE

## 2017-09-21 VITALS
WEIGHT: 196.19 LBS | BODY MASS INDEX: 30.79 KG/M2 | DIASTOLIC BLOOD PRESSURE: 85 MMHG | HEART RATE: 87 BPM | SYSTOLIC BLOOD PRESSURE: 124 MMHG | TEMPERATURE: 98 F | HEIGHT: 67 IN

## 2017-09-21 DIAGNOSIS — R31.0 GROSS HEMATURIA: ICD-10-CM

## 2017-09-21 DIAGNOSIS — N52.9 ERECTILE DYSFUNCTION, UNSPECIFIED ERECTILE DYSFUNCTION TYPE: ICD-10-CM

## 2017-09-21 DIAGNOSIS — R39.198 INCREASING RESIDUAL URINE: ICD-10-CM

## 2017-09-21 DIAGNOSIS — N20.0 NEPHROLITHIASIS: ICD-10-CM

## 2017-09-21 DIAGNOSIS — R33.9 URINARY RETENTION: ICD-10-CM

## 2017-09-21 DIAGNOSIS — N39.0 RECURRENT UTI: Primary | ICD-10-CM

## 2017-09-21 PROCEDURE — 88112 CYTOPATH CELL ENHANCE TECH: CPT | Performed by: PATHOLOGY

## 2017-09-21 PROCEDURE — 99214 OFFICE O/P EST MOD 30 MIN: CPT | Mod: S$PBB,,, | Performed by: UROLOGY

## 2017-09-21 PROCEDURE — 87086 URINE CULTURE/COLONY COUNT: CPT

## 2017-09-21 PROCEDURE — 88112 CYTOPATH CELL ENHANCE TECH: CPT | Mod: 26,,, | Performed by: PATHOLOGY

## 2017-09-21 PROCEDURE — 3079F DIAST BP 80-89 MM HG: CPT | Mod: ,,, | Performed by: UROLOGY

## 2017-09-21 PROCEDURE — 51701 INSERT BLADDER CATHETER: CPT | Mod: PBBFAC,PN | Performed by: UROLOGY

## 2017-09-21 PROCEDURE — 99999 PR PBB SHADOW E&M-EST. PATIENT-LVL IV: CPT | Mod: PBBFAC,,, | Performed by: UROLOGY

## 2017-09-21 PROCEDURE — 3074F SYST BP LT 130 MM HG: CPT | Mod: ,,, | Performed by: UROLOGY

## 2017-09-21 PROCEDURE — 81002 URINALYSIS NONAUTO W/O SCOPE: CPT | Mod: PBBFAC,PN | Performed by: UROLOGY

## 2017-09-21 PROCEDURE — 99214 OFFICE O/P EST MOD 30 MIN: CPT | Mod: PBBFAC,PN | Performed by: UROLOGY

## 2017-09-21 NOTE — PROGRESS NOTES
"[facility] Progress Note - Stephan  Urology Group: Anna/Robert/Cassie/Radha  [facility]    Chief Complaint: GH    Subjective:        HPI: Hesham Serna is a 67 y.o. male presents with     Last seen 8/28/17    Gross hematuria  -ct urogram 11/14/16: herniation into left testicle, 4mm left renal stone. Repeat ctu 8/21/17 showed a 6mm stone in his left kidney and herniating bladder into testicel. .    -urine cultures have been positive in the past, ua today suspicious. Pt does CIC.   -urine cytology 9/26/16 - negative .7/17/17 - negative,  8/10/17 - urothelial cell atypia, supicious for urothelial high grade tumor.He has a h/o smoking (55 packs, 2-3 packs a day). Repeat urine cytology 8/17/17 abnormal  -cystoscopy 9/26/16 - pt never scheduled - pt states he cannot schedule this right now   -cystoscopy 8/28/17 urethral meatus with tear, small prostate and no b lobar hypertrophy, + inflammation on posterior bladder wall and floor that was biopsied. Path showed cystitis cystica.   -last episode of GH was 1.5 weeks ago when he saw alissa for uti  -he saw  who recommended     Elevated residuals  Pt with history of urinary retention when he first started seeing me. UDS showed atonic bladder.   spontaneously voids 4-5x a day "a normal amount" and "feels like he is emptying" but sometimes catheterizes 1x a night. CIC about 5-6x a day and does it based on how full he feels.      Seen 7/17/17 and stated that he tried to catheterize but could not and continued to have blood when he catheterized.  Was also concerned about frequency q1h and dysuria. ua had shown 1+luek and blood. cx grew serratia. I cath'd him and it went easy-had bloody urine. Urine cytology was suspicious.  I also had him discontinue the myrbetriq and continue flomax.      I also had him fill out a voiding diary.  He brought a handwritten diary in on 8/17/17 showing that he was voiding every " 2-3 hours with essentially no residual. Catheterizing 1x in the middle of the night, once in the morning. He catheterizes bc he has the urge to void but he can't. He is voiding about 3x a day.  Plan was to have him fill out a more formal diary. He returns today stating he lost it.     Seen 8/21/17 and he catheterizes more than 3x a day and sometimes cannot pass the catheter.    Uroflow results 8/21/17: Peak flow: 13 mL/s, Mean flow: 7mL/s, Voided time: 16s, Flow time: 14s, TTP flow: 3s, Voided volume: 100 mL  PVR 0ml    Seen 8/28/17 and I tried having him use the coloplast catehter but this would not advance all the way but the red rubber catheter did pass and I asked him to use this instead. He says he has not been catheterizing regularly.  Today he says he has been voiding about 7-8x a day, not catheterizing and nocturia 1-2x a night. Still on rapaflo     Nephrolithiasis  h/o nephrolithiasis. CTRSS on 6/4/15 showed left 4mm renal stone. KUB on 4/12/16 showed stone is radioopaque. Ctu 11/14/16 showed this again.  Scheduled for eswl but never proceeded with surgery. Stated he wants to hold off until his back is fixed. No c/o left flank pain  but has been having gross hematuria and anbormal urine cytology    Recurrent uti  His last infection was on 9/12/17 with E.cloacae. Currently he has no sx of uti. When he was seen on 9/12 he had some c/o dysuria which he always has and gross hematuria. He is currently on doxycycline.     Erectile dysfunction  -pt states that he has not had an erection in over 3 years. He has tried meds in the past and they work. He has over 20 viagra's at home.     Of note today he was counting hundreds of dollars worth of cash today in the clinic.   He is here today with his friend/nurse/helper Laquita [phone]           REVIEW OF SYSTEMS:  General ROS: no fevers, no chills  Psychological ROS: no depression  Endocrine ROS: no heat or cold  Respiratory ROS: + SOB  Cardiovascular ROS: no  CP  Gastrointestinal ROS: no abdominal pain, + constipation, no diarrhea, no BRBPR  Musculoskeletal ROS: no muscle pain  Neurological ROS: no headaches  Dermatological ROS: no rashes  HEENT: noglasses, no sinus   ROS: per HPI    AUA ssx: 19, mixed   IIEF: 5     The past medical, surgical, social and family hx were reviewed. There have been any changes.   He said he fell recently  Cataracts? none    Urologic meds: rapaflo  Anticoagulation: No    Objective:     Vitals:    09/21/17 0832   BP: 124/85   Pulse: 87   Temp: 98.3 °F (36.8 °C)          Physical Exam   Constitutional: He is oriented to person, place, and time. He appears well-developed and well-nourished. He is cooperative. No distress.   HENT:   Head: Normocephalic and atraumatic.   Eyes: Pupils are equal, round, and reactive to light.   Cardiovascular: Normal rate and regular rhythm.    Pulmonary/Chest: Effort normal.   Abdominal: Soft. Normal appearance.   Musculoskeletal: Normal range of motion.   Neurological: He is alert and oriented to person, place, and time. He has normal reflexes.   Skin: Skin is intact.     Psychiatric: He has a normal mood and affect.       MOON 2/9/17: 20 g prostate, testes descended bilaterally, no masses,  left reducible inguinal hernia    In and out cath 5cc    Urinalysis: 1.010/7/50 blood  Labs:     Urine culture   9/12/17 E.cloacae  8/28/17 ng  8/21/17            k.pneumonia  8/10/17                      ng  1/16/17 ng  10/21/16 E.cloacae  9/19/16 P.mirabilis  7/20/16 s.haemolyticus  3/23/16  Ng  12/16/15 ng    PSA   5/9/17                       0.45  5/26/15                      0.27  7/14/14                      0.26     Cr  4/3/17 1.0    Path:   Bladder, biopsy 9/4/17: Cystitis cystica.  Urine, voided, cytology 8/21/17: Urothelial atypia, suspicious cells present, see comment.  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  9/26/16 URINE, VOIDED (CYTOLOGY):  -Negative for malignant cells  -Abundant neutrophils  -Degenerative changes     Rads:   ctu 8/21/17  1.  Stable examination demonstrating bilateral inguinal hernias, the left of which is a direct inguinal hernia containing a portion of the urinary bladder. There is urinary bladder wall thickening involving the anterior bladder wall and herniated portion of the bladder which is nonspecific but likely reactive in this setting. Right indirect fat-containing inguinal hernia also again noted.  2. Additional stable findings:  -Nonobstructive left nephrolithiasis  -Colonic diverticulosis  -Chronic severe L1 compression fracture status post vertebroplasty/kyphoplasty     kub 8/21/17  Left 4mm (6mm on ct) mid pole stone (L3)  Note to self: recommend eswl, stone growing     ctu 11/14/16: 4mm left renal stone, no renal mass, no hydro, prtial herniation of the bladder into the LIH. Prostate not enlarged. A fat containing RIH. Severe L1 compression fracture  kub 4/12/16 - left renal stone 5mm  ctrss 6/4/15 - 4mm left midpole stone    Assessment:       1. Recurrent UTI    2. Urinary retention    3. Nephrolithiasis    4. Increasing residual urine    5. Gross hematuria        Plan:       Gross hematuria  -could be related to bladder herniating into testicular causing bladder irritation. However they want to hold off on treatment of this.   -cystoscopy showed irritation c/w this. ctu showed a nonobstructing stone which should not be the cause and he's had UTIs which can cause this too.   -repeating urine cytology today    Recurrent UTI  -recommend stopping the doxycycline now and save antibiotics for when he has symptomatic uti's (fever, flank pain and chills).  -likely related to in and out caths  -repeating urie culture today    Elevated urine residuals  -he has been voiding spontatnously on his own for past we  -pvr by in and out cath post void today was only 5cc  -continue  rapaflo, told him he does not need to catheterize anymore if he continues to have low residuals    Nephrolithiasis  -LMP 4mm stone unchanged in size and visible on kub  -hold off on any treatment    ED  -he says he will try to masturbate at home and see how this goes. With his SOB explained he needs to be very careful.       F/u 3 months

## 2017-09-21 NOTE — TELEPHONE ENCOUNTER
Patient states he received Kristalose packs from [facility], states this is the wrong medicine because he usually receives Chronulac. Upon further inspection it was noted a prescription for Chronulac was sent to [facility] on 9-19-17. Patient notified. Verbalized understanding.

## 2017-09-21 NOTE — TELEPHONE ENCOUNTER
----- Message from Shayla Collins LPN sent at 9/21/2017  4:14 PM CDT -----  Contact: self [phone]      ----- Message -----  From: Brooklyn Marshall  Sent: 9/21/2017  11:13 AM  To: Rachael ÁLVAREZ Staff    Call placed to pod. Please call him, he states that you called in the wrong medication.  Thank you!

## 2017-09-22 LAB
BACTERIA UR CULT: NORMAL
BILIRUB SERPL-MCNC: ABNORMAL MG/DL
BLOOD URINE, POC: 50
COLOR, POC UA: ABNORMAL
GLUCOSE UR QL STRIP: ABNORMAL
KETONES UR QL STRIP: ABNORMAL
LEUKOCYTE ESTERASE URINE, POC: ABNORMAL
NITRITE, POC UA: ABNORMAL
PH, POC UA: 7
PROTEIN, POC: ABNORMAL
SPECIFIC GRAVITY, POC UA: 1010
UROBILINOGEN, POC UA: ABNORMAL

## 2017-09-25 ENCOUNTER — OUTPATIENT CASE MANAGEMENT (OUTPATIENT)
Dept: ADMINISTRATIVE | Facility: OTHER | Age: 67
End: 2017-09-25

## 2017-09-25 NOTE — PROGRESS NOTES
"9/25/17- RN CM called patient for follow up. Med Rec completed with patient with some discrepancies found, will message PCP with discrepancies. Reported that he does use a pill box organizer for medication management. Patient reported to RN CM that he would like to get some assistance in his home, reported that his "helper" quit on him yesterday. Requested assistance with caregivers in the home. RN CM will send message to VIOLETTA Brumfield [facility] LCSW working with patient. Patient informed RN CM that while on the call he was getting messages and was flustered because he needed to go listen to them. TIARA MARQUEZ will follow up with patient in 2 weeks.        Follow Up Plan:  --complete med rec-DONE  --complete COPD Disease assessment  --Educate about COPD. Encourage patient to follow medication and treatment regimen. Encourage patient to maintain follow up with doctors.  --Collaborate with [facility] MATTHEW about available resources----Assessment complete by LCSW  -- Educate about Fall prevention and safety in the home. Encourage patient follow medication and treatment regimen. Encourage patient to maintain follow up with doctors.   "

## 2017-09-26 ENCOUNTER — OUTPATIENT CASE MANAGEMENT (OUTPATIENT)
Dept: ADMINISTRATIVE | Facility: OTHER | Age: 67
End: 2017-09-26
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
## 2017-09-29 ENCOUNTER — TELEPHONE (OUTPATIENT)
Dept: PHARMACY | Facility: CLINIC | Age: 67
End: 2017-09-29

## 2017-09-29 NOTE — TELEPHONE ENCOUNTER
Spoke with pt about assistance with medications, pt stated his copays exceeded $700. Contacted patient's pharmacy, [facility], pt's copays are all $0. They stated he doesn't pay copays. Notified pt of update and provided contact information if anything changes with the costs.

## 2017-10-02 ENCOUNTER — TELEPHONE (OUTPATIENT)
Dept: PSYCHIATRY | Facility: CLINIC | Age: 67
End: 2017-10-02

## 2017-10-02 ENCOUNTER — OUTPATIENT CASE MANAGEMENT (OUTPATIENT)
Dept: ADMINISTRATIVE | Facility: OTHER | Age: 67
End: 2017-10-02

## 2017-10-02 NOTE — PROGRESS NOTES
"10/2/17: MARCELO contacted pt for follow up.  Pt self-reports continuing to be in chronic back pain.  Pt also states that he cannot get any in-home help.  Pt states he is receiving Meals on Wheels (MOW) through the [place] [employer]; however he believes this may stop because he has missed two of their deliveries.  Pt said he was told by [place] [employer] [facility] if he is not home for one more Meals on Wheels delivery, he will be cut off from their program.  Pt said they normally deliver meals on Thursdays and he has a doctor's appointment this Thursday.  MARCELO asked if pt could call [employer] to see if they could drop his meals off either before or after his doctor's appointment; he said, "that won't work because it takes me hours to get ready for my doctor's appointment."  MARCELO contacted [employer] for [facility]; was transferred to representative Barth's voice mail, left message.  MARCELO called pt back to give him the [employer] contact information for [facility].  MARCELO will follow up with pt in two weeks.      "

## 2017-10-02 NOTE — TELEPHONE ENCOUNTER
----- Message from Neda Crain sent at 9/30/2017  2:46 PM CDT -----  Contact: Pt Hesham Serna [phone]  Pt is requesting a call back to schedule an appointment.    Pt may be reached at [phone].    Thank you.  LC

## 2017-10-06 ENCOUNTER — OUTPATIENT CASE MANAGEMENT (OUTPATIENT)
Dept: ADMINISTRATIVE | Facility: OTHER | Age: 67
End: 2017-10-06

## 2017-10-06 ENCOUNTER — TELEPHONE (OUTPATIENT)
Dept: FAMILY MEDICINE | Facility: CLINIC | Age: 67
End: 2017-10-06

## 2017-10-06 NOTE — PROGRESS NOTES
10/6/17: Good afternoon. My name is Louis Hamilton [facility], I work for [facility]s Outpatient case management department with Dr. Ramos. I wanted to call and review your disaster plan with you. I also wanted to go over some crucial information and provide you with emergency phone numbers for your [place].     [] Called patient, no answer, I left a message with the phone number for the [place] Office of Emergency Preparedness.   [x] Please be sure to have a supply of water, non-perishable food items, flashlights and batteries with you in your house.   [x] Please be sure you bring all of your medications with you. Bring at least a five day supply. It is best to bring your medication bottles, in case you are displaced for a longer period of time, therefore you can get your medication refilled from your temporary location.   [x] Please bring sure to bring any DME that you may need. This includes walkers, wheelchairs, shower chairs, nebulizer machine, etc.   [x] If you are a diabetic- be sure to bring your glucometer and all glucometer monitoring supplies.   [x] If you have high blood pressure- be sure to bring your blood pressure cuff so you can continue to monitor.   [x] If you have CHF-be sure to bring your scale so you can continue to monitor.  [x] If on PEG feeding- be sure to bring tube feedings and feeding supplies.  [x] If on oxygen- be sure to bring all of your oxygen supplies: Cannula, portable tanks, concentrator, etc. Also contact you Oxygen Supply Company to find out the nearest location of an oxygen supply company to where you will be located. ([employer]: [phone], [facility]: [phone], [facility]:[phone], [employer]: [phone]).   [x] If you receive hemodialysis- you should already have a plan in place with your dialysis center. If you do not know where you need to evacuate to in order to be close to a dialysis center- reach out to your dialysis center for further direction. ([facility]   service line: [phone], [facility] service line [phone])  [x] If receiving treatment at an infusion center- please contact the infusion center to find out which infusion center they have a contract with. Also ask your infusion center for location of the infusion center they are in contract with, so if need be you can evacuate to that area.   Office of Emergency Preparedness Phone number:  [] [facility]: [phone]  [] [facility]: [phone]  [x] [place] [facility]: [phone]  [] [place]: [phone]  [] [place]: [phone]  [] [facility]: [phone]  [] [facility]: [phone]  [] [facility]: [phone]  [] [facility]: [phone]  [] [facility]: [phone]  [] [facility]: [phone]  [] [facility]: [phone]  [] [facility]: [phone]    [] [facility]:  [phone]   [] [facility]:  [phone]   [] [facility]:  [phone]   [] [facility]:  [phone]   [] [facility]:  [phone]   [] [facility]: [phone]   [] [facility]:  [phone]   [] [facility]:  [phone]   [] [facility]:  [phone]   [] [facility]:  [phone]   [] [facility]:  [phone]   [] [facility]:  [phone]   [] [facility]:  [phone]   [] [facility]:  [phone]   [] [facility]:  [phone]   [] [facility]: [phone]   [] [facility]:  [phone]   [] [place]: [phone]   [] [facility]: [phone]
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
## 2017-10-09 ENCOUNTER — OUTPATIENT CASE MANAGEMENT (OUTPATIENT)
Dept: ADMINISTRATIVE | Facility: OTHER | Age: 67
End: 2017-10-09
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
## 2017-10-12 ENCOUNTER — TELEPHONE (OUTPATIENT)
Dept: FAMILY MEDICINE | Facility: CLINIC | Age: 67
End: 2017-10-12

## 2017-10-12 DIAGNOSIS — J44.1 ACUTE EXACERBATION OF CHRONIC OBSTRUCTIVE PULMONARY DISEASE (COPD): ICD-10-CM

## 2017-10-12 DIAGNOSIS — F39 MOOD DISORDER: ICD-10-CM

## 2017-10-12 DIAGNOSIS — J20.9 ACUTE BRONCHITIS, UNSPECIFIED ORGANISM: ICD-10-CM

## 2017-10-12 DIAGNOSIS — F31.0 BIPOLAR AFFECTIVE DISORDER, CURRENT EPISODE HYPOMANIC: ICD-10-CM

## 2017-10-12 RX ORDER — METHYLPREDNISOLONE 4 MG/1
TABLET ORAL
Qty: 1 PACKAGE | Refills: 0 | Status: SHIPPED | OUTPATIENT
Start: 2017-10-12 | End: 2017-12-08 | Stop reason: ALTCHOICE

## 2017-10-12 NOTE — TELEPHONE ENCOUNTER
----- Message from Lima Baxter sent at 10/12/2017  1:29 PM CDT -----  Please call patient, he states he needs to have a conversation with Dr Ramos's nurse regarding the medicine he needs for the month, [phone] (home)

## 2017-10-12 NOTE — TELEPHONE ENCOUNTER
----- Message from Bekah Betancur sent at 10/12/2017  1:33 PM CDT -----  Contact: self  Patient requesting a call back from office. He said its personal. Please contact him at [phone].    Thanks!

## 2017-10-12 NOTE — TELEPHONE ENCOUNTER
Patient requesting prescription for Prednisone. Requesting to use [facility]. Please advise.

## 2017-10-13 ENCOUNTER — TELEPHONE (OUTPATIENT)
Dept: PSYCHIATRY | Facility: CLINIC | Age: 67
End: 2017-10-13

## 2017-10-13 DIAGNOSIS — N40.0 BENIGN NON-NODULAR PROSTATIC HYPERPLASIA WITHOUT LOWER URINARY TRACT SYMPTOMS: ICD-10-CM

## 2017-10-13 RX ORDER — SILODOSIN 8 MG/1
8 CAPSULE ORAL DAILY
Qty: 90 CAPSULE | Refills: 3 | Status: SHIPPED | OUTPATIENT
Start: 2017-10-13 | End: 2017-12-11 | Stop reason: SDUPTHER

## 2017-10-13 RX ORDER — OXYCODONE HYDROCHLORIDE 15 MG/1
TABLET ORAL
OUTPATIENT
Start: 2017-10-13

## 2017-10-13 RX ORDER — ZIPRASIDONE HYDROCHLORIDE 20 MG/1
CAPSULE ORAL
Qty: 180 CAPSULE | Refills: 0 | Status: SHIPPED | OUTPATIENT
Start: 2017-10-13 | End: 2017-12-13 | Stop reason: SDUPTHER

## 2017-10-13 RX ORDER — CLONAZEPAM 2 MG/1
TABLET ORAL
Qty: 90 TABLET | Refills: 0 | Status: SHIPPED | OUTPATIENT
Start: 2017-10-13 | End: 2017-11-24 | Stop reason: SDUPTHER

## 2017-10-13 RX ORDER — LACTULOSE 10 G/15ML
SOLUTION ORAL; RECTAL
Qty: 1200 ML | Refills: 3 | Status: SHIPPED | OUTPATIENT
Start: 2017-10-13 | End: 2017-12-14 | Stop reason: SDUPTHER

## 2017-10-13 NOTE — TELEPHONE ENCOUNTER
Spoke with patient. Advised we do not have a appointment time 9AM on October 27. He states he is having vehicle problems. Will try and make his next appointment but will call and cancel if his vehicle is not working right.    States he does appreciate moving his appointment time to 9AM and he will be cooperative in the waiting room.    States he does not want to loose Dr. Queen and Shelly. They are both sweet hearts and he respects them both

## 2017-10-13 NOTE — TELEPHONE ENCOUNTER
Patient requesting refill of Lactulose, requests 1,200 ml be prescribed/90 day supply. Please advise.   LR--6-19-17  LOV-- 9-12-17  FOV--12-8-17

## 2017-10-13 NOTE — TELEPHONE ENCOUNTER
Ok to be seen at 9 am as long as patient knows he will not be first patient of day.  We hope to be running on time, but circumstances occasionally occur which we cannot control.

## 2017-10-13 NOTE — TELEPHONE ENCOUNTER
----- Message from Gian Cardoso sent at 10/13/2017 11:06 AM CDT -----  Contact: same  Unsuccessful call placed to office.  Patient called in and stated Ally just called him about some of his Rx's that were not called in and other that were called in to the wrong pharmacy?    Patients call back number is [phone]

## 2017-10-13 NOTE — TELEPHONE ENCOUNTER
----- Message from Lima Bush sent at 10/13/2017 11:12 AM CDT -----  Contact: self  Patient called regarding refill of medication. Please contact [phone] (rrrb)

## 2017-10-16 ENCOUNTER — TELEPHONE (OUTPATIENT)
Dept: PSYCHIATRY | Facility: CLINIC | Age: 67
End: 2017-10-16

## 2017-10-16 ENCOUNTER — TELEPHONE (OUTPATIENT)
Dept: UROLOGY | Facility: CLINIC | Age: 67
End: 2017-10-16

## 2017-10-16 ENCOUNTER — OUTPATIENT CASE MANAGEMENT (OUTPATIENT)
Dept: ADMINISTRATIVE | Facility: OTHER | Age: 67
End: 2017-10-16

## 2017-10-16 DIAGNOSIS — R82.998 CELLS AND CASTS IN URINE: Primary | ICD-10-CM

## 2017-10-16 NOTE — TELEPHONE ENCOUNTER
Spoke with patient who states he is having a bad day. Is having issues with a bad UTI that's not responding to antibiotics, his car is not working and he can not visit his elderly mother    States that he took his medication for anxiety and feels better after his breathing treatment      Thanked me for trying to help reschedule his appointment but in unable to at this time. Will call me back if he needs to reschedule

## 2017-10-16 NOTE — TELEPHONE ENCOUNTER
Ok find out if he has been catheterizing and if so how often and how much is coming out when he does  Can he record this today and drop this off  He needs to write down what time he voids, if he voids before catheterizing and how much comes out when he catheterizes

## 2017-10-16 NOTE — TELEPHONE ENCOUNTER
Spoke with patient he states he needs antibiotics, he is going to the restroom every 20 minutes. Unable to bring in a urine sample due to know transportation. Please advise

## 2017-10-16 NOTE — PROGRESS NOTES
"10/16/17: LCSW attempted to contact pt for follow up.  Pt states, "I can't talk right now!  I'm waiting on the doctor to call me back."  LCSW informed pt if he is having an emergency, he should contact 911.  Pt took down LCSW's contact information.  LCSW will follow up with pt in two weeks.    "

## 2017-10-16 NOTE — TELEPHONE ENCOUNTER
Patient left three voicemail's back to back calling stating he is having anxiety about his appointment on 10/27/2017 because he has a urology appointment at 10:30AM     Looked at appointment screen and see his urology appointment is tomorrow at 10:30 on 10/17 and our appointment in on 10/27 at 8AM    As I try to explain this- patient states he needs to do his breathing treatment and hangs up the line    Will reattempt to call patient back this afternoon

## 2017-10-16 NOTE — TELEPHONE ENCOUNTER
Spoke with patient he will try to come in tomorrow to give a urine sample if he can find a ride. Please sign orders

## 2017-10-16 NOTE — TELEPHONE ENCOUNTER
----- Message from Letty Toney sent at 10/16/2017  3:26 PM CDT -----  Patient states that he need to know if he get a ride can he come in at any time and give a urine specimen.  Please call [phone].

## 2017-10-16 NOTE — TELEPHONE ENCOUNTER
Spoke with patient only catheterizing in the middle of the night twice. Patient states he does not know how much comes out. He states he will try to record information but can't promise

## 2017-10-16 NOTE — TELEPHONE ENCOUNTER
----- Message from Zoraida Godinez sent at 10/16/2017  2:10 PM CDT -----  Contact: patient  Patient calling in regards to speaking with the Nurse. He is urinating every 20 min and believes it is an infection. Please advise.  Call back   Thanks!

## 2017-10-17 ENCOUNTER — CLINICAL SUPPORT (OUTPATIENT)
Dept: UROLOGY | Facility: CLINIC | Age: 67
End: 2017-10-17
Payer: MEDICARE

## 2017-10-17 ENCOUNTER — TELEPHONE (OUTPATIENT)
Dept: UROLOGY | Facility: CLINIC | Age: 67
End: 2017-10-17

## 2017-10-17 ENCOUNTER — APPOINTMENT (OUTPATIENT)
Dept: LAB | Facility: HOSPITAL | Age: 67
End: 2017-10-17
Attending: UROLOGY
Payer: MEDICARE

## 2017-10-17 DIAGNOSIS — F31.0 BIPOLAR AFFECTIVE DISORDER, CURRENT EPISODE HYPOMANIC: ICD-10-CM

## 2017-10-17 DIAGNOSIS — R82.998 CELLS AND CASTS IN URINE: ICD-10-CM

## 2017-10-17 DIAGNOSIS — F39 MOOD DISORDER: ICD-10-CM

## 2017-10-17 LAB
BILIRUB UR QL STRIP: NEGATIVE
CLARITY UR: CLEAR
COLOR UR: YELLOW
GLUCOSE UR QL STRIP: NEGATIVE
HGB UR QL STRIP: ABNORMAL
KETONES UR QL STRIP: NEGATIVE
LEUKOCYTE ESTERASE UR QL STRIP: NEGATIVE
NITRITE UR QL STRIP: NEGATIVE
PH UR STRIP: 7 [PH] (ref 5–8)
PROT UR QL STRIP: NEGATIVE
SP GR UR STRIP: 1.01 (ref 1–1.03)
URN SPEC COLLECT METH UR: ABNORMAL
UROBILINOGEN UR STRIP-ACNC: NEGATIVE EU/DL

## 2017-10-17 PROCEDURE — 99499 UNLISTED E&M SERVICE: CPT | Mod: S$PBB,,, | Performed by: UROLOGY

## 2017-10-17 PROCEDURE — 99999 PR PBB SHADOW E&M-EST. PATIENT-LVL I: CPT | Mod: PBBFAC,,,

## 2017-10-17 PROCEDURE — 87086 URINE CULTURE/COLONY COUNT: CPT

## 2017-10-17 PROCEDURE — 81003 URINALYSIS AUTO W/O SCOPE: CPT

## 2017-10-17 PROCEDURE — 99211 OFF/OP EST MAY X REQ PHY/QHP: CPT | Mod: PBBFAC,PN

## 2017-10-17 NOTE — TELEPHONE ENCOUNTER
----- Message from Brooklyn Marshall sent at 10/17/2017 10:55 AM CDT -----  Contact: self [phone]  Call placed to pod. He would like to know if you had enough urine to run the test?  Please call him.  Thank you!

## 2017-10-17 NOTE — TELEPHONE ENCOUNTER
Spoke with patient he wants to know if he provided enough urine since and spilled urine on the floor. Informed patient there is enough urine for u/a and urine culture. Patient verbally voiced understanding.

## 2017-10-18 RX ORDER — CLONAZEPAM 2 MG/1
TABLET ORAL
Qty: 90 TABLET | Refills: 0 | OUTPATIENT
Start: 2017-10-18

## 2017-10-19 ENCOUNTER — TELEPHONE (OUTPATIENT)
Dept: UROLOGY | Facility: CLINIC | Age: 67
End: 2017-10-19

## 2017-10-19 LAB — BACTERIA UR CULT: NO GROWTH

## 2017-10-19 NOTE — TELEPHONE ENCOUNTER
Spoke with patient informed him of negative urine culture results. Patient verbally voiced understanding.

## 2017-10-23 ENCOUNTER — OUTPATIENT CASE MANAGEMENT (OUTPATIENT)
Dept: ADMINISTRATIVE | Facility: OTHER | Age: 67
End: 2017-10-23

## 2017-10-23 ENCOUNTER — TELEPHONE (OUTPATIENT)
Dept: FAMILY MEDICINE | Facility: CLINIC | Age: 67
End: 2017-10-23

## 2017-10-23 ENCOUNTER — TELEPHONE (OUTPATIENT)
Dept: UROLOGY | Facility: CLINIC | Age: 67
End: 2017-10-23

## 2017-10-23 NOTE — TELEPHONE ENCOUNTER
Spoke with patient advised per 's last note he does not need to catheterize anymore. Patient is stating he cannot get the catheter in. Please advise

## 2017-10-23 NOTE — TELEPHONE ENCOUNTER
----- Message from Norm Paul RN sent at 10/23/2017 12:36 PM CDT -----  Contact: Norm Paul RN Outpatient Case Management  Good Afternoon,     I recently spoke with Mr. Serna. He reported to me that he was been having some difficulty with self cathing and is having some resistance. Can you please contact him for further assistance?      Thank you,    Norm Paul RN

## 2017-10-23 NOTE — TELEPHONE ENCOUNTER
Received message from patient requesting to know where Clonazepam prescription was sent to. Upon further inspection it was noted this medication was e-scribed to [facility]. Patient notified. Verbalized understanding.

## 2017-10-23 NOTE — TELEPHONE ENCOUNTER
----- Message from Zoraida Godinez sent at 10/23/2017  2:06 PM CDT -----  Contact: patient  Patient calling to speak with the Nurse to find out which Pharmacy his prescriptions were called in at. Please advise. Call to pod. No answer.  Call back   Thanks!

## 2017-10-23 NOTE — TELEPHONE ENCOUNTER
"----- Message from Norm Paul RN sent at 10/23/2017 12:24 PM CDT -----  Contact: Norm Paul RN Outpatient Case Management  Good Afternoon,    Mr. Serna reported to me today that he is not feeling well. Stated that he has "no get up and go" and that he has been having seizures. He stated that this is his first time having seizures. He has noticed that he is sweating excessively, having to change his clothes several times a day. He is also having some issues with constipation. Can you please contact him for further assistance?    Thank you,    Norm"

## 2017-10-23 NOTE — PROGRESS NOTES
"10/9/17-RN CM called patient for follow up. Patient reported that he is not doing good. Stated that he has been having seizures, problems with his stomach and and has no "get up and go." Also stated that he has been having frequent episodes of sweating where he needs to change his clothes several times a day. RN CM send message to Dr. Ramos requesting patient contact for further advice. Patient also reported that he has been trying to self cath and is meeting resistance. Stated that he is having some difficulty with cathing and is concerned. RN CM sent message to Dr. Castillo requesting patient contact for further advice. RN CM completed COPD disease assessment. Patient reported that he was not aware of signs of early lung infection. Provided with teaching of signs to look for and when to contact the doctor. Verbalized understanding. Will continue to provide education for COPD management. RN CM will follow up with patient in 2 weeks.        Follow Up Plan:  --complete med rec-DONE  --complete COPD Disease assessment-DONE  --Educate about COPD. Encourage patient to follow medication and treatment regimen. Encourage patient to maintain follow up with doctors.  --Collaborate with [facility] SW about available resources----Assessment complete by LCSW  -- Educate about Fall prevention and safety in the home. Encourage patient follow medication and treatment regimen. Encourage patient to maintain follow up with doctors.   "

## 2017-10-24 ENCOUNTER — TELEPHONE (OUTPATIENT)
Dept: FAMILY MEDICINE | Facility: CLINIC | Age: 67
End: 2017-10-24

## 2017-10-24 ENCOUNTER — OUTPATIENT CASE MANAGEMENT (OUTPATIENT)
Dept: ADMINISTRATIVE | Facility: OTHER | Age: 67
End: 2017-10-24

## 2017-10-24 NOTE — TELEPHONE ENCOUNTER
----- Message from Lima Baxter sent at 10/24/2017  8:31 AM CDT -----  Please call patient in regards to a missed call from office, please call [phone] (home)

## 2017-10-24 NOTE — TELEPHONE ENCOUNTER
Spoke to patient who states he received a call from Dr. Ramos on yesterday evening and wanted to know the reason for the call. Writer spoke with Dr. Ramos who states he received communication from patient's  who states patient was mildly depressed. States he placed a call to patient to see how he was doing and to ask if patient was having any thoughts of suicide. Writer conveyed this message to patient who states he is doing fine. States the  is making up stories and have contact 2 of his doctors with this same message. States he does not want his Clonazepam discontinued. Writer verbally updated Dr. Ramos with patient's response.

## 2017-10-24 NOTE — PROGRESS NOTES
10/24/17-RN CM returned call to patient, who left multiple messages for RN CM requesting call back. Patient inquired if RN CM contacted PCP regarding issues. Stated that he has not heard back from the doctor. Chart review completed and Dr. Ramos has attempted to contact the patient. Patient asked RN JIMMY why she inquired about who pain management doctor is. RN CM informed patient that per chart review no appointments were scheduled, wanted to determine if he was seeing a provider outside of the [facility], to which he replied that he was seeing someone in [place]. Patient irritated and stated that he did not want RN CM to contact any of his physicians. RN CM will place patient in monitoring as OPCM LCSW continues to assist patient with needs. Will continue to assist with facilitation and coordination of care.

## 2017-10-27 ENCOUNTER — TELEPHONE (OUTPATIENT)
Dept: FAMILY MEDICINE | Facility: CLINIC | Age: 67
End: 2017-10-27

## 2017-10-27 NOTE — TELEPHONE ENCOUNTER
Patient requesting refill of Lactulose and Advair. Upon further inspection it was noted these medications were already e-scribed with additional refills. Patient notified. Verbalized understanding.

## 2017-10-27 NOTE — TELEPHONE ENCOUNTER
----- Message from Sheron Aidan sent at 10/27/2017  2:18 PM CDT -----  Contact: patient  Patient states that he needs some medications, lactulose (CHRONULAC) 10 gram/15 mL solution, the ADVAIR DISKUS 500-50 mcg/dose DsDv diskus inhaler and to please call him back at [phone].  Thank you      [facility] [address] [place] [address] AT [facility] [address] [facility] [address]  [address] [place] [address]  Phone: [phone] Fax: [phone]

## 2017-11-01 ENCOUNTER — TELEPHONE (OUTPATIENT)
Dept: UROLOGY | Facility: CLINIC | Age: 67
End: 2017-11-01

## 2017-11-01 NOTE — TELEPHONE ENCOUNTER
Spoke with patient he had questions about his rapaflo medication. Informed patient 90 day supply sent in on 10/13 he will have to call the pharmacy for prescription. Patient verbally voiced understanding.

## 2017-11-01 NOTE — TELEPHONE ENCOUNTER
----- Message from Prabha Zimmer sent at 11/1/2017  3:00 PM CDT -----  Contact: patient  Patient called regarding having surgery.please call back at [phone]. Thanks,

## 2017-11-06 ENCOUNTER — TELEPHONE (OUTPATIENT)
Dept: PSYCHIATRY | Facility: CLINIC | Age: 67
End: 2017-11-06

## 2017-11-06 NOTE — TELEPHONE ENCOUNTER
Attempted to call patient; no answer. Left voicemail to return our call at [phone].  Thank you

## 2017-11-06 NOTE — TELEPHONE ENCOUNTER
Spoke with patient who states that he is interested in detox ing from his pain medication and would like recommendations from Dr. Queen as to who he should go with    States he has previous tried and it was not tolerated but feels like he never wants to stop trying.   Please advise

## 2017-11-06 NOTE — TELEPHONE ENCOUNTER
Please advise Mr Serna that [facility] recently opened 6 detox beds at their facility in [place].  The intake process begins by the pt's calling [phone] for to schedule an intake.  They would likely refer him to the ER first for medical clearance, but they will advise him of more details when he calls.

## 2017-11-07 ENCOUNTER — TELEPHONE (OUTPATIENT)
Dept: FAMILY MEDICINE | Facility: CLINIC | Age: 67
End: 2017-11-07

## 2017-11-07 NOTE — TELEPHONE ENCOUNTER
Attempted to call patient; no answer. Left voicemail to return our call at [phone].  Thank you

## 2017-11-07 NOTE — TELEPHONE ENCOUNTER
----- Message from Negrita Vazquez sent at 11/7/2017 12:04 PM CST -----  Contact: self  Needs an xray on right leg. Please call back at [phone] (fdpk)

## 2017-11-07 NOTE — TELEPHONE ENCOUNTER
Patient complaining of right leg pain. Requesting order for x-ray. Advised patient office appointment was needed for evaluation, unable to assure patient order for x-ray will be ordered. Patient states he does not want to schedule an appointment. States will use pain medicine instead.

## 2017-11-07 NOTE — TELEPHONE ENCOUNTER
----- Message from Brooklyn Marshall sent at 11/7/2017  3:58 PM CST -----  Contact: self [phone]  Please call him regarding pain in his right leg.  He thinks he needs an xray.  Thank you!

## 2017-11-08 NOTE — TELEPHONE ENCOUNTER
Patient notified and verbalized understanding    Asked if I would mail him the information because he did not have a pen  I mailed a brochure and a copy of the  phone number    Patient verbalized understanding  Very thankful for dr. eric helping him with this

## 2017-11-09 ENCOUNTER — TELEPHONE (OUTPATIENT)
Dept: FAMILY MEDICINE | Facility: CLINIC | Age: 67
End: 2017-11-09

## 2017-11-09 NOTE — TELEPHONE ENCOUNTER
----- Message from Kaycee Moore sent at 11/9/2017 12:03 PM CST -----  Contact: Self  Patient is requesting a call back from Ally, he is looking for information that he knows you will remember.   Please call [phone] (home).  Thanks

## 2017-11-09 NOTE — TELEPHONE ENCOUNTER
Spoke to patient who requested to know the name of the nursing home he was admitted to several years ago. Upon further inspection it was noted that [facility] was reviewing patient's information for possible admit. Patient notified and states he remember that this is the facility he was admitted to.

## 2017-11-10 ENCOUNTER — OUTPATIENT CASE MANAGEMENT (OUTPATIENT)
Dept: ADMINISTRATIVE | Facility: OTHER | Age: 67
End: 2017-11-10

## 2017-11-10 ENCOUNTER — TELEPHONE (OUTPATIENT)
Dept: FAMILY MEDICINE | Facility: CLINIC | Age: 67
End: 2017-11-10

## 2017-11-10 NOTE — PROGRESS NOTES
"11/10/17: LCSW contacted pt for follow up.  LCSW asked pt how he was and he stated "not well."  Pt states, " I am interested in staying at a nursing home that will take my puppy and will not detox me."  LCSW asked pt why he does not want to detox and he said "because I will have seizures and it will kill me."  Pt asked for OPCM RN's number.  LCSW also gave pt his number and the number to [facility] so pt could call them to get more information about their detox facility.  Pt stated he was eating and told LCSW he will talk to him later.  LCSW will follow up with pt in two weeks.   "

## 2017-11-10 NOTE — TELEPHONE ENCOUNTER
----- Message from Zaida Teixeira sent at 11/10/2017 12:29 PM CST -----  Contact: self  Patient needs to know the name of the doctor that prescribe the medication for Hep C. Please call patient at [phone]. Thanks!

## 2017-11-10 NOTE — TELEPHONE ENCOUNTER
Patient requesting to know the name of the doctor that prescribed him a medication for Hep C. Upon further inspection it was noted patient was prescribed Ledipasvir for Hep C by Dr. Disla.  Patient notified. Verbalized understanding.

## 2017-11-13 ENCOUNTER — OUTPATIENT CASE MANAGEMENT (OUTPATIENT)
Dept: ADMINISTRATIVE | Facility: OTHER | Age: 67
End: 2017-11-13

## 2017-11-13 ENCOUNTER — TELEPHONE (OUTPATIENT)
Dept: PSYCHIATRY | Facility: CLINIC | Age: 67
End: 2017-11-13

## 2017-11-13 NOTE — TELEPHONE ENCOUNTER
Spoke with patient who states he is having a bad day and can not stop shaking. Felt SOB but feels better now. Took a klonopin.     Stops conversation- states he has to go because someone else is ringing in. Hangs up phone    Will re attempt later today

## 2017-11-13 NOTE — PROGRESS NOTES
11/13/17-RN JIMMY returned phone call to patient. Patient reported that he is having a lot of back pain, rated this pain 9/10. Reported that he was currently eating so that he could take his pain medication. Reported that he is also difficulty breathing and can't catch his breath. Patient reported having a panic attack because someone recently closed his account and took his money out of his account. Patient stated that he would like to speak to Dr. Queen. TIARA MARQUEZ will send message to Dr. Queen's team with patient concerns. Encouraged patient to contact OPCM RN with needs. Verbalized understanding. Will follow up in 3 weeks.

## 2017-11-13 NOTE — TELEPHONE ENCOUNTER
Patient called back at 3pm. Appologetic. States don't be mad. I had to make sure it wasn't my other doctor.    Advised patient I was not mad. He wants to know if Dr. Queen is mad at him or doesn't want to treat him anymore. Advised patient- no one here is mad at him.    He becomes tearful stating he doesn't know what to do anymore. His nerves are so bad. He cant stop shaking. Feels nervous.    Took 1/2 klonopin at 1pm and feels no relief  Request Dr. Queen to call him this afternoon    Advised patient Dr. Queen is out of office at a meeting  Patient states he does not mind waiting  Please advise

## 2017-11-13 NOTE — TELEPHONE ENCOUNTER
----- Message from Norm Paul RN sent at 11/13/2017  2:41 PM CST -----  Contact: Norm Paul RN Outpatient Case Management  Good Afternoon,    I received an incoming call from Mr. Serna who reported that he was having a bad day. He reported that he is having back pain and trouble breathing. He believes his SOB is panic related. He stated that he found out today that one of his accounts was closed and he lost $1900 from this account. Can you please contact him for further assistance as he is requesting that I contact you about calling him?  Please feel free to contact me if you have any further questions.      Thank you!  Norm

## 2017-11-14 NOTE — TELEPHONE ENCOUNTER
"I spoke to the patient - there was a poor connection on the line; pt asked that I return his call tomorrow.   He is having an issue with his bank account. He was told he owes back state taxes from a time he was not living in [place]. Money seized from his account.  Reports he is doing "very bad."  He feel he can handle most of this, but he has been very short of breath, needs oxygen.  He is having car trouble, not in any shape to drive or go down his stairs.  He is concerned about his mother's health.  I offered pt sooner appt or assistance looking into transportation options - pt stated I was not hearing him.   I will try to contact him again tomorrow.  Pt is working with case management as well.   He cancelled appt with me last month.     "

## 2017-11-15 ENCOUNTER — TELEPHONE (OUTPATIENT)
Dept: FAMILY MEDICINE | Facility: CLINIC | Age: 67
End: 2017-11-15

## 2017-11-15 ENCOUNTER — TELEPHONE (OUTPATIENT)
Dept: PSYCHIATRY | Facility: CLINIC | Age: 67
End: 2017-11-15

## 2017-11-15 NOTE — TELEPHONE ENCOUNTER
I tried to reach the patient - no answer, left message advising pt that if he is having significant SOB, he should call 911 and go to ER for evaluation.   I was unable to reach him to discuss management of anxiety - will try to reach him again tomorrow.  Pt has not run out of Clonazepam - see below.     [place]  - pt last filled Clonazepam 2 mg # 90 tabs 10/5/17     LPN contacted pt's pharmacy:   Mr Serna got 90 Clonazepam tablets on 11/5 and can not refill until 12/3. he is not out completly. he has enough for his meal time and bed time to get through the month. but no EXTRA dose

## 2017-11-15 NOTE — TELEPHONE ENCOUNTER
Spoke with patient. He is taking effexor 20 mg and is not taking the effexor. States he does not know where it is.     Advised he needs to report to ER if SOB continues, gets worse.    Verbalized understanding    States he believes its related to his anxiety from his bank/ money situation    Request Dr. Queen call him back if she can- if not tonight then tomorrow.

## 2017-11-15 NOTE — TELEPHONE ENCOUNTER
Patient called and left voicemail at 10:11 to give him a call back.    Attempted to call patient; no answer. Left voicemail to return our call at [phone].  Thank you

## 2017-11-15 NOTE — TELEPHONE ENCOUNTER
----- Message from Sandra Montoya sent at 11/14/2017  2:55 PM CST -----  Contact: Patient  [place], [phone], Patient is calling about his medication. Which seems to be wrong. Did not know which medication. Patient is requesting to speak with the office. Thanks.

## 2017-11-15 NOTE — TELEPHONE ENCOUNTER
I followed up on this message with patient.  He reports that the state seized $ 1900 of his bank account for back taxes and today he got a letter from Medicare stating that he would now need to start paying for his breathing equipment or else it would be seized.  He asks that we assist him in clarifying this. He will call with a contact number for person that I will pass along to case management who hopefully can clarify this matter.     Pt reports he took last Clonazepam this am, but does not want me to alert Dr Ramos that he needs a refill.  He wishes to go through proper channels with his nurse.  He has had tremors with stress, pt advised of risk of life threatening w/d from benzodiazepines. He will call Dr Ramos in am.  Pt advised to call 911 with s/sx of w/d.

## 2017-11-15 NOTE — TELEPHONE ENCOUNTER
Spoke with patient for nearly 15 minutes.    He states he is having multiple symptoms all related to anxiety- SOB, dizziness. Has not had his BP taken.    States he has run out of his 'extra or as needed' klonopin. Which helped during moments like this so he does not know what he is gonna do.    Patient does state he is not expecting extra medication- he states he is just going to have to deal with this    States he is very overwhelmed. The state [place] garnished all his money in his bank account. They took almost 2000 dollars and still want 7000$ more.    He has been at the bank all day dealing with bank issues and the IRS    Feels overwhelmed and stressed out  Please advise

## 2017-11-16 ENCOUNTER — TELEPHONE (OUTPATIENT)
Dept: FAMILY MEDICINE | Facility: CLINIC | Age: 67
End: 2017-11-16

## 2017-11-16 RX ORDER — VENLAFAXINE HYDROCHLORIDE 37.5 MG/1
37.5 CAPSULE, EXTENDED RELEASE ORAL DAILY
Qty: 30 CAPSULE | Refills: 2 | Status: SHIPPED | OUTPATIENT
Start: 2017-11-16 | End: 2018-02-01

## 2017-11-16 NOTE — TELEPHONE ENCOUNTER
Spoke to patient who wanted to inform office he has an appointment on 11-29-17 with Jenyn Bang, states he also has an appointment with Dr. Judge on that date at 11 am and wants to be sure he will not be late for that appointment. Writer assured patient office will attempt to be on time for his appointment. Patient also states he is due a refill of Klonopin in December, advised patient to call office in December to receive this refill. Patient verbalized understanding.

## 2017-11-16 NOTE — TELEPHONE ENCOUNTER
Received message from patient stating he needs an x-ray of his right leg from his knee to his hip.  Per Dr. Ramos patient needs to be seen and evaluated. Patient notified. Verbalized understanding. Offered to schedule appointment, patient states he has to check his schedule first and call back to schedule appointment.

## 2017-11-16 NOTE — TELEPHONE ENCOUNTER
----- Message from Gian Cardoso sent at 11/16/2017 12:24 PM CST -----  Contact: same  FYI--Patient called in and stated he was told to call with a date to pass on to nurse, 11/21/17 and that nurse would know what he was talking about.

## 2017-11-16 NOTE — TELEPHONE ENCOUNTER
Spoke to patient on today's date.  Patient requesting appointment r/t leg pain. See separate encounter dated today.

## 2017-11-16 NOTE — TELEPHONE ENCOUNTER
"I spoke to the patient.  He did not follow through on recommendations to have his respiratory symptoms evaluated.  I can hear him wheezing on phone, he sounds short of breath, but able to speak.  He describes his breathing as "horrible."  Pt again encouraged to call 911 if unable to leave home, or schedule asap with a PCP here.  He agreed to look into the latter.  He is also agreeable to start Effexor XR for anxiety. I have resent his Rx to pharmacy - will start low at 37.5 mg daily.  Will titrate as tolerated.  He remains on geodon. Will also schedule a follow up appt here.  Pt advised to call us to report any worsening of symptoms or problems with new medication.   "

## 2017-11-16 NOTE — TELEPHONE ENCOUNTER
"Called patient to offer to schedule patient. He declines at this time. States he has a lot of appointments at the end of November and into December and does not know when he can come in.    He states the only day he knows he can come is December 14th in the morning- advised Dr. Queen will not be in that day. Patient upset and states he will just have to call us back when he is ready to schedule a follow up appointment    Noted patient was SOB again- advised patient that he should report to ER. Patient got upset. Yelling "How many of y'all are gonna tell me that. I know I know." and patient hung up  "

## 2017-11-16 NOTE — TELEPHONE ENCOUNTER
----- Message from Lima Baxter sent at 11/16/2017 10:19 AM CST -----  Patient states he needs a xray of his Rt leg because from his knee to his hip is sore & burning, please call [phone] (home)

## 2017-11-17 ENCOUNTER — TELEPHONE (OUTPATIENT)
Dept: FAMILY MEDICINE | Facility: CLINIC | Age: 67
End: 2017-11-17

## 2017-11-17 NOTE — TELEPHONE ENCOUNTER
----- Message from Ally Campo sent at 11/17/2017  7:15 AM CST -----  Patient calling back concerning phone message he received this morning concerning medication/please call patient back at [phone] to advise.

## 2017-11-21 ENCOUNTER — TELEPHONE (OUTPATIENT)
Dept: FAMILY MEDICINE | Facility: CLINIC | Age: 67
End: 2017-11-21

## 2017-11-21 NOTE — TELEPHONE ENCOUNTER
Spoke to patient who states he is worried he will run over time at appointment on 11-29-17 with Jenny Bang. States he has another appointment at 11 am.  Assured patient the provider he is seeing is usually punctual. Patient verbalized understanding.

## 2017-11-21 NOTE — TELEPHONE ENCOUNTER
----- Message from Sandra Montoya sent at 11/21/2017  1:46 PM CST -----  Contact: Patient  Hesham, patient [phone], Calling to speak with the nurse regarding his appointments. Please advise, thanks.

## 2017-11-24 ENCOUNTER — TELEPHONE (OUTPATIENT)
Dept: FAMILY MEDICINE | Facility: CLINIC | Age: 67
End: 2017-11-24

## 2017-11-24 DIAGNOSIS — F31.0 BIPOLAR AFFECTIVE DISORDER, CURRENT EPISODE HYPOMANIC: ICD-10-CM

## 2017-11-24 DIAGNOSIS — F39 MOOD DISORDER: ICD-10-CM

## 2017-11-24 RX ORDER — CLONAZEPAM 2 MG/1
TABLET ORAL
Qty: 90 TABLET | Refills: 0 | Status: SHIPPED | OUTPATIENT
Start: 2017-11-24 | End: 2017-12-08 | Stop reason: SDUPTHER

## 2017-11-24 NOTE — TELEPHONE ENCOUNTER
----- Message from Prabha Zimmer sent at 11/24/2017 10:08 AM CST -----  Contact: patient  Patient called to advise that  forgot to send a script. Didn't want to give name of script.requesting a call back from danielito at [phone]. Thanks,

## 2017-11-24 NOTE — TELEPHONE ENCOUNTER
----- Message from Zaida Teixeira sent at 11/24/2017 12:36 PM CST -----  Contact: self  Patient is calling Ally regarding his medication.  Please call patient at [phone]. Thanks!

## 2017-11-24 NOTE — TELEPHONE ENCOUNTER
Patient notified Dr. Ramos e-scribed prescription for Klonopin on today at 12:31pm. Patient verbalized understanding.

## 2017-11-24 NOTE — TELEPHONE ENCOUNTER
----- Message from RT Alice sent at 11/24/2017 10:48 AM CST -----  Contact: pt    Called pod pt , requesting a all back soon from Nurse Ally concerning his medication, thanks.

## 2017-11-27 ENCOUNTER — OUTPATIENT CASE MANAGEMENT (OUTPATIENT)
Dept: ADMINISTRATIVE | Facility: OTHER | Age: 67
End: 2017-11-27

## 2017-11-27 NOTE — PROGRESS NOTES
Pt's assigned LCSW, Musa Brumfield (darrell) is out of the office. This LCSW attempted to contact pt for follow up on his plan of care. There was no answer; LCSW left a voicemail asking for the call to be returned.

## 2017-11-28 ENCOUNTER — OUTPATIENT CASE MANAGEMENT (OUTPATIENT)
Dept: ADMINISTRATIVE | Facility: OTHER | Age: 67
End: 2017-11-28

## 2017-11-28 ENCOUNTER — TELEPHONE (OUTPATIENT)
Dept: FAMILY MEDICINE | Facility: CLINIC | Age: 67
End: 2017-11-28

## 2017-11-28 NOTE — PROGRESS NOTES
"11/28/17-RN JIMMY received incoming call from patient. Patient reported that he was not doing well. Recently spoke with [facility] LCSW Mariola Iglesias and informed her that her was having seizures today. Patient reported to this RN that he has had 3 seizures this morning. Described these episodes as having the shakes all over. Stated "too Much oxygen can make me have seizures." Patient reported that his seizures started about 3-4 weeks ago.Patient also reported that he is having muscle cramps in different parts of his body. Patient reported that he feels like he can not do anything. Patient is aware of appt with ERICA Bang tomorrow. RN CM will send message to PCP with patient concerns requesting contact for further advice. [facility] MARCELO mentioned to RN JIMMY that patient was discussing nursing home placement. Patient stated that if he can get these seizures under control then he is not interested at this time. Will send message to PCP and follow up with patient in 3 weeks.    "

## 2017-11-28 NOTE — PROGRESS NOTES
"LCSWs received an incoming call from patient. When asked how he was doing today, pt stated "not got at all, I've had 3 seizures." Pt inquired if his seizures were related to his medications. Pt stated within the last 2 hours, he's had at least 2 seizures. LCSW reminded pt of his upcoming appointment for tomorrow due to his pain, but self-reported he tried call his PCP- Dr. Ramos to schedule his appt sooner but that was the earliest of the appointment.  Pt was insisting to talk to an Rn; LCSW provided [facility]Rn[facility]'s contact information to patient. LCSW messaged Norm asking to make contact with the patient. LCSW attempted to review with patient that possibility of nursing home placement; however, pt's response was "can you please get me a nurse?" In addition, pt asked LCSW for the contact number for [facility] [phone], which was provided to pt again LCSW will follow up with pt at a later time.     Intervention:   Collaborate with care team members  Encourage compliance with medical treatment    Plan for next encounter:  Discuss possibility of placement      "

## 2017-11-28 NOTE — TELEPHONE ENCOUNTER
This matter was handled in a separate encounter on this date at 12:56 pm.  Patient was advised to go to ER. Patient verbalized understanding.

## 2017-11-28 NOTE — TELEPHONE ENCOUNTER
"Received message from patient stating he has not been feeling well and is having a little dizziness. Requesting appointment with Dr. Ramos. Also received message from  stating patient reported he has been having seizures. Call placed to patient. Advised patient to be seen today at ER. Patient states " I can't go to the ER because they may keep me and I have to go to the bank tomorrow." Advised patient if ER recommends hospitalization it's for his best interest. Patient states " I'm not having seizures, I just get the shakes and have a little dizziness."  Writer spoke with Dr. Ramos who states patient needs to be seen in ED today. Patient notified and states "Alright."  "

## 2017-11-28 NOTE — TELEPHONE ENCOUNTER
----- Message from Norm Paul RN sent at 11/28/2017 11:16 AM CST -----  Contact: Norm Paul RN Outpatient Case Management  Good Morning,     I spoke with Mr. Serna this morning. He is reporting to me that he is having seizures. He stated that he has had 3 this morning. He stated that he experiences shaking all over. He stated that his has been having for the past 3-4 weeks. He stated that he is having muscles cramps in different parts of his body. He is aware of his appt tomorrow but is very anxious about these episodes. Can you please contact him for further advice?      Thank you!  Norm

## 2017-11-28 NOTE — TELEPHONE ENCOUNTER
----- Message from Kymberly Hydrocapsule sent at 11/28/2017 12:38 PM CST -----  Contact: Patient  Hesham, patient [phone] calling to speak with Ally, patient states he has not been feeling well and having a little dizziness, did not state any other symptoms. Asked patient if he was okay with seeing a nurse practicioner he declined, stated he only wanted to see Dr. Ramos. Wanting to come in today or tomorrow. Please advise. Thanks.

## 2017-11-29 ENCOUNTER — OFFICE VISIT (OUTPATIENT)
Dept: FAMILY MEDICINE | Facility: CLINIC | Age: 67
End: 2017-11-29
Payer: MEDICARE

## 2017-11-29 VITALS
WEIGHT: 196.19 LBS | BODY MASS INDEX: 30.79 KG/M2 | SYSTOLIC BLOOD PRESSURE: 117 MMHG | DIASTOLIC BLOOD PRESSURE: 70 MMHG | TEMPERATURE: 98 F | HEIGHT: 67 IN | HEART RATE: 86 BPM

## 2017-11-29 DIAGNOSIS — Z99.81 OXYGEN DEPENDENT: ICD-10-CM

## 2017-11-29 DIAGNOSIS — I10 ESSENTIAL HYPERTENSION: ICD-10-CM

## 2017-11-29 DIAGNOSIS — R20.0 RIGHT LEG NUMBNESS: Primary | ICD-10-CM

## 2017-11-29 DIAGNOSIS — J96.11 CHRONIC RESPIRATORY FAILURE WITH HYPOXIA: ICD-10-CM

## 2017-11-29 DIAGNOSIS — G62.9 NEUROPATHY: ICD-10-CM

## 2017-11-29 PROCEDURE — 99213 OFFICE O/P EST LOW 20 MIN: CPT | Mod: S$PBB,,, | Performed by: NURSE PRACTITIONER

## 2017-11-29 PROCEDURE — 99999 PR PBB SHADOW E&M-EST. PATIENT-LVL IV: CPT | Mod: PBBFAC,,, | Performed by: NURSE PRACTITIONER

## 2017-11-29 PROCEDURE — G0008 ADMIN INFLUENZA VIRUS VAC: HCPCS | Mod: PBBFAC,PO

## 2017-11-29 PROCEDURE — 99214 OFFICE O/P EST MOD 30 MIN: CPT | Mod: PBBFAC,PO,25 | Performed by: NURSE PRACTITIONER

## 2017-11-29 NOTE — PROGRESS NOTES
2 patient identifiers used(name and ). Administered Flu vaccine Im. Patient tolerated well, no bleeding at insertion site noted. Pain scale 0/10. Aseptic technique maintained. Immunization information given to patient. Advised patient to remain in clinic for 15 minutes to monitor for reaction. No adverse reaction noted.

## 2017-11-29 NOTE — PROGRESS NOTES
"Subjective:       Patient ID: Hesham Serna is a 67 y.o. male.    Chief Complaint: No chief complaint on file.    Mr. Serna presents to the clinic today for burning pain to right upper leg which began one month ago. Patient states he is in a hurry today to get to Pulmonology appointment due to shortness of breath.  He is on continuous oxygen.  He reports numbness and burning pain just to anterior aspect of right thigh.  He has chronic back pain.  He reports panic attacks and states he doesn't have any "extra" Klonopin to take when he has a panic attack.  He does see Psychiatry.        Review of Systems   Constitutional: Negative for chills and fever.   Eyes: Negative for visual disturbance.   Respiratory: Positive for shortness of breath (chronic due to copd). Negative for cough.    Cardiovascular: Negative for chest pain.   Musculoskeletal: Positive for back pain. Negative for arthralgias.        Right anterior thigh pain   Neurological: Positive for numbness.   Psychiatric/Behavioral: The patient is nervous/anxious.        Objective:      Physical Exam   Constitutional: He is oriented to person, place, and time. He appears well-developed and well-nourished. No distress.   HENT:   Head: Normocephalic and atraumatic.   Right Ear: External ear normal.   Left Ear: External ear normal.   Eyes: Conjunctivae and EOM are normal.   Cardiovascular: Normal rate and regular rhythm.    Pulmonary/Chest: Effort normal.   Musculoskeletal: Normal range of motion.   Wearing back brace   Neurological: He is alert and oriented to person, place, and time. A sensory deficit is present.   Anterior right thigh--unable to discriminate sharp and soft touch. Pain to palpation but not to light touch   Skin: Skin is warm and dry.   Psychiatric: He has a normal mood and affect. His behavior is normal.   Vitals reviewed.          Current Outpatient Prescriptions:     fentaNYL (SUBSYS) 200 mcg/spray Spry, Place 200 mcg under the tongue 2 " (two) times daily as needed., Disp: , Rfl:     ADVAIR DISKUS 500-50 mcg/dose DsDv diskus inhaler, INHALE 1 PUFF TWO TIMES A DAY, Disp: 180 each, Rfl: 3    albuterol 90 mcg/actuation inhaler, Inhale 2 puffs into the lungs every 6 (six) hours as needed for Wheezing., Disp: 1 each, Rfl: 5    albuterol-ipratropium 2.5mg-0.5mg/3mL (DUO-NEB) 0.5 mg-3 mg(2.5 mg base)/3 mL nebulizer solution, Take 3 mLs by nebulization every 6 (six) hours while awake., Disp: 180 mL, Rfl: 3    AMITIZA 8 mcg Cap, TAKE 1 CAPSULE BY MOUTH TWICE DAILY WITH FOOD, Disp: 180 capsule, Rfl: 4    celecoxib (CELEBREX) 100 MG capsule, Take 2 capsules (200 mg total) by mouth 2 (two) times daily., Disp: 180 capsule, Rfl: 3    clonazePAM (KLONOPIN) 2 MG Tab, TAKE 1 TABLET (2 MG TOTAL) BY MOUTH 3 (THREE) TIMES DAILY AS NEEDED., Disp: 90 tablet, Rfl: 0    econazole nitrate 1 % cream, Apply to feet twice daily, Disp: 85 g, Rfl: 2    fluocinonide 0.05% (LIDEX) 0.05 % cream, , Disp: , Rfl:     fluticasone (FLONASE) 50 mcg/actuation nasal spray, SNIFF 1 SPRAY INTO EACH NOSTRIL ONCE DAILY, Disp: 16 g, Rfl: 3    gentamicin (GARAMYCIN) 0.1 % ointment, , Disp: , Rfl:     ketoconazole (NIZORAL) 2 % shampoo, , Disp: , Rfl:     KRISTALOSE 20 gram Pack, , Disp: , Rfl:     lactulose (CHRONULAC) 10 gram/15 mL solution, TAKE 30 MLS BY MOUTH THREE TIMES DAILY, Disp: 1200 mL, Rfl: 3    ledipasvir-sofosbuvir  mg Tab, Take 1 tablet by mouth once daily., Disp: 28 tablet, Rfl: 1    methadone (DOLOPHINE) 10 MG tablet, Take 1 tablet (10 mg total) by mouth every 6 (six) hours as needed. Two tablets every morning, two tablets every 12 pm, one-two tablets every evening (Patient taking differently: Take 10 mg by mouth every 8 (eight) hours as needed. ), Disp: , Rfl:     methylPREDNISolone (MEDROL DOSEPACK) 4 mg tablet, use as directed, Disp: 1 Package, Rfl: 0    oxycodone (ROXICODONE) 15 MG Tab, , Disp: , Rfl:     silodosin (RAPAFLO) 8 mg Cap capsule, Take 1  "capsule (8 mg total) by mouth once daily., Disp: 90 capsule, Rfl: 3    spironolactone (ALDACTONE) 25 MG tablet, Take 1 tablet (25 mg total) by mouth once daily., Disp: 30 tablet, Rfl: 11    venlafaxine (EFFEXOR-XR) 37.5 MG 24 hr capsule, Take 1 capsule (37.5 mg total) by mouth once daily., Disp: 30 capsule, Rfl: 2    ziprasidone (GEODON) 20 MG Cap, Take one capsule PO BID with meals, Disp: 180 capsule, Rfl: 0  Assessment:       1. Right leg numbness    2. Neuropathy    3. Chronic respiratory failure with hypoxia    4. Oxygen dependent    5. Essential hypertension        Plan:       Right leg numbness  Declines referral to any specialist stating "I don't want to see any more doctors".  -     EMG- 1 EXTREMITY; Future    Neuropathy  -     EMG- 1 EXTREMITY; Future    Chronic respiratory failure with hypoxia  Patient to see Pulmonology today.    Oxygen dependent  Continue O2.     Essential hypertension  Stable on current medication.    Other orders  -     Influenza - High Dose (65+) (PF) (IM)    Patient readiness: acceptance and barriers:social stressors    During the course of the visit the patient was educated and counseled about the following:     Hypertension:   Medication: no change.    Goals: Hypertension: Reduce Blood Pressure    Did patient meet goals/outcomes: Yes    The following self management tools provided: declined    Patient Instructions (the written plan) was given to the patient/family.     Time spent with patient: 15 minutes                "

## 2017-12-02 DIAGNOSIS — J43.9 PULMONARY EMPHYSEMA, UNSPECIFIED EMPHYSEMA TYPE: ICD-10-CM

## 2017-12-03 RX ORDER — ROFLUMILAST 500 UG/1
TABLET ORAL
Qty: 30 TABLET | Refills: 11 | Status: SHIPPED | OUTPATIENT
Start: 2017-12-03 | End: 2018-01-15 | Stop reason: SDUPTHER

## 2017-12-05 ENCOUNTER — TELEPHONE (OUTPATIENT)
Dept: PSYCHIATRY | Facility: CLINIC | Age: 67
End: 2017-12-05

## 2017-12-05 NOTE — TELEPHONE ENCOUNTER
Attempted to call patient; no answer. Left voicemail to return our call at [phone].  Thank you

## 2017-12-05 NOTE — TELEPHONE ENCOUNTER
"Patient requesting a call back from Dr. Queen.   " im having a rough time with my mother."  Please advice [phone]  Krystle      "

## 2017-12-06 ENCOUNTER — TELEPHONE (OUTPATIENT)
Dept: FAMILY MEDICINE | Facility: CLINIC | Age: 67
End: 2017-12-06

## 2017-12-06 NOTE — TELEPHONE ENCOUNTER
----- Message from Lima Baxter sent at 12/5/2017  2:54 PM CST -----  Please call patient in regards to what he states a personal matter, [phone] (home)

## 2017-12-06 NOTE — TELEPHONE ENCOUNTER
Spoke with patient. Who was short of breath. States he is not feeling good. Has appointment with Dr. Ramos with labs Friday 12/8/2017.     Offered patient appointment Friday afternoon. States he would like to wait to schedule until he feels better. He is staying at his mothers house. She continues to tell him she wants to die. This upsets patient and is afraid what will happen when his mother does pass. He is worried his pipes in his basement will freeze and he is concerned because his COPD is bothering him.    States he needed to go but thanks writer for the call and is thankful for Dr. Queen in his life

## 2017-12-06 NOTE — TELEPHONE ENCOUNTER
Patient states having car trouble and not sure if he will be able to attend appointment on Friday 12-8-17.  Patient also concerned that his pipes will burst in his house if he leaves to come to appointment. Advised patient writer will leave scheduled appointment as is, if not able to attend please call that morning to notify office. Patient verbalized understanding.

## 2017-12-07 ENCOUNTER — TELEPHONE (OUTPATIENT)
Dept: FAMILY MEDICINE | Facility: CLINIC | Age: 67
End: 2017-12-07

## 2017-12-07 ENCOUNTER — DOCUMENTATION ONLY (OUTPATIENT)
Dept: FAMILY MEDICINE | Facility: CLINIC | Age: 67
End: 2017-12-07

## 2017-12-07 ENCOUNTER — TELEPHONE (OUTPATIENT)
Dept: PSYCHIATRY | Facility: CLINIC | Age: 67
End: 2017-12-07

## 2017-12-07 ENCOUNTER — OUTPATIENT CASE MANAGEMENT (OUTPATIENT)
Dept: ADMINISTRATIVE | Facility: OTHER | Age: 67
End: 2017-12-07

## 2017-12-07 NOTE — TELEPHONE ENCOUNTER
----- Message from Gail Alvarez sent at 12/7/2017  9:22 AM CST -----  Contact: [phone]  Patient is requesting a call back from the nurse stated its in regard to his blood work.  Please call the patient upon request at phone number [phone].

## 2017-12-07 NOTE — TELEPHONE ENCOUNTER
Patient offered an appointment today at 3 pm but declined stating he was not able to come at that time due that being his meal/medication time. Patient calling at this time to see if he can come of appointment offered earlier. Patient notified appointment was given to another patient after he declined. Patient states will come to appointment scheduled for tomorrow.

## 2017-12-07 NOTE — TELEPHONE ENCOUNTER
Spoke to patient who requesting to confirm appointment for tomorrow. States he missed 2 calls from the office, writer advised calls were to confirm his appointment for office visit and labs on tomorrow. Patient states he will be at appointment.

## 2017-12-07 NOTE — PROGRESS NOTES
Pre-Visit Chart Review  For Appointment Scheduled on 12/08/2017    There are no preventive care reminders to display for this patient.

## 2017-12-07 NOTE — TELEPHONE ENCOUNTER
----- Message from Anushka Saldivar sent at 12/7/2017  8:41 AM CST -----  Contact: self                                  attn:  Ally  Patient [phone] is saying that he just received a call but does not know who called/they did not leave a message/I did advise patient it was probably to confirm his appts tomorrow but he is asking to speak with Nurse Ally/please call

## 2017-12-07 NOTE — TELEPHONE ENCOUNTER
----- Message from Gail Alvarez sent at 12/7/2017  2:41 PM CST -----  Contact: [phone]  Patient is returning nurse's phone call.  Please call patient back at [phone].

## 2017-12-07 NOTE — TELEPHONE ENCOUNTER
----- Message from Letty Ziegler sent at 12/7/2017  2:30 PM CST -----  Returning your call.  Please call patient at [phone].

## 2017-12-07 NOTE — TELEPHONE ENCOUNTER
----- Message from Lima Baxter sent at 12/7/2017 10:35 AM CST -----  Please call patient, he wouldn't state the reason why, just stated it regarded appt tomorrow, [phone] (home)

## 2017-12-07 NOTE — TELEPHONE ENCOUNTER
Spoke with patient who states that he had a terrible morning    Woke up to find his mother had fallen in the kitchen and was not able to get up. She had called for him to help but the TV was on and he never heard her.     States she is okay. No injuries and refused to go to ER today.    Patient states that he has appointment with Dr. Ramos in the morning and would like to get squeezed in if Dr. Queen is available.    He states that he needs to see his doctor  He does not want to make a actual appointment- states he may not have time to be seen. So he will check in with the  in the morning if we have any cancellations

## 2017-12-07 NOTE — PROGRESS NOTES
12/7/17: LCSW contacted [place] [facility] ([facility]) [facility] to inquire about transportation; spoke with V[place]a who informed LCSW how they only transport within state and county limits.  There is also the Elderly and Disabled Medicaid Waiver program that uses [employer] for transportation; however their waiting list is at least one year long.  LCSW contacted [employer] to inquire about transportation for pt; they do not have pt in the system which would indicate he does not have Medicaid benefits.  LCSW attempted to contact pt for follow up; no answer, left voicemail.

## 2017-12-08 ENCOUNTER — LAB VISIT (OUTPATIENT)
Dept: LAB | Facility: HOSPITAL | Age: 67
End: 2017-12-08
Attending: FAMILY MEDICINE
Payer: MEDICARE

## 2017-12-08 ENCOUNTER — OFFICE VISIT (OUTPATIENT)
Dept: FAMILY MEDICINE | Facility: CLINIC | Age: 67
End: 2017-12-08
Payer: MEDICARE

## 2017-12-08 VITALS
SYSTOLIC BLOOD PRESSURE: 128 MMHG | DIASTOLIC BLOOD PRESSURE: 75 MMHG | WEIGHT: 200.63 LBS | TEMPERATURE: 98 F | HEART RATE: 75 BPM | OXYGEN SATURATION: 96 % | HEIGHT: 67 IN | BODY MASS INDEX: 31.49 KG/M2

## 2017-12-08 DIAGNOSIS — J44.9 CHRONIC OBSTRUCTIVE PULMONARY DISEASE, UNSPECIFIED COPD TYPE: ICD-10-CM

## 2017-12-08 DIAGNOSIS — F31.0 BIPOLAR AFFECTIVE DISORDER, CURRENT EPISODE HYPOMANIC: ICD-10-CM

## 2017-12-08 DIAGNOSIS — I10 ESSENTIAL HYPERTENSION: ICD-10-CM

## 2017-12-08 DIAGNOSIS — B18.2 HEP C W/O COMA, CHRONIC: ICD-10-CM

## 2017-12-08 DIAGNOSIS — F39 MOOD DISORDER: ICD-10-CM

## 2017-12-08 DIAGNOSIS — F31.32 BIPOLAR AFFECTIVE DISORDER, CURRENTLY DEPRESSED, MODERATE: Primary | ICD-10-CM

## 2017-12-08 DIAGNOSIS — J96.11 CHRONIC RESPIRATORY FAILURE WITH HYPOXIA: ICD-10-CM

## 2017-12-08 LAB
ANION GAP SERPL CALC-SCNC: 9 MMOL/L
BUN SERPL-MCNC: 15 MG/DL
CALCIUM SERPL-MCNC: 9.7 MG/DL
CHLORIDE SERPL-SCNC: 101 MMOL/L
CO2 SERPL-SCNC: 31 MMOL/L
CREAT SERPL-MCNC: 1 MG/DL
EST. GFR  (AFRICAN AMERICAN): >60 ML/MIN/1.73 M^2
EST. GFR  (NON AFRICAN AMERICAN): >60 ML/MIN/1.73 M^2
GLUCOSE SERPL-MCNC: 83 MG/DL
POTASSIUM SERPL-SCNC: 4.2 MMOL/L
SODIUM SERPL-SCNC: 141 MMOL/L

## 2017-12-08 PROCEDURE — 36415 COLL VENOUS BLD VENIPUNCTURE: CPT | Mod: PO

## 2017-12-08 PROCEDURE — 99214 OFFICE O/P EST MOD 30 MIN: CPT | Mod: S$PBB,,, | Performed by: FAMILY MEDICINE

## 2017-12-08 PROCEDURE — 99999 PR PBB SHADOW E&M-EST. PATIENT-LVL III: CPT | Mod: PBBFAC,,, | Performed by: FAMILY MEDICINE

## 2017-12-08 PROCEDURE — 80048 BASIC METABOLIC PNL TOTAL CA: CPT

## 2017-12-08 PROCEDURE — 99213 OFFICE O/P EST LOW 20 MIN: CPT | Mod: PBBFAC,PO | Performed by: FAMILY MEDICINE

## 2017-12-08 RX ORDER — CELECOXIB 100 MG/1
200 CAPSULE ORAL 2 TIMES DAILY
Qty: 180 CAPSULE | Refills: 3 | Status: SHIPPED | OUTPATIENT
Start: 2017-12-08 | End: 2017-12-08 | Stop reason: SDUPTHER

## 2017-12-08 RX ORDER — CLONAZEPAM 2 MG/1
TABLET ORAL
Qty: 90 TABLET | Refills: 0 | Status: SHIPPED | OUTPATIENT
Start: 2017-12-08 | End: 2018-01-12 | Stop reason: SDUPTHER

## 2017-12-08 RX ORDER — CELECOXIB 100 MG/1
200 CAPSULE ORAL 2 TIMES DAILY
Qty: 180 CAPSULE | Refills: 3 | Status: SHIPPED | OUTPATIENT
Start: 2017-12-08 | End: 2018-01-12 | Stop reason: ALTCHOICE

## 2017-12-08 RX ORDER — IPRATROPIUM BROMIDE AND ALBUTEROL SULFATE 2.5; .5 MG/3ML; MG/3ML
3 SOLUTION RESPIRATORY (INHALATION)
Qty: 180 ML | Refills: 3 | Status: SHIPPED | OUTPATIENT
Start: 2017-12-08 | End: 2018-01-15 | Stop reason: SDUPTHER

## 2017-12-08 RX ORDER — PREDNISONE 10 MG/1
10 TABLET ORAL DAILY
Qty: 10 TABLET | Refills: 0 | Status: SHIPPED | OUTPATIENT
Start: 2017-12-08 | End: 2017-12-18

## 2017-12-08 RX ORDER — PREDNISONE 10 MG/1
10 TABLET ORAL DAILY
Qty: 10 TABLET | Refills: 0 | Status: SHIPPED | OUTPATIENT
Start: 2017-12-08 | End: 2017-12-08 | Stop reason: SDUPTHER

## 2017-12-08 RX ORDER — CEPHALEXIN 500 MG/1
500 CAPSULE ORAL EVERY 8 HOURS
Qty: 30 CAPSULE | Refills: 0 | Status: SHIPPED | OUTPATIENT
Start: 2017-12-08 | End: 2017-12-08 | Stop reason: SDUPTHER

## 2017-12-08 RX ORDER — CEPHALEXIN 500 MG/1
500 CAPSULE ORAL EVERY 8 HOURS
Qty: 30 CAPSULE | Refills: 0 | Status: SHIPPED | OUTPATIENT
Start: 2017-12-08 | End: 2018-01-12 | Stop reason: ALTCHOICE

## 2017-12-08 NOTE — PROGRESS NOTES
Subjective:       Patient ID: Hesham Serna is a 67 y.o. male.    Chief Complaint: Hypertension    Hypertension   This is a chronic problem. The problem is unchanged. Associated symptoms include anxiety, blurred vision, chest pain, headaches, malaise/fatigue, neck pain, orthopnea, palpitations, peripheral edema and shortness of breath. Risk factors for coronary artery disease include male gender, obesity and sedentary lifestyle. The current treatment provides mild improvement. Compliance problems include exercise and medication side effects.      Review of Systems   Constitutional: Positive for fatigue, fever and malaise/fatigue.   HENT: Positive for postnasal drip.    Eyes: Positive for blurred vision.   Respiratory: Positive for cough and shortness of breath.    Cardiovascular: Positive for chest pain, palpitations and orthopnea.   Musculoskeletal: Positive for neck pain.   Neurological: Positive for headaches.       Patient Active Problem List   Diagnosis    Arthritis    Degenerative disc disease    Hypertension    Rosacea    Anxiety state    Episodic mood disorder    Hep C w/o coma, chronic    Cluster headaches    BPH with urinary obstruction    DDD (degenerative disc disease), cervical    Cirrhosis    Obese    Bipolar affective disorder    DDD (degenerative disc disease), lumbar    Chronic narcotic dependence    Bipolar affective disorder, currently depressed, moderate    Unspecified drug dependence, continuous    Alcoholic cirrhosis    Urinary retention    Nephrolithiasis    BPH (benign prostatic hyperplasia)    Mixed simple and mucopurulent chronic bronchitis    Chronic respiratory failure with hypoxia    Oxygen dependent       Objective:      Physical Exam   Constitutional: He appears well-developed and well-nourished.   HENT:   Right Ear: Tympanic membrane and ear canal normal.   Left Ear: Tympanic membrane and ear canal normal.   Nose: Mucosal edema and rhinorrhea present.  Right sinus exhibits no maxillary sinus tenderness and no frontal sinus tenderness. Left sinus exhibits no maxillary sinus tenderness and no frontal sinus tenderness.   Mouth/Throat: Posterior oropharyngeal erythema present. No oropharyngeal exudate, posterior oropharyngeal edema or tonsillar abscesses.   Cardiovascular: Normal rate, regular rhythm and normal heart sounds.    Pulmonary/Chest: He is in respiratory distress. He has wheezes. He has rales.   Skin: Skin is warm and dry.   Psychiatric: He has a normal mood and affect.   Nursing note and vitals reviewed.      Lab Results   Component Value Date    WBC 7.25 08/15/2017    HGB 13.9 (L) 08/15/2017    HCT 41.2 08/15/2017     (L) 08/15/2017    CHOL 235 (H) 08/15/2017    TRIG 67 08/15/2017    HDL 84 (H) 08/15/2017    ALT 21 08/15/2017    AST 28 08/15/2017     08/15/2017    K 4.0 08/15/2017     08/15/2017    CREATININE 1.0 08/15/2017    BUN 11 08/15/2017    CO2 24 08/15/2017    TSH 1.345 04/01/2015    PSA 0.39 04/08/2009    INR 0.9 02/29/2016    HGBA1C 5.1 08/15/2017     The 10-year ASCVD risk score (Fayettevillerishi BACK Jr., et al., 2013) is: 16.9%    Values used to calculate the score:      Age: 67 years      Sex: Male      Is Non- : No      Diabetic: No      Tobacco smoker: Yes      Systolic Blood Pressure: 128 mmHg      Is BP treated: Yes      HDL Cholesterol: 84 mg/dL      Total Cholesterol: 235 mg/dL    Assessment:       1. Bipolar affective disorder, currently depressed, moderate    2. Chronic respiratory failure with hypoxia    3. Hep C w/o coma, chronic    4. Chronic obstructive pulmonary disease, unspecified COPD type    5. Mood disorder    6. Bipolar affective disorder, current episode hypomanic        Plan:       Bipolar affective disorder, currently depressed, moderate    Chronic respiratory failure with hypoxia  -     Discontinue: cephALEXin (KEFLEX) 500 MG capsule; Take 1 capsule (500 mg total) by mouth every 8 (eight)  hours.  Dispense: 30 capsule; Refill: 0  -     Discontinue: predniSONE (DELTASONE) 10 MG tablet; Take 1 tablet (10 mg total) by mouth once daily.  Dispense: 10 tablet; Refill: 0  -     albuterol-ipratropium 2.5mg-0.5mg/3mL (DUO-NEB) 0.5 mg-3 mg(2.5 mg base)/3 mL nebulizer solution; Take 3 mLs by nebulization every 6 (six) hours while awake.  Dispense: 180 mL; Refill: 3  -     cephALEXin (KEFLEX) 500 MG capsule; Take 1 capsule (500 mg total) by mouth every 8 (eight) hours.  Dispense: 30 capsule; Refill: 0  -     predniSONE (DELTASONE) 10 MG tablet; Take 1 tablet (10 mg total) by mouth once daily.  Dispense: 10 tablet; Refill: 0    Hep C w/o coma, chronic  -     Comprehensive metabolic panel; Future; Expected date: 12/08/2017  -     AFP TUMOR MARKER; Future; Expected date: 12/08/2017  -     AMMONIA; Future; Expected date: 12/08/2017    Chronic obstructive pulmonary disease, unspecified COPD type  -     albuterol-ipratropium 2.5mg-0.5mg/3mL (DUO-NEB) 0.5 mg-3 mg(2.5 mg base)/3 mL nebulizer solution; Take 3 mLs by nebulization every 6 (six) hours while awake.  Dispense: 180 mL; Refill: 3    Mood disorder  -     clonazePAM (KLONOPIN) 2 MG Tab; TAKE 1 TABLET (2 MG TOTAL) BY MOUTH 3 (THREE) TIMES DAILY AS NEEDED.  Dispense: 90 tablet; Refill: 0    Bipolar affective disorder, current episode hypomanic  -     clonazePAM (KLONOPIN) 2 MG Tab; TAKE 1 TABLET (2 MG TOTAL) BY MOUTH 3 (THREE) TIMES DAILY AS NEEDED.  Dispense: 90 tablet; Refill: 0    Other orders  -     Discontinue: celecoxib (CELEBREX) 100 MG capsule; Take 2 capsules (200 mg total) by mouth 2 (two) times daily.  Dispense: 180 capsule; Refill: 3  -     celecoxib (CELEBREX) 100 MG capsule; Take 2 capsules (200 mg total) by mouth 2 (two) times daily.  Dispense: 180 capsule; Refill: 3      Patient readiness: acceptance and barriers:readiness, social stressors, environmental, economic and household issues    During the course of the visit the patient was educated and  counseled about the following:     Hypertension:   Regular aerobic exercise.  Obesity:   General weight loss/lifestyle modification strategies discussed (elicit support from others; identify saboteurs; non-food rewards, etc).    Goals: Hypertension: Reduce Blood Pressure and Obesity: Reduce calorie intake and BMI    Did patient meet goals/outcomes: Yes    The following self management tools provided: blood pressure log  excercise log    Patient Instructions (the written plan) was given to the patient/family.     Time spent with patient: 30 minutes        He declined respiratory therapy, declined to sit down.

## 2017-12-11 ENCOUNTER — TELEPHONE (OUTPATIENT)
Dept: FAMILY MEDICINE | Facility: CLINIC | Age: 67
End: 2017-12-11

## 2017-12-11 DIAGNOSIS — N40.0 BENIGN NON-NODULAR PROSTATIC HYPERPLASIA WITHOUT LOWER URINARY TRACT SYMPTOMS: ICD-10-CM

## 2017-12-11 RX ORDER — SILODOSIN 8 MG/1
8 CAPSULE ORAL DAILY
Qty: 90 CAPSULE | Refills: 3 | Status: SHIPPED | OUTPATIENT
Start: 2017-12-11 | End: 2018-01-30 | Stop reason: SDUPTHER

## 2017-12-11 NOTE — TELEPHONE ENCOUNTER
Spoke patient requesting to reschedule his appointment on 12/14 to january. Appointment rescheduled. Patient also requesting a refill on his rapaflo. Please advise

## 2017-12-11 NOTE — TELEPHONE ENCOUNTER
----- Message from Kaycee Moore sent at 12/11/2017  8:21 AM CST -----  Contact: Self  Patient states that he has the flu and he is out of medication.  Please refill of his silodosin (RAPAFLO) 8 mg Cap capsule  Patient does want a call back to talk about his illness, please call  [phone].  Thanks    [facility] [address] [place] [address] AT [facility] [address] [address]  Phone: [phone] Fax: [phone]

## 2017-12-11 NOTE — TELEPHONE ENCOUNTER
----- Message from Kaycee Moore sent at 12/11/2017  8:26 AM CST -----  Contact: Self  Patient is requesting a refill of his ziprasidone (GEODON) 20 MG Cap.  He is also requesting a call back from Ally.  Please call [phone] (home).  Thanks    [facility] [address] [place] [address] AT [facility] [address] [address]  Phone: [phone] Fax: [phone]

## 2017-12-11 NOTE — TELEPHONE ENCOUNTER
Patient requesting a refill of Geodon. Upon further inspection it was noted this medication was refilled on 10-13-17 for a 90 day supply. Refill not due until 1-13-18.  Patient notified. Verbalized understanding.

## 2017-12-13 RX ORDER — TIOTROPIUM BROMIDE 18 UG/1
CAPSULE ORAL; RESPIRATORY (INHALATION)
Qty: 90 CAPSULE | Refills: 3 | Status: SHIPPED | OUTPATIENT
Start: 2017-12-13 | End: 2018-01-15 | Stop reason: SDUPTHER

## 2017-12-13 RX ORDER — ZIPRASIDONE HYDROCHLORIDE 20 MG/1
CAPSULE ORAL
Qty: 180 CAPSULE | Refills: 0 | Status: SHIPPED | OUTPATIENT
Start: 2017-12-13 | End: 2018-04-20 | Stop reason: SDUPTHER

## 2017-12-13 NOTE — TELEPHONE ENCOUNTER
----- Message from Erinn Valadez sent at 12/13/2017 10:45 AM CST -----  Patient is calling because office was supposed to call in lactulose (CHRONULAC) 10 gram/15 mL solution sent to:      [facility] [address] [facility], [place] - [address] AT [facility] [address]  [address]  [place] [address]  Phone: [phone] Fax: [phone]    Please call back when completed at [phone].

## 2017-12-13 NOTE — TELEPHONE ENCOUNTER
Patient called and request refill of geodon  Will call back when available to schedule appointment
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
## 2017-12-14 ENCOUNTER — TELEPHONE (OUTPATIENT)
Dept: FAMILY MEDICINE | Facility: CLINIC | Age: 67
End: 2017-12-14

## 2017-12-14 RX ORDER — LACTULOSE 10 G/15ML
SOLUTION ORAL; RECTAL
Qty: 1200 ML | Refills: 3 | Status: SHIPPED | OUTPATIENT
Start: 2017-12-14 | End: 2018-04-02 | Stop reason: SDUPTHER

## 2017-12-14 NOTE — TELEPHONE ENCOUNTER
Call placed to [facility] who verified patient needs refill of Lactulose. Also confirmed patient has refills available of Geodon and Rapaflo. Please refill Lactulose.   LR--10-13-17  LOV--12-8-17  FOV--1-12-18

## 2017-12-14 NOTE — TELEPHONE ENCOUNTER
----- Message from Letty Ziegler sent at 12/13/2017 11:59 AM CST -----  Please call patient in reference to his medications.  Call [phone]

## 2017-12-14 NOTE — TELEPHONE ENCOUNTER
This matter was handled in a separate encounter. Patient notified of status of refill of Lactulose.

## 2017-12-15 ENCOUNTER — OUTPATIENT CASE MANAGEMENT (OUTPATIENT)
Dept: ADMINISTRATIVE | Facility: OTHER | Age: 67
End: 2017-12-15

## 2017-12-19 ENCOUNTER — OUTPATIENT CASE MANAGEMENT (OUTPATIENT)
Dept: ADMINISTRATIVE | Facility: OTHER | Age: 67
End: 2017-12-19

## 2017-12-19 ENCOUNTER — TELEPHONE (OUTPATIENT)
Dept: FAMILY MEDICINE | Facility: CLINIC | Age: 67
End: 2017-12-19

## 2017-12-19 NOTE — TELEPHONE ENCOUNTER
Spoke to patient who request to have his recent lab results and a list of his future appointments mailed to his mother's home at [address] [place] [id number].  Information mailed as patient requested.

## 2017-12-19 NOTE — PROGRESS NOTES
12/19/17-RN CM and OPCM LCSW Louis completed follow up conference call with patient. Patient unable to talk at great length at this time as he needed to call his mother. Patient denies any additional needs at this time. Appreciative of contact from OPCM team. Patient has contact information for OPCM RN CM and LCSW. Encouraged patient to contact OPCM with any additional needs. Verbalized understanding. Will close case at this time.

## 2017-12-19 NOTE — TELEPHONE ENCOUNTER
----- Message from Lyn Gary sent at 12/19/2017 12:37 PM CST -----  Contact: Hesham  Calling to have a nurse call him back about the tests he is going to have done and to get a print out of all appointments mailed out to him. Call [phone] (home)   thanks

## 2017-12-19 NOTE — PROGRESS NOTES
12/19/17: LCSW and RN conference called pt for follow up.  Pt said he has to cut this conversation short as he had to check on his mother.  Pt appreciative of call; he did not report any further social needs or concerns at this time.  LCSW will close case.

## 2017-12-27 ENCOUNTER — TELEPHONE (OUTPATIENT)
Dept: FAMILY MEDICINE | Facility: CLINIC | Age: 67
End: 2017-12-27

## 2017-12-27 ENCOUNTER — TELEPHONE (OUTPATIENT)
Dept: PHYSICAL MEDICINE AND REHAB | Facility: CLINIC | Age: 67
End: 2017-12-27

## 2017-12-27 DIAGNOSIS — J44.1 COPD WITH ACUTE EXACERBATION: ICD-10-CM

## 2017-12-27 RX ORDER — SPIRONOLACTONE 25 MG/1
25 TABLET ORAL DAILY
Qty: 90 TABLET | Refills: 3 | Status: SHIPPED | OUTPATIENT
Start: 2017-12-27 | End: 2018-07-08 | Stop reason: SDUPTHER

## 2017-12-27 NOTE — TELEPHONE ENCOUNTER
----- Message from Kaycee Moore sent at 12/27/2017 11:37 AM CST -----  Contact: Self  Patient is requesting a refill of personal medicine for his manhood, he wouldn't give me the name of it just wants you to call him back.  [phone] (home).  Thanks

## 2017-12-27 NOTE — TELEPHONE ENCOUNTER
----- Message from Kaycee Moore sent at 12/27/2017 11:35 AM CST -----  Contact: self  Patient is trying to reschedule his appointment that was cancelled on 1/5.  Please call him at [phone] (home).  Thank you!

## 2017-12-27 NOTE — TELEPHONE ENCOUNTER
----- Message from Sheron Bermudez sent at 12/27/2017  8:56 AM CST -----  Contact: Patient  States that he needs a refill for his fluid medication.  He didn't have the name of the medication with him and he would like to talk to Ally.  Please call the patient back at [phone].  Thank you      [facility] [address] [place] [address] AT [facility] [address] [address]  Phone: [phone] Fax: [phone]

## 2017-12-27 NOTE — TELEPHONE ENCOUNTER
Spoke with patient. Documentation in another encounter. Pt. Requesting 90 day supply on medication.

## 2017-12-28 ENCOUNTER — TELEPHONE (OUTPATIENT)
Dept: FAMILY MEDICINE | Facility: CLINIC | Age: 67
End: 2017-12-28

## 2017-12-28 NOTE — TELEPHONE ENCOUNTER
----- Message from Sheron Bermudez sent at 12/27/2017 12:54 PM CST -----  Contact: Patient  Patient had questions about the appointment scheduled for an EMG.  He did not know what it was for or what it is.  A message was sent but it was a referral and the department requested for the doctor to re-schedule.   He requested to cancel at this time.  Please call the patient back about this test at [phone].  Thank you

## 2017-12-29 ENCOUNTER — TELEPHONE (OUTPATIENT)
Dept: PHYSICAL MEDICINE AND REHAB | Facility: CLINIC | Age: 67
End: 2017-12-29

## 2017-12-29 NOTE — TELEPHONE ENCOUNTER
----- Message from Lyn Gary sent at 12/29/2017 11:05 AM CST -----  Contact: Jey Donald would like a call back regarding prep for EMG. Please call [phone] (home)   Thanks!

## 2018-01-02 ENCOUNTER — OUTPATIENT CASE MANAGEMENT (OUTPATIENT)
Dept: ADMINISTRATIVE | Facility: OTHER | Age: 68
End: 2018-01-02

## 2018-01-02 NOTE — PROGRESS NOTES
Please note this patient was mailed an Outpatient Care Management Patient Satisfaction Discharge Survey on 1/2/18.    Please contact [facility] at [phone] [phone] with any questions.    Thank you,    Maura Alvarado, SSC

## 2018-01-09 DIAGNOSIS — J96.11 CHRONIC RESPIRATORY FAILURE WITH HYPOXIA: Primary | ICD-10-CM

## 2018-01-11 ENCOUNTER — OUTPATIENT CASE MANAGEMENT (OUTPATIENT)
Dept: ADMINISTRATIVE | Facility: OTHER | Age: 68
End: 2018-01-11

## 2018-01-11 ENCOUNTER — TELEPHONE (OUTPATIENT)
Dept: FAMILY MEDICINE | Facility: CLINIC | Age: 68
End: 2018-01-11

## 2018-01-11 NOTE — TELEPHONE ENCOUNTER
----- Message from Lima Baxter sent at 1/5/2018 11:14 AM CST -----  Please call patient in regards to paperwork he states he just dropped off for Anibal, [phone] (home)

## 2018-01-11 NOTE — PROGRESS NOTES
Please note this patient was recently/12-19-17 enrolled and discharged from Outpatient Care Management. All goals were met.     Please contact [facility] with any questions at [phone].    Thank you,  Debora Castillo

## 2018-01-12 ENCOUNTER — TELEPHONE (OUTPATIENT)
Dept: FAMILY MEDICINE | Facility: CLINIC | Age: 68
End: 2018-01-12

## 2018-01-12 ENCOUNTER — DOCUMENTATION ONLY (OUTPATIENT)
Dept: FAMILY MEDICINE | Facility: CLINIC | Age: 68
End: 2018-01-12

## 2018-01-12 ENCOUNTER — OFFICE VISIT (OUTPATIENT)
Dept: FAMILY MEDICINE | Facility: CLINIC | Age: 68
End: 2018-01-12
Payer: MEDICARE

## 2018-01-12 ENCOUNTER — OUTPATIENT CASE MANAGEMENT (OUTPATIENT)
Dept: ADMINISTRATIVE | Facility: OTHER | Age: 68
End: 2018-01-12

## 2018-01-12 VITALS
BODY MASS INDEX: 30.45 KG/M2 | WEIGHT: 194 LBS | DIASTOLIC BLOOD PRESSURE: 79 MMHG | HEART RATE: 88 BPM | HEIGHT: 67 IN | TEMPERATURE: 99 F | SYSTOLIC BLOOD PRESSURE: 140 MMHG

## 2018-01-12 DIAGNOSIS — E78.2 MIXED HYPERLIPIDEMIA: ICD-10-CM

## 2018-01-12 DIAGNOSIS — R06.09 DYSPNEA ON EXERTION: ICD-10-CM

## 2018-01-12 DIAGNOSIS — F39 MOOD DISORDER: ICD-10-CM

## 2018-01-12 DIAGNOSIS — J42 CHRONIC BRONCHITIS, UNSPECIFIED CHRONIC BRONCHITIS TYPE: Primary | ICD-10-CM

## 2018-01-12 DIAGNOSIS — F31.0 BIPOLAR AFFECTIVE DISORDER, CURRENT EPISODE HYPOMANIC: ICD-10-CM

## 2018-01-12 DIAGNOSIS — K74.60 CIRRHOSIS OF LIVER WITHOUT ASCITES, UNSPECIFIED HEPATIC CIRRHOSIS TYPE: ICD-10-CM

## 2018-01-12 DIAGNOSIS — Z91.148 POOR COMPLIANCE WITH MEDICATION: ICD-10-CM

## 2018-01-12 PROCEDURE — 99214 OFFICE O/P EST MOD 30 MIN: CPT | Mod: PBBFAC,PO | Performed by: FAMILY MEDICINE

## 2018-01-12 PROCEDURE — 99214 OFFICE O/P EST MOD 30 MIN: CPT | Mod: S$PBB,,, | Performed by: FAMILY MEDICINE

## 2018-01-12 PROCEDURE — 99999 PR PBB SHADOW E&M-EST. PATIENT-LVL IV: CPT | Mod: PBBFAC,,, | Performed by: FAMILY MEDICINE

## 2018-01-12 RX ORDER — CLONAZEPAM 2 MG/1
TABLET ORAL
Qty: 90 TABLET | Refills: 0 | Status: SHIPPED | OUTPATIENT
Start: 2018-01-12 | End: 2018-02-06 | Stop reason: SDUPTHER

## 2018-01-12 NOTE — PATIENT INSTRUCTIONS
Exercising with Chronic Lung Disease: Taking the First Steps    Exercise has helped thousands of people with chronic lung disease gain more control over their lives. It can help you, too! Youll get started in pulmonary rehabilitation (rehab). The pulmonary rehab team will help you set safe, realistic goals. To have lasting results, exercise has to be a lifelong commitment. This means you must keep up with it even after your pulmonary rehab program has ended.  Assessing your needs  Before you start exercising, the pulmonary rehab team will assess your needs. You will be asked about your health history and symptoms. If you have joint pain or any other health problems, be sure to discuss them with the team. This lets the pulmonary rehab team make sure you are safe and comfortable during exercise.  How far can you go?  The pulmonary rehab team needs to know how much you can safely do right now. To find out, you may have a 6-minute timed-distance walk test. This is not a race. It involves walking on a flat surface, such as a hallway or short track. The test shows how far you can walk in 6 minutes, and what symptoms happen. A team member will ask about your shortness of breath and if you are in any pain. Your heart rate will be checked, too. During the test, you can stop and rest if you need to. Once you catch your breath, keep going.  Your oxygen levels  A pulse oximeter is a small instrument that measures the oxygen in your blood. Its attached to a small probe that slips over your finger. The oxygen in your blood will be measured before, during, and after your walk test. This shows if oxygen needs to be prescribed. You may need oxygen during exercise even if you dont use it at other times. If you already use oxygen, you may need a different flow rate during exercise.  An exercise program just for you  Youll follow an exercise program thats been specially designed for people with chronic lung disease. This program  will be tailored to your own needs. In other words, its an exercise program just for you. And youll have support every step of the way.  · Starting out. The staff will help you get started slowly and safely. With each exercise session, youll do a little more than you did the time before.  · In the long run. Youll probably work up to about 30 to 60 minutes of exercise a day, most days of the week. Youll likely be exercising at the pulmonary rehab facility as well as at home.  Achieving your goals  Exercise will be most rewarding if youre working toward a goal. Sometimes it helps to break up big goals into smaller ones. Say your long-term goal is to play a round of golf. To approach this, you could start with a smaller goal of hitting a bucket of balls. When youve mastered that goal, you could move on to playing a few holes. Each time you meet a smaller goal, youre one step closer to meeting the big one.  Whats stopping me?  Its easy to think of reasons why exercise is hard. Try to face your fears and excuses head-on. Whats stopping you from exercising? Write down anything that comes to mind. For each, try to think of at least two possible solutions.  Example  · Whats stopping me? Fear of becoming short of breath.  · Possible solutions. Do pursed-lip breathing during exercise. Stop and rest when I need to.  Whats stopping me? _________________________________________________  Possible solutions: _____________________________________________________  Whats stopping me? _________________________________________________  Possible solutions: _____________________________________________________   Date Last Reviewed: 5/1/2016  © 4091-2860 [employer]. [address] [place] [address], [place] [address]. All rights reserved. This information is not intended as a substitute for professional medical care. Always follow your healthcare professional's instructions.

## 2018-01-12 NOTE — PROGRESS NOTES
Subjective:       Patient ID: Hesham Serna is a 67 y.o. male.    Chief Complaint: COPD and Dizziness    COPD   This is a chronic problem. The current episode started more than 1 month ago. The problem occurs intermittently. The problem has been unchanged. Associated symptoms include arthralgias, chills, congestion, coughing, fatigue, myalgias and swollen glands. Pertinent negatives include no abdominal pain. The symptoms are aggravated by smoking, walking and stress. The treatment provided mild relief.     Review of Systems   Constitutional: Positive for chills and fatigue.   HENT: Positive for congestion, hearing loss, mouth sores, rhinorrhea and sinus pressure.    Respiratory: Positive for cough, chest tightness, shortness of breath and wheezing.    Cardiovascular: Positive for palpitations.   Gastrointestinal: Negative for abdominal pain.   Genitourinary: Positive for frequency.   Musculoskeletal: Positive for arthralgias, back pain and myalgias.   Psychiatric/Behavioral: Positive for agitation, behavioral problems and confusion.       Patient Active Problem List   Diagnosis    Arthritis    Degenerative disc disease    Hypertension    Rosacea    Anxiety state    Episodic mood disorder    Hep C w/o coma, chronic    Cluster headaches    BPH with urinary obstruction    DDD (degenerative disc disease), cervical    Cirrhosis    Obese    Bipolar affective disorder    DDD (degenerative disc disease), lumbar    Chronic narcotic dependence    Bipolar affective disorder, currently depressed, moderate    Unspecified drug dependence, continuous    Alcoholic cirrhosis    Urinary retention    Nephrolithiasis    BPH (benign prostatic hyperplasia)    Mixed simple and mucopurulent chronic bronchitis    Chronic respiratory failure with hypoxia    Oxygen dependent       Objective:      Physical Exam   Constitutional: Vital signs are normal. He appears listless. He appears toxic. He has a sickly  appearance.   HENT:   Right Ear: Decreased hearing is noted.   Left Ear: Decreased hearing is noted.   Nose: Mucosal edema, rhinorrhea and septal deviation present.   Eyes: Right eye exhibits chemosis. Left eye exhibits chemosis.   Neck: Spinous process tenderness and muscular tenderness present. Decreased range of motion present.   Cardiovascular: Normal pulses.  An irregularly irregular rhythm present. PMI is displaced.  Exam reveals distant heart sounds.    Pulmonary/Chest: He is in respiratory distress. He has decreased breath sounds in the right lower field and the left lower field. He has wheezes in the right upper field and the left upper field. He has rhonchi in the right lower field and the left lower field.   Neurological: He appears listless.   Psychiatric: Thought content normal. His mood appears anxious. His affect is angry and inappropriate. His speech is rapid and/or pressured. He is aggressive. Cognition and memory are impaired. He exhibits a depressed mood.       Lab Results   Component Value Date    WBC 7.25 08/15/2017    HGB 13.9 (L) 08/15/2017    HCT 41.2 08/15/2017     (L) 08/15/2017    CHOL 235 (H) 08/15/2017    TRIG 67 08/15/2017    HDL 84 (H) 08/15/2017    ALT 21 08/15/2017    AST 28 08/15/2017     12/08/2017    K 4.2 12/08/2017     12/08/2017    CREATININE 1.0 12/08/2017    BUN 15 12/08/2017    CO2 31 (H) 12/08/2017    TSH 1.345 04/01/2015    PSA 0.39 04/08/2009    INR 0.9 02/29/2016    HGBA1C 5.1 08/15/2017     The 10-year ASCVD risk score (Salineno NAZANIN Jr., et al., 2013) is: 19.6%    Values used to calculate the score:      Age: 67 years      Sex: Male      Is Non- : No      Diabetic: No      Tobacco smoker: Yes      Systolic Blood Pressure: 140 mmHg      Is BP treated: Yes      HDL Cholesterol: 84 mg/dL      Total Cholesterol: 235 mg/dL    Assessment:       1. Chronic bronchitis, unspecified chronic bronchitis type    2. Dyspnea on exertion    3. Mood  disorder    4. Bipolar affective disorder, current episode hypomanic    5. Poor compliance with medication    6. Cirrhosis of liver without ascites, unspecified hepatic cirrhosis type    7. Mixed hyperlipidemia        Plan:       Chronic bronchitis, unspecified chronic bronchitis type  -     X-Ray Chest PA And Lateral; Future; Expected date: 01/12/2018  -     2D Echo w/ Color Flow Doppler; Future  -     Ambulatory referral to Pulmonology  -     Ambulatory referral to Outpatient Case Management  -     Comprehensive metabolic panel; Future; Expected date: 01/12/2018  -     CBC auto differential; Future; Expected date: 01/12/2018  -     HEPATITIS C RNA, QUANTITATIVE, PCR; Future; Expected date: 01/12/2018  -     Lipid panel; Future; Expected date: 01/12/2018    Dyspnea on exertion  -     X-Ray Chest PA And Lateral; Future; Expected date: 01/12/2018  -     2D Echo w/ Color Flow Doppler; Future  -     Ambulatory referral to Pulmonology  -     Ambulatory referral to Outpatient Case Management    Mood disorder  -     clonazePAM (KLONOPIN) 2 MG Tab; TAKE 1 TABLET (2 MG TOTAL) BY MOUTH 3 (THREE) TIMES DAILY AS NEEDED.  Dispense: 90 tablet; Refill: 0  -     Ambulatory referral to Outpatient Case Management    Bipolar affective disorder, current episode hypomanic  -     clonazePAM (KLONOPIN) 2 MG Tab; TAKE 1 TABLET (2 MG TOTAL) BY MOUTH 3 (THREE) TIMES DAILY AS NEEDED.  Dispense: 90 tablet; Refill: 0  -     Ambulatory referral to Outpatient Case Management    Poor compliance with medication  -     Ambulatory referral to Outpatient Case Management    Cirrhosis of liver without ascites, unspecified hepatic cirrhosis type  -     Comprehensive metabolic panel; Future; Expected date: 01/12/2018  -     CBC auto differential; Future; Expected date: 01/12/2018  -     HEPATITIS C RNA, QUANTITATIVE, PCR; Future; Expected date: 01/12/2018  -     Lipid panel; Future; Expected date: 01/12/2018    Mixed hyperlipidemia  -     Lipid panel;  Future; Expected date: 01/12/2018      Patient readiness: acceptance and barriers:readiness, social stressors, environmental, economic, household issues and occupational issues    During the course of the visit the patient was educated and counseled about the following:     Hypertension:   Dietary sodium restriction.  Obesity:   General weight loss/lifestyle modification strategies discussed (elicit support from others; identify saboteurs; non-food rewards, etc).    Goals: Hypertension: Reduce Blood Pressure and Obesity: Reduce calorie intake and BMI    Did patient meet goals/outcomes: No    The following self management tools provided: declined    Patient Instructions (the written plan) was given to the patient/family.     Time spent with patient: 30 minutes    Barriers to medications present (yes )    Adverse reactions to current medications (yes)    Over the counter medications reviewed (Yes)        There have been some probable medication, dietary and lifestyle compliance issues here. I have discussed with him the great importance of following the treatment plan exactly as directed in order to achieve a good medical outcome.    This has been fully explained to the patient, who indicates understanding.He declined changes.

## 2018-01-12 NOTE — TELEPHONE ENCOUNTER
Dear Ms Alvarado,  I left you a message to help Mr Serna to arrange for NH or assist living.  Thanks  Dr Ramos

## 2018-01-12 NOTE — PROGRESS NOTES
Pre-Visit Chart Review  For Appointment Scheduled on 01/12/2018    There are no preventive care reminders to display for this patient.

## 2018-01-12 NOTE — TELEPHONE ENCOUNTER
----- Message from Maura Alvarado sent at 1/12/2018 11:46 AM CST -----  Please note this patient was recently enrolled and discharged (12-19-17) from Outpatient Care Management. All goals were met.      Please contact [facility] with any questions at [phone].      Maura Alvarado, [facility]  Outpatient Complex Care Mgmt  Ext [phone]

## 2018-01-15 ENCOUNTER — OUTPATIENT CASE MANAGEMENT (OUTPATIENT)
Dept: ADMINISTRATIVE | Facility: OTHER | Age: 68
End: 2018-01-15

## 2018-01-15 ENCOUNTER — TELEPHONE (OUTPATIENT)
Dept: PULMONOLOGY | Facility: CLINIC | Age: 68
End: 2018-01-15

## 2018-01-15 DIAGNOSIS — J43.9 PULMONARY EMPHYSEMA, UNSPECIFIED EMPHYSEMA TYPE: ICD-10-CM

## 2018-01-15 DIAGNOSIS — J44.9 CHRONIC OBSTRUCTIVE PULMONARY DISEASE, UNSPECIFIED COPD TYPE: ICD-10-CM

## 2018-01-15 DIAGNOSIS — J96.11 CHRONIC RESPIRATORY FAILURE WITH HYPOXIA: ICD-10-CM

## 2018-01-15 DIAGNOSIS — J41.8 MIXED SIMPLE AND MUCOPURULENT CHRONIC BRONCHITIS: Primary | Chronic | ICD-10-CM

## 2018-01-15 RX ORDER — FLUTICASONE PROPIONATE AND SALMETEROL 500; 50 UG/1; UG/1
POWDER RESPIRATORY (INHALATION)
Qty: 180 EACH | Refills: 3 | Status: SHIPPED | OUTPATIENT
Start: 2018-01-15 | End: 2018-04-13 | Stop reason: SDUPTHER

## 2018-01-15 RX ORDER — ROFLUMILAST 500 UG/1
TABLET ORAL
Qty: 30 TABLET | Refills: 11 | Status: CANCELLED | OUTPATIENT
Start: 2018-01-15

## 2018-01-15 RX ORDER — TIOTROPIUM BROMIDE 18 UG/1
CAPSULE ORAL; RESPIRATORY (INHALATION)
Qty: 90 CAPSULE | Refills: 3 | Status: CANCELLED | OUTPATIENT
Start: 2018-01-15

## 2018-01-15 RX ORDER — FLUTICASONE PROPIONATE AND SALMETEROL 500; 50 UG/1; UG/1
POWDER RESPIRATORY (INHALATION)
Qty: 180 EACH | Refills: 3 | Status: CANCELLED | OUTPATIENT
Start: 2018-01-15

## 2018-01-15 RX ORDER — TIOTROPIUM BROMIDE 18 UG/1
CAPSULE ORAL; RESPIRATORY (INHALATION)
Qty: 90 CAPSULE | Refills: 3 | Status: SHIPPED | OUTPATIENT
Start: 2018-01-15 | End: 2018-04-13 | Stop reason: SDUPTHER

## 2018-01-15 RX ORDER — ROFLUMILAST 500 UG/1
TABLET ORAL
Qty: 30 TABLET | Refills: 11 | Status: SHIPPED | OUTPATIENT
Start: 2018-01-15 | End: 2018-04-11

## 2018-01-15 NOTE — TELEPHONE ENCOUNTER
----- Message from Indu Whitt sent at 1/15/2018 10:11 AM CST -----  Contact: Patient  Patient is calling to see if the doctor could refill his medication for him until his appointment in April.  Please call patient to let him know.  DALIRESP 500 mcg Tab  SPIRIVA WITH HANDIHALER 18 mcg inhalation capsule  ADVAIR DISKUS 500-50 mcg/dose DsDv diskus inhaler  Call Back#[phone]  [facility]     [id number] - [place], [place] - [address] [address]  Phone: [phone] Fax: [phone]

## 2018-01-15 NOTE — TELEPHONE ENCOUNTER
Called pt regarding below message. Pt states he was dismissed from Dr. Judge's practice and has a new pt appt set up to see Dr. Randle in April. Pt states he does not have enough medication to last until then and would like Dr. Ramos to refill until his appt with Dr. Randle. Informed pt I will send this to Dr. Ramos for review and someone will return his call with additional information. Pt verbalized understanding with no further questions.     ----- Message from Gian Cardoso sent at 1/15/2018  8:27 AM CST -----  Contact: same  Patient called in and stated he needs Dr. Ramos to refer him to a new lung doctor as Dr. Toby Judge dismissed him from practice.  Patient would like a call back at [phone]

## 2018-01-15 NOTE — TELEPHONE ENCOUNTER
Tried to explain to the patient that he has refills on these medications that he is requesting, the patient is trying to get the rx changed to another pharmacy. I explained to him to call the pharmacy that he has the meds filled and change the pharmacy.

## 2018-01-15 NOTE — PROGRESS NOTES
Thank you for the referral. The following patient has been assigned to Norm Paul RN with Outpatient Complex Care Management for high risk screening.    Reason for referral: This S[facility] received voicemail from Dr. Samuel Ramos on [facility] mainline after hours on Friday 1/12/2018. Patient needs assistance with nursing home or assisted living placement.     Please contact [facility] at [phone] with any questions.    Thank you,    Debora Castillo  Outpatient Care Management  Ext. [phone]

## 2018-01-15 NOTE — TELEPHONE ENCOUNTER
Spoke to pt on separate encounter.     ----- Message from Jovana Banks sent at 1/15/2018 10:20 AM CST -----  Requesting a refill for albuterol-ipratropium 2.5mg-0.5mg/3mL (DUO-NEB) 3 x a day for nebulizer   / pt states he is out of this medication / will be using [facility] / call [phone] (home)

## 2018-01-16 RX ORDER — IPRATROPIUM BROMIDE AND ALBUTEROL SULFATE 2.5; .5 MG/3ML; MG/3ML
3 SOLUTION RESPIRATORY (INHALATION)
Qty: 180 ML | Refills: 3 | Status: SHIPPED | OUTPATIENT
Start: 2018-01-16 | End: 2018-04-11 | Stop reason: SDUPTHER

## 2018-01-16 RX ORDER — ALBUTEROL SULFATE 90 UG/1
2 AEROSOL, METERED RESPIRATORY (INHALATION) EVERY 6 HOURS PRN
Qty: 1 EACH | Refills: 5 | Status: SHIPPED | OUTPATIENT
Start: 2018-01-16 | End: 2018-04-11 | Stop reason: SDUPTHER

## 2018-01-17 ENCOUNTER — TELEPHONE (OUTPATIENT)
Dept: PSYCHIATRY | Facility: CLINIC | Age: 68
End: 2018-01-17
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
## 2018-01-17 NOTE — TELEPHONE ENCOUNTER
"Patient requesting a return call from .  States, " would like to talk to her before I leave [facility] on the 27th of this month"    States, not urgent just need to talk to her."  Please advice [phone]  Krystle    "

## 2018-01-18 ENCOUNTER — TELEPHONE (OUTPATIENT)
Dept: FAMILY MEDICINE | Facility: CLINIC | Age: 68
End: 2018-01-18

## 2018-01-18 ENCOUNTER — TELEPHONE (OUTPATIENT)
Dept: PSYCHIATRY | Facility: CLINIC | Age: 68
End: 2018-01-18

## 2018-01-18 DIAGNOSIS — F39 MOOD DISORDER: Primary | ICD-10-CM

## 2018-01-18 DIAGNOSIS — Z59.10 INADEQUATE HOUSING: ICD-10-CM

## 2018-01-18 SDOH — SOCIAL DETERMINANTS OF HEALTH (SDOH): INADEQUATE HOUSING UNSPECIFIED: Z59.10

## 2018-01-18 NOTE — TELEPHONE ENCOUNTER
----- Message from RT Alice sent at 1/18/2018 10:57 AM CST -----  Contact: pt    pt , requesting medication refill: Nurse Misti Clarke please call soon, thanks.

## 2018-01-18 NOTE — TELEPHONE ENCOUNTER
Please see attached message. Patient requesting prescription for Cephalexin. Upon further inspection it was noted this medication was discontinued on 1-12-18 citing alternate therapy as reason for d/c. Please advise.

## 2018-01-18 NOTE — TELEPHONE ENCOUNTER
Called patient and offered him an appointment today @ 130 pm since its been awhile since he has been seen. Patient states he does not have a  Vehicle and cannot get a cab they are all full. Explained Dr Queen will return his call later today when she is available. Patient was ok with that.

## 2018-01-18 NOTE — TELEPHONE ENCOUNTER
Patient was returning Dr Queen call. I explained would let her know and a she will try him at later time today as she will be in clinic with patients
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
## 2018-01-19 ENCOUNTER — OUTPATIENT CASE MANAGEMENT (OUTPATIENT)
Dept: ADMINISTRATIVE | Facility: OTHER | Age: 68
End: 2018-01-19

## 2018-01-19 ENCOUNTER — TELEPHONE (OUTPATIENT)
Dept: FAMILY MEDICINE | Facility: CLINIC | Age: 68
End: 2018-01-19

## 2018-01-19 ENCOUNTER — TELEPHONE (OUTPATIENT)
Dept: PSYCHIATRY | Facility: CLINIC | Age: 68
End: 2018-01-19
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
## 2018-01-19 NOTE — TELEPHONE ENCOUNTER
Called patient to set up the 2 weeks appointment and he states he can not set one at this time he will have to call when he can make an appointment because he is only able to use cabs at this time.   He also stated that he missed case managements call he was getting in a cab to go somewhere he will await them to call him back next week.

## 2018-01-19 NOTE — TELEPHONE ENCOUNTER
----- Message from Edilia Padilla sent at 1/19/2018  8:14 AM CST -----  Contact:  patient  Patient, Hesham Serna, [phone] is returning the office call.  Call placed to office, but no answer.  Please advise.  Thanks!

## 2018-01-19 NOTE — TELEPHONE ENCOUNTER
----- Message from Maura Alvarado sent at 1/19/2018 11:28 AM CST -----  Please note this patient is currently enrolled in our program. I will forward this information to her assigned , Norm Paul, TIARA to assist the patient.    Mood disorder  Inadequate housing  Please see encounter associated with this order. Mother/pt asking for resources for patient's placement in [facility]. Mother has told him he has to go, pt not able to return to home and mother reports he is not able to care for himself and she is not able to care for him. Pt does report he is agreeable to placement.    Thank you,    Maura Alvarado, [facility]  Outpatient Complex Care Mgmt  Ext [phone]

## 2018-01-19 NOTE — TELEPHONE ENCOUNTER
----- Message from Brooklyn Marshall sent at 1/19/2018 11:43 AM CST -----  Contact: self [phone]  He is calling to follow up on the antibiotic prescription.  Pharmacy has not received it yet.  Please call him.  Thank you!

## 2018-01-19 NOTE — TELEPHONE ENCOUNTER
Spoke to patient how states he needed antibiotics for his lungs. States he is having a bad cough. Please advise.

## 2018-01-22 ENCOUNTER — TELEPHONE (OUTPATIENT)
Dept: PULMONOLOGY | Facility: CLINIC | Age: 68
End: 2018-01-22

## 2018-01-22 ENCOUNTER — OUTPATIENT CASE MANAGEMENT (OUTPATIENT)
Dept: ADMINISTRATIVE | Facility: OTHER | Age: 68
End: 2018-01-22

## 2018-01-22 ENCOUNTER — TELEPHONE (OUTPATIENT)
Dept: FAMILY MEDICINE | Facility: CLINIC | Age: 68
End: 2018-01-22

## 2018-01-22 DIAGNOSIS — J41.8 MIXED SIMPLE AND MUCOPURULENT CHRONIC BRONCHITIS: Chronic | ICD-10-CM

## 2018-01-22 DIAGNOSIS — F31.32 BIPOLAR AFFECTIVE DISORDER, CURRENTLY DEPRESSED, MODERATE: ICD-10-CM

## 2018-01-22 DIAGNOSIS — Z99.81 OXYGEN DEPENDENT: ICD-10-CM

## 2018-01-22 DIAGNOSIS — J96.11 CHRONIC RESPIRATORY FAILURE WITH HYPOXIA: ICD-10-CM

## 2018-01-22 DIAGNOSIS — F41.1 ANXIETY STATE: ICD-10-CM

## 2018-01-22 DIAGNOSIS — I15.0 RENOVASCULAR HYPERTENSION: ICD-10-CM

## 2018-01-22 DIAGNOSIS — J42 CHRONIC BRONCHITIS, UNSPECIFIED CHRONIC BRONCHITIS TYPE: Primary | ICD-10-CM

## 2018-01-22 DIAGNOSIS — R33.9 URINARY RETENTION: Chronic | ICD-10-CM

## 2018-01-22 DIAGNOSIS — F11.20 CHRONIC NARCOTIC DEPENDENCE: ICD-10-CM

## 2018-01-22 NOTE — TELEPHONE ENCOUNTER
**continuation of previous note**    Advised pt that I called [facility] to let them know he has an upcoming appointment for his Oxygen use.

## 2018-01-22 NOTE — PATIENT INSTRUCTIONS
Preventing Falls in the Home  An adult or child can fall for many reasons. If you are an older adult, you may fall because your reaction time slows down. Your muscles and joints may get stiff, weak, or less flexible because of illness, medicines, or a physical condition. These things can also make a child more likely to fall or be injured in a fall.  Other health problems that make falls more likely include:  · Arthritis  · Dizziness or lightheadedness when you get out of bed (orthostatic hypotension)  · History of a stroke  · Dizziness  · Anemia  · Certain medicines taken for mental illness  · Problems with balance or gait  · History of falls with or without an injury  · Changes in vision (vision impairment)  · Changes in thinking skills and memory (cognitive impairment)  Injuries from a fall can include broken bones, dislocated joints, and cuts. When these injuries are serious enough, they can make it impossible for you or a child who is injured in a fall to live on his or her own.  Prevention tips  To help prevent falls and fall-related injuries, follow the tips below.   Floors  Make floors safer by doing the following:   · Put nonskid pads under area rugs.  · Remove throw rugs.  · Replace worn floor coverings.  · Tack carpets firmly to each step on carpeted stairs. Put nonskid strips on the edges of uncarpeted stairs.  · Keep floors and stairs free of clutter and cords.  · Arrange furniture so there are clear pathways.  · Clean up any spills right away.  · Wear shoes that fit.  Bathrooms    Make bathrooms safer by doing the following:   · Install grab bars in the tub or shower.  · Apply nonskid strips or put a nonskid rubber mat in the tub or shower.  · Sit on a bath chair to bathe.  · Use bathmats with nonskid backing.  Lighting and the environment  Improve lighting in your home by doing the following:   · Keep a flashlight in each room. Or put a lamp next to the bed within easy reach.  · Put nightlights in  the bedrooms, hallways, kitchen, and bathrooms.  · Make sure all stairways have good lighting.  · Take your time when going up and down stairs.  · Put handrails on both sides of stairs and in walkways for more support. To prevent injury to your wrist or arm, dont use handrails to pull yourself up.  · Install grab bars to pull yourself up.  · Move or rearrange items that you use often. This will make them easier to find or reach.  · Look at your home to find any safety hazards. Especially look at doorways, walkways, and the driveway. Remove or repair any safety problems that you find.  Date Last Reviewed: 8/1/2016 © 2000-2017 [employer]. [address] [place] [address], [place] [address]. All rights reserved. This information is not intended as a substitute for professional medical care. Always follow your healthcare professional's instructions.        Preventing Falls: Are You At Risk of Falling?     Ask for help to reduce risk of falling in your home.     As you get older, you're not as steady on your feet as you once were. And you may have health problems you didn't have when you were younger. So, it's not surprising that older people are more likely to trip and fall. Falling can be very serious. It can change your overall health and quality of life. That's why it's important to be aware of your own risk of falling.  The dangers of falling  Falls are one of the main causes of injury in people over age 65. An older person who falls may take longer to get better than a younger person. And, after a fall, an older person is more likely to have problems that don't go away. So, preventing falls can help you avoid serious health problems.  Are you at risk of falling?  Answer these questions to rate your level of risk.  · Are you a woman?  · Have you fallen or stumbled in the last year?  · Are you over age 65?  · Are you ever dizzy or lightheaded with standing?  · Do you have a hard time getting in and out of the  "bathtub or on and off the toilet?  · Do you lean on objects to help you get around? Or do you use a cane or walker?  · Do you have vision or hearing problems? For example, do you need new glasses or hearing aids?  · Do you have 2 or more long-lasting (chronic) medical conditions?  · Do you take 3 or more medicines?  · Have you felt depressed recently?  · Have you had more trouble with your memory in recent months?  · Are there hazards in your home that might cause you to fall, such as loose rugs or poor lighting?  · Do you have a pet that jumps on you or might trip you?  · Have you stopped getting regular exercise?  · Do you have diabetes?   · Do you have a neurologic disease, such as Parkinson or Alzheimer disease?   · Do you drink alcohol?  · Do you wear athletic shoes or slippers, or go barefoot at home?  You can help prevent falls  If you answered "yes" to any of the above questions, you should take steps to reduce your risk of a fall. Monitoring health conditions and keeping walkways in your home free of clutter are just 2 ways. Changing is sometimes easier said than done. But keep in mind that even small changes can make you less likely to fall.  The fear of falling  It's normal to be scared of falling, especially if you've fallen before. But being afraid can actually make you more likely to fall. This is because:  · Fear might cause you to become less active. Being less active can lead to a loss of strength and balance.  · Fear can lead to isolation from others, depression, or the use of more medicines or alcohol. And all these things make falling even more likely.  To break the cycle, learn more about ways to avoid falling. As you take control, you may find yourself feeling less afraid.   Date Last Reviewed: 6/12/2015  © 2400-1491 [employer]. [address] [place] [address], [place] [address]. All rights reserved. This information is not intended as a substitute for professional medical care. Always " "follow your healthcare professional's instructions.        Exercises to Prevent Falls  Certain types of exercises may help make you less likely to fall. Try the ones below. Or do other exercises that your health care provider suggests. Depending on your health, you may need to start slowly. Don't let that stop you. Even small amounts of exercise can help you. Be sure to talk to your health care provider before starting any exercise program.       Improve balance  Many types of exercise can help improve balance. Ankur chi and yoga are good examples. Here's another one to try. You can do it anytime and almost anywhere.  · Stand next to a counter or solid support.  · Push yourself up onto your tiptoes.  · Hold for 5 seconds. If you start to lose your balance, hold on to the counter.  · Rest and repeat 5 times. Work up to holding for 20 to 30 seconds, if you can. Increase flexibility  Being more flexible makes it easier for you to move around safely. Try exercises like the seated hamstring stretch.  · Sit in a chair and put one foot on a stool.  · Straighten your leg and reach with both hands down either side of your leg. Reach as far down your leg as you can.  · Hold for about 20 seconds.  · Go back to the starting position. Then repeat 5 times. Switch legs. Build strength  "Resistance" exercises help build strength. You can do them without equipment. Or you can use weights, elastic bands, or special machines. One such exercise is called the biceps curl. You can hold a 1-pound weight or even a can of soup. Do this exercise at least 3 times a week. Strive for every day.  · Sit up straight in a chair.  · Keep your elbow close to your body and your wrist straight.  · Bend your arm, moving your hand up to your shoulder. Then slowly lower your arm.  · Repeat 5 times. Switch to the other arm.   Build your staying power  Aerobic exercises make your heart and lungs stronger so you can keep moving longer. Walking and swimming are " two of the best types of exercises you can do. Using a stationary bike is great, too. Find an aerobic exercise that you enjoy. Start slowly and build up. Even 5 minutes is helpful. Aim for a goal of 30 minutes, at least 3 times a week. You don't have to do 30 minutes in 1 session. Break it up and walk a little throughout the day.  More helpful tips  · Start easy. Slowly work up to doing more.  · Talk with your health care provider about the best exercises for you.  · Call senior centers or health clubs about exercise programs.  · If needed, have a family member watch you walk every so often to check your stability.  · Exercise with a friend. Choose an activity you both enjoy.  · Consider zoe chi or yoga to strengthen your balance.  · Try exercises that you can do anytime, anywhere. Here are 2 examples. Have someone with you when you first try these:  ¨ Practice walking by placing 1 foot right in front of the other.  ¨ Stand up and sit down 10 times. Repeat this throughout the day.   Date Last Reviewed: 6/13/2015 © 2000-2017 [employer]. [address] [place] [address]. All rights reserved. This information is not intended as a substitute for professional medical care. Always follow your healthcare professional's instructions.        Preventing Falls: Moving Safely Using a Cane or Walker     When using a cane, keep it away from your feet so you dont trip.     A walking aid, such as a cane or walker, can help you stay more independent and avoid falls. Remember to keep your walking aid within easy reach when you're in a chair or in bed. And learn how to use it safely so you don't injure yourself. Be sure the cane or walker is the correct height. Hang your arm loosely at your side, and measure the distance from your wrist to the floor. The distance should be the same as the height of your cane or walker.  Using a cane  If you have a stronger side, hold the cane on the side of your stronger  leg.  1. Get your balance.  2. Move the cane and your weaker leg forward.  3. Support your weight on both the cane and your weaker side.  4. Step with your stronger leg.  5. Start again from step 1.  Using a walker  1. Roll the walker (or lift it, if you're using one without wheels) forward about 12 inches.  2. Step forward with your weaker leg first.  3. Use the walker to help keep your balance.  4. Bring your other foot forward to the center of the walker.  5. Start again from step 1.  Helpful tips  · Check with your health care provider about the right walking aid to use. Ask about a walker with a seat attached.  · Check the tips of your cane or walker to make sure they have nonskid covers.  · Move slowly from room to room. Don't rush.  · Sit down to get dressed.  · Use a kole pack or backpack to keep your hands free.  · Get help for jobs that mean climbing, even on a stepstool.  · Do not try going up or down stairs using a walker.   Date Last Reviewed: 6/12/2015  © 8455-4462 [employer]. [address] [place] [address], [place] [id number]. All rights reserved. This information is not intended as a substitute for professional medical care. Always follow your healthcare professional's instructions.        Preventing Falls: How to Prepare and What to Do    Falling is not something you want to think about. But it can make a big difference to plan ahead. If you're prepared, you'll know how to get help. And you'll be less likely to panic if you fall. This means you'll be able to do what's needed to get help right away.  How to prepare  · Have someone check on you daily.  · Keep a list of emergency numbers near the phone.  · Always have a way to call for help. Keep a cell phone with you at all times. Or talk with your healthcare provider about how to set up a home monitoring service. This involves wearing a small device around your neck or wrist. If you fall, you can press the button on the device. This alerts  emergency responders.  · Talk with your healthcare provider about an exercise program that's right for you. Regular exercise may reduce the risk of falling and the risk for injury related to a fall.  · It's important to have good lighting in your home. Avoid using throw rugs, because they can raise your risk of tripping and falling. Add grab bars in the bathroom to help reduce the risk of falling. Small changes can make your home safer. Talk with your healthcare provider about making your home safer.  What to do if you fall  Above all, try to stay calm:  · If you start to fall, try to relax your body. This will reduce the impact of the fall.  · After you fall, press your monitor button, or phone for help.  · Don't rush to get up. First, make sure you're not hurt.  · Roll onto your side, then crawl to a chair. Pull yourself up onto the chair slowly.  · You should be checked if you struck your head, lost consciousness, were confused afterward, or have any other concerns for injury.  · Be sure to tell your doctor that you fell.  A note to family and friends  If you're with a loved one when he or she starts to fall, don't try to stop the fall. Ease the person to the floor carefully, so neither of you gets hurt. Don't leave the person alone. And don't try to move him or her. Put a pillow under his or her head. Check for injuries. If help is needed right away, be sure to call 911.   Date Last Reviewed: 6/13/2015 © 2000-2017 [employer]. [address] [place] [address], [place] [address]. All rights reserved. This information is not intended as a substitute for professional medical care. Always follow your healthcare professional's instructions.        Chronic Lung Disease: Preventing Lung Infections  Chronic lung diseases include chronic obstructive pulmonary disease (COPD), which includes chronic bronchitis and emphysema. Other chronic lung diseases include pulmonary fibrosis, sarcoidosis, and other conditions.  When you have chronic lung diseases, it's very important to protect yourself from respiratory infections, like colds, the flu, and lung infections. Infections may cause your lung condition to worsen. Although you can't completely avoid them, there are things you can do to lessen the chance of infections.    Take precautions  Taking the following precautions can help you avoid illness:  · Remember to keep your hands away from your nose and mouth. Germs on your hands get into your respiratory system this way.  · Wash your hands often. When you wash them:  ¨ Use soap and warm water.  ¨ Rub your hands together well for at least 20 seconds.  ¨ Make sure to rinse them well.  ¨ Dry your hands on clean towels or air-dry them.  · Use hand  containing alcohol, if you are unable to wash your hands. Use the  after touching doorknobs, handles, and supermarket carts, for example, since lots of people touch them. Then wash your hands as soon as you can.  · To help prevent the flu, get a flu vaccination every year. This may be given at your healthcare provider's office, a drugstore, or pharmacy, or at work. Get your flu shot as soon as the vaccines are available in your area. This is usually around September each year.  · To help prevent pneumococcal pneumonia, get pneumonia vaccinations. Talk with your healthcare provider about which pneumococcal vaccinations you need.  · Try to stay away from people with respiratory infections, such as colds or the flu. Stay away from crowded places, like shopping centers or movie theatres during cold and flu season.  · If you smoke, think about quitting. In addition to causing or worsening many lung conditions, the lung damage from smoking increases your risk of infections. Stay away from others who smoke, too. This is also harmful and increases your chance of infections.  Date Last Reviewed: 4/14/2016  © 0112-6908 [employer]. [address] [facility], [place]  [address]. All rights reserved. This information is not intended as a substitute for professional medical care. Always follow your healthcare professional's instructions.        COPD Flare    You have had a flare-up of your COPD.  COPD, or chronic obstructive pulmonary disease, is a common lung disease. It causes your airways to become irritated and narrower. This makes it harder for you to breathe. Emphysema and chronic bronchitis are both types of COPD. This is a chronic condition, which means you always have it. Sometimes it gets worse. When this happens, it is called a flare-up.  Symptoms of COPD  People with COPD may have symptoms most of the time. In a flare-up, your symptoms get worse. These symptoms may mean you are having a flare-up:  · Shortness of breath, shallow or rapid breathing, or wheezing that gets worse  · Lung infection  · Cough that gets worse  · More mucus, thicker mucus or mucus of a different color  · Tiredness, decreased energy, or trouble doing your usual activities  · Fever  · Chest tightness  · Your symptoms dont get better even when you use your usual medicines, inhalers, and nebulizer  · Trouble talking  · You feel confused  Causes of flare-ups  Unfortunately, a flare-up can happen even though you did everything right, and you followed your doctors instructions. Some causes of flare-ups are:  · Smoking or secondhand smoke  · Colds, the flu, or respiratory infections  · Air pollution  · Sudden change in the weather  · Dust, irritating chemicals, or strong fumes  · Not taking your medicines as prescribed  Home care  Here are some things you can do at home to treat a flare-up:  · Try not to panic. This makes it harder to breathe, and keeps you from doing the right things.  · Dont smoke or be around others who are smoking.  · Try to drink more fluids than usual during a flare-up, unless your doctor has told you not to because of heart and kidney problems. More fluids can help loosen the  mucus.  · Use your inhalers and nebulizer, if you have one, as you have been told to.  · If you were given antibiotics, take them until they are used up or your doctor tells you to stop. Its important to finish the antibiotics, even though you feel better. This will make sure the infection has cleared.  · If you were given prednisone or another steroid, finish it even if you feel better.  Preventing a flare-up  Even though flare-ups happen, the best way to treat one is to prevent it before it starts. Here are some pointers:  · Dont smoke or be around others who are smoking.  · Take your medicines as you have been told.  · Talk with your doctor about getting a flu shot every year. Also find out if you need a pneumonia shot.  · If there is a weather advisory warning to stay indoors, try to stay inside when possible.  · Try to eat healthy and get plenty of sleep.  · Try to avoid things that usually set you off, like dust, chemical fumes, hairsprays, or strong perfumes.  Follow-up care  Follow up with your healthcare provider, or as advised.  If a culture was done, you will be told if your treatment needs to be changed. You can call as directed for the results.  If X-rays were done, you will be notified of any new findings that may affect your care.  Call 911  Call 911 if any of these occur:  · You have trouble breathing  · You feel confused or its difficult to wake you up  · You faint or lose consciousness  · You have a rapid heart rate  · You have new pain in your chest, arm, shoulder, neck or upper back  When to seek medical advice  Call your healthcare provider right away if any of these occur:  · Wheezing or shortness of breath gets worse  · You need to use your inhalers more often than usual without relief  · Fever of 100.4°F (38ºC) or higher, or as directed by your healthcare provider  · Coughing up lots of dark-colored or bloody mucus (sputum)  · Chest pain with each breath  · You do not start to get better  within 24 hours  · Swelling of your ankles gets worse  · Dizziness or weakness  Date Last Reviewed: 9/1/2016  © 9554-4467 [employer]. [address] [place] [address], [place] [address]. All rights reserved. This information is not intended as a substitute for professional medical care. Always follow your healthcare professional's instructions.        Treatment for COPD    Your healthcare provider will prescribe the best treatments for your COPD.  Treatment  Recommendations include the following:  · Medicines. Some medicines help relieve symptoms when you have them. Others are taken daily to control inflammation in the lungs. Always take your medicines as prescribed. Learn the names of your medicines, as well as how and when to use them.  · Oxygen therapy. Oxygen may be prescribed if tests show that your blood contains too little oxygen.  · Smoking. If you smoke, quit. Smoking is the main cause of COPD. Quitting will help you be able to better manage your COPD. Ask your healthcare provider about ways to help you quit smoking.  · Avoiding infections. Infections, like a cold or the flu, can cause your symptoms to worsen. Try to stay away from people who are sick. Wash your hands often. And, ask your healthcare provider about vaccines for the flu and pneumonia.  Coping with shortness of breath  Coping tips include the following:  · Exercise. Try to be as active as possible. This will improve energy levels and strengthen your muscles, so you can do more.  · Breathing techniques. Ask your healthcare provider or nurse to show you how to do pursed-lip breathing.  · Balance rest and activity. Each day, try to balance rest periods with activity. For example, you might start the day with getting dressed and eating breakfast, then relax and read the paper. After that, take a brief walk. And then sit with your feet up for a while.  · Pulmonary rehabilitation. Ask your provider, or call your local hospital to find out about  pulmonary rehab programs. The programs help with managing your disease, breathing techniques, exercise, support and counseling.  · Healthy eating. Eating a healthy, balanced diet and making an effort to maintain your ideal weight are important to staying as healthy as possible. Make sure you have a lot of fruit and vegetables every day, as well as balanced portions of whole grains, lean meats and fish, and low-fat dairy products.  Date Last Reviewed: 5/1/2016 © 2000-2017 [employer]. [address] [place] [address]. All rights reserved. This information is not intended as a substitute for professional medical care. Always follow your healthcare professional's instructions.        Chronic Lung Disease: Avoiding Irritants and Allergens    Many people with chronic lung disease, such as asthma or COPD, need to avoid irritants that can trigger symptoms making it more difficult to breathe. Irritants are certain substances in the air that irritate the airways. Some people are also sensitive to certain allergens. These are substances that cause inflammation in the lungs. Allergens can also cause runny nose, or itchy, watery eyes. You probably cant avoid all these things, all the time. But youll most likely breathe better if you stay away from the substances that bother you.   Try to avoid  Smoke. This includes cigarettes, cigars, pipes, and fireplaces.  · Dont smoke. And dont allow anyone else to smoke near you or in your home.  · Ask for smoke-free hotel rooms and rental cars.  · Make sure fireplaces and wood stoves are well ventilated, and sit well away from them.  Smog. This is made up of car exhaust and other air pollutants.  · Read or listen to local air quality reports. These let you know when air quality is poor.  · Stay indoors as much as you can on smoggy days.  Strong odors. These include scented room fresheners, mothballs, and insect sprays. Perfume and cooking can be other causes of  strong odors.  · Avoid using bleach and ammonia for cleaning.  · Use scent-free deodorant, lotion, and other products.  Other irritants. These include dust, aerosol sprays, and fine powders.  · Wear a mask while doing tasks like dusting, sweeping, and yard work.  Cold weather. This can make breathing more difficult.  · Protect your lungs by wearing a scarf over your nose and mouth.   You may also need to avoid  If you have allergies, you should try to avoid the allergens that cause them. Ask your healthcare provider if you need to avoid any of these:  Pollen. This is a fine powder made by trees, grasses, and weeds.  · Try to learn what types of pollen affect you the most. Pollen levels vary during the year.  · Avoid outdoor activities when pollen levels are high. Use air conditioning instead of opening the windows in your home and car.  Animal dander. This is shed by animals with fur or feathers. The particles can float through the air and stick to carpet, clothing, and furniture.  · Wash your hands and clothes after handling pets.  Dust mites. These are tiny bugs too small to see. They live in mattresses, bedding, carpets, curtains, and indoor dust.  · Wash bedding in hot water (130°F/54.4°C) each week.  · Cover mattresses and pillows with special mite-proof cases.  Mold. This grows in damp places, such as bathrooms, basements, and closets.  · Run an exhaust fan while bathing. Or, leave a window open in the bathroom.  · Use a dehumidifier in damp areas.  Date Last Reviewed: 5/1/2016  © 6183-0978 [employer]. [address] [place] [address], [place] [address]. All rights reserved. This information is not intended as a substitute for professional medical care. Always follow your healthcare professional's instructions.        Chronic Lung Disease: Controlling Stress    Stress and anxiety can make breathing harder. When its hard to breathe, its natural to get anxious and start to panic. This makes you even more  short of breath. This sequence is known as the dyspnea cycle, and its common among people with chronic lung disease. Talk with your healthcare provider about how you are feeling. It's important for him or her to understand what is going on and how it is affecting your life. Breathing training and coping strategies can help you manage stress and anxiety.  Understanding the cycle  When youre short of breath, your breathing muscles get tense. Its hard to take a deep breath. You may worry that youre not getting enough air. Then you start breathing faster and become more short of breath. You may even start to panic, which makes symptoms seem worse. Often, people with chronic lung disease try to prevent this cycle by limiting activity, staying at home, and avoiding anything that could cause shortness of breath. You dont have to live this way.   Ways to relax  When you find yourself getting stressed or anxious, make an effort to relax. Doing so will help break the dyspnea cycle. Sit in a quiet, comfortable place. Do pursed-lip and diaphragmatic breathing. You may also find the following helpful:  · Certain activities can help you relax. These can include reading a good book, listening to music or relaxation tapes, practicing yoga or zoe chi, meditating, and praying. Find activities that work for you.  · Try visualization. Picture yourself in a peaceful place, such as the beach. Feel the warm sand. Hear the waves. Smell the ocean. Doing this may help you feel more relaxed.  · Your healthcare provider may advise using a bronchodilator along with these or other relaxation techniques.     What you can do to break the cycle  To prevent shortness of breath from limiting your life:  · Right now. Learn to stop an attack with pursed-lip breathing, diaphragmatic breathing, and relaxation techniques. If you dont know how to do these, ask your healthcare provider.  · In day-to-day life. Learn to maximize your energy and to  breathe during activity, so you can do more.  · Over time. Start exercising, so your body can start to handle more activity.   Date Last Reviewed: 5/1/2016  © 4042-4660 [employer]. [address] [place] [address], [place] [address]. All rights reserved. This information is not intended as a substitute for professional medical care. Always follow your healthcare professional's instructions.
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
## 2018-01-22 NOTE — TELEPHONE ENCOUNTER
----- Message from Norm Paul RN sent at 1/22/2018  3:30 PM CST -----  Contact: Norm Paul RN Outpatient Case Management  Good Afternoon,    I enrolled Mr. Serna In Outpatient Case management. He is currently home bound and reporting some difficulty in taking care of his daily needs. He inquired about having HH services. If you feel this is an appropriate request, can a referral be placed for the patient to be evaluated for HH?        Thank you for your assistance,    Norm

## 2018-01-23 ENCOUNTER — TELEPHONE (OUTPATIENT)
Dept: FAMILY MEDICINE | Facility: CLINIC | Age: 68
End: 2018-01-23

## 2018-01-23 NOTE — TELEPHONE ENCOUNTER
Received message from Stan with [facility] stating she has questions regarding orders that were faxed to their office. Call placed to Mrs. Pierce who states someone had already faxed over the information needed. No further actions required.

## 2018-01-23 NOTE — TELEPHONE ENCOUNTER
----- Message from Sheron Bermudez sent at 1/23/2018  9:32 AM CST -----  Contact: [facility]  Stan with [facility] has questions about the orders that were sent over for the patient and can you please call her back.  Her number is [phone].  Thank you

## 2018-01-24 ENCOUNTER — OUTPATIENT CASE MANAGEMENT (OUTPATIENT)
Dept: ADMINISTRATIVE | Facility: OTHER | Age: 68
End: 2018-01-24

## 2018-01-24 NOTE — TELEPHONE ENCOUNTER
[facility] has received home health orders. Please see encounter related to this on yesterday's date.

## 2018-01-24 NOTE — PROGRESS NOTES
1/24/18: 1st Attempt to complete Social Work Assessment for Outpatient Care Management; left message requesting a return call.

## 2018-01-25 ENCOUNTER — OUTPATIENT CASE MANAGEMENT (OUTPATIENT)
Dept: ADMINISTRATIVE | Facility: OTHER | Age: 68
End: 2018-01-25

## 2018-01-25 ENCOUNTER — TELEPHONE (OUTPATIENT)
Dept: FAMILY MEDICINE | Facility: CLINIC | Age: 68
End: 2018-01-25

## 2018-01-25 ENCOUNTER — TELEPHONE (OUTPATIENT)
Dept: PSYCHIATRY | Facility: CLINIC | Age: 68
End: 2018-01-25
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
## 2018-01-25 NOTE — TELEPHONE ENCOUNTER
This matter was handled in a separate encounter. Patient advised to be seen at ER. Verbalized understanding.

## 2018-01-25 NOTE — PROGRESS NOTES
1/25/18-RN CM returned patient's phone call. Reported that he called [facility] regarding [facility] detox program. Stated that [facility] does not take his insurance. RN CM provided patient with contact information for [facility]. Reported that he was walking his dog and can not write number down. RN CM called back back as requested and left message providing him the contact information, [phone]. Requested that RN CM follow up with patient in the morning. RN CM will call back at that time as requested.       Follow Up Plan:  --complete med rec   --Complete PHQ9  --Educate about COPD. Encourage patient to follow medication and treatment regimen. Encourage patient to maintain follow up with doctors.  --Collaborate with hospitals SW about available resources.   -- Educate about Fall prevention and safety in the home. Encourage patient follow medication and treatment regimen. Encourage patient to maintain follow up with doctors.   --refer to [facility] for assistance
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
## 2018-01-26 ENCOUNTER — HOSPITAL ENCOUNTER (EMERGENCY)
Facility: HOSPITAL | Age: 68
Discharge: HOME | End: 2018-01-26
Attending: EMERGENCY MEDICINE
Payer: MEDICARE

## 2018-01-26 ENCOUNTER — OUTPATIENT CASE MANAGEMENT (OUTPATIENT)
Dept: ADMINISTRATIVE | Facility: OTHER | Age: 68
End: 2018-01-26

## 2018-01-26 ENCOUNTER — TELEPHONE (OUTPATIENT)
Dept: FAMILY MEDICINE | Facility: CLINIC | Age: 68
End: 2018-01-26

## 2018-01-26 VITALS
OXYGEN SATURATION: 92 % | SYSTOLIC BLOOD PRESSURE: 145 MMHG | HEIGHT: 67 IN | WEIGHT: 200 LBS | DIASTOLIC BLOOD PRESSURE: 67 MMHG | BODY MASS INDEX: 31.39 KG/M2 | RESPIRATION RATE: 18 BRPM | HEART RATE: 70 BPM | TEMPERATURE: 97 F

## 2018-01-26 DIAGNOSIS — S32.009A CLOSED FRACTURE OF LUMBAR VERTEBRA, UNSPECIFIED FRACTURE MORPHOLOGY, UNSPECIFIED LUMBAR VERTEBRAL LEVEL, INITIAL ENCOUNTER: Primary | ICD-10-CM

## 2018-01-26 PROCEDURE — 25000242 PHARM REV CODE 250 ALT 637 W/ HCPCS: Performed by: EMERGENCY MEDICINE

## 2018-01-26 PROCEDURE — 94640 AIRWAY INHALATION TREATMENT: CPT

## 2018-01-26 PROCEDURE — 99284 EMERGENCY DEPT VISIT MOD MDM: CPT

## 2018-01-26 RX ORDER — IPRATROPIUM BROMIDE AND ALBUTEROL SULFATE 2.5; .5 MG/3ML; MG/3ML
3 SOLUTION RESPIRATORY (INHALATION)
Status: COMPLETED | OUTPATIENT
Start: 2018-01-26 | End: 2018-01-26

## 2018-01-26 RX ADMIN — IPRATROPIUM BROMIDE AND ALBUTEROL SULFATE 3 ML: .5; 3 SOLUTION RESPIRATORY (INHALATION) at 09:01
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
## 2018-01-26 NOTE — TELEPHONE ENCOUNTER
Please see attached message from patient. Per Dr. Ramos request for MRI denied. States patient should be seen for evaluation. Patient notified. Verbalized understanding. Patient states he was seen at ER today, but MRI was not performed.

## 2018-01-26 NOTE — LETTER
Patient: Hesham Serna  YOB: 1950  Date: 1/26/2018 Time: 10:28 AM  Location: [facility]    Leaving the [facility] Against Medical Advice    Chart #:[id number]    This will certify that I, the undersigned,    ______________________________________________________________________    A patient in the above named [facility], having requested discharge and removal from the medical [facility] against the advice of my attending physician(s), hereby release [facility], its physicians, officers and employees, severally and individually, from any and all liability of any nature whatsoever for any injury or harm or complication of any kind that may result directly or indirectly, by reason of my terminating my stay as a patient at [facility] and my departure from [facility], and hereby waive any and all rights of action I may now have or later acquire as a result of my voluntary departure from [facility] and the termination of my stay as a patient therein.    This release is made with the full knowledge of the danger that may result from the action which I am taking.      Date:_______________________                         ___________________________                                                                                    Patient/Legal Representative    Witness:        ____________________________                          ___________________________  Nurse                                                                        Physician
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
## 2018-01-26 NOTE — TELEPHONE ENCOUNTER
Please see attached message. Patient left forms for PCP to review. Requesting order for MRI. Forms placed on Dr. Ramos's desk for review. Please advise.

## 2018-01-26 NOTE — TELEPHONE ENCOUNTER
----- Message from Val Iglesias sent at 1/26/2018 10:42 AM CST -----  Contact: Self  Mr Senra would like Dr Ramos to enter an order for an MRI of his back to be scheduled for 1/27/18 at [facility]. He left paperwork that can be found at the . Please give him a call back at [phone].    Thank you,  Val ARCOS

## 2018-01-26 NOTE — TELEPHONE ENCOUNTER
Patient called requesting another rehab facility.  he called [facility] and they do not accept his social security   Please advise

## 2018-01-26 NOTE — PROGRESS NOTES
1/26/18-Chart review completed. Patient currently in the ED at this time for evaluation of back pain. Will attempt to follow up at a later date.

## 2018-01-26 NOTE — TELEPHONE ENCOUNTER
Chart reviewed.  Pt left AMA from ER earlier today. Pt calm on phone, requesting contact information for inpatient detox that may accept Medicare; pt given contact info for [facility] and [facility].  Pt encouraged to return to ER for MRI

## 2018-01-26 NOTE — TELEPHONE ENCOUNTER
Please follow up on this call tomorrow - pt needs MRI ordered from PCP or pain management provider.

## 2018-01-30 DIAGNOSIS — F39 MOOD DISORDER: ICD-10-CM

## 2018-01-30 DIAGNOSIS — F31.0 BIPOLAR AFFECTIVE DISORDER, CURRENT EPISODE HYPOMANIC: ICD-10-CM

## 2018-01-30 DIAGNOSIS — N40.0 BENIGN NON-NODULAR PROSTATIC HYPERPLASIA WITHOUT LOWER URINARY TRACT SYMPTOMS: ICD-10-CM

## 2018-01-30 RX ORDER — SILODOSIN 8 MG/1
8 CAPSULE ORAL DAILY
Qty: 90 CAPSULE | Refills: 3 | Status: SHIPPED | OUTPATIENT
Start: 2018-01-30 | End: 2018-04-14 | Stop reason: SDUPTHER

## 2018-01-30 NOTE — TELEPHONE ENCOUNTER
Spoke with patient informed him of prescription sent to the pharmacy. Patient verbally voiced understanding.

## 2018-01-30 NOTE — TELEPHONE ENCOUNTER
----- Message from Ally Fonseca sent at 1/30/2018  1:00 PM CST -----  Patient requested refill on  silodosin (RAPAFLO) 8 mg Cap capsule, call into [facility]  pharmacy at  [phone]. Please call patient at  [phone] if you have any questions. Thank you.       [facility] [address] [place] [address] AT [facility] [address] [place] [address]  Phone: [phone] Fax: [phone]

## 2018-01-30 NOTE — TELEPHONE ENCOUNTER
----- Message from Negrita Vazquez sent at 1/30/2018  1:49 PM CST -----  Contact: self  Patient needs to discuss refill on Rapaflo.  I explained that it had been called in, but patient insisted on speaking to a nurse.  Please call back at [phone] (home)

## 2018-01-31 ENCOUNTER — OUTPATIENT CASE MANAGEMENT (OUTPATIENT)
Dept: ADMINISTRATIVE | Facility: OTHER | Age: 68
End: 2018-01-31

## 2018-01-31 ENCOUNTER — TELEPHONE (OUTPATIENT)
Dept: FAMILY MEDICINE | Facility: CLINIC | Age: 68
End: 2018-01-31
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
## 2018-01-31 NOTE — LETTER
January 31, 2018    Hesham MAJANO [address]  [place] [address]             [facility] [address] [place] [address] Dear Mr. Serna:    This is Louis Hamilton LCSW and I am your  with the [facility] program.  I am writing this letter because I have been unable to reach you on the telephone.  If there are any further social needs or concerns that I can assist you with please feel free to call me at [phone].         Sincerely,          Louis Hamilton LCSW   Outpatient Complex Care Management  [phone]

## 2018-01-31 NOTE — TELEPHONE ENCOUNTER
----- Message from Kaycee Londonangelina sent at 1/31/2018  4:43 PM CST -----  Contact: Self  Patient states that he had some x-rays of his back but did not have the MRI done, the tech was rude and now he would like to know if the doctor would still like for him to have his MRI.  Please call back at [phone] and also if you want to be his doctors.  Thank you!

## 2018-02-01 ENCOUNTER — TELEPHONE (OUTPATIENT)
Dept: PSYCHIATRY | Facility: CLINIC | Age: 68
End: 2018-02-01

## 2018-02-01 ENCOUNTER — TELEPHONE (OUTPATIENT)
Dept: FAMILY MEDICINE | Facility: CLINIC | Age: 68
End: 2018-02-01

## 2018-02-01 ENCOUNTER — OUTPATIENT CASE MANAGEMENT (OUTPATIENT)
Dept: ADMINISTRATIVE | Facility: OTHER | Age: 68
End: 2018-02-01

## 2018-02-01 DIAGNOSIS — J43.1 PANLOBULAR EMPHYSEMA: Primary | ICD-10-CM

## 2018-02-01 DIAGNOSIS — M15.8 OTHER OSTEOARTHRITIS INVOLVING MULTIPLE JOINTS: ICD-10-CM

## 2018-02-01 DIAGNOSIS — M51.36 DDD (DEGENERATIVE DISC DISEASE), LUMBAR: ICD-10-CM

## 2018-02-01 RX ORDER — BUSPIRONE HYDROCHLORIDE 5 MG/1
5 TABLET ORAL DAILY
Qty: 30 TABLET | Refills: 0
Start: 2018-02-01 | End: 2018-06-20 | Stop reason: DRUGHIGH

## 2018-02-01 NOTE — PROGRESS NOTES
"2/1/18-RN CM received incoming call from patient. Reported that he was having anxiety attack,  Stated that he is vomiting up his food and is dizzy. Stated that he would like Dr. Queen to prescribe him some medication for his anxiety. RN CM will send message to Dr. Queen's team with request for patient outreach. Patient has been in contact with hospitals LCSW. Patient is still looking for placement. Stated that he is worried about going somewhere that will not give him his pain medications. Inquired about HH services. Stated that 2 people came to his home today, a male and female. Stated that he told the male HH staff member to "Shut up."  Reported that he was ok with the female HH staff. RN CM will follow up with patient in 2 weeks to assist with needs.     Follow Up Plan:  --complete med rec   --Complete PHQ9  --Educate about COPD. Encourage patient to follow medication and treatment regimen. Encourage patient to maintain follow up with doctors.  --Collaborate with hospitals SW about available resources. DONE 2/1  -- Educate about Fall prevention and safety in the home. Encourage patient follow medication and treatment regimen. Encourage patient to maintain follow up with doctors.   --refer to PAP for assistance  "

## 2018-02-01 NOTE — TELEPHONE ENCOUNTER
Spoke with patient who states he always lives with anxiety and always deals with anxiety. States he is dying and his mother is dying.    Appointment made 2/5/2018 for urgent appointment    Patient had to go for breathing treatment

## 2018-02-01 NOTE — TELEPHONE ENCOUNTER
----- Message from Ally Fonseca sent at 2/1/2018  9:34 AM CST -----  Kadi with [facility] is requesting to add Pt, OT and ST to evaluated and treat she is requesting an order to add to admission plan of care, contact Kadi at [phone].    Thank you

## 2018-02-01 NOTE — TELEPHONE ENCOUNTER
I spoke to the patient - he is anxious; now living back in [place] at his home; does not have transportation.  His mother is telling him to leave her alone, not come back to her home now.  He denies SI, but is fearful, anxious.  He is not taking Venlafaxine - he cannot state why, and is taking only 20 mg of Geodon.  Higher dose makes him tired.  He is taking two 1/2 Clonazepam 2 mg tabs daily (PCP).  When I asked, pt reports he called a few rehabs - they were too expensive.  He is working with case management.  Those records are reviewed.  Pt has an appt with me 2/5/18.  He is unclear if he will have transportation here.  He is advised to restart Buspar 5 mg daily (he reported past nausea with 2 doses, so is advised to take with meal).  Will titrate as tolerated, but pt has been mostly non compliant with my med recommendations. Pt denies SI.

## 2018-02-01 NOTE — TELEPHONE ENCOUNTER
TIARA Tripathi Staff   Caller: oNrm Paul RN Outpatient Case Management (Today,  3:48 PM)             Good Afternoon,     I received a call from Mr. Serna this afternoon. He reported to having increased anxiety and anxiety attacks. He is requesting some medication for his anxiety. Can you please contact him for further advice?       Thank you!   Norm Paul RN

## 2018-02-01 NOTE — PROGRESS NOTES
"2/1/18-1st attempt to follow up for OPCM. RN CM called and spoke with patient. Patient reported that he was "in the middle of something" and was "doing a breathing treatment."  Could not talk at this time. Provided patient with OPCM RN contact information for return call. Will follow up with patient at a later date.   "

## 2018-02-02 ENCOUNTER — TELEPHONE (OUTPATIENT)
Dept: PSYCHIATRY | Facility: CLINIC | Age: 68
End: 2018-02-02

## 2018-02-02 RX ORDER — CLONAZEPAM 2 MG/1
TABLET ORAL
Qty: 90 TABLET | Refills: 0 | OUTPATIENT
Start: 2018-02-02

## 2018-02-02 NOTE — TELEPHONE ENCOUNTER
Patient called and cancelled appointment for Monday 02/05/18 he states he was not able to buy a car he was suppose to buy so he has no ride he will call and reschedule when he can .

## 2018-02-05 ENCOUNTER — OUTPATIENT CASE MANAGEMENT (OUTPATIENT)
Dept: ADMINISTRATIVE | Facility: OTHER | Age: 68
End: 2018-02-05

## 2018-02-05 NOTE — PROGRESS NOTES
Please note an Outpatient Complex Care Management welcome packet and consent form was created and mailed to the patient on 2/5/18.    Please contact [facility] at ext. [phone] with any questions.    Thank you,    Maura Alvarado, [facility]  Outpatient Complex Care Mgmt  Ext [phone]

## 2018-02-05 NOTE — LETTER
February 5, 2018    Hesham MAJANO [address]  [place] [address]             Outpatient Case Management  [address]  [facility] [address] Dear Hesham Serna:    We understand that receiving many services from different doctors and healthcare providers is overwhelming. There are appointments to make, transportation to arrange, dietary instructions to understand, and new medications to obtain.    This is where [facility] can help.     You are eligible to receive Outpatient Case Management services when you have healthcare needs that require the coordination of many providers, treatments, and services. You also qualify if you need assistance with a new treatment plan.     There is no charge for this support. You may have been referred to this program from your doctor(s), hospital staff member(s), or insurance company but you always have a choice to participate or not participate. To participate, you must give us your permission to be enrolled.     When you are enrolled in the [facility] program, the  who is assigned to you is    Norm Paul, RN  [phone]    Depending on your needs and wishes, your  may speak with you by phone, visit you at your place of living (for example your home, skilled nursing facility, or rehabilitation facility), or meet you at your doctors office.     Your  will tell you why you have been selected to participate in the program and will complete an assessment of your needs. Then a personalized plan of care will be developed with you and or your caregiver.             Here are examples of the services your [facility]  provides.     Coordinate communication among multiple providers.   Arrange for transportation, doctors visits, durable medical equipment, home care services, and special clinics.    Provide coaching on how to manage your health condition.     Answer questions about your health condition.   Help you understand your doctors treatment plan.    Provide additional instruction about your health condition, treatments, and medications.    Help you obtain information about your insurance coverage.    Advocate for your individual needs.    Request a Licensed Clinical  (LCSW) to visit you if you need their services. LCSWs help with long term planning (discussing placement options, advanced planning directives), financial planning, and assistance (for example rent, utilities, medication funding).     Your  will coordinate their activities with other outpatient services you are receiving. All services provided by [facility]  are coordinated with and communicated to your primary care physician.    Our goal is to help you manage your health condition(s) safely within your living environment, whether that is your home or a medical facility. We want to help you function at the healthiest and highest level possible.     Sincerely,      Ankit Delgado MD  Medical Director    Enclosures:    Frequently Asked Questions  Patient Rights and Responsibilities   Reporting a Grievance or Complaint  Consent/Release of Information  Stamped Addressed Envelope                  Frequently Asked Questions about [facility]    What is [facility]?  Outpatient Case management is not Home healthcare services. [facility]  do not provide hands-on care. [facility]  will work with your doctor to arrange for home health services, if needed. Home health services have a limited duration and there are some restrictions as to who can get these services. There is no prescribed limit to the amount of time you receive [facility] services. [facility]  are not agents of your insurance company. However, [facility]  can help you obtain information from your insurance company.     Who are the [facility] ?  [facility]  are Registered Nurses and Social Workers. It is important to remember that you and your  are a team that works together with your primary care physician to create your individualized plan of care. The ultimate goal of your care plan is to help you implement your doctors treatment plan and to help you function at the highest level of health possible.     What are my rights as a patient?  It is important for you to know and understand your rights and responsibilities while receiving services from the [facility] program. We have enclosed a complete description of your rights and responsibilities. You can help to make your care more effective when you understand your right and responsibilities.     What is needed to be enrolled in the program?  You are only enrolled in the [facility] Program when you give us your consent to participate. You will find enclosed a consent form. You are receiving this letter because you or your caregivers have given us a verbal consent to enroll you in [facility] Case Management Program. We ask that you sign and return the enclosed written consent in the stamped self-addressed envelope.                           Patient Rights and Responsibilities    We consider you a partner in your care. When you are well informed, participate in treatment decisions and communicate openly with your doctor and other healthcare professionals, you help make your care more effective.     While you are in the Outpatient Case Management Program, your rights include the following:     You have a right to be provided services in a non-discriminatory manner in accordance with the provisions of Title VI of the Civil Rights Act of 1964, Section 504 of the Rehabilitation Act of  1973, the Age Discrimination Act of 1975, the Americans with Disabilities Act as well as any other applicable Federal and State laws and regulations.     You have the right to a reasonable, timely response to your request or need for care, as well as the right to considerate and respectful care including an environment that preserves dignity and contributes to a positive self-image. You are responsible for being considerate and respectful of our staff.     You have a right to information regarding patient rights, advocacy services and complaint mechanisms, and the right to prompt resolution of any complaint. You or a designee has the right to participate in the resolution of ethical issues surrounding your care. You have a right to file a complaint if you feel that your rights have been infringed, without fear or penalty from [facility] or the federal government. You may file a complaint with the Director of Outpatient Case Management by calling [phone]. At any time, you may lodge a grievance with the [facility] by calling [phone], or the Joint Commission on Accreditation of Healthcare Organizations at [phone].     You, or someone acting on your behalf, have the right to understandable information on your health status, treatment and progress in order to make decisions. You have the right to know the nature, risks and alternatives to treatment. You have the right to be informed, when appropriate, regarding the outcome of the care that has been provided. You have the right to refuse treatment to the extent permitted by law, and the right to be informed of the alternatives and consequences of refusing treatment.     You, in collaboration with your physician, have the right to make decisions regarding care and the right to participate in the development and implementation of the plan of care and effective pain management. You have the right to know the name and  professional status of those responsible for the delivery of your care and treatment.       You have a right, within legal guidelines, to have a guardian, next-of-kin or legal designee exercises your patient rights when you are unable to do so. You have the right for your wishes regarding end-of-life decisions to be addressed by the healthcare team through advance directives. You have the right to personal privacy and confidentiality and to expect confidentiality of all records and communications pertaining to your care.      You have the right to receive communications about your health information confidentially. You have the right to request restrictions on the uses and disclosures of your health information. You have the right to inspect, copy, request amendments and receive an accounting of to whom we have disclosed your health information.     You have the right to be provided with interpretation services if you do not speak English; to alternative communication techniques if you are hearing or vision impaired; and to have any other resources needed on your behalf to ensure effective communication. These services are provided free of charge.     You have a right to personal safety (free from mental, physical, sexual and verbal abuse, neglect and exploitation). You have the right to access protective and advocacy services.     Advance Directives  A Patient Advocate is available to meet with patients to answer questions regarding advance directives.    Living Will  A document that outlines what medical treatment the patient does or does not want in the event the patient becomes unable to make those decisions at the appropriate time.    Durable Medical Power of   A document by which the patient designates an individual to be responsible for making medical decisions in the event the patient becomes unable to do so.    HIPAA Notice of Privacy Practices  Your medical information is governed by federal  privacy laws. HIPAA protects private medical information and how that information is disclosed. If you have a question regarding the HIPAA Notice of Privacy Practices, or if you believe your privacy rights have been violated, you may call our designated hotline at ([phone] [phone].            Quality Improvement  Because we consistently strive to improve the care and service provided to our patients, we welcome your feedback. Your comments are an important part of our quality improvement process, as we like to know what we are doing right and which areas are in need of improvement. Our policy is to listen, be responsive and provide you with an appropriate and timely follow-up to your questions or concerns. Our goal is active patient and family involvement in all aspects of the care process.            Reporting a Grievance or Complaint    During your time with the [facility] team you may have a grievance or complaint with our services. Your Patients Bill of Rights gives patients, families, and caregivers the right to express concerns and grievances and the right to expect a reasonable and timely response.     Your presentation of your concerns is not viewed negatively. It is an opportunity for us to improve the quality of our care and services we provide to you.     You may report your concerns directly to your , or you can phone in a complaint to:     Director of Outpatient Case Management  [phone]    You may also send a complaint letter to:    Director of Outpatient Case Management Services  [address] [place] [id number]    Tell us the details of your complaint and provide us with a contact phone number so we can contact you to obtain additional information. We will return a call to you within two business days of our receipt of your complaint, and to request additional information as needed. If you choose to mail a letter, your complaint may take a few days  longer to reach us.     All grievances will be addressed as quickly as possible. A grievance or complaint that involves situations or practices which place patients in immediate danger will be addressed as an urgent matter. We will work to resolve all other complaints within seven days of receipt. By that time, you will receive a phone call with either the resolution of your complaint, or a plan for corrective action. A formal written response will be sent to you within 30 days of receipt of your grievance.     If a resolution cannot be completed within 30 days, a letter will be sent to you or your family member with an estimated time for the final response.    Additionally, all patients have the right to file complaints with external agencies, without exception. Complaints/grievances can be addressed to the following agencies:            Patient Safety or Quality of Care Concerns  Office of Quality Monitoring   The [facility] [place] [address]  [phone] Toll Free    HIPPA Privacy/Security Concerns  Office for Civil Rights Region IV  [place] Department of Health & Human Services  [address] [place] [address], [address]  [place] [address] [phone] Phone  [phone] TDD  [phone] Toll Free    Medicare/Medicaid Billing Concerns  [place] for Medicare & Medicaid Services  Region [address]  [address] [place] [address], [address]  [place] [address] [phone] Phone  [phone] Toll Free    General Concerns  [facility]  [phone] Toll Free Complaint Hotline                                      Consent Form/Release of Information    By signing--     (1) I agree I have read the Outpatient Case Management information provided to me;     (2) I agree to voluntarily participate in the Outpatient Case Management program;     (3) I understand I must consent to participation in the Outpatient Case Management program during my first interview  with my ;    (4) I consent to the discussion and release of my personal health information to my healthcare team (including my personal physician, my medical home care team, any specialty physician(s), and my [facility] team);     (5) I agree my consent is valid for the length of time I am receiving Outpatient Case Management;    (6) I agree to referrals to community resources which my Case Management team recommends for me. I agree to the release of my personal information and personal health information as necessary to referral sources.    ___________________________________________________________________  Patients Printed Name     ___________________________________________________________________  Patients Signature       Date    If patient is in being cared for, please complete this section:     ___________________________________________________________________  Printed Name of Person Caring For Patient   Relationship To Patient    ___________________________________________________________________   Signature of Person Caring For Patient     Date    PLEASE SIGN AND RETURN IN THE ENCLOSED PRE-ADDRESSED ENVELOPE.

## 2018-02-06 ENCOUNTER — TELEPHONE (OUTPATIENT)
Dept: FAMILY MEDICINE | Facility: CLINIC | Age: 68
End: 2018-02-06

## 2018-02-06 DIAGNOSIS — F39 MOOD DISORDER: ICD-10-CM

## 2018-02-06 DIAGNOSIS — N40.0 BENIGN NON-NODULAR PROSTATIC HYPERPLASIA WITHOUT LOWER URINARY TRACT SYMPTOMS: ICD-10-CM

## 2018-02-06 DIAGNOSIS — F31.0 BIPOLAR AFFECTIVE DISORDER, CURRENT EPISODE HYPOMANIC: ICD-10-CM

## 2018-02-06 RX ORDER — CLONAZEPAM 2 MG/1
TABLET ORAL
Qty: 90 TABLET | Refills: 0 | Status: SHIPPED | OUTPATIENT
Start: 2018-02-06 | End: 2018-03-05 | Stop reason: SDUPTHER

## 2018-02-06 NOTE — TELEPHONE ENCOUNTER
----- Message from Sandra Montoya sent at 2/6/2018 10:56 AM CST -----  Contact: Patient  Hesham, patient [phone], Calling for refills on Rx . Will give only to the nurse. Please advise. Thanks.

## 2018-02-06 NOTE — TELEPHONE ENCOUNTER
----- Message from Mallorie Chambers sent at 2/6/2018  1:51 PM CST -----  Contact: self   Patient want to speak with a nurse regarding some questions he has about a medication he is unsure of the name, please call back at [phone] (home)

## 2018-02-06 NOTE — TELEPHONE ENCOUNTER
----- Message from Ally Fonseca sent at 2/6/2018  9:03 AM CST -----  Patient is requesting a return call concerning his medication contact patient at [phone].    Thank you

## 2018-02-07 RX ORDER — CLONAZEPAM 2 MG/1
TABLET ORAL
Qty: 90 TABLET | Refills: 0 | Status: CANCELLED | OUTPATIENT
Start: 2018-02-07

## 2018-02-07 NOTE — TELEPHONE ENCOUNTER
----- Message from Gail Alvarez sent at 2/7/2018  3:28 PM CST -----  Contact: [phone]  Patient is returning nurse's phone call, stated he's out of his medication.  Please call patient back at [phone].

## 2018-02-07 NOTE — TELEPHONE ENCOUNTER
Called patient again no answer left message again Dr. Ramos is out of the office until in the morning and that the on call provider will not refill this type of medication unless it is a medication that is taken daily routinely however, his Clonazepam is a prn medication and should only be taken 3 times daily as needed Dr. Ramos has been notified of medication is needing to be refilled and will get to it first thing in the AM and we apologies  for the inconvenience.

## 2018-02-07 NOTE — TELEPHONE ENCOUNTER
Left message medication is in for Dr. Ramos to approve he is out today and want be back until tomorrow it will be looked at by Dr. Ramos at this time we will let him know if it approved

## 2018-02-07 NOTE — TELEPHONE ENCOUNTER
----- Message from Anushka Walters sent at 2/7/2018  2:31 PM CST -----  Contact: Hesham Whyte is requesting refill Rx but would not give name(s). Please call [phone]. Thanks!

## 2018-02-08 ENCOUNTER — OUTPATIENT CASE MANAGEMENT (OUTPATIENT)
Dept: ADMINISTRATIVE | Facility: OTHER | Age: 68
End: 2018-02-08

## 2018-02-08 NOTE — PROGRESS NOTES
2/8/18: 1st Attempt to complete SW follow-up for Outpatient Care Management; left message requesting return call.

## 2018-02-12 DIAGNOSIS — M50.30 DDD (DEGENERATIVE DISC DISEASE), CERVICAL: Chronic | ICD-10-CM

## 2018-02-12 RX ORDER — LUBIPROSTONE 8 UG/1
CAPSULE, GELATIN COATED ORAL
Qty: 180 CAPSULE | Refills: 0 | Status: SHIPPED | OUTPATIENT
Start: 2018-02-12 | End: 2018-04-27 | Stop reason: SDUPTHER

## 2018-02-12 RX ORDER — CELECOXIB 100 MG/1
CAPSULE ORAL
Qty: 180 CAPSULE | Refills: 0 | OUTPATIENT
Start: 2018-02-12

## 2018-02-15 ENCOUNTER — OUTPATIENT CASE MANAGEMENT (OUTPATIENT)
Dept: ADMINISTRATIVE | Facility: OTHER | Age: 68
End: 2018-02-15

## 2018-02-15 ENCOUNTER — TELEPHONE (OUTPATIENT)
Dept: FAMILY MEDICINE | Facility: CLINIC | Age: 68
End: 2018-02-15

## 2018-02-15 NOTE — TELEPHONE ENCOUNTER
----- Message from Levar Cox sent at 2/14/2018  1:52 PM CST -----  Contact: dugv- [phone]  Patient returning a phone call. Thanks!

## 2018-02-15 NOTE — PROGRESS NOTES
"2/15/18 1100-RN CM called patient for follow up. Patient reported to RN CM that he is currently at his home in [place] by himself. Patient is concerned about his mother passing away. Became tearful when discussing mother, but was about to re-engage in discussion in a calm and collected manner. Patient reported that at this time he does not have transportation but relies on his neighbors to  his medicine. Patient reported that he is in need of refills on his Klonopin and is also out of his antibiotic for his lungs. Per chart review, patient has made contact with Dr. Ramos's team who is aware of patient need of refill. Patient informed RN CM that he is currently on oxygen using 2L and stated that he may increase it to 3L as needed. RN CM began to complete med rec with patient. Able to review very few of his medications when he became anxious. Stated that he was in the middle of a panic attack and wanted to end call. Unable to complete med rec with patient at this time. Will re attempt with next encounter. Patient informed RN CM "your call may have done more harm then good."  Patient requested to end call at that time.       1115-RN CM received incoming call from patient, who reported that he was eating frozen meals and is looking for other food options. Inquired about things that patient enjoys to eat and asked if there are some easy items that he can prepare. Patient responded "I don't know."  Patient again informed RN CM that he was having a panic attack, attempted to engage patient in discussion but stated that he wanted to end call. RN CM will follow up with patient in 2 weeks to assist with needs.     Follow Up Plan:  --complete med rec--Attempted, unable to complete due to anxiety  --Complete PHQ9-will re attempt with next encounter  --Educate about COPD. Encourage patient to follow medication and treatment regimen. Encourage patient to maintain follow up with doctors.  -- Educate about Fall prevention and " safety in the home. Encourage patient follow medication and treatment regimen. Encourage patient to maintain follow up with doctors.   --refer to PAP for assistance

## 2018-02-15 NOTE — TELEPHONE ENCOUNTER
----- Message from Negrita Vazquez sent at 2/15/2018  7:40 AM CST -----  Contact: self  Patient has a cough and is spitting stuff up, thinks he is getting the flu. He would like something called in. Please call back at [phone] (home)         [facility] [address] [place] [address] AT [facility] [address]  [address] [address]  Phone: [phone] Fax: [phone]

## 2018-02-15 NOTE — TELEPHONE ENCOUNTER
----- Message from Ally Campo sent at 2/15/2018  9:16 AM CST -----  Patient stated he is experiencing flu-like symptoms/would like medication be ordered at [facility] in [place], [place]/also requesting medication: Klonopin be ordered at the [facility]/please call patient back at [phone] to advise.

## 2018-02-15 NOTE — PROGRESS NOTES
Patient, Hesham Serna (MRN #[id number]), presented with a recent Platelet count less than 150 K/uL consistent with the definition of thrombocytopenia (ICD10 - D69.6).    Platelets   Date Value Ref Range Status   08/15/2017 116 (L) 150 - 350 K/uL Final     The patient's thrombocytopenia was monitored, evaluated, addressed and/or treated. This addendum to the medical record is made on 02/14/2018.

## 2018-02-15 NOTE — TELEPHONE ENCOUNTER
----- Message from Erinn Valadez sent at 2/14/2018  9:16 AM CST -----  Patient would like to speak to Ally concerning something about his medication. He kept changing the story. He says he has questions about a few things. Please call back at [phone].

## 2018-02-15 NOTE — TELEPHONE ENCOUNTER
----- Message from Sandra Montoya sent at 2/15/2018 10:44 AM CST -----  Contact: Patient  Hesham, patient [phone], Calling to speak with the nurse. Calling for a Rx for heavy fluem. Please call patient, will not give any further information. Thanks.

## 2018-02-15 NOTE — TELEPHONE ENCOUNTER
----- Message from Zoraida Godinez sent at 2/15/2018  8:29 AM CST -----  Contact: patient  Type: Needs Medical Advice    Who Called: patient  Symptoms (please be specific): spitting up and coughing up phlegm  How long has patient had these symptoms: 2-3 days  Pharmacy name and phone #:   [facility] [address] [place] [address] AT [address]  [address] [address]  Phone: [phone] Fax: [phone]    Best Call Back Number:   Additional Information: calling to request something be called in for the cough. He would like the Nurse to call him
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
## 2018-02-16 ENCOUNTER — TELEPHONE (OUTPATIENT)
Dept: FAMILY MEDICINE | Facility: CLINIC | Age: 68
End: 2018-02-16

## 2018-02-16 DIAGNOSIS — J41.1 CHRONIC BRONCHITIS WITH PRODUCTIVE MUCOPURULENT COUGH: Primary | ICD-10-CM

## 2018-02-16 RX ORDER — BROMPHENIRAMINE MALEATE, PSEUDOEPHEDRINE HYDROCHLORIDE, AND DEXTROMETHORPHAN HYDROBROMIDE 2; 30; 10 MG/5ML; MG/5ML; MG/5ML
5 SYRUP ORAL 4 TIMES DAILY PRN
Qty: 240 ML | Refills: 0 | Status: SHIPPED | OUTPATIENT
Start: 2018-02-16 | End: 2018-02-26

## 2018-02-16 RX ORDER — CEPHALEXIN 500 MG/1
500 TABLET ORAL 3 TIMES DAILY
Qty: 30 TABLET | Refills: 0 | Status: SHIPPED | OUTPATIENT
Start: 2018-02-16 | End: 2018-02-20 | Stop reason: SDUPTHER

## 2018-02-16 NOTE — TELEPHONE ENCOUNTER
Please see attached message from patient. Spoke to patient who states he is coughing up phlegm that is white in color. States he knows he has an infection. Requesting Cephalexin 500 mg. Assured patient his request will be forwarded to Dr. Ramos. Please advise.

## 2018-02-16 NOTE — TELEPHONE ENCOUNTER
----- Message from Erinn Valadez sent at 2/15/2018  3:12 PM CST -----  Patient is calling to speak to Ally concerning a refill on his medication Cephalexin 500 mg. He states that he has another medication that he needs a refill on but he states that it is personal. He needs office to call him at [phone].      [facility] [address] [place] [address] AT [facility] [address] [address]  Phone: [phone] Fax: [phone]

## 2018-02-16 NOTE — TELEPHONE ENCOUNTER
----- Message from Mallorie Chambers sent at 2/16/2018  9:19 AM CST -----  Contact: self   Placed call to pod, patient missed a call from your office. Please call back at [phone] (ggjr)

## 2018-02-16 NOTE — TELEPHONE ENCOUNTER
----- Message from Zaida Teixeira sent at 2/16/2018  4:18 PM CST -----  Contact: self  Patient called Ally's asking for advice about if the doctor medication .  Please call patient at [phone]. Thanks!    [facility] [address] [place] [address] AT [address]  [address] [address]  Phone: [phone] Fax: [phone]

## 2018-02-17 NOTE — TELEPHONE ENCOUNTER
Chronic bronchitis with productive mucopurulent cough  -     cephalexin (KEFTAB) 500 mg tablet; Take 1 tablet (500 mg total) by mouth 3 (three) times daily.  Dispense: 30 tablet; Refill: 0  -     brompheniramine-pseudoeph-DM 2-30-10 mg/5 mL Syrp; Take 5 mLs by mouth 4 (four) times daily as needed.  Dispense: 240 mL; Refill: 0

## 2018-02-19 ENCOUNTER — TELEPHONE (OUTPATIENT)
Dept: FAMILY MEDICINE | Facility: CLINIC | Age: 68
End: 2018-02-19

## 2018-02-19 RX ORDER — CELECOXIB 200 MG/1
200 CAPSULE ORAL DAILY PRN
COMMUNITY
End: 2018-05-16

## 2018-02-19 NOTE — TELEPHONE ENCOUNTER
----- Message from Letty Ziegler sent at 2/19/2018  9:50 AM CST -----  Clarification on Rx Celecoxib 200 mg once daily.  Please call patient at [phone].

## 2018-02-19 NOTE — TELEPHONE ENCOUNTER
----- Message from Levar Cox sent at 2/19/2018  7:33 AM CST -----  Contact: self--[phone]  Patient called asking to speak with the nurse regarding dosage for antibiotic celecoxib.  Thanks!

## 2018-02-19 NOTE — TELEPHONE ENCOUNTER
Please clarify dosage of Celecoxib dosage. Patient requesting to know if current dosage is 200 mg twice daily or 200 mg once daily. Please see office notes dated 1-12-18.

## 2018-02-19 NOTE — TELEPHONE ENCOUNTER
This matter was handled in a separate encounter. Message sent to PCP requesting clarification of orders. Awaiting response.

## 2018-02-20 ENCOUNTER — TELEPHONE (OUTPATIENT)
Dept: FAMILY MEDICINE | Facility: CLINIC | Age: 68
End: 2018-02-20

## 2018-02-20 DIAGNOSIS — J41.1 CHRONIC BRONCHITIS WITH PRODUCTIVE MUCOPURULENT COUGH: ICD-10-CM

## 2018-02-20 NOTE — TELEPHONE ENCOUNTER
----- Message from Kymberly De Paz sent at 2/20/2018  8:35 AM CST -----  Contact: Patient  Hesham, patient [phone] returning phone call to office. Please advise. Thanks.

## 2018-02-20 NOTE — TELEPHONE ENCOUNTER
Spoke to patient who states he was still unsure about the dosage directions given to him on yesterday for Celebrex. Writer reviewed directions as 200 mg once daily as needed. Patient indicated understanding.

## 2018-02-21 RX ORDER — CEPHALEXIN 500 MG/1
CAPSULE ORAL
Qty: 30 CAPSULE | Refills: 0 | Status: SHIPPED | OUTPATIENT
Start: 2018-02-21 | End: 2018-04-14 | Stop reason: ALTCHOICE

## 2018-02-22 ENCOUNTER — TELEPHONE (OUTPATIENT)
Dept: FAMILY MEDICINE | Facility: CLINIC | Age: 68
End: 2018-02-22

## 2018-02-22 NOTE — TELEPHONE ENCOUNTER
Spoke to patient who states he tried to refill his prescription for Celebrex earlier than insurance would allow and was told the prescription would be $600.  States he will wait until the prescription can be refilled and covered by insurance. No further actions required.

## 2018-02-22 NOTE — TELEPHONE ENCOUNTER
----- Message from Dennis Harman sent at 2/22/2018  8:31 AM CST -----  Contact: pt  Pt is returning call, call placed to pod no answer   Call Back#[phone]  Thanks

## 2018-02-22 NOTE — TELEPHONE ENCOUNTER
----- Message from Ally Campo sent at 2/21/2018  3:08 PM CST -----  Patient needs to speak with nurse concerning medications: Celebrex and Antibiotic/stated he is out of medication/please call back at [phone] to advise.

## 2018-02-22 NOTE — TELEPHONE ENCOUNTER
----- Message from Sandra Montoya sent at 2/21/2018  3:42 PM CST -----  Contact: Patient  Hesham, patient [phone]. Calling about his medication refills. Rx celecoxib (CELEBREX) 200 MG capsule. Stated the pharmacy told him this Rx will $600. Requesting to change medication. Please advise, first before sending. Thanks.    [facility] [address] [place] [address] [place] [address]  Phone: [phone] Fax: [phone] w

## 2018-02-22 NOTE — TELEPHONE ENCOUNTER
----- Message from Ellen Zhou sent at 2/22/2018 11:12 AM CST -----  Contact: self   Patient needs to speak to the nurse about his medication refills, Patient is not sure of which refills he needs    Please call [phone]

## 2018-02-22 NOTE — TELEPHONE ENCOUNTER
----- Message from Levar Cox sent at 2/21/2018  1:07 PM CST -----  Contact: self- [phone]  Patient called stating he has to wait until March 5 th to get rx celebrex refilled. Patient states he will be out of town during this time. Thanks!

## 2018-02-22 NOTE — TELEPHONE ENCOUNTER
"Please see attached message from patient. Spoke to patient regarding who states he checked his prescription bottle for Celebrex and noted he has 3 refills available on that prescription.  Patient states " I called you for nothing."  "

## 2018-02-23 ENCOUNTER — OUTPATIENT CASE MANAGEMENT (OUTPATIENT)
Dept: ADMINISTRATIVE | Facility: OTHER | Age: 68
End: 2018-02-23

## 2018-02-23 NOTE — PROGRESS NOTES
2/23/18: LCSW attempted to contact pt for follow up.  Pt stated now was not a good time to talk.  Pt asked LCSW if he could call him back Monday.

## 2018-02-26 ENCOUNTER — OUTPATIENT CASE MANAGEMENT (OUTPATIENT)
Dept: ADMINISTRATIVE | Facility: OTHER | Age: 68
End: 2018-02-26

## 2018-02-26 ENCOUNTER — TELEPHONE (OUTPATIENT)
Dept: FAMILY MEDICINE | Facility: CLINIC | Age: 68
End: 2018-02-26

## 2018-02-26 NOTE — TELEPHONE ENCOUNTER
Call placed to patient who again had questions regarding Celebrex prescription. States this medication will cost him $60. Assured patient writer would call pharmacy to enquire about this matter. Also spoke to patient's home health nurse (Betsy) who was at patient's home performing visit. Mrs. Meyer requested a list of current medications be faxed to her for review. List faxed to Mrs. Meyer as requested.  Call placed to pharmacy, spoke to the pharmacist (Lela) who states patient is attempting to  prescription earlier than the date he is supposed to pick them up and he was advised if he picks medication up early he would have to pay for medication out of pocket. Patient notified. Verbalized understanding.

## 2018-02-26 NOTE — TELEPHONE ENCOUNTER
Spoke to patient who states he was confused about his Celebrex dosage. Writer reviewed order for Celebrex which was clarified by Dr. Ramos on 2-19-18 as Celebrex 200 mg once daily as needed.  Patient verbalized understanding of Celebrex order at this time.

## 2018-02-26 NOTE — TELEPHONE ENCOUNTER
----- Message from Kaycee Moore sent at 2/26/2018  9:04 AM CST -----  Contact: self  Patient states that the doctor switched him to celecoxib (CELEBREX) 200 MG capsule and wants to talk to you about this medicine.  Call back at [phone] (home).  Thank you!

## 2018-02-26 NOTE — TELEPHONE ENCOUNTER
----- Message from Esteban Rodriguez sent at 2/26/2018 10:06 AM CST -----  Contact: Patient  Hesham, [phone], would like to speak with nurse about something he forgot to mention on previous phone call. Please advise. Thanks.
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
## 2018-02-28 ENCOUNTER — OUTPATIENT CASE MANAGEMENT (OUTPATIENT)
Dept: ADMINISTRATIVE | Facility: OTHER | Age: 68
End: 2018-02-28

## 2018-02-28 ENCOUNTER — TELEPHONE (OUTPATIENT)
Dept: FAMILY MEDICINE | Facility: CLINIC | Age: 68
End: 2018-02-28

## 2018-02-28 NOTE — TELEPHONE ENCOUNTER
Spoke to patient who wanted to remind writer his prescriptions will be due next month around the 9th.  Assured patient his prescriptions will be e-scribed at that time. Patient verbalized understanding.

## 2018-02-28 NOTE — PROGRESS NOTES
2/28/18-1st attempt to follow up for OPCM. Called patient at [phone] with no answer. Message left requesting return call. Will attempt to follow up at a later date.

## 2018-02-28 NOTE — TELEPHONE ENCOUNTER
----- Message from Letty Ziegler sent at 2/28/2018 12:09 PM CST -----  Returning your call from yesterday.  Please call patient at [phone].

## 2018-03-05 ENCOUNTER — TELEPHONE (OUTPATIENT)
Dept: FAMILY MEDICINE | Facility: CLINIC | Age: 68
End: 2018-03-05

## 2018-03-05 DIAGNOSIS — F39 MOOD DISORDER: ICD-10-CM

## 2018-03-05 DIAGNOSIS — F31.0 BIPOLAR AFFECTIVE DISORDER, CURRENT EPISODE HYPOMANIC: ICD-10-CM

## 2018-03-05 RX ORDER — CLONAZEPAM 2 MG/1
TABLET ORAL
Qty: 90 TABLET | Refills: 0 | Status: CANCELLED | OUTPATIENT
Start: 2018-03-05

## 2018-03-05 RX ORDER — CLONAZEPAM 2 MG/1
TABLET ORAL
Qty: 90 TABLET | Refills: 0 | Status: SHIPPED | OUTPATIENT
Start: 2018-03-05 | End: 2018-04-02 | Stop reason: SDUPTHER

## 2018-03-05 RX ORDER — CLONAZEPAM 2 MG/1
TABLET ORAL
Qty: 90 TABLET | Refills: 0 | Status: SHIPPED | OUTPATIENT
Start: 2018-03-05 | End: 2018-03-05 | Stop reason: SDUPTHER

## 2018-03-05 NOTE — TELEPHONE ENCOUNTER
----- Message from Sophia Martinez sent at 3/5/2018 11:43 AM CST -----  Contact: self   Patient need a refill on KLONOPIN please send to [facility], please call patient back after meds are call in [phone] (home)       [id number] - [place], [place] - [address] [address]  Phone: [phone] Fax: [phone]

## 2018-03-05 NOTE — TELEPHONE ENCOUNTER
----- Message from Ally Fonseca sent at 3/5/2018  8:11 AM CST -----  Patient is requesting to speak to nurse regarding his medication contact him at [phone].    Thank you

## 2018-03-05 NOTE — TELEPHONE ENCOUNTER
Patient notified prescription was e-scribed to [facility] today. Patient verbalized understanding.

## 2018-03-06 ENCOUNTER — TELEPHONE (OUTPATIENT)
Dept: UROLOGY | Facility: CLINIC | Age: 68
End: 2018-03-06

## 2018-03-06 NOTE — TELEPHONE ENCOUNTER
Spoke with patient he states he needs a refill on catheters. Patient states he is still catheterizing a bunch of times a day. Wants us to call [phone] to order more catheters. I informed patient has to be okayed by provider. Patient verbally voiced understanding

## 2018-03-06 NOTE — TELEPHONE ENCOUNTER
Ok please make sure he has at least a 6 month f/u   Call the number and ask them to send us the prescription so we can fill out with the type of catheter he is using and how often he is catheterizing

## 2018-03-07 ENCOUNTER — OUTPATIENT CASE MANAGEMENT (OUTPATIENT)
Dept: ADMINISTRATIVE | Facility: OTHER | Age: 68
End: 2018-03-07

## 2018-03-07 NOTE — PROGRESS NOTES
3/7/18-2nd attempt to follow up for OPCM. Patient answered call and stated that he can not talk at this time. RN CM will attempt to follow up with patient at a later date.

## 2018-03-12 ENCOUNTER — OUTPATIENT CASE MANAGEMENT (OUTPATIENT)
Dept: ADMINISTRATIVE | Facility: OTHER | Age: 68
End: 2018-03-12
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
## 2018-03-13 ENCOUNTER — TELEPHONE (OUTPATIENT)
Dept: PULMONOLOGY | Facility: CLINIC | Age: 68
End: 2018-03-13

## 2018-03-13 NOTE — TELEPHONE ENCOUNTER
Requested records from Dr Judge's office via fax     ----- Message from Mallorie Chambers sent at 3/12/2018  4:43 PM CDT -----  Contact: self   Patient called to give information of [facility] that he sees. Dr Judge, address is [address] [place] [address], number [phone] fax number [phone]. patient want to speak with a nurse also has some questions, please call back at [phone] (home)

## 2018-03-15 ENCOUNTER — OUTPATIENT CASE MANAGEMENT (OUTPATIENT)
Dept: ADMINISTRATIVE | Facility: OTHER | Age: 68
End: 2018-03-15

## 2018-03-15 NOTE — PROGRESS NOTES
"3/15/18-RN CM called patient for follow up. Patient reported to RN CM that he is not doing good. Stated "I'm Having a lot of anxiety attacks." Stated that he is having trouble breathing, reported this can be related to his anxiety as well as his lung disease. Patient is concerned about his mother's health. Reported that he calls to check on her frequently. Patient stated that he would like to come off of all of his medications, but can't because he relies on his medications. Inquired about oxygen use, stated that he uses oxygen at 2L. Patient requested a home with RN JIMMY tomorrow, informed patient that due to schedule constraints as home visit is not possible tomorrow. Patient then became frustrated with RN CM and stated he had to end call to cook dinner. Provided patient with my contact information and encouraged him to contact this RN CM with needs. Verbalized understanding. RN CM will follow up with patient in 3 weeks to assist with needs.     Follow Up Plan:  --complete med rec--Attempted, unable to complete due to anxiety  --Complete PHQ9-will re attempt with next encounter  --Educate about COPD. Encourage patient to follow medication and treatment regimen. Encourage patient to maintain follow up with doctors.  -- Educate about Fall prevention and safety in the home. Encourage patient follow medication and treatment regimen. Encourage patient to maintain follow up with doctors.   --refer to PAP for assistance  "

## 2018-03-16 ENCOUNTER — TELEPHONE (OUTPATIENT)
Dept: FAMILY MEDICINE | Facility: CLINIC | Age: 68
End: 2018-03-16

## 2018-03-16 DIAGNOSIS — J44.1 ACUTE EXACERBATION OF CHRONIC OBSTRUCTIVE PULMONARY DISEASE (COPD): Primary | ICD-10-CM

## 2018-03-16 RX ORDER — BROMPHENIRAMINE MALEATE, PSEUDOEPHEDRINE HYDROCHLORIDE, AND DEXTROMETHORPHAN HYDROBROMIDE 2; 30; 10 MG/5ML; MG/5ML; MG/5ML
5 SYRUP ORAL 4 TIMES DAILY PRN
Qty: 240 ML | Refills: 0 | Status: SHIPPED | OUTPATIENT
Start: 2018-03-16 | End: 2018-07-06 | Stop reason: SDUPTHER

## 2018-03-16 RX ORDER — AMPICILLIN 500 MG/1
1000 CAPSULE ORAL 3 TIMES DAILY
Qty: 30 CAPSULE | Refills: 1 | Status: SHIPPED | OUTPATIENT
Start: 2018-03-16 | End: 2018-03-21

## 2018-03-16 RX ORDER — PREDNISONE 10 MG/1
10 TABLET ORAL DAILY
Qty: 5 TABLET | Refills: 0 | Status: SHIPPED | OUTPATIENT
Start: 2018-03-16 | End: 2018-03-21

## 2018-03-16 NOTE — TELEPHONE ENCOUNTER
----- Message from Esteban Rodriguez sent at 3/16/2018 11:51 AM CDT -----  Contact: Patient  Hesham, [phone], requesting refill on antibiotic. Says his chest cold have come back and he is coughing up yellow mucus. Please advise. Thanks.    [facility]  [address] [place] [address]  Phone: [phone] Fax: [phone]

## 2018-03-16 NOTE — TELEPHONE ENCOUNTER
Patient states he has a head cold. States he is coughing up yellow phlegm. Denies fever. Requesting a prescription for antibiotic. Please advise.

## 2018-03-17 NOTE — TELEPHONE ENCOUNTER
He does not qualifies for antibiotics now.. He needs to use a cough medication that I sent and course higher of his prednisone. Use PNC after 5 days.

## 2018-03-20 ENCOUNTER — TELEPHONE (OUTPATIENT)
Dept: FAMILY MEDICINE | Facility: CLINIC | Age: 68
End: 2018-03-20

## 2018-03-20 NOTE — TELEPHONE ENCOUNTER
----- Message from Esteban Rodriguez sent at 3/20/2018 12:09 PM CDT -----  Contact: Patient  Hesham, [phone], requesting to speak with staff regarding his medication. Would like to know if he should stop taking his cough syrup while on the antibiotic or not. Please advise. Thanks.

## 2018-03-20 NOTE — TELEPHONE ENCOUNTER
Patient requesting to know if he can take the cough syrup prescribed for him along with the antibiotic prescribed for him. Writer assured patient he can take both medications simultaneously. Advised patient to take the medications as prescribed. Writer and patient reviewed directions of both medications. Patient verbalized understanding.

## 2018-03-22 ENCOUNTER — TELEPHONE (OUTPATIENT)
Dept: FAMILY MEDICINE | Facility: CLINIC | Age: 68
End: 2018-03-22

## 2018-03-22 NOTE — TELEPHONE ENCOUNTER
"Spoke to patient who states he is experiencing the shakes after taking his medication. States the only thing new he is taking is the antibiotics for his cold symptoms (Ampicillin).  States he is scheduled to complete the Ampicillin therapy on Saturday 3-24-18.  Patient reports he no longer has cold symptoms, and is no longer coughing up any phlegm. Requesting advise related to feeling " the shakes" after taking medication. Please advise.   "

## 2018-03-22 NOTE — TELEPHONE ENCOUNTER
I am thrilled that the chest cold is better. Please use half of the vial of Albuterol and may use it every 8 hrs instead.

## 2018-03-26 ENCOUNTER — OUTPATIENT CASE MANAGEMENT (OUTPATIENT)
Dept: ADMINISTRATIVE | Facility: OTHER | Age: 68
End: 2018-03-26

## 2018-03-26 ENCOUNTER — TELEPHONE (OUTPATIENT)
Dept: FAMILY MEDICINE | Facility: CLINIC | Age: 68
End: 2018-03-26

## 2018-03-26 NOTE — TELEPHONE ENCOUNTER
----- Message from Norm Paul RN sent at 3/26/2018  9:29 AM CDT -----  Contact: Norm Paul RN Outpatient Case Management  Good Morning,       The above patient could benefit from services offered by Chronic Care Management through [facility]. Patient was referred to OPCM High Risk. The patient has been followed by OPCM staff longer than the recommended OPCM length of stay. Patient has been educated extensively on the following conditions: COPD and Fall Prevention/safety in the Home Setting. Patients status/condition/adherence to treatment regimen is at high risk for decline if CM support is withdrawn.    Thank you,  Norm Paul, RN
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
## 2018-03-26 NOTE — TELEPHONE ENCOUNTER
Please see attached message from Kat with [facility] stating patient needs refill of Klonopin. Upon further inspection it was noted this medication was last refilled on 3-5-18, too early to refill at this time. Mrs. RahmanKat and patient notified too early to refill this medication. Verbalized understanding.

## 2018-03-26 NOTE — TELEPHONE ENCOUNTER
----- Message from Yamile Charles sent at 3/26/2018 12:35 PM CDT -----  Contact: [facility]  [facility] calling state pt needs a refill on clonazePAM (KLONOPIN) 2 MG Tab...[phone]        .      [id number] - [place], [place] - [address] [facility]  [address] [facility] [address]  Phone: [phone] Fax: [phone]

## 2018-03-26 NOTE — PROGRESS NOTES
"3/26/18-RN JIMMY called patient for follow up with OPCM LCSW Louis Brumfield. Patient reported to RN CM that he is not doing good. Reported that he just got home from paying bills. Stated that he was "about to pass out and needed to get oxygen." Patient did get oxygen then returned to call. Reported that he is driving his own vehicle but thinks it is not working correctly. Stated that he was having some difficulty with affording his medications. RN CM placed referral during previous encounter to PAP for assistance. Also provided patient contact number for Dora with the PAP and encouraged him to contact her to determine if he qualifies for assistance. Verbalized understanding. During call patient stated he had to end call as his mother was calling and he needed to call her back. Due to patient current health conditions, will refer patient to Chronic Care Management program through [facility] and close case at this time. Patient does have RN CM contact information in the event that needs develop.      "

## 2018-03-28 ENCOUNTER — TELEPHONE (OUTPATIENT)
Dept: FAMILY MEDICINE | Facility: CLINIC | Age: 68
End: 2018-03-28

## 2018-03-28 NOTE — TELEPHONE ENCOUNTER
Patient recently completed antibiotic therapy (Ampicillin) for cough with yellow phlegm and verbalized cold symptoms were resolved on 3-22-18.  Patient contacting office at this time to state cough with yellow phlegm has returned, requesting more antibiotics. Denies fever at this time. Confirmed he is taking nebulizer treatments as prescribed. Advised patient an appointment for evaluation was necessary due to recurrent cough/cold symptoms. Appointment scheduled for 4-2-18. Patient agreed to appointment date and time.

## 2018-03-28 NOTE — TELEPHONE ENCOUNTER
----- Message from Cinda Ayala sent at 3/28/2018  9:59 AM CDT -----  Contact: patient   Patient calling to speak to the Nurse in regards to a prescription. Please advise. Call to pod. No answer.   Call back   Thanks!    [facility] [address] [place] [address] AT [address]  [address] [place] [id number]  Phone: [phone] Fax: [phone]

## 2018-04-02 ENCOUNTER — OFFICE VISIT (OUTPATIENT)
Dept: FAMILY MEDICINE | Facility: CLINIC | Age: 68
End: 2018-04-02
Payer: MEDICARE

## 2018-04-02 ENCOUNTER — HOSPITAL ENCOUNTER (OUTPATIENT)
Dept: RADIOLOGY | Facility: CLINIC | Age: 68
Discharge: HOME | End: 2018-04-02
Attending: PHYSICIAN ASSISTANT
Payer: MEDICARE

## 2018-04-02 VITALS
HEART RATE: 94 BPM | TEMPERATURE: 98 F | WEIGHT: 196 LBS | DIASTOLIC BLOOD PRESSURE: 78 MMHG | BODY MASS INDEX: 30.76 KG/M2 | SYSTOLIC BLOOD PRESSURE: 124 MMHG | OXYGEN SATURATION: 95 % | HEIGHT: 67 IN

## 2018-04-02 DIAGNOSIS — M54.6 ACUTE BILATERAL THORACIC BACK PAIN: ICD-10-CM

## 2018-04-02 DIAGNOSIS — F39 MOOD DISORDER: ICD-10-CM
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
## 2018-04-02 DIAGNOSIS — F31.0 BIPOLAR AFFECTIVE DISORDER, CURRENT EPISODE HYPOMANIC: ICD-10-CM
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
## 2018-04-02 DIAGNOSIS — J44.1 COPD EXACERBATION: ICD-10-CM

## 2018-04-02 PROCEDURE — 72070 X-RAY EXAM THORAC SPINE 2VWS: CPT | Mod: 26,,, | Performed by: RADIOLOGY

## 2018-04-02 PROCEDURE — 99999 PR PBB SHADOW E&M-EST. PATIENT-LVL V: CPT | Mod: PBBFAC,,, | Performed by: PHYSICIAN ASSISTANT

## 2018-04-02 PROCEDURE — 99213 OFFICE O/P EST LOW 20 MIN: CPT | Mod: S$PBB,,, | Performed by: PHYSICIAN ASSISTANT

## 2018-04-02 PROCEDURE — 72070 X-RAY EXAM THORAC SPINE 2VWS: CPT | Mod: TC,FY,PO

## 2018-04-02 PROCEDURE — 99215 OFFICE O/P EST HI 40 MIN: CPT | Mod: PBBFAC,PO,25 | Performed by: PHYSICIAN ASSISTANT

## 2018-04-02 RX ORDER — CLONAZEPAM 2 MG/1
TABLET ORAL
Qty: 90 TABLET | Refills: 0 | Status: SHIPPED | OUTPATIENT
Start: 2018-04-02 | End: 2018-04-27 | Stop reason: SDUPTHER

## 2018-04-02 RX ORDER — LACTULOSE 10 G/15ML
SOLUTION ORAL; RECTAL
Qty: 1200 ML | Refills: 3 | Status: SHIPPED | OUTPATIENT
Start: 2018-04-02 | End: 2018-04-02 | Stop reason: SDUPTHER

## 2018-04-02 RX ORDER — LACTULOSE 10 G/15ML
SOLUTION ORAL; RECTAL
Qty: 1200 ML | Refills: 3 | Status: SHIPPED | OUTPATIENT
Start: 2018-04-02 | End: 2018-05-24 | Stop reason: ALTCHOICE

## 2018-04-02 RX ORDER — DOXYCYCLINE 100 MG/1
100 CAPSULE ORAL EVERY 12 HOURS
Qty: 20 CAPSULE | Refills: 0 | Status: SHIPPED | OUTPATIENT
Start: 2018-04-02 | End: 2018-04-11 | Stop reason: SDUPTHER

## 2018-04-02 NOTE — PROGRESS NOTES
Subjective:       Patient ID: Hesham Serna is a 67 y.o. male.    Chief Complaint: Cough    Mr. Serna comes to clinic today to be evaluated for cough. He has chronic cough due to COPD. He has recently finished ampicillin. He is taking his nebulizer treatments every 6 hours. The patient also reports that he feel while giving the dog a bath yesterday. He fell in the tub, hurting his mid back. The patient has chronic pain in the back. The patient also requests refill of lactulose.       Review of Systems   Constitutional: Negative for activity change, appetite change, chills and fever.   HENT: Negative for congestion, postnasal drip, rhinorrhea, sinus pressure, sneezing and sore throat.    Eyes: Negative for visual disturbance.   Respiratory: Positive for wheezing. Negative for cough and shortness of breath.    Cardiovascular: Negative for chest pain.   Gastrointestinal: Negative for abdominal distention and abdominal pain.   Genitourinary: Negative for difficulty urinating and dysuria.        Chronic urinary problems, followed by urology   Musculoskeletal: Positive for back pain and gait problem. Negative for arthralgias and myalgias.        Chronic pain- followed by Dr. Perdue, pain management   Allergic/Immunologic: Positive for environmental allergies.   Neurological: Negative for headaches.   Hematological: Negative for adenopathy.   Psychiatric/Behavioral: The patient is not nervous/anxious.        Objective:      Physical Exam   Constitutional: He is oriented to person, place, and time.   HENT:   Nasal cannula in place   Cardiovascular: Normal rate and regular rhythm.    Pulmonary/Chest: Effort normal. He has no wheezes.    supplemental oxygen being used today  Decreased breath sounds- chronic due to COPD. No wheezes present   Musculoskeletal: He exhibits no edema.   Back brace in place     Neurological: He is alert and oriented to person, place, and time.   Skin: No erythema.   Psychiatric: His behavior  "is normal.   Patient anxious       Assessment:       1. Fall, initial encounter    2. Acute bilateral thoracic back pain    3. COPD exacerbation        Plan:   Hesham was seen today for cough.    Diagnoses and all orders for this visit:    Fall, initial encounter  -     X-Ray Thoracic Spine AP Lateral; Future    Acute bilateral thoracic back pain  -     X-Ray Thoracic Spine AP Lateral; Future    COPD exacerbation  -     doxycycline (VIBRAMYCIN) 100 MG Cap; Take 1 capsule (100 mg total) by mouth every 12 (twelve) hours.  Patient advised that antibiotics are not necessary at this time. The patient demanded antibiotics be prescribed "just in case." Patient advised that he should only take the antibiotics if cough becomes productive of thick, purulent sputum, fever develops. If patient starts antibiotics he should call clinic.  Patient should avoid sun exposure while taking antibiotics.   Other orders  -     Discontinue: lactulose (CHRONULAC) 10 gram/15 mL solution; TAKE 30 MLS BY MOUTH THREE TIMES DAILY  -     lactulose (CHRONULAC) 10 gram/15 mL solution; TAKE 30 MLS BY MOUTH THREE TIMES DAILY        "

## 2018-04-02 NOTE — PATIENT INSTRUCTIONS
Established High Blood Pressure    High blood pressure (hypertension) is a chronic disease. Often, healthcare providers dont know what causes it. But it can be caused by certain health conditions and medicines.  If you have high blood pressure, you may not have any symptoms. If you do have symptoms, they may include headache, dizziness, changes in your vision, chest pain, and shortness of breath. But even without symptoms, high blood pressure thats not treated raises your risk for heart attack and stroke. High blood pressure is a serious health risk and shouldnt be ignored.  A blood pressure reading is made up of two numbers: a higher number over a lower number. The top number is the systolic pressure. The bottom number is the diastolic pressure. A normal blood pressure is a systolic pressure of  less than 120 over a diastolic pressure of less than 80. You will see your blood pressure readings written together. For example, a person with a systolic pressure of 188 and a diastolic pressure of 78 will have 118/78 written in the medical record.  High blood pressure is when either the top number is 140 or higher, or the bottom number is 90 or higher. This must be the result when taking your blood pressure a number of times. The blood pressures between normal and high are called prehypertension.  Home care  If you have high blood pressure, you should do what is listed below to lower your blood pressure. If you are taking medicines for high blood pressure, these methods may reduce or end your need for medicines in the future.  · Begin a weight-loss program if you are overweight.  · Cut back on how much salt you get in your diet. Heres how to do this:  ¨ Dont eat foods that have a lot of salt. These include olives, pickles, smoked meats, and salted potato chips.  ¨ Dont add salt to your food at the table.  ¨ Use only small amounts of salt when cooking.  · Start an exercise program. Talk with your healthcare  provider about the type of exercise program that would be best for you. It doesn't have to be hard. Even brisk walking for 20 minutes 3 times a week is a good form of exercise.  · Dont take medicines that stimulate the heart. This includes many over-the-counter cold and sinus decongestant pills and sprays, as well as diet pills. Check the warnings about hypertension on the label. Before buying any over-the-counter medicines or supplements, always ask the pharmacist about the product's potential interaction with your high blood pressure and your high blood pressure medicines.  · Stimulants such as amphetamine or cocaine could be deadly for someone with high blood pressure. Never take these.  · Limit how much caffeine you get in your diet. Switch to caffeine-free products.  · Stop smoking. If you are a long-time smoker, this can be hard. Talk to your healthcare provider about medicines and nicotine replacement options to help you. Also, enroll in a stop-smoking program to make it more likely that you will quit for good.  · Learn how to handle stress. This is an important part of any program to lower blood pressure. Learn about relaxation methods like meditation, yoga, or biofeedback.  · If your provider prescribed medicines, take them exactly as directed. Missing doses may cause your blood pressure get out of control.  · If you miss a dose or doses, check with your healthcare provider or pharmacist about what to do.  · Consider buying an automatic blood pressure machine. Ask your provider for a recommendation. You can get one of these at most pharmacies.     The American Heart Association recommends the following guidelines for home blood pressure monitoring:  · Don't smoke or drink coffee for 30 minutes before taking your blood pressure.  · Go to the bathroom before the test.  · Relax for 5 minutes before taking the measurement.  · Sit with your back supported (don't sit on a couch or soft chair); keep your feet on  the floor uncrossed. Place your arm on a solid flat surface (like a table) with the upper part of the arm at heart level. Place the middle of the cuff directly above the eye of the elbow. Check the monitor's instruction manual for an illustration.  · Take multiple readings. When you measure, take 2 to 3 readings one minute apart and record all of the results.  · Take your blood pressure at the same time every day, or as your healthcare provider recommends.  · Record the date, time, and blood pressure reading.  · Take the record with you to your next medical appointment. If your blood pressure monitor has a built-in memory, simply take the monitor with you to your next appointment.  · Call your provider if you have several high readings. Don't be frightened by a single high blood pressure reading, but if you get several high readings, check in with your healthcare provider.  · Note: When blood pressure reaches a systolic (top number) of 180 or higher OR diastolic (bottom number) of 110 or higher, seek emergency medical treatment.  Follow-up care  You will need to see your healthcare provider regularly. This is to check your blood pressure and to make changes to your medicines. Make a follow-up appointment as directed. Bring the record of your home blood pressure readings to the appointment.  When to seek medical advice  Call your healthcare provider right away if any of these occur:  · Blood pressure reaches a systolic (upper number) of 180 or higher OR a diastolic (bottom number) of 110 or higher  · Chest pain or shortness of breath  · Severe headache  · Throbbing or rushing sound in the ears  · Nosebleed  · Sudden severe pain in your belly (abdomen)  · Extreme drowsiness, confusion, or fainting  · Dizziness or spinning sensation (vertigo)  · Weakness of an arm or leg or one side of the face  · You have problems speaking or seeing   Date Last Reviewed: 12/1/2016  © 3865-7167 [employer]. [address] [place] [address]  [place] [address]. All rights reserved. This information is not intended as a substitute for professional medical care. Always follow your healthcare professional's instructions.

## 2018-04-03 ENCOUNTER — TELEPHONE (OUTPATIENT)
Dept: FAMILY MEDICINE | Facility: CLINIC | Age: 68
End: 2018-04-03

## 2018-04-03 NOTE — TELEPHONE ENCOUNTER
----- Message from Cinda Ayala sent at 4/3/2018  8:54 AM CDT -----  Contact: patient   Patient calling to speak to the Nurse. Please advise.   Call back    Thanks!

## 2018-04-06 ENCOUNTER — TELEPHONE (OUTPATIENT)
Dept: FAMILY MEDICINE | Facility: CLINIC | Age: 68
End: 2018-04-06

## 2018-04-06 ENCOUNTER — TELEPHONE (OUTPATIENT)
Dept: PSYCHIATRY | Facility: CLINIC | Age: 68
End: 2018-04-06

## 2018-04-06 NOTE — TELEPHONE ENCOUNTER
Patient called and left voice mail message that he wanted a call back. After listening to the voice mail the phone was ringing in the clinic at it was the patient again.  I spoke to him and he stated he wanted to personally talk to Dr Queen I explained she was out of clinic and would not be in until Monday morning and I could get a message to the on call doctor and have her call him he refused that he stated Dr Queen knows his personal issues.  I asked patint if he was having any suicidal thoughts or thoughts of harming himself or any one else and he Denied any thoughts.  He stated he needed to talk to her about getting into a rehab to detox off of the medication he has been taking.   I was able to give him the 24/7 number to [facility] so that he can call and speak to them [phone] He said he was going to call them and will be awaiting to here from Dr Queen on Monday.  He took the number and read it back to me with the name of the facility.   I am going to forward this message to Dr Casillas to see if there is anything that she may want to do with patient.  Please advise  Patient is very strict about only talking to Dr Queen

## 2018-04-06 NOTE — TELEPHONE ENCOUNTER
----- Message from Cinda Ayala sent at 4/6/2018  4:21 PM CDT -----  Contact: patient   Patient returning a missed call. Please advise.   Call back    Thanks!

## 2018-04-09 ENCOUNTER — APPOINTMENT (OUTPATIENT)
Dept: LAB | Facility: HOSPITAL | Age: 68
End: 2018-04-09
Attending: UROLOGY
Payer: MEDICARE

## 2018-04-09 ENCOUNTER — TELEPHONE (OUTPATIENT)
Dept: UROLOGY | Facility: CLINIC | Age: 68
End: 2018-04-09

## 2018-04-09 ENCOUNTER — OFFICE VISIT (OUTPATIENT)
Dept: UROLOGY | Facility: CLINIC | Age: 68
End: 2018-04-09
Payer: MEDICARE

## 2018-04-09 VITALS
TEMPERATURE: 98 F | HEIGHT: 67 IN | DIASTOLIC BLOOD PRESSURE: 84 MMHG | HEART RATE: 84 BPM | WEIGHT: 196 LBS | BODY MASS INDEX: 30.76 KG/M2 | SYSTOLIC BLOOD PRESSURE: 139 MMHG

## 2018-04-09 DIAGNOSIS — N20.0 NEPHROLITHIASIS: ICD-10-CM

## 2018-04-09 DIAGNOSIS — N32.81 OAB (OVERACTIVE BLADDER): Primary | ICD-10-CM

## 2018-04-09 DIAGNOSIS — R31.0 GROSS HEMATURIA: ICD-10-CM

## 2018-04-09 DIAGNOSIS — R33.9 URINARY RETENTION: ICD-10-CM

## 2018-04-09 DIAGNOSIS — R82.89 ABNORMAL URINE CYTOLOGY: ICD-10-CM

## 2018-04-09 DIAGNOSIS — R31.29 MICROSCOPIC HEMATURIA: ICD-10-CM

## 2018-04-09 LAB
BILIRUB SERPL-MCNC: ABNORMAL MG/DL
BLOOD URINE, POC: 50
COLOR, POC UA: ABNORMAL
GLUCOSE UR QL STRIP: ABNORMAL
KETONES UR QL STRIP: ABNORMAL
LEUKOCYTE ESTERASE URINE, POC: ABNORMAL
NITRITE, POC UA: ABNORMAL
PH, POC UA: 7
PROTEIN, POC: ABNORMAL
SPECIFIC GRAVITY, POC UA: 1015
UROBILINOGEN, POC UA: ABNORMAL

## 2018-04-09 PROCEDURE — 87186 SC STD MICRODIL/AGAR DIL: CPT

## 2018-04-09 PROCEDURE — 81002 URINALYSIS NONAUTO W/O SCOPE: CPT | Mod: PBBFAC,PN | Performed by: UROLOGY

## 2018-04-09 PROCEDURE — 99214 OFFICE O/P EST MOD 30 MIN: CPT | Mod: S$PBB,25,, | Performed by: UROLOGY

## 2018-04-09 PROCEDURE — 87077 CULTURE AEROBIC IDENTIFY: CPT

## 2018-04-09 PROCEDURE — 87086 URINE CULTURE/COLONY COUNT: CPT

## 2018-04-09 PROCEDURE — 88112 CYTOPATH CELL ENHANCE TECH: CPT | Performed by: PATHOLOGY

## 2018-04-09 PROCEDURE — 99999 PR PBB SHADOW E&M-EST. PATIENT-LVL IV: CPT | Mod: PBBFAC,,, | Performed by: UROLOGY

## 2018-04-09 PROCEDURE — 51701 INSERT BLADDER CATHETER: CPT | Mod: PBBFAC,PN | Performed by: UROLOGY

## 2018-04-09 PROCEDURE — 87088 URINE BACTERIA CULTURE: CPT

## 2018-04-09 PROCEDURE — 99214 OFFICE O/P EST MOD 30 MIN: CPT | Mod: PBBFAC,PN | Performed by: UROLOGY

## 2018-04-09 NOTE — TELEPHONE ENCOUNTER
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 I did a biopsy but it was negative last year. I'm repeating a cytology and told him if it's abnormal I may have to look in his bladder again.     Pt verbalized an understanding.  He is scheduled for an appt and will be added to wait list for sooner appt.  He is concerned about his mother's health issues.

## 2018-04-09 NOTE — TELEPHONE ENCOUNTER
----- Message from Lima Baxter sent at 4/9/2018  2:22 PM CDT -----  Type:  Patient Returning Call      Who Left Message for Patient:  Rosa  Does the patient know what this is regarding?: No  Best Call Back Number:  [phone] (home)

## 2018-04-09 NOTE — PATIENT INSTRUCTIONS
Gross hematuria workup, but +abnormal urine cytology   -could be related to bladder herniating into testicular causing bladder irritation. However they want to hold off on treatment of this.   -cystoscopy showed irritation c/w this. ctu showed a nonobstructing stone which should not be the cause and he's had UTIs which can cause this too.   -repeat urine cytology was abnormal. If +again will schedule repeat cystoscopy.     Recurrent UTI  -recommend stopping the doxycycline now and save antibiotics for when he has symptomatic uti's (fever, flank pain and chills).  -likely related to in and out caths  -repeating cath urinee culture today and if + may treat, c/o oab.     OAB  -restart myrbetriq, 90 day supply to [facility]  -f/u urine cytology (h/o abnormal cytology) may need repeat cysto. Still smoking  -f/u urine culture.   -stop drinking multiple coffees and cokes      Elevated urine residuals  -do not think he needs to catheterize, emptying his bladder  -pvr by in and out cath post void today was only 10cc,  -continue rapaflo, told him he does not need to catheterize anymore if he continues to have low residuals    Nephrolithiasis  -LMP 4mm stone unchanged in size and visible on kub  -hold off on any treatment    ED  -he says he will try to masturbate at home and see how this goes. With his SOB explained he needs to be very careful.     F/u 2 months

## 2018-04-09 NOTE — TELEPHONE ENCOUNTER
Spoke with patient he states he had a missed call from our office. Informed patient no one from our office called. Patient verbally voiced understanding.

## 2018-04-09 NOTE — TELEPHONE ENCOUNTER
I called the patient back - he reports he is unable to talk because he is taking a breathing treatment.  He asked for me to call him at end of day.  He has not been seen here since 8/17; discussed having my nurse also call to schedule him an appt.  Pt reports he saw Urologist this AM and was dx with bladder CA. I do not see confirmation of this from Dr Castillo's note - will also message her to clarify.

## 2018-04-09 NOTE — PROGRESS NOTES
"[facility] Progress Note - [place]  Urology Group: Anna/Robert/Cassie/Radha  PC[facility]: Dr[facility]ae[facility]    Chief Complaint: GH    Subjective:        HPI: Hesham Serna is a 67 y.o. male presents with     Last seen 9/2017    Gross hematuria  -ct urogram 11/14/16: herniation into left testicle, 4mm left renal stone. Repeat ctu 8/21/17 showed a 6mm stone in his left kidney and herniating bladder into testicel. .    -urine cultures have been positive in the past, ua today suspicious. Pt does CIC.   -urine cytology 9/26/16 - negative .7/17/17 - negative,  8/10/17 - urothelial cell atypia, supicious for urothelial high grade tumor.He has a h/o smoking (55 packs, 2-3 packs a day). Repeat urine cytology 8/17/17 abnormal  -cystoscopy 9/26/16 - pt never scheduled - pt states he cannot schedule this right now   -cystoscopy 8/28/17 urethral meatus with tear, small prostate and no b lobar hypertrophy, + inflammation on posterior bladder wall and floor that was biopsied. Path showed cystitis cystica.   -last episode of GH was 1.5 weeks ago when he saw alissa for uti  -he saw  who recommended     H/o Elevated residuals and OAB  Pt with history of urinary retention when he first started seeing me. UDS showed atonic bladder.   spontaneously voids 4-5x a day "a normal amount" and "feels like he is emptying" but sometimes catheterizes 1x a night. CIC about 5-6x a day and does it based on how full he feels.      Seen 7/17/17 and stated that he tried to catheterize but could not and continued to have blood when he catheterized.  Was also concerned about frequency q1h and dysuria. ua had shown 1+luek and blood. cx grew serratia. I cath'd him and it went easy-had bloody urine. Urine cytology was suspicious.  I also had him discontinue the myrbetriq and continue flomax.      I also had him fill out a voiding diary.  He brought a handwritten diary in on 8/17/17 showing that he was " voiding every 2-3 hours with essentially no residual. Catheterizing 1x in the middle of the night, once in the morning. He catheterizes bc he has the urge to void but he can't. He is voiding about 3x a day.  Plan was to have him fill out a more formal diary. He returns today stating he lost it.     Seen 8/21/17 and he catheterizes more than 3x a day and sometimes cannot pass the catheter. Uroflow results 8/21/17: Peak flow: 13 mL/s, Mean flow: 7mL/s, Voided time: 16s, Flow time: 14s, TTP flow: 3s, Voided volume: 100 mL  PVR 0ml    Seen 8/28/17 and I tried having him use the coloplast catheter but this would not advance all the way but the red rubber catheter did pass and I asked him to use this instead. He says he has not been catheterizing regularly. Seen 9/21/17 and had been voiding about 7-8x a day, not catheterizing and nocturia 1-2x a night. pvr by in and out cath was 5cc. Stopped myrbetriq 5 to 6 months ago.     Today he says 3 weeks ago he started voiding every 5 minutes, has some burning. Prior to this he was voiding 5x a day and catheterizing 5 day. He says he also started catheterizing again 5 months ago bc he was not emptying his bladder. He says the last 9 out of 10 catheters were not able to be passed and has been having trouble catheterizing for the past 4 weeks. Drinking a few cups of sodas and cokes in the morning    Gross hematuria and abnormal urine cytology  -could be related to bladder herniating into testicular causing bladder irritation. However they want to hold off on treatment of this.   -cystoscopy 9/4/17 showed irritation c/w this. ctu showed a nonobstructing stone which should not be the cause and he's had UTIs which can cause this too.   -Voided urine 9/21/17: Highly atypical single cells are present, suspicious for transitional cell carcinoma.These features can also be seen in the context of instrumentation. Clinical correlation is essential. This was discussed with Dr. Castillo on  October 2, 2017, at 8:40 AM.   -denies any recurrent gross hematuria     Nephrolithiasis  h/o nephrolithiasis. CTRSS on 6/4/15 showed left 4mm renal stone. KUB on 4/12/16 showed stone is radioopaque. Ctu 11/14/16 showed this again.  Scheduled for eswl but never proceeded with surgery. Stated he wants to hold off until his back is fixed. No c/o left flank pain  but has been having gross hematuria and abnormal urine cytology    He denies passing any stones.     Recurrent uti  His last infection was on 9/12/17 with E.cloacae. Currently he has no sx of uti. When he was seen on 9/12 he had some c/o dysuria which he always has and gross hematuria. He is currently on doxycycline.   -catheterized by me today with 16fr straight lubricated ronquillo, went easily     Urinalysis today void: 1.015/7/tr leukocytes/1+glucose/50 blood - send for culture, asymptomatic except frequency  ua cath: sent for culture    Urine culture   9/12/17 E.cloacae  8/28/17 ng  8/21/17            k.pneumonia  8/10/17                      ng  1/16/17  ng  10/21/16  E.cloacae  9/19/16  P.mirabilis  7/20/16  s.haemolyticus  3/23/16   Ng  12/16/15  ng      Erectile dysfunction, did not discuss today   -pt states that he has not had an erection in over 3 years. He has tried meds in the past and they work. He has over 20 viagra's at home.       REVIEW OF SYSTEMS:  General ROS: no fevers, no chills  Psychological ROS: no depression  Endocrine ROS: no heat or cold  Respiratory ROS: + SOB  Cardiovascular ROS: no CP  Gastrointestinal ROS: no abdominal pain, + constipation, no diarrhea, no BRBPR  Musculoskeletal ROS: no muscle pain  Neurological ROS: no headaches  Dermatological ROS: no rashes  HEENT: noglasses, no sinus   ROS: per HPI    AUA ssx: 19, mixed   IIEF: 5     The past medical, surgical, social and family hx were reviewed. There have been any changes.   He said he fell recently  Cataracts? none    Urologic meds: rapaflo  Anticoagulation:  No    Objective:     Vitals:    04/09/18 1056   BP: 139/84   Pulse: 84   Temp: 98.4 °F (36.9 °C)          Physical Exam   Constitutional: He is oriented to person, place, and time. He appears well-developed and well-nourished. He is cooperative. No distress.   HENT:   Head: Normocephalic and atraumatic.   Eyes: Pupils are equal, round, and reactive to light.   Cardiovascular: Normal rate and regular rhythm.    Pulmonary/Chest: Effort normal.   Abdominal: Soft. Normal appearance.   Musculoskeletal: Normal range of motion.   Neurological: He is alert and oriented to person, place, and time. He has normal reflexes.   Skin: Skin is intact.     Psychiatric: He has a normal mood and affect.       MOON 2/9/17: 20 g prostate, testes descended bilaterally, no masses,  left reducible inguinal hernia    In and out cath with 16fr coloplast lubricated catheter - went easily and 20cc drained.       Labs:    PSA   5/9/17                       0.45  5/26/15                      0.27  7/14/14                      0.26     Cr  4/3/17 1.0    Path:   -Voided urine 9/21/17: Highly atypical single cells are present, suspicious for transitional cell carcinoma.These features can also be seen in the context of instrumentation. Clinical correlation is essential. This was discussed with Dr. Castillo on October 2, 2017, at 8:40 AM.  Bladder, biopsy 9/4/17: Cystitis cystica.  Urine, voided, cytology 8/21/17: Urothelial atypia, suspicious cells present, see comment.  URINE (VOIDED) 8/10/17: Urothelial cell atypia, suspicious for urothelial high-grade dysplasia/carcinoma in situ  Voided urine 7/17/17: Negative for malignant cells.  9/26/16 URINE, VOIDED (CYTOLOGY):  -Negative for malignant cells  -Abundant neutrophils  -Degenerative changes     Rads:   ctu 8/21/17  1.  Stable examination demonstrating bilateral inguinal hernias, the left of which is a direct inguinal hernia containing a portion of the urinary bladder. There is urinary bladder  wall thickening involving the anterior bladder wall and herniated portion of the bladder which is nonspecific but likely reactive in this setting. Right indirect fat-containing inguinal hernia also again noted.  2. Additional stable findings:  -Nonobstructive left nephrolithiasis  -Colonic diverticulosis  -Chronic severe L1 compression fracture status post vertebroplasty/kyphoplasty     kub 8/21/17  Left 4mm (6mm on ct) mid pole stone (L3)  Note to self: recommend eswl, stone growing     ctu 11/14/16: 4mm left renal stone, no renal mass, no hydro, prtial herniation of the bladder into the LIH. Prostate not enlarged. A fat containing RIH. Severe L1 compression fracture  kub 4/12/16 - left renal stone 5mm  ctrss 6/4/15 - 4mm left midpole stone    Assessment:       1. OAB (overactive bladder)    2. Microscopic hematuria    3. Urinary retention    4. Nephrolithiasis    5. Gross hematuria    6. Abnormal urine cytology        Plan:       Gross hematuria workup, but +abnormal urine cytology   -could be related to bladder herniating into testicular causing bladder irritation. However they want to hold off on treatment of this.   -cystoscopy showed irritation c/w this. ctu showed a nonobstructing stone which should not be the cause and he's had UTIs which can cause this too.   -repeat urine cytology was abnormal. If +again will schedule repeat cystoscopy.     Recurrent UTI  -recommend stopping the doxycycline now and save antibiotics for when he has symptomatic uti's (fever, flank pain and chills).  -likely related to in and out caths  -repeating cath urinee culture today and if + may treat, c/o oab.     OAB  -restart myrbetriq, 90 day supply to [facility]  -f/u urine cytology (h/o abnormal cytology) may need repeat cysto. Still smoking  -f/u urine culture.   -stop drinking multiple coffees and cokes    Elevated urine residuals  -do not think he needs to catheterize, emptying his bladder  -pvr by in and out cath post  void today was only 10cc,  -continue rapaflo, told him he does not need to catheterize anymore if he continues to have low residuals    Nephrolithiasis  -LMP 4mm stone unchanged in size and visible on kub  -hold off on any treatment    psa in may         F/u 2 months

## 2018-04-09 NOTE — TELEPHONE ENCOUNTER
Called and scheduled patient for 05/21/18 @ 8 am . Patient was given the new address for appointment

## 2018-04-09 NOTE — TELEPHONE ENCOUNTER
Patient has called office @ 09:01 am and 09:25 am and left messages on voice mail that he is waiting on Dr Queen call.    Returned call and spoke to patient and let him know that Dr Queen is in clinic seeing patients and the message was sent to her and someone will be contacting him later today when available.  Patient was ok and will await a call

## 2018-04-10 ENCOUNTER — CLINICAL SUPPORT (OUTPATIENT)
Dept: CARDIOLOGY | Facility: CLINIC | Age: 68
End: 2018-04-10
Attending: FAMILY MEDICINE
Payer: MEDICARE

## 2018-04-10 ENCOUNTER — TELEPHONE (OUTPATIENT)
Dept: UROLOGY | Facility: CLINIC | Age: 68
End: 2018-04-10

## 2018-04-10 DIAGNOSIS — R06.09 DYSPNEA ON EXERTION: ICD-10-CM

## 2018-04-10 DIAGNOSIS — J42 CHRONIC BRONCHITIS, UNSPECIFIED CHRONIC BRONCHITIS TYPE: ICD-10-CM

## 2018-04-10 PROCEDURE — 93306 TTE W/DOPPLER COMPLETE: CPT | Mod: 26,S$PBB,, | Performed by: INTERNAL MEDICINE

## 2018-04-10 PROCEDURE — C8929 TTE W OR WO FOL WCON,DOPPLER: HCPCS | Mod: PBBFAC,PO | Performed by: INTERNAL MEDICINE

## 2018-04-10 NOTE — TELEPHONE ENCOUNTER
----- Message from Gail Alvarez sent at 4/10/2018  9:49 AM CDT -----  Contact: [phone]  Patient is requesting a call back from the nurse stated he's pouring out blood, he stated he's bleeding a lot.    Please call the patient upon request at phone number [phone].

## 2018-04-10 NOTE — TELEPHONE ENCOUNTER
Spoke with patient he states he is bleeding from his penis. Patient states it took him a hour in half to urinate this morning so he used a catheter. Patient now states he has blood in his urine. Per  advised patient not to use catheters unless he cannot urinate for 6-7 hours and to increase fluid intake. Patient verbally voiced understanding.

## 2018-04-11 ENCOUNTER — TELEPHONE (OUTPATIENT)
Dept: PULMONOLOGY | Facility: CLINIC | Age: 68
End: 2018-04-11

## 2018-04-11 ENCOUNTER — OFFICE VISIT (OUTPATIENT)
Dept: PULMONOLOGY | Facility: CLINIC | Age: 68
End: 2018-04-11
Payer: MEDICARE

## 2018-04-11 ENCOUNTER — TELEPHONE (OUTPATIENT)
Dept: FAMILY MEDICINE | Facility: CLINIC | Age: 68
End: 2018-04-11

## 2018-04-11 VITALS
DIASTOLIC BLOOD PRESSURE: 75 MMHG | SYSTOLIC BLOOD PRESSURE: 131 MMHG | OXYGEN SATURATION: 94 % | HEART RATE: 85 BPM | WEIGHT: 196 LBS | HEIGHT: 67 IN | BODY MASS INDEX: 30.76 KG/M2

## 2018-04-11 DIAGNOSIS — J96.11 CHRONIC RESPIRATORY FAILURE WITH HYPOXIA: Primary | ICD-10-CM

## 2018-04-11 DIAGNOSIS — J41.8 MIXED SIMPLE AND MUCOPURULENT CHRONIC BRONCHITIS: Chronic | ICD-10-CM

## 2018-04-11 DIAGNOSIS — J44.9 STEROID-DEPENDENT COPD: ICD-10-CM

## 2018-04-11 DIAGNOSIS — J43.9 PULMONARY EMPHYSEMA, UNSPECIFIED EMPHYSEMA TYPE: ICD-10-CM

## 2018-04-11 DIAGNOSIS — J44.9 CHRONIC OBSTRUCTIVE PULMONARY DISEASE, UNSPECIFIED COPD TYPE: ICD-10-CM

## 2018-04-11 DIAGNOSIS — J44.1 COPD EXACERBATION: ICD-10-CM

## 2018-04-11 DIAGNOSIS — R68.3 CLUBBING OF FINGERS: ICD-10-CM

## 2018-04-11 DIAGNOSIS — Z92.241 STEROID-DEPENDENT COPD: ICD-10-CM

## 2018-04-11 DIAGNOSIS — M81.0 OSTEOPOROSIS, UNSPECIFIED OSTEOPOROSIS TYPE, UNSPECIFIED PATHOLOGICAL FRACTURE PRESENCE: ICD-10-CM

## 2018-04-11 LAB
AV MEAN GRADIENT: 5 MMHG
AV VALVE AREA: 3.17 CM2
BACTERIA UR CULT: NORMAL
CV ECHO LV RWT: 0.44 CM
DOP CALC AO PEAK VEL: 1.53 M/S
DOP CALC AO VTI: 0.3 CM
DOP CALC LVOT AREA: 3.66 CM2
DOP CALC LVOT DIAMETER: 2.16 CM
DOP CALC LVOT STROKE VOLUME: 0.95 CM3
DOP CALCLVOT PEAK VEL VTI: 0.26 CM
E WAVE DECELERATION TIME: 280.08 MSEC
E/A RATIO: 0.49
E/E' RATIO: 6
ECHO EF ESTIMATED: 56 %
ECHO LV POSTERIOR WALL: 1.04 CM (ref 0.6–1.1)
FRACTIONAL SHORTENING: 29 % (ref 28–44)
INTERVENTRICULAR SEPTUM: 1.06 CM (ref 0.6–1.1)
IVRT: 0.14 MSEC
LA MAJOR: 5.47 CM
LA MINOR: 5.38 CM
LA WIDTH: 2.6
LEFT ATRIUM SIZE: 3.83 CM
LEFT INTERNAL DIMENSION IN SYSTOLE: 3.34 CM (ref 2.1–4)
LEFT VENTRICULAR INTERNAL DIMENSION IN DIASTOLE: 4.73 CM (ref 3.5–6)
LV LATERAL E/E' RATIO: 4.67
LV SEPTAL E/E' RATIO: 8.4
MV PEAK A VEL: 0.85 M/S
MV PEAK E VEL: 0.42 M/S
MV STENOSIS PRESSURE HALF TIME: 81.22 MS
MV VALVE AREA P 1/2 METHOD: 2.71 CM2
PISA TR MAX VEL: 2.58 M/S
PROX AORTA: 3.6 CM
PULM VEIN S/D RATIO: 1.44
PV PEAK D VEL: 0.27 M/S
PV PEAK S VEL: 0.39 M/S
RA MAJOR: 4.47 CM
RA PRESSURE: 3 MMHG
RA WIDTH: 3.92 CM
RIGHT VENTRICULAR END-DIASTOLIC DIMENSION: 1.63 CM
SINUS: 3.6 CM
STJ: 3.4 CM
TDI LATERAL: 0.09
TDI SEPTAL: 0.05
TDI: 0.07
TR MAX PG: 26.72 MMHG
TRICUSPID ANNULAR PLANE SYSTOLIC EXCURSION: 0.01 CM
TV REST PULMONARY ARTERY PRESSURE: 29.72 MMHG

## 2018-04-11 PROCEDURE — 99205 OFFICE O/P NEW HI 60 MIN: CPT | Mod: S$PBB,,, | Performed by: INTERNAL MEDICINE

## 2018-04-11 PROCEDURE — 99215 OFFICE O/P EST HI 40 MIN: CPT | Mod: PBBFAC,PO | Performed by: INTERNAL MEDICINE

## 2018-04-11 PROCEDURE — 99999 PR PBB SHADOW E&M-EST. PATIENT-LVL V: CPT | Mod: PBBFAC,,, | Performed by: INTERNAL MEDICINE

## 2018-04-11 RX ORDER — ALBUTEROL SULFATE 90 UG/1
2 AEROSOL, METERED RESPIRATORY (INHALATION) EVERY 6 HOURS PRN
Qty: 1 EACH | Refills: 5 | Status: SHIPPED | OUTPATIENT
Start: 2018-04-11 | End: 2018-11-01 | Stop reason: SDUPTHER

## 2018-04-11 RX ORDER — DOXYCYCLINE 100 MG/1
100 CAPSULE ORAL EVERY 12 HOURS
Qty: 20 CAPSULE | Refills: 3 | Status: SHIPPED | OUTPATIENT
Start: 2018-04-11 | End: 2018-04-11 | Stop reason: SDUPTHER

## 2018-04-11 RX ORDER — IPRATROPIUM BROMIDE AND ALBUTEROL SULFATE 2.5; .5 MG/3ML; MG/3ML
3 SOLUTION RESPIRATORY (INHALATION)
Qty: 180 ML | Refills: 3 | Status: SHIPPED | OUTPATIENT
Start: 2018-04-11 | End: 2018-09-14 | Stop reason: SDUPTHER

## 2018-04-11 RX ORDER — IPRATROPIUM BROMIDE AND ALBUTEROL SULFATE 2.5; .5 MG/3ML; MG/3ML
3 SOLUTION RESPIRATORY (INHALATION)
Qty: 180 ML | Refills: 3 | Status: SHIPPED | OUTPATIENT
Start: 2018-04-11 | End: 2018-04-11 | Stop reason: SDUPTHER

## 2018-04-11 RX ORDER — DOXYCYCLINE 100 MG/1
100 CAPSULE ORAL EVERY 12 HOURS
Qty: 20 CAPSULE | Refills: 3 | Status: SHIPPED | OUTPATIENT
Start: 2018-04-11 | End: 2018-05-24 | Stop reason: ALTCHOICE

## 2018-04-11 RX ORDER — ALBUTEROL SULFATE 90 UG/1
2 AEROSOL, METERED RESPIRATORY (INHALATION) EVERY 6 HOURS PRN
Qty: 1 EACH | Refills: 5 | Status: SHIPPED | OUTPATIENT
Start: 2018-04-11 | End: 2018-04-11 | Stop reason: SDUPTHER

## 2018-04-11 RX ORDER — PREDNISONE 10 MG/1
10 TABLET ORAL DAILY
COMMUNITY
End: 2018-04-11 | Stop reason: SDUPTHER

## 2018-04-11 RX ORDER — PREDNISONE 10 MG/1
20 TABLET ORAL DAILY
Qty: 60 TABLET | Refills: 11 | Status: SHIPPED | OUTPATIENT
Start: 2018-04-11 | End: 2018-07-02 | Stop reason: SDUPTHER

## 2018-04-11 RX ORDER — PREDNISONE 10 MG/1
20 TABLET ORAL DAILY
Qty: 60 TABLET | Refills: 11 | Status: SHIPPED | OUTPATIENT
Start: 2018-04-11 | End: 2018-04-11 | Stop reason: SDUPTHER

## 2018-04-11 NOTE — TELEPHONE ENCOUNTER
----- Message from Kymberly De Paz sent at 4/11/2018 10:01 AM CDT -----  Contact: Patient  Hesham, patient [phone] calling to speak with nurse Ally, states that he was referred to see Dr. Randle and he has been waiting at the office since 8:30 and still has not been seen, his appointment was for 9:20, so he wants to speak with the nurse for a referral to another Pulmonologist. Please advise. Thanks.

## 2018-04-11 NOTE — TELEPHONE ENCOUNTER
Spoke to patient who states he had an extended wait today at appointment with Dr. Randle. Writer confirmed patient was seen today by Dr. Randle. Patient confirmed and is satisfied with care rendered.

## 2018-04-11 NOTE — TELEPHONE ENCOUNTER
Went over medications with pt on the phone until pt verbalized understanding.     ----- Message from Ellen Zhou sent at 4/11/2018 12:58 PM CDT -----  Contact: Self  Patient needs to speak to the nurse about his medications he got today     Patient states they cant fill them     Please call back [phone]

## 2018-04-11 NOTE — LETTER
April 11, 2018      Samuel Ramos MD  [address]  [place] [address]           [facility]  [address]  [place] [address]  Phone: [phone]  Fax: [phone]          Patient: Hesham Serna   MR Number: [id number]   YOB: 1950   Date of Visit: 4/11/2018       Dear Dr. Samuel Ramos:    Thank you for referring Hesham Serna to me for evaluation. Attached you will find relevant portions of my assessment and plan of care.    If you have questions, please do not hesitate to call me. I look forward to following Hesham Serna along with you.    Sincerely,    Kareem Randle MD    Enclosure  CC:  No Recipients    If you would like to receive this communication electronically, please contact [email or web address] or [phone] to request more information on Mobile A[employer] Link access.    For providers and/or their staff who would like to refer a patient to [facility], please contact us through our one-stop-shop provider referral line, [facility], at [phone].    If you feel you have received this communication in error or would no longer like to receive these types of communications, please e-mail [email or web address]

## 2018-04-11 NOTE — PATIENT INSTRUCTIONS
Try to use trelegy once daily in place of spiriva and advair- need to continue most cost effective therapy.  Use trelegy,    OR advair and spiriva    May stop daliresp    Use prednisone 10 mg daily.  May boost to to 20 mg daily if more short of breath.    May benefit from osteoporosis therapy-- check bone density reasonable.      Need to review breathing test    Use doxycycline for yellow mucous

## 2018-04-11 NOTE — PROGRESS NOTES
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  Still smokes 3-5 daily, lives alone, gets by frozen dinners.  [place] [place] Still [place].  Has chr back brace,  Sleeps with ox.    On prednisone over 10 yrs, has bad back after fall in tub washing dog.  No osteoporosis rx. Mom with lung dz at 88, pt with bad lungs since chemical spills- stopped working age 57.      Chr sinus drip and and poor sense smell, no asa use,  No cough or wheeze noted,  Had pft with Dr Judge past.       The chief compliant  problem is new to me",   PFSH:  Past Medical History:   Diagnosis Date    Allergy     Arthritis     Asthma     Cancer     skin DR Isrrael Yun    COPD (chronic obstructive pulmonary disease)     Degenerative disc disease     Depression     bipolar dis    Fracture of vertebra due to osteoporosis     Hepatitis C     Hypertension     Liver disease     Nasal bone fx-closed     Nephrolith     Rosacea     Skin cancer          Past Surgical History:   Procedure Laterality Date    jaw surgey      KNEE SURGERY      NECK SURGERY      8 procedures on neck    RHIZOTOMY W/ RADIOFREQUENCY ABLATION      TONSILLECTOMY      tumors      10 removed from scalp     Social History   Substance Use Topics    Smoking status: Current Every Day Smoker     Packs/day: 0.50     Years: 30.00     Types: Cigarettes    Smokeless tobacco: Never Used      Comment: down to 6 a day    Alcohol use No     Family History   Problem Relation Age of Onset    Heart disease Father     No Known Problems Mother     Cancer Paternal Uncle      prostate    Cancer Paternal Aunt      breast    Eczema Neg Hx     Lupus Neg Hx     Psoriasis Neg Hx     Melanoma Neg Hx     Glaucoma Neg Hx      Review of patient's allergies indicates:   Allergen Reactions    Codeine Other (See Comments)    Morphine " "     Pt denies this 1-10-13     Bactrim [sulfamethoxazole-trimethoprim] Other (See Comments)     Pt cannot take with Methadone    Ciprofloxacin Other (See Comments)     Patient cannot take with methadone       Performance Status:The patient's activity level is housebound activities.      Review of Systems:  a review of eleven systems covering constitutional, Eye, HEENT, Psych, Respiratory, Cardiac, GI, , Musculoskeletal, Endocrine, Dermatologic was negative except for pertinent findings as listed ABOVE and below:   pertinent positive as above, rest is good  No liver dz.  Chr back pain - h/o cirrhosis,    Exam:Comprehensive exam done. /75 (BP Location: Left arm, Patient Position: Standing)   Pulse 85   Ht 5' 7" (1.702 m)   Wt 88.9 kg (195 lb 15.8 oz)   SpO2 (!) 94%   BMI 30.70 kg/m²   Exam included Vitals as listed, and patient's appearance and affect and alertness and mood, oral exam for yeast and hygiene and pharynx lesions and Mallapatti (M) score, neck with inspection for jvd and masses and thyroid abnormalities and lymph nodes (supraclavicular and infraclavicular nodes and axillary also examined and noted if abn), chest exam included symmetry and effort and fremitus and percussion and auscultation, cardiac exam included rhythm and gallops and murmur and rubs and jvd and edema, abdominal exam for mass and hepatosplenomegaly and tenderness and hernias and bowel sounds, Musculoskeletal exam with muscle tone and posture and mobility/gait and  strength, and skin for rashes and cyanosis and pallor and turgor, extremity for clubbing.  Findings were normal except for pertinent findings listed below:   M3, dry mouth, chest is symmetric, no distress, normal percussion, normal fremitus and good normal breath sounds- decrease bs, no edema, clubbing-      Radiographs (ct chest and cxr) reviewed: view by direct vision  Ct spine viewed- Jan 2018 nad poor study    Labs reviewed   PFT was done and results to " be reviewed       Plan:  Clinical impression is apparently straight forward and impression with management as below.    Hesham was seen today for bronchitis and shortness of breath.    Diagnoses and all orders for this visit:    Chronic respiratory failure with hypoxia  -     Discontinue: fluticasone-umeclidin-vilanter (TRELEGY ELLIPTA) 100-62.5-25 mcg DsDv; Inhale 1 puff into the lungs once daily.  -     Discontinue: albuterol-ipratropium 2.5mg-0.5mg/3mL (DUO-NEB) 0.5 mg-3 mg(2.5 mg base)/3 mL nebulizer solution; Take 3 mLs by nebulization every 6 (six) hours while awake.  -     Discontinue: albuterol 90 mcg/actuation inhaler; Inhale 2 puffs into the lungs every 6 (six) hours as needed for Wheezing.  -     Discontinue: doxycycline (VIBRAMYCIN) 100 MG Cap; Take 1 capsule (100 mg total) by mouth every 12 (twelve) hours.  -     Discontinue: predniSONE (DELTASONE) 10 MG tablet; Take 2 tablets (20 mg total) by mouth once daily.  -     X-Ray Chest PA And Lateral; Future  -     CBC auto differential; Future  -     fluticasone-umeclidin-vilanter (TRELEGY ELLIPTA) 100-62.5-25 mcg DsDv; Inhale 1 puff into the lungs once daily.  -     doxycycline (VIBRAMYCIN) 100 MG Cap; Take 1 capsule (100 mg total) by mouth every 12 (twelve) hours.  -     predniSONE (DELTASONE) 10 MG tablet; Take 2 tablets (20 mg total) by mouth once daily.  -     albuterol 90 mcg/actuation inhaler; Inhale 2 puffs into the lungs every 6 (six) hours as needed for Wheezing.  -     albuterol-ipratropium 2.5mg-0.5mg/3mL (DUO-NEB) 0.5 mg-3 mg(2.5 mg base)/3 mL nebulizer solution; Take 3 mLs by nebulization every 6 (six) hours while awake.    Mixed simple and mucopurulent chronic bronchitis    Chronic obstructive pulmonary disease, unspecified COPD type  -     Discontinue: albuterol-ipratropium 2.5mg-0.5mg/3mL (DUO-NEB) 0.5 mg-3 mg(2.5 mg base)/3 mL nebulizer solution; Take 3 mLs by nebulization every 6 (six) hours while awake.  -     albuterol-ipratropium  2.5mg-0.5mg/3mL (DUO-NEB) 0.5 mg-3 mg(2.5 mg base)/3 mL nebulizer solution; Take 3 mLs by nebulization every 6 (six) hours while awake.    Pulmonary emphysema, unspecified emphysema type  -     Discontinue: albuterol 90 mcg/actuation inhaler; Inhale 2 puffs into the lungs every 6 (six) hours as needed for Wheezing.  -     albuterol 90 mcg/actuation inhaler; Inhale 2 puffs into the lungs every 6 (six) hours as needed for Wheezing.    COPD exacerbation  -     Discontinue: fluticasone-umeclidin-vilanter (TRELEGY ELLIPTA) 100-62.5-25 mcg DsDv; Inhale 1 puff into the lungs once daily.  -     Discontinue: albuterol-ipratropium 2.5mg-0.5mg/3mL (DUO-NEB) 0.5 mg-3 mg(2.5 mg base)/3 mL nebulizer solution; Take 3 mLs by nebulization every 6 (six) hours while awake.  -     Discontinue: albuterol 90 mcg/actuation inhaler; Inhale 2 puffs into the lungs every 6 (six) hours as needed for Wheezing.  -     Discontinue: doxycycline (VIBRAMYCIN) 100 MG Cap; Take 1 capsule (100 mg total) by mouth every 12 (twelve) hours.  -     Discontinue: predniSONE (DELTASONE) 10 MG tablet; Take 2 tablets (20 mg total) by mouth once daily.  -     X-Ray Chest PA And Lateral; Future  -     CBC auto differential; Future  -     fluticasone-umeclidin-vilanter (TRELEGY ELLIPTA) 100-62.5-25 mcg DsDv; Inhale 1 puff into the lungs once daily.  -     doxycycline (VIBRAMYCIN) 100 MG Cap; Take 1 capsule (100 mg total) by mouth every 12 (twelve) hours.  -     predniSONE (DELTASONE) 10 MG tablet; Take 2 tablets (20 mg total) by mouth once daily.  -     albuterol 90 mcg/actuation inhaler; Inhale 2 puffs into the lungs every 6 (six) hours as needed for Wheezing.  -     albuterol-ipratropium 2.5mg-0.5mg/3mL (DUO-NEB) 0.5 mg-3 mg(2.5 mg base)/3 mL nebulizer solution; Take 3 mLs by nebulization every 6 (six) hours while awake.    Osteoporosis, unspecified osteoporosis type, unspecified pathological fracture presence  -     DXA Bone Density Spine And Hip;  Future    Steroid-dependent COPD  -     DXA Bone Density Spine And Hip; Future    Clubbing of fingers        Follow-up in about 3 months (around 7/11/2018), or if symptoms worsen or fail to improve.    Discussed with patient above for education the following:      Patient Instructions   Try to use trelegy once daily in place of spiriva and advair- need to continue most cost effective therapy.  Use trelegy,    OR advair and spiriva    May stop daliresp    Use prednisone 10 mg daily.  May boost to to 20 mg daily if more short of breath.    May benefit from osteoporosis therapy-- check bone density reasonable.      Need to review breathing test    Use doxycycline for yellow mucous

## 2018-04-12 ENCOUNTER — TELEPHONE (OUTPATIENT)
Dept: PULMONOLOGY | Facility: CLINIC | Age: 68
End: 2018-04-12

## 2018-04-12 NOTE — TELEPHONE ENCOUNTER
Pt came to office and spoke to me directly. Called pharmacy and clarified    ----- Message from Jess Lewis RT sent at 4/12/2018  9:20 AM CDT -----  Contact: pt    Called pod, pt , requesting a call back soon from Nurse Rae, concerning his insurance not paying for his prescription, thanks.

## 2018-04-12 NOTE — TELEPHONE ENCOUNTER
Called pharmacy and clarified    ----- Message from Ally Fonseca sent at 4/12/2018  9:44 AM CDT -----  Type: Needs Medical Advice    Who Called:  patient  Symptoms (please be specific): n/a  How long has patient had these symptoms:  n/a  Pharmacy name and phone #:  N/a  Best Call Back Number: [phone]  Additional Information: Patient is at office now, he states he is unable to get his medications filled because they did not have the doctors KARON #, he is requesting nurse contact [facility] medication Trelety and have doctor sign off on it    Unable to reach pod    Thank you

## 2018-04-12 NOTE — TELEPHONE ENCOUNTER
Advised pt that insurance is processing PA       ----- Message from Indu Whitt sent at 4/12/2018  1:52 PM CDT -----  Contact: Patient  Patient would like Adelita to call him today to speak with her regarding the following.  Patient needs the doctor to call in and give the pharmacy his KARON number.  Patient is going out of town Saturday morning and would like to get these prescriptions before then and they need to be special ordered.  Please call the patient once this has been done.  Call Back#[phone]  Thanks

## 2018-04-12 NOTE — TELEPHONE ENCOUNTER
See previous note    ----- Message from Mateus Gautam sent at 4/12/2018  9:29 AM CDT -----  Contact: Pt  Pt is returning a call back from nurse. Pt can be reached at [phone] (home)

## 2018-04-12 NOTE — TELEPHONE ENCOUNTER
No answer, LVM  ----- Message from Letty Toney sent at 4/12/2018  4:19 PM CDT -----  Clarification on Rx Trelegy.  Please call [employer] [facility]/[phone].

## 2018-04-12 NOTE — TELEPHONE ENCOUNTER
Advised pt its processing.   ----- Message from Yamile Charlse sent at 4/12/2018 12:27 PM CDT -----  Contact: pt  Pt calling states needs to speak to Nurse Rae regarding need to call the [facility] to sign off on his Rx..[phone] (home)         .  [id number] - [place], [place] - [address] [address]  Phone: [phone] Fax: [phone]

## 2018-04-13 ENCOUNTER — TELEPHONE (OUTPATIENT)
Dept: FAMILY MEDICINE | Facility: CLINIC | Age: 68
End: 2018-04-13

## 2018-04-13 ENCOUNTER — TELEPHONE (OUTPATIENT)
Dept: UROLOGY | Facility: CLINIC | Age: 68
End: 2018-04-13

## 2018-04-13 ENCOUNTER — TELEPHONE (OUTPATIENT)
Dept: PHYSICAL MEDICINE AND REHAB | Facility: CLINIC | Age: 68
End: 2018-04-13

## 2018-04-13 ENCOUNTER — TELEPHONE (OUTPATIENT)
Dept: PULMONOLOGY | Facility: CLINIC | Age: 68
End: 2018-04-13

## 2018-04-13 DIAGNOSIS — J41.8 MIXED SIMPLE AND MUCOPURULENT CHRONIC BRONCHITIS: Chronic | ICD-10-CM

## 2018-04-13 DIAGNOSIS — J43.9 PULMONARY EMPHYSEMA, UNSPECIFIED EMPHYSEMA TYPE: ICD-10-CM

## 2018-04-13 DIAGNOSIS — N40.0 BENIGN NON-NODULAR PROSTATIC HYPERPLASIA WITHOUT LOWER URINARY TRACT SYMPTOMS: ICD-10-CM

## 2018-04-13 RX ORDER — TIOTROPIUM BROMIDE 18 UG/1
CAPSULE ORAL; RESPIRATORY (INHALATION)
Qty: 90 CAPSULE | Refills: 3 | Status: SHIPPED | OUTPATIENT
Start: 2018-04-13 | End: 2018-07-13 | Stop reason: SDUPTHER

## 2018-04-13 RX ORDER — FLUTICASONE PROPIONATE AND SALMETEROL 500; 50 UG/1; UG/1
POWDER RESPIRATORY (INHALATION)
Qty: 180 EACH | Refills: 3 | Status: SHIPPED | OUTPATIENT
Start: 2018-04-13 | End: 2018-07-11 | Stop reason: SDUPTHER

## 2018-04-13 NOTE — TELEPHONE ENCOUNTER
----- Message from Sophia Martinez sent at 4/13/2018 10:44 AM CDT -----  Contact: self   Patient miss call from your office please call back at [phone] (pwzx)

## 2018-04-13 NOTE — TELEPHONE ENCOUNTER
Re ordered advair and spiriva     ----- Message from Indu Whitt sent at 4/13/2018 11:58 AM CDT -----  Contact: Patient  Patient is calling regarding his medication that the insurance company is not approving.  He also stated that he needs to speak with someone because he is leaving town tomorrow.  Call Back#[phone]  Thanks

## 2018-04-13 NOTE — TELEPHONE ENCOUNTER
"----- Message from Anushka Saldivar sent at 4/13/2018  7:48 AM CDT -----  Contact: self  Patient [phone] has an EMG schedule for 12 noon today/he says he has a "broken" back and is in a lot of pain today/Should he reschedule - per patient?/he is asking to speak with the Nurse/please call    "

## 2018-04-13 NOTE — TELEPHONE ENCOUNTER
Spoke with patient he states he is urinating every 5 minutes and is now having burning urination. Patient states he was told by  to stop his doxycycline. Please advise

## 2018-04-13 NOTE — TELEPHONE ENCOUNTER
Patient states he is in severe pain and wanted to cancel his appt.  I informed patient the test is very painful and would advise to reschedule.  appt scheduled for 6/1/18

## 2018-04-13 NOTE — TELEPHONE ENCOUNTER
----- Message from Zeenat Talamantes sent at 4/13/2018 11:09 AM CDT -----  Contact: self [phone]  States that he would like to speak to nurse. States that he needs to know if Dr Kareem Randle discharged him. Please call back at [phone]//thank you acc

## 2018-04-13 NOTE — TELEPHONE ENCOUNTER
Called back. Pa denied.     ----- Message from Indu Whitt sent at 4/13/2018 10:07 AM CDT -----  Contact: Neda with [facility]  Neda is calling back regarding the request for the Trilogy inhaler.  Neda has some additional questions for the doctors office.  She stated that she would have to hear back from the doctors office by 1PM.  Call Back#[phone]  Thanks

## 2018-04-13 NOTE — TELEPHONE ENCOUNTER
----- Message from Gian DEAN Janae sent at 4/13/2018  1:47 PM CDT -----  Contact: same  Patient called in and requested a call back from nurse because he is having problems.  Patient stated he was seen the other day by Dr. Castillo.  Patient call back number is [phone].  Patient would not give any other information

## 2018-04-13 NOTE — TELEPHONE ENCOUNTER
Received message from patient requesting to know if he was discharged from Dr. Kareem Randle. Writer advised patient to contact Dr. Randle's office for clarification of this matter. Patient states he called Dr. Randle's office but didn't receive a call back. Writer encouraged patient to follow up with Dr. Randle's office regarding this matter.

## 2018-04-14 RX ORDER — AMOXICILLIN AND CLAVULANATE POTASSIUM 875; 125 MG/1; MG/1
1 TABLET, FILM COATED ORAL 2 TIMES DAILY
Qty: 14 TABLET | Refills: 0 | Status: SHIPPED | OUTPATIENT
Start: 2018-04-14 | End: 2018-04-21

## 2018-04-14 RX ORDER — SILODOSIN 8 MG/1
8 CAPSULE ORAL DAILY
Qty: 90 CAPSULE | Refills: 3 | Status: SHIPPED | OUTPATIENT
Start: 2018-04-14 | End: 2018-06-12 | Stop reason: SDUPTHER

## 2018-04-14 NOTE — TELEPHONE ENCOUNTER
I did not realize  had also written him for doxycycline for yellow mucous, he is not complaining of this. So I told him to stay off the doxycycline.     He is still c/o burning with urination and voiding every othe  . Drinking a lot of water. Not catheterizing. I told him to still hold off on catheterizing.    I told him to start augmentin and take for 7 days. If he is still urinating every 30 minutes and has burning after completion of augmentin for 7 days then I will have him catheterize again and check residuals.    I wrote him for rapaflo refill and he already has myrbetriq.    We will check on him Friday 4/20  Please keep encounter open

## 2018-04-16 ENCOUNTER — TELEPHONE (OUTPATIENT)
Dept: UROLOGY | Facility: CLINIC | Age: 68
End: 2018-04-16

## 2018-04-16 ENCOUNTER — TELEPHONE (OUTPATIENT)
Dept: PULMONOLOGY | Facility: CLINIC | Age: 68
End: 2018-04-16

## 2018-04-16 ENCOUNTER — TELEPHONE (OUTPATIENT)
Dept: PSYCHIATRY | Facility: CLINIC | Age: 68
End: 2018-04-16

## 2018-04-16 RX ORDER — CELECOXIB 200 MG/1
CAPSULE ORAL
Qty: 30 CAPSULE | Refills: 1 | Status: SHIPPED | OUTPATIENT
Start: 2018-04-16 | End: 2018-05-16

## 2018-04-16 NOTE — TELEPHONE ENCOUNTER
Patient is requesting a refill of Celebrex 200 mg daily as needed. Patient is requesting to use [facility] in [place].   LOV--4-2-18  FOV-- 5-4-18

## 2018-04-16 NOTE — TELEPHONE ENCOUNTER
----- Message from Zoraida Herbert sent at 4/16/2018 10:55 AM CDT -----  Contact: patient  Type: Needs Medical Advice    Who Called:  patient  Symptoms (please be specific):  NA  How long has patient had these symptoms:  NA  Pharmacy name and phone #:   [facility] [id number] On license of [facility], [place] - [address] [place] [address] AT [address]  [address]  [facility] [address]  Phone: [phone] Fax: [phone]  Best Call Back Number: [phone]  Additional Information: The patient wants to speak with Rosa to be sure he is taking the correct medication so he won't have a seizure. Call to pod, no answer.

## 2018-04-16 NOTE — TELEPHONE ENCOUNTER
----- Message from Mateus Gautam sent at 4/16/2018  1:05 PM CDT -----  Contact: Pt  Pt is calling states he's at the pharmacy trying to get medication. Pt can be reached at [phone] (efbz)

## 2018-04-16 NOTE — TELEPHONE ENCOUNTER
Patient called and stated that he was going to need a refill on his medication Geodon. Explained I will call pharmacy because looked like a refill was at the pharmacy. He understood.  Called the [facility] in [place] and they did have the refill on file and will refill it for the patient  Called and informed the patient to check eith the pharmacy as they were refilling the medication  Patient understood and will call an  medication

## 2018-04-16 NOTE — TELEPHONE ENCOUNTER
Spoke with patient he states  prescribed him a medication that he didn't want him to take antibiotics with. But he stated he talked to 's nurse and they stated it was okay.

## 2018-04-16 NOTE — TELEPHONE ENCOUNTER
Spoke to pt and advised that he is to switch back to advair and spiriva instead of trelegy. Confirmed July appt with pt.     ----- Message from Lima Bush sent at 4/16/2018  8:32 AM CDT -----  Contact: self  Patient called regarding receiving missed call on Saturday by Dr. Castillo's office Saturday regarding an antibiotic. Had some questions regarding that he wanted to discuss with Dr. Randle office.  Please contact [phone] (home)

## 2018-04-16 NOTE — TELEPHONE ENCOUNTER
See previous note  ----- Message from Jovana Banks sent at 4/16/2018  8:36 AM CDT -----  Pt asking to speak to Adelita / has copd .. Trying new meds / Dr Castillo prescribed antibiotics for bladder infection / needs to speak to nurse / call pt at [phone] (home)

## 2018-04-16 NOTE — TELEPHONE ENCOUNTER
----- Message from Brooklyn Marshall sent at 4/16/2018 12:31 PM CDT -----  Contact: self [phone]  He is requesting that Ally call him regarding a refill of Celebrex.  Thank you!

## 2018-04-20 ENCOUNTER — TELEPHONE (OUTPATIENT)
Dept: PULMONOLOGY | Facility: CLINIC | Age: 68
End: 2018-04-20

## 2018-04-20 ENCOUNTER — TELEPHONE (OUTPATIENT)
Dept: PSYCHIATRY | Facility: CLINIC | Age: 68
End: 2018-04-20

## 2018-04-20 RX ORDER — ZIPRASIDONE HYDROCHLORIDE 20 MG/1
CAPSULE ORAL
Qty: 180 CAPSULE | Refills: 0 | Status: SHIPPED | OUTPATIENT
Start: 2018-04-20 | End: 2018-06-20 | Stop reason: SDUPTHER

## 2018-04-20 NOTE — TELEPHONE ENCOUNTER
Advised pt not to use doxycycline, call us if he gets sick. Pt verbalized understanding.     ----- Message from Erinn Valadez sent at 4/19/2018  1:40 PM CDT -----  Patient would like to talk to Adelita concerning doxycycline (VIBRAMYCIN) 100 MG Cap which he states that he cannot take because of all his other medications. He needs to talk to office about this. Please call back [phone].

## 2018-04-20 NOTE — TELEPHONE ENCOUNTER
Spoke with patient and rescheduled for June 20 at 9:30AM    Request med refill for geodon 20 mg BID

## 2018-04-25 ENCOUNTER — TELEPHONE (OUTPATIENT)
Dept: UROLOGY | Facility: CLINIC | Age: 68
End: 2018-04-25

## 2018-04-25 DIAGNOSIS — N30.00 ACUTE CYSTITIS WITHOUT HEMATURIA: Primary | ICD-10-CM

## 2018-04-25 NOTE — TELEPHONE ENCOUNTER
Spoke with patient he states he is out of his Augmentin prescribed by . He states his symptoms was better while taking antibiotics but now he is now having symptoms again. He can't control his urine while urinating, hard to urinate and also states he has to drink a lot of fluids to urinate. Please advise

## 2018-04-25 NOTE — TELEPHONE ENCOUNTER
Spoke with patient informed him of recommendations. Patient states he will see us on 6/12 can't make it before this he has too many other appointments.

## 2018-04-25 NOTE — TELEPHONE ENCOUNTER
----- Message from Kymberly De Paz sent at 4/25/2018  1:52 PM CDT -----  Contact: Patient  Hesham, patient [phone] calling because he is out of his antibiotic and is needing a refill on Amoxiclav 875 MG, 1 tablet by mouth twice daily. Patient is requesting a phone call. Please advise. Thanks.    [facility] [address] [place] [address] [place] [address]  Phone: [phone] Fax: [phone]

## 2018-04-26 ENCOUNTER — TELEPHONE (OUTPATIENT)
Dept: FAMILY MEDICINE | Facility: CLINIC | Age: 68
End: 2018-04-26

## 2018-04-26 NOTE — TELEPHONE ENCOUNTER
Called pt regarding below message. Pt is requesting a refill of Lactulose. Informed pt that a refill was sent on 4/2/18 with 3 additional refills. Pt verbalized understanding and will contact pharmacy for refill.     ----- Message from Indu Whitt sent at 4/26/2018  2:07 PM CDT -----  Contact: Patient  Type:  Patient Returning Call    Who Called:  Patient  Who Left Message for Patient:  Ally  Does the patient know what this is regarding?:  Prescriptions  Best Call Back Number:    Additional Information:

## 2018-04-27 ENCOUNTER — TELEPHONE (OUTPATIENT)
Dept: PULMONOLOGY | Facility: CLINIC | Age: 68
End: 2018-04-27

## 2018-04-27 ENCOUNTER — NURSE TRIAGE (OUTPATIENT)
Dept: ADMINISTRATIVE | Facility: CLINIC | Age: 68
End: 2018-04-27

## 2018-04-27 DIAGNOSIS — M50.30 DDD (DEGENERATIVE DISC DISEASE), CERVICAL: Chronic | ICD-10-CM

## 2018-04-27 DIAGNOSIS — F31.0 BIPOLAR AFFECTIVE DISORDER, CURRENT EPISODE HYPOMANIC: ICD-10-CM

## 2018-04-27 DIAGNOSIS — F39 MOOD DISORDER: ICD-10-CM

## 2018-04-27 NOTE — TELEPHONE ENCOUNTER
----- Message from Yamile Charles sent at 4/27/2018 12:23 PM CDT -----  Contact: pt  Pt calling states that he needs to speak to nurse Canales in the office regarding list of medication needs filled have 5 of them and he would have to go through and line up so he would give to me...[phone] (home)

## 2018-04-27 NOTE — TELEPHONE ENCOUNTER
Patient requesting a refill of the following medications:  Klonopin--LR--4-2-18  Amitiza--LR--2-12-18  LOV--4-2-18  FOV--5-14-18

## 2018-04-27 NOTE — TELEPHONE ENCOUNTER
----- Message from Caren Mata sent at 4/27/2018  1:44 PM CDT -----  needs to know how long he should use albuterol machine..[phone] (urgent per pt/transferred to on call)

## 2018-04-27 NOTE — TELEPHONE ENCOUNTER
Pt called re albuterol. Using new neb set up with albuterol. Pt states treatment not lasting 6 hours. Pt notified to use albuterol q 6 hours while awake. Treatment lasts about 10 minutes. Pt states that his trilety samples are about to run out. Pt states that med samples did help. Pt needs more samples or different med called in as insurance doesn't cover. Pt states that he will use advair, pulmincort and neb. Pt states he can come by on 5/7 for samples. Pt needs refills of spiriva.   [facility]     Reason for Disposition   Caller has URGENT medication question about med that PCP prescribed and triager unable to answer question    Protocols used: ST MEDICATION QUESTION CALL-A-AH

## 2018-04-29 RX ORDER — LUBIPROSTONE 8 UG/1
8 CAPSULE ORAL 2 TIMES DAILY
Qty: 180 CAPSULE | Refills: 0 | Status: SHIPPED | OUTPATIENT
Start: 2018-04-29 | End: 2018-07-12 | Stop reason: SDUPTHER

## 2018-04-29 RX ORDER — CLONAZEPAM 2 MG/1
TABLET ORAL
Qty: 90 TABLET | Refills: 0 | Status: SHIPPED | OUTPATIENT
Start: 2018-04-29 | End: 2018-05-24 | Stop reason: SDUPTHER

## 2018-04-29 NOTE — TELEPHONE ENCOUNTER
Does he needs to go to physical therapy for his back? He is late due for  Labs and doctor's appointment to monitor his liver cirrhosis.

## 2018-05-01 ENCOUNTER — TELEPHONE (OUTPATIENT)
Dept: FAMILY MEDICINE | Facility: CLINIC | Age: 68
End: 2018-05-01

## 2018-05-01 NOTE — TELEPHONE ENCOUNTER
Patient notified medication was e-scribed to pharmacy. Patient declined physical therapy, states he doesn't have reliable transportation. Patient has lab appointment scheduled for 5-7-18 and follow up office visit scheduled for 5-14-18.

## 2018-05-01 NOTE — TELEPHONE ENCOUNTER
Called pt regarding below message. Pt states he needs a refill of prednisone. Informed pt that a refill was [facility] by [facility] Randle [facility]11/[facility]. Pt states he is unable to get them filled. Informed pt I will call the pharmacy regarding his refills. Pt verbalized understanding.     Called pharmacy regarding above. Pharmacy states pts insurance wont allow him to fill until tomorrow.     Called pt regarding above from pharmacy. Pt verbalized understanding with no further questions.   ----- Message from Sophia Martinez sent at 5/1/2018 11:00 AM CDT -----  Contact: self   Patient need a refill on predniSONE please send to [facility], any questions please call back at [phone] (home)       [facility] [address] [place] [address] AT [address] [address]  Phone: [phone] Fax: [phone]

## 2018-05-07 ENCOUNTER — LAB VISIT (OUTPATIENT)
Dept: LAB | Facility: HOSPITAL | Age: 68
End: 2018-05-07
Attending: FAMILY MEDICINE
Payer: MEDICARE

## 2018-05-07 DIAGNOSIS — B18.2 HEP C W/O COMA, CHRONIC: ICD-10-CM

## 2018-05-07 LAB
AFP SERPL-MCNC: 1.3 NG/ML
ALBUMIN SERPL BCP-MCNC: 3.3 G/DL
ALP SERPL-CCNC: 72 U/L
ALT SERPL W/O P-5'-P-CCNC: 22 U/L
AMMONIA PLAS-SCNC: 23 UMOL/L
ANION GAP SERPL CALC-SCNC: 12 MMOL/L
AST SERPL-CCNC: 27 U/L
BILIRUB SERPL-MCNC: 0.8 MG/DL
BUN SERPL-MCNC: 11 MG/DL
CALCIUM SERPL-MCNC: 9.6 MG/DL
CHLORIDE SERPL-SCNC: 102 MMOL/L
CO2 SERPL-SCNC: 28 MMOL/L
CREAT SERPL-MCNC: 0.9 MG/DL
EST. GFR  (AFRICAN AMERICAN): >60 ML/MIN/1.73 M^2
EST. GFR  (NON AFRICAN AMERICAN): >60 ML/MIN/1.73 M^2
GLUCOSE SERPL-MCNC: 83 MG/DL
POTASSIUM SERPL-SCNC: 4.3 MMOL/L
PROT SERPL-MCNC: 6.7 G/DL
SODIUM SERPL-SCNC: 142 MMOL/L

## 2018-05-07 PROCEDURE — 82140 ASSAY OF AMMONIA: CPT

## 2018-05-07 PROCEDURE — 80053 COMPREHEN METABOLIC PANEL: CPT

## 2018-05-07 PROCEDURE — 82105 ALPHA-FETOPROTEIN SERUM: CPT

## 2018-05-07 PROCEDURE — 36415 COLL VENOUS BLD VENIPUNCTURE: CPT | Mod: PO

## 2018-05-08 ENCOUNTER — TELEPHONE (OUTPATIENT)
Dept: PULMONOLOGY | Facility: CLINIC | Age: 68
End: 2018-05-08

## 2018-05-08 NOTE — TELEPHONE ENCOUNTER
See previous note.     ----- Message from Letty Ziegler sent at 5/8/2018 12:30 PM CDT -----  Returning your call.  Please call [facility]/[phone]/Neda in regards to patient.

## 2018-05-08 NOTE — TELEPHONE ENCOUNTER
Pt called requesting PA for Trelegy.  Advised MD recommended to go back to [employer] and [employer] for insurance purposes. No PA done.     ----- Message from RT Alice sent at 5/8/2018  9:06 AM CDT -----  Contact: [phone], [facility]  Neda,[phone], [employer], requesting a call back soon concerning the pt's inhaler, please call soon, thanks.

## 2018-05-09 ENCOUNTER — TELEPHONE (OUTPATIENT)
Dept: PULMONOLOGY | Facility: CLINIC | Age: 68
End: 2018-05-09

## 2018-05-09 NOTE — TELEPHONE ENCOUNTER
Patient was already swhiched back to Advair and Symbicort.    ----- Message from Caren Mata sent at 5/9/2018 10:03 AM CDT -----  Contact: [place] [facility] prior autho dept  faxing over arun shabazz...[phone]

## 2018-05-11 ENCOUNTER — TELEPHONE (OUTPATIENT)
Dept: FAMILY MEDICINE | Facility: CLINIC | Age: 68
End: 2018-05-11

## 2018-05-11 NOTE — TELEPHONE ENCOUNTER
----- Message from Ellen Zhou sent at 5/11/2018  1:50 PM CDT -----  Contact: Self  Call placed to pod     Type:  Patient Returning Call    Who Called:  Patient  Who Left Message for Patient:  Ally   Does the patient know what this is regarding?:  Yes   Best Call Back Number: [phone] (home)   Additional Information:  Patient states he missed a few calls from Ally

## 2018-05-14 ENCOUNTER — TELEPHONE (OUTPATIENT)
Dept: FAMILY MEDICINE | Facility: CLINIC | Age: 68
End: 2018-05-14

## 2018-05-14 NOTE — TELEPHONE ENCOUNTER
----- Message from Lima Bush sent at 5/14/2018  1:12 PM CDT -----  Type: Needs Medical Advice    Who Called:  patient  Symptoms (please be specific):  Na  How long has patient had these symptoms:  Na  Pharmacy name and phone #:    [facility] [address] [place], [place] - [address] [place] [address] AT [address]  [address]  [facility] [address]  Phone: [phone] Fax: [phone]  Best Call Back Number: [phone] (home)     Additional Information: Wanted to discuss medication

## 2018-05-14 NOTE — TELEPHONE ENCOUNTER
Patient is requesting antibiotics for chest congestion.  Has an appointment to be seen 5/24 by Ms. Bang.

## 2018-05-16 ENCOUNTER — TELEPHONE (OUTPATIENT)
Dept: FAMILY MEDICINE | Facility: CLINIC | Age: 68
End: 2018-05-16

## 2018-05-16 RX ORDER — CELECOXIB 200 MG/1
CAPSULE ORAL
Qty: 30 CAPSULE | Refills: 0 | OUTPATIENT
Start: 2018-05-16

## 2018-05-16 RX ORDER — CELECOXIB 200 MG/1
200 CAPSULE ORAL DAILY PRN
Qty: 45 CAPSULE | Refills: 3 | Status: SHIPPED | OUTPATIENT
Start: 2018-05-16 | End: 2018-08-01

## 2018-05-16 NOTE — TELEPHONE ENCOUNTER
Spoke to patient who stated he wanted a status update on if Dr. Ramos was back in the office. Patient notified Dr. Ramos is on leave of absence and is out of the office at this time. Advised patient office will reschedule his appointment with Dr. Ramos upon his return. Patient has existing appointment dated for 5-24-18 with Jenny Bang NP. Advised if he needed to be seen earlier than scheduled appointment to call office to check for availability. Patient verbalized understanding.

## 2018-05-16 NOTE — TELEPHONE ENCOUNTER
----- Message from Anushka Walters sent at 5/16/2018 12:04 PM CDT -----  Contact: Hesham Whyte is calling regarding Dr Ramos being out. Please call [phone]. Thanks!

## 2018-05-16 NOTE — TELEPHONE ENCOUNTER
Please see attached message from patient. Upon further inspection it was noted a prescription for Celebrex was e-scribed on 4-16-18 with 1 additional refill. Patient called office stating he contacted pharmacy and was advised he did not have any refills available. Call placed to pharmacy who confirmed patient has a refill of this medication. States she will call patient for notification due to having given patient incorrect information in last call to him.

## 2018-05-16 NOTE — TELEPHONE ENCOUNTER
----- Message from Yamile Charles sent at 5/16/2018  4:23 PM CDT -----  Contact: pt  Pt calling states was told by pharmacy that he has no refill on his celecoxib (CELEBREX) 200 MG capsule,pt is out of...[phone] (home)           .  [facility] [address] [place] [address], [place] [address] AT [facility] [address]  [address] [place] [address]  Phone: [phone] Fax: [phone]

## 2018-05-17 ENCOUNTER — TELEPHONE (OUTPATIENT)
Dept: FAMILY MEDICINE | Facility: CLINIC | Age: 68
End: 2018-05-17

## 2018-05-17 NOTE — TELEPHONE ENCOUNTER
----- Message from Ally Campo sent at 5/17/2018  1:22 PM CDT -----  Patient needs to speak with nurse Ally or Jeni concerning lab work/has question/unable to reach anyone by phone or IM/please call patient back at [phone] to advise.

## 2018-05-17 NOTE — TELEPHONE ENCOUNTER
----- Message from Sophia Martinez sent at 5/17/2018  2:37 PM CDT -----  Contact: self   Patient want to speak with a nurse regarding bloodwork questions please call back at [phone] (home)

## 2018-05-17 NOTE — TELEPHONE ENCOUNTER
"Spoke to patient who states "If Dr. Kareem Randel wants me to have some labs done can you put the orders in for him?" Advised patient orders exist for CBC placed by Dr Randle. Orders linked to lab appointment for tomorrow. Patient appreciative.   "

## 2018-05-17 NOTE — TELEPHONE ENCOUNTER
Patient notified labs needed. States he will have labs performed as soon as he gets a chance. Offered to schedule lab appointment, patient declined stating he was exactly sure what day he will be able to come to the lab. Patient states he will have labs performed by Monday of next week.

## 2018-05-17 NOTE — TELEPHONE ENCOUNTER
He needs further labs. The Hep C titers and lipids are missing. The rest of the labs are normal. Celebrex was refilled.

## 2018-05-17 NOTE — TELEPHONE ENCOUNTER
Please see attached message from Dr. Ramos. Labs needed (Lipid Panel, Hepatitis C, CMP). Call placed to patient. No answer received. Left message requesting return call to office.

## 2018-05-17 NOTE — TELEPHONE ENCOUNTER
Spoke to patient who states he would like an appointment scheduled for tomorrow for labs ordered 1-12-18 by Dr. Ramos. Appointment scheduled for 5-18-18 as requested by patient. Patient agreed to appointment date and time.

## 2018-05-17 NOTE — TELEPHONE ENCOUNTER
[facility]    PATIENT'S NAME: Stephane Elaine   ATTENDING PHYSICIAN: Elizabeth Santana M.D.    PATIENT ACCOUNT#:   [de-identified]    LOCATION:  [address] [place] [address]  MEDICAL RECORD #:   [id number]       YOB: 1959  ADMISSION DATE:       08/27/2017    HIS This matter was handled in a separate encounter on today's date.    17:59:19  t: 08/28/2017 19:23:05  [facility] [id number]  AK/

## 2018-05-17 NOTE — TELEPHONE ENCOUNTER
----- Message from Indu Whitt sent at 5/17/2018  1:02 PM CDT -----  Contact: Patient  Patient is in [place] and would like to have his blood work done while he is in town.  Please call patient.  Call Back#[phone]  Thanks

## 2018-05-18 ENCOUNTER — TELEPHONE (OUTPATIENT)
Dept: FAMILY MEDICINE | Facility: CLINIC | Age: 68
End: 2018-05-18

## 2018-05-18 ENCOUNTER — LAB VISIT (OUTPATIENT)
Dept: LAB | Facility: HOSPITAL | Age: 68
End: 2018-05-18
Attending: FAMILY MEDICINE
Payer: MEDICARE

## 2018-05-18 DIAGNOSIS — J44.1 COPD EXACERBATION: ICD-10-CM

## 2018-05-18 DIAGNOSIS — J42 CHRONIC BRONCHITIS, UNSPECIFIED CHRONIC BRONCHITIS TYPE: ICD-10-CM

## 2018-05-18 DIAGNOSIS — J96.11 CHRONIC RESPIRATORY FAILURE WITH HYPOXIA: ICD-10-CM

## 2018-05-18 DIAGNOSIS — K74.60 CIRRHOSIS OF LIVER WITHOUT ASCITES, UNSPECIFIED HEPATIC CIRRHOSIS TYPE: ICD-10-CM

## 2018-05-18 DIAGNOSIS — E78.2 MIXED HYPERLIPIDEMIA: ICD-10-CM

## 2018-05-18 LAB
ALBUMIN SERPL BCP-MCNC: 3.6 G/DL
ALP SERPL-CCNC: 72 U/L
ALT SERPL W/O P-5'-P-CCNC: 16 U/L
ANION GAP SERPL CALC-SCNC: 10 MMOL/L
AST SERPL-CCNC: 20 U/L
BASOPHILS # BLD AUTO: 0.04 K/UL
BASOPHILS NFR BLD: 0.4 %
BILIRUB SERPL-MCNC: 0.7 MG/DL
BUN SERPL-MCNC: 10 MG/DL
CALCIUM SERPL-MCNC: 9.5 MG/DL
CHLORIDE SERPL-SCNC: 102 MMOL/L
CHOLEST SERPL-MCNC: 233 MG/DL
CHOLEST/HDLC SERPL: 2.8 {RATIO}
CO2 SERPL-SCNC: 29 MMOL/L
CREAT SERPL-MCNC: 1 MG/DL
DIFFERENTIAL METHOD: ABNORMAL
EOSINOPHIL # BLD AUTO: 0.2 K/UL
EOSINOPHIL NFR BLD: 1.7 %
ERYTHROCYTE [DISTWIDTH] IN BLOOD BY AUTOMATED COUNT: 13.6 %
EST. GFR  (AFRICAN AMERICAN): >60 ML/MIN/1.73 M^2
EST. GFR  (NON AFRICAN AMERICAN): >60 ML/MIN/1.73 M^2
GLUCOSE SERPL-MCNC: 84 MG/DL
HCT VFR BLD AUTO: 40.6 %
HDLC SERPL-MCNC: 84 MG/DL
HDLC SERPL: 36.1 %
HGB BLD-MCNC: 13.3 G/DL
IMM GRANULOCYTES # BLD AUTO: 0.06 K/UL
IMM GRANULOCYTES NFR BLD AUTO: 0.6 %
LDLC SERPL CALC-MCNC: 136 MG/DL
LYMPHOCYTES # BLD AUTO: 1.5 K/UL
LYMPHOCYTES NFR BLD: 15.3 %
MCH RBC QN AUTO: 33.6 PG
MCHC RBC AUTO-ENTMCNC: 32.8 G/DL
MCV RBC AUTO: 103 FL
MONOCYTES # BLD AUTO: 0.7 K/UL
MONOCYTES NFR BLD: 6.8 %
NEUTROPHILS # BLD AUTO: 7.4 K/UL
NEUTROPHILS NFR BLD: 75.2 %
NONHDLC SERPL-MCNC: 149 MG/DL
NRBC BLD-RTO: 0 /100 WBC
PLATELET # BLD AUTO: 184 K/UL
PMV BLD AUTO: 9.7 FL
POTASSIUM SERPL-SCNC: 4.2 MMOL/L
PROT SERPL-MCNC: 6.7 G/DL
RBC # BLD AUTO: 3.96 M/UL
SODIUM SERPL-SCNC: 141 MMOL/L
TRIGL SERPL-MCNC: 65 MG/DL
WBC # BLD AUTO: 9.84 K/UL

## 2018-05-18 PROCEDURE — 80053 COMPREHEN METABOLIC PANEL: CPT

## 2018-05-18 PROCEDURE — 87522 HEPATITIS C REVRS TRNSCRPJ: CPT

## 2018-05-18 PROCEDURE — 85025 COMPLETE CBC W/AUTO DIFF WBC: CPT

## 2018-05-18 PROCEDURE — 80061 LIPID PANEL: CPT

## 2018-05-18 NOTE — TELEPHONE ENCOUNTER
Received message from patient stating he was returning a call to the office from unczqodkd-5-19-18.  Spoke to patient on yesterday and scheduled labs as requested by Dr. Ramos. Patient notified no further actions required. Patient verbalized understanding.

## 2018-05-18 NOTE — TELEPHONE ENCOUNTER
----- Message from Anushka Saldivar sent at 5/18/2018  8:03 AM CDT -----  Contact: self  Patient [phone] is returning call to Nurse Canales from yesterday 05 17 18 afternoon/please call

## 2018-05-20 RX ORDER — ZIPRASIDONE HYDROCHLORIDE 20 MG/1
CAPSULE ORAL
Qty: 1 CAPSULE | Refills: 0
Start: 2018-05-20 | End: 2018-05-24 | Stop reason: ALTCHOICE

## 2018-05-20 NOTE — TELEPHONE ENCOUNTER
----- Message from Tory Vigil sent at 5/20/2018  1:57 PM CDT -----  Contact: devang maki Pt out of medication

## 2018-05-20 NOTE — TELEPHONE ENCOUNTER
I spoke to the patient - he reports he is out of Ziprasidone, last filled 4/9/18.  Per records, I sent a 90 day supply on 4/20/18.  I contacted the pharmacy - refill sent last month was on file. Pharmacy will fill. Pt aware pharmacy closes at 6 pm - he states he will pick it up tomorrow.  Pt reports he is having a difficult time with his mother, asks for a call back. I explained I am out of the office today, I will ask my nurse to contact him in am to follow up.  Pt verbalized an understanding.  Note pt cancelled appt 5/21/18.

## 2018-05-21 ENCOUNTER — TELEPHONE (OUTPATIENT)
Dept: UROLOGY | Facility: CLINIC | Age: 68
End: 2018-05-21

## 2018-05-21 DIAGNOSIS — R82.998 CELLS AND CASTS IN URINE: Primary | ICD-10-CM

## 2018-05-21 LAB
HCV LOG: 6.35 LOG (10) IU/ML
HCV RNA QUANT PCR: ABNORMAL IU/ML
HCV, QUALITATIVE: DETECTED IU/ML

## 2018-05-21 NOTE — TELEPHONE ENCOUNTER
CALLED PATIENT AND HE COULD NOT MAKE THE APPOINTMENT FOR 05/30/18 HE HAS ANOTHER APPOINTMENT AT 9 AM IN [place].   EXPLAINED WILL CALL IF GET ANOTHER CANCELLATION PATIENT UNDERSTOOD AND WAS THANKFUL.  STATED THE PHARMACY IS ORDERING HIS MEDICATION THEY ONLY HAD ENOUGH FOR HALF AT THIS TIME.

## 2018-05-21 NOTE — TELEPHONE ENCOUNTER
----- Message from Anushka Saldivar sent at 5/21/2018 11:38 AM CDT -----  Contact: self  Patient [phone] is calling to speak with the Nurse/please call/this is the only information patient would give

## 2018-05-21 NOTE — TELEPHONE ENCOUNTER
Spoke with patient requesting antibiotics from  patient states he has some burning when he urinates. Informed him  is out of office today will stop by the clinic on Thursday to give a urine sample. Please sign orders

## 2018-05-22 ENCOUNTER — TELEPHONE (OUTPATIENT)
Dept: PULMONOLOGY | Facility: CLINIC | Age: 68
End: 2018-05-22

## 2018-05-22 ENCOUNTER — TELEPHONE (OUTPATIENT)
Dept: FAMILY MEDICINE | Facility: CLINIC | Age: 68
End: 2018-05-22

## 2018-05-22 NOTE — TELEPHONE ENCOUNTER
Spoke with patient he states he is no longer catheterizing he was told to stop by . Will stop by tomorrow or Thursday to give a urine sample

## 2018-05-22 NOTE — TELEPHONE ENCOUNTER
Called Kadi regarding blow message. Kadi states pt informed them he has a new diagnosis. Kadi is requesting recent visit notes faxed to [phone]. Informed Kadi iw ill fax over the necessary notes. Kadi verbalized understanding with no further questions.     ----- Message from Gail Alvarez sent at 5/22/2018  3:26 PM CDT -----  Contact: kadi with [place] [facility] ph#[phone]    kadi with [place] [facility] ph#[phone]    Please call in regards to this patient, need update information

## 2018-05-22 NOTE — TELEPHONE ENCOUNTER
Notified pt that labs were good per MD    ----- Message from Prabha Zimmer sent at 5/18/2018  2:02 PM CDT -----  Contact: Patient  Type: Needs Medical Advice    Who Called: Patient  Symptoms (please be specific):    How long has patient had these symptoms:    Pharmacy name and phone #:    Best Call Back Number: [phone]  Additional Information: Patient called requesting a call back regarding labs

## 2018-05-24 ENCOUNTER — OFFICE VISIT (OUTPATIENT)
Dept: FAMILY MEDICINE | Facility: CLINIC | Age: 68
End: 2018-05-24
Payer: MEDICARE

## 2018-05-24 ENCOUNTER — HOSPITAL ENCOUNTER (OUTPATIENT)
Dept: RADIOLOGY | Facility: CLINIC | Age: 68
Discharge: HOME | End: 2018-05-24
Attending: NURSE PRACTITIONER
Payer: MEDICARE

## 2018-05-24 ENCOUNTER — CLINICAL SUPPORT (OUTPATIENT)
Dept: UROLOGY | Facility: CLINIC | Age: 68
End: 2018-05-24
Payer: MEDICARE

## 2018-05-24 ENCOUNTER — TELEPHONE (OUTPATIENT)
Dept: FAMILY MEDICINE | Facility: CLINIC | Age: 68
End: 2018-05-24

## 2018-05-24 VITALS
HEIGHT: 67 IN | WEIGHT: 195.75 LBS | BODY MASS INDEX: 30.72 KG/M2 | SYSTOLIC BLOOD PRESSURE: 124 MMHG | HEART RATE: 75 BPM | TEMPERATURE: 98 F | DIASTOLIC BLOOD PRESSURE: 78 MMHG

## 2018-05-24 DIAGNOSIS — F39 MOOD DISORDER: ICD-10-CM

## 2018-05-24 DIAGNOSIS — F41.1 ANXIETY STATE: ICD-10-CM

## 2018-05-24 DIAGNOSIS — J44.1 CHRONIC OBSTRUCTIVE PULMONARY DISEASE WITH ACUTE EXACERBATION: ICD-10-CM

## 2018-05-24 DIAGNOSIS — R82.998 CELLS AND CASTS IN URINE: ICD-10-CM

## 2018-05-24 DIAGNOSIS — J96.11 CHRONIC RESPIRATORY FAILURE WITH HYPOXIA: ICD-10-CM

## 2018-05-24 DIAGNOSIS — F11.20 CHRONIC NARCOTIC DEPENDENCE: ICD-10-CM

## 2018-05-24 DIAGNOSIS — R33.9 URINARY RETENTION: Primary | ICD-10-CM

## 2018-05-24 DIAGNOSIS — T40.2X5A CONSTIPATION DUE TO OPIOID THERAPY: Primary | ICD-10-CM

## 2018-05-24 DIAGNOSIS — F31.0 BIPOLAR AFFECTIVE DISORDER, CURRENT EPISODE HYPOMANIC: ICD-10-CM

## 2018-05-24 DIAGNOSIS — I15.0 RENOVASCULAR HYPERTENSION: ICD-10-CM

## 2018-05-24 DIAGNOSIS — Z79.899 ENCOUNTER FOR LONG TERM BENZODIAZEPINE THERAPY: Primary | ICD-10-CM

## 2018-05-24 DIAGNOSIS — K59.03 CONSTIPATION DUE TO OPIOID THERAPY: Primary | ICD-10-CM

## 2018-05-24 DIAGNOSIS — R22.1 LUMP ON NECK: ICD-10-CM

## 2018-05-24 DIAGNOSIS — B18.2 HEP C W/O COMA, CHRONIC: ICD-10-CM

## 2018-05-24 DIAGNOSIS — J44.1 COPD EXACERBATION: Primary | ICD-10-CM

## 2018-05-24 PROCEDURE — 71046 X-RAY EXAM CHEST 2 VIEWS: CPT | Mod: 26,,, | Performed by: RADIOLOGY

## 2018-05-24 PROCEDURE — 99214 OFFICE O/P EST MOD 30 MIN: CPT | Mod: S$PBB,,, | Performed by: NURSE PRACTITIONER

## 2018-05-24 PROCEDURE — 99212 OFFICE O/P EST SF 10 MIN: CPT | Mod: PBBFAC,25,27,PN

## 2018-05-24 PROCEDURE — 99999 PR PBB SHADOW E&M-EST. PATIENT-LVL V: CPT | Mod: PBBFAC,,, | Performed by: NURSE PRACTITIONER

## 2018-05-24 PROCEDURE — 71046 X-RAY EXAM CHEST 2 VIEWS: CPT | Mod: TC,FY,PO

## 2018-05-24 PROCEDURE — 99499 UNLISTED E&M SERVICE: CPT | Mod: S$PBB,,, | Performed by: UROLOGY

## 2018-05-24 PROCEDURE — 99215 OFFICE O/P EST HI 40 MIN: CPT | Mod: PBBFAC,PO,25 | Performed by: NURSE PRACTITIONER

## 2018-05-24 PROCEDURE — 51798 US URINE CAPACITY MEASURE: CPT | Mod: PBBFAC,PN

## 2018-05-24 PROCEDURE — 80307 DRUG TEST PRSMV CHEM ANLYZR: CPT

## 2018-05-24 PROCEDURE — 99999 PR PBB SHADOW E&M-EST. PATIENT-LVL II: CPT | Mod: PBBFAC,,,

## 2018-05-24 RX ORDER — CLONAZEPAM 2 MG/1
TABLET ORAL
Qty: 90 TABLET | Refills: 2 | Status: SHIPPED | OUTPATIENT
Start: 2018-05-24 | End: 2018-08-16 | Stop reason: SDUPTHER

## 2018-05-24 RX ORDER — LACTULOSE 10 G/15ML
SOLUTION ORAL; RECTAL
Qty: 1200 ML | Refills: 3 | Status: SHIPPED | OUTPATIENT
Start: 2018-05-24 | End: 2018-08-03 | Stop reason: SDUPTHER

## 2018-05-24 RX ORDER — DOXYCYCLINE HYCLATE 100 MG
100 TABLET ORAL 2 TIMES DAILY
Qty: 10 TABLET | Refills: 0 | Status: SHIPPED | OUTPATIENT
Start: 2018-05-24 | End: 2018-05-29

## 2018-05-24 NOTE — TELEPHONE ENCOUNTER
----- Message from Deborah Cabello sent at 5/24/2018  4:08 PM CDT -----  Contact: PT  Type:  RX Refill Request    Who Called:  Hesham Serna  Refill or New Rx: refill   RX Name and Strength:  Laxatives, Pt is requesting 3 bottles.  How is the patient currently taking it? (ex. 1XDay):    Is this a 30 day or 90 day RX:  3 bottles  Preferred Pharmacy with phone number: [facility] [address] [place] [address] AT [facility] [address]  [address] [address]  Phone: [phone] Fax: [phone]  Local or Mail Order:  local  Ordering Provider:  n/a  Best Call Back Number:  [phone]  Additional Information:

## 2018-05-24 NOTE — PATIENT INSTRUCTIONS
Understanding Hepatitis C (HCV)  Hepatitis means inflammation of the liver. There are many kinds of hepatitis. Some can be spread. Others are not. Hepatitis C virus (HCV) can be spread to others. It can lead to lifelong liver disease. This includes chronic hepatitis, cirrhosis, liver failure, and liver cancer.  Symptoms of hepatitis C  Most people notice no problems until they develop liver disease years later. Symptoms include the following:  · Flulike problems (fatigue, nausea, vomiting, diarrhea, and sore muscles and joints)  · Tenderness in the upper right abdomen  · Jaundice (yellowing skin)  · Swelling in the belly  · Itching  · Dark urine  How HCV spreads  HCV spreads through exposure to an infected persons blood. This is most likely to happen if:  · You used an infected needle (IV drug needles, tattoos, acupuncture needles, and body piercing)  · You had a needlestick injury in the hospital  · You shared personal care items such as razors  · You had sex without a condom with an infected person (a less common cause)  · You had a blood transfusion several years ago (blood is now screened for HCV)  Many people do not know how they were exposed to HCV.  Prevent the spread  No vaccine can prevent the spread of HCV and hepatitis C. Its up to you to keep others safe.  Do:  · Cover all skin breaks and sores yourself. If you need help, the person treating you should wear latex gloves.  · Use condoms during sex.  Dont:  · Dont donate blood, plasma, body organs, other body tissue, or sperm.  · Dont share needles.  · Dont share razors, toothbrushes, manicure tools, or other personal items.   Date Last Reviewed: 7/1/2016 © 2000-2017 [employer]. [address] [place] [address], [place] [address]. All rights reserved. This information is not intended as a substitute for professional medical care. Always follow your healthcare professional's instructions.

## 2018-05-24 NOTE — TELEPHONE ENCOUNTER
----- Message from Lima Bush sent at 5/24/2018  4:14 PM CDT -----  Type:  RX Refill Request    Who Called: patient  Refill or New Rx:  refill  RX Name and Strength: lactulose (CHRONULAC) 10 gram/15 mL solution  How is the patient currently taking it? (ex. 1XDay): Na  Is this a 30 day or 90 day RX:  3 bottles  Preferred Pharmacy with phone number:    [facility] [address] [place] [address] AT [facility] of [address]  [address] [address]  Phone: [phone] Fax: [phone]  Local or Mail Order: local  Ordering Provider:  Rachel Mandujano Call Back Number:  [phone] (home)     Additional Information:  Candy

## 2018-05-24 NOTE — PROGRESS NOTES
Subjective:       Patient ID: Hesham Serna is a 67 y.o. male.    Chief Complaint: No chief complaint on file.    Mr. Serna presents to the clinic today for medication documentation for clonazepam.  He reports he has been taking this for years.  He has anxiety and bipolar disorder and he sees Pychiatry regularly.  He is on chronic pain medication for back pain.  He has COPD and reports worsening cough with sputum production.  He is on continuous oxygen.  He has history of hepatitis C and refuses treatment with Harvoni.  He states this is because he is taking too many medications.  He has seen Hepatology.      Review of Systems   Constitutional: Negative for chills and fever.   HENT:        Right neck mass present x 1 week non painful   Respiratory: Positive for cough and shortness of breath. Negative for wheezing.    Cardiovascular: Negative for chest pain, palpitations and leg swelling.   Gastrointestinal: Negative for abdominal pain, constipation and diarrhea.   Musculoskeletal: Positive for back pain (chronic).   Psychiatric/Behavioral: The patient is nervous/anxious.        Objective:      Physical Exam   Constitutional: He is oriented to person, place, and time. He appears well-developed and well-nourished. No distress.   HENT:   Head: Normocephalic and atraumatic.   Right Ear: External ear normal.   Left Ear: External ear normal.   Mouth/Throat: Oropharynx is clear and moist. No oropharyngeal exudate.   Eyes: Pupils are equal, round, and reactive to light. Right eye exhibits no discharge. Left eye exhibits no discharge.   Neck: Neck supple. No thyromegaly present.       Cardiovascular: Normal rate and regular rhythm.  Exam reveals no gallop and no friction rub.    No murmur heard.  No LE edema   Pulmonary/Chest: Effort normal. No respiratory distress. He has no decreased breath sounds. He has wheezes. He has no rhonchi. He has no rales.   Patient using continuous O2 per NC.   Abdominal: Soft. He  exhibits no distension. There is no tenderness.   Lymphadenopathy:     He has no cervical adenopathy.   Neurological: He is alert and oriented to person, place, and time. Coordination normal.   Skin: Skin is warm and dry.   Psychiatric: He has a normal mood and affect. His behavior is normal. Thought content normal.   Vitals reviewed.          Current Outpatient Prescriptions:     albuterol 90 mcg/actuation inhaler, Inhale 2 puffs into the lungs every 6 (six) hours as needed for Wheezing., Disp: 1 each, Rfl: 5    albuterol-ipratropium 2.5mg-0.5mg/3mL (DUO-NEB) 0.5 mg-3 mg(2.5 mg base)/3 mL nebulizer solution, Take 3 mLs by nebulization every 6 (six) hours while awake., Disp: 180 mL, Rfl: 3    busPIRone (BUSPAR) 5 MG Tab, Take 1 tablet (5 mg total) by mouth once daily., Disp: 30 tablet, Rfl: 0    celecoxib (CELEBREX) 200 MG capsule, Take 1 capsule (200 mg total) by mouth daily as needed for Pain., Disp: 45 capsule, Rfl: 3    clonazePAM (KLONOPIN) 2 MG Tab, TAKE 1 TABLET (2 MG TOTAL) BY MOUTH 3 (THREE) TIMES DAILY AS NEEDED., Disp: 90 tablet, Rfl: 0    econazole nitrate 1 % cream, Apply to feet twice daily, Disp: 85 g, Rfl: 2    fentaNYL (SUBSYS) 200 mcg/spray Spry, Place 200 mcg under the tongue 2 (two) times daily as needed., Disp: , Rfl:     fluocinonide 0.05% (LIDEX) 0.05 % cream, , Disp: , Rfl:     fluticasone (FLONASE) 50 mcg/actuation nasal spray, SNIFF 1 SPRAY INTO EACH NOSTRIL ONCE DAILY, Disp: 16 g, Rfl: 3    fluticasone-salmeterol 500-50 mcg/dose (ADVAIR DISKUS) 500-50 mcg/dose DsDv diskus inhaler, INHALE 1 PUFF TWO TIMES A DAY, Disp: 180 each, Rfl: 3    lubiprostone (AMITIZA) 8 MCG Cap, Take 1 capsule (8 mcg total) by mouth 2 (two) times daily., Disp: 180 capsule, Rfl: 0    methadone (DOLOPHINE) 10 MG tablet, Take 1 tablet (10 mg total) by mouth every 6 (six) hours as needed. Two tablets every morning, two tablets every 12 pm, one-two tablets every evening (Patient taking differently: Take 10  mg by mouth every 8 (eight) hours as needed. ), Disp: , Rfl:     mirabegron (MYRBETRIQ) 50 mg Tb24, Take 1 tablet (50 mg total) by mouth once daily., Disp: 90 tablet, Rfl: 3    oxycodone (ROXICODONE) 15 MG Tab, , Disp: , Rfl:     predniSONE (DELTASONE) 10 MG tablet, Take 2 tablets (20 mg total) by mouth once daily., Disp: 60 tablet, Rfl: 11    silodosin (RAPAFLO) 8 mg Cap capsule, Take 1 capsule (8 mg total) by mouth once daily., Disp: 90 capsule, Rfl: 3    spironolactone (ALDACTONE) 25 MG tablet, Take 1 tablet (25 mg total) by mouth once daily., Disp: 90 tablet, Rfl: 3    tiotropium (SPIRIVA WITH HANDIHALER) 18 mcg inhalation capsule, INHALE THE CONTENTS OF 1 CAPSULE BY MOUTH AS PER HANDIHALER, Disp: 90 capsule, Rfl: 3    ziprasidone (GEODON) 20 MG Cap, Take one capsule PO BID with meals, Disp: 180 capsule, Rfl: 0  Assessment:       1. Encounter for long term benzodiazepine therapy    2. Lump on neck    3. Chronic obstructive pulmonary disease with acute exacerbation    4. Chronic respiratory failure with hypoxia    5. Chronic narcotic dependence    6. Anxiety state    7. Renovascular hypertension    8. Hep C w/o coma, chronic        Plan:       Encounter for long term benzodiazepine therapy  Drug screen today.    Controlled substance contract signed today.   checked with no evidence of diversion.  Refill will be sent to PCP.  RTC 3 mos with PCP.    Advised of risks of taking benzodiazepines and opiates together.    Lump on neck  Suspect cyst.  If ultrasound inconclusive will recheck in 4 weeks.  If lasting longer than 4 weeks will need consult with Surgery.  -     US Soft Tissue Misc; Future; Expected date: 05/24/2018    Chronic obstructive pulmonary disease with acute exacerbation  -     X-Ray Chest PA And Lateral; Future; Expected date: 05/24/2018    Chronic respiratory failure with hypoxia  Continue oxygen.  Follow up Pulmonology.    Chronic narcotic dependence  Continue with Pain  Management.    Anxiety state  Stable, continue current medication.    Renovascular hypertension  Stable on current treatment.    Hep C w/o coma, chronic  He will consider treatment.    Patient readiness: acceptance and barriers:none    During the course of the visit the patient was educated and counseled about the following:     Hypertension:   Medication: no change.    Goals: Hypertension: Reduce Blood Pressure    Did patient meet goals/outcomes: Yes    The following self management tools provided: declined    Patient Instructions (the written plan) was given to the patient/family.     Time spent with patient: 30 minutes    Barriers to medications present (no )    Adverse reactions to current medications (no)    Over the counter medications reviewed (Yes)

## 2018-05-24 NOTE — TELEPHONE ENCOUNTER
Patient notified prescription for Lactulose was e-scribed to pharmacy. Patient notified. Verbalized understanding.

## 2018-05-24 NOTE — TELEPHONE ENCOUNTER
LOV 5/24  Contract 5/24  Drug screen 5/24  Last refill 4/30   checked with no evidence of diversion.

## 2018-05-29 ENCOUNTER — TELEPHONE (OUTPATIENT)
Dept: FAMILY MEDICINE | Facility: CLINIC | Age: 68
End: 2018-05-29

## 2018-05-29 LAB
6MAM UR QL: NOT DETECTED
7AMINOCLONAZEPAM UR QL: PRESENT
A-OH ALPRAZ UR QL: NOT DETECTED
ALPRAZ UR QL: NOT DETECTED
AMPHET UR QL SCN: NOT DETECTED
ANNOTATION COMMENT IMP: NORMAL
ANNOTATION COMMENT IMP: NORMAL
BARBITURATES UR QL: NOT DETECTED
BUPRENORPHINE UR QL: NOT DETECTED
BZE UR QL: NOT DETECTED
CARBOXYTHC UR QL: NOT DETECTED
CARISOPRODOL UR QL: NOT DETECTED
CLONAZEPAM UR QL: NOT DETECTED
CODEINE UR QL: NOT DETECTED
CREAT UR-MCNC: 95.1 MG/DL (ref 20–400)
DIAZEPAM UR QL: NOT DETECTED
ETHYL GLUCURONIDE UR QL: NOT DETECTED
FENTANYL UR QL: NOT DETECTED
HYDROCODONE UR QL: NOT DETECTED
HYDROMORPHONE UR QL: NOT DETECTED
LORAZEPAM UR QL: NOT DETECTED
MDA UR QL: NOT DETECTED
MDEA UR QL: NOT DETECTED
MDMA UR QL: NOT DETECTED
ME-PHENIDATE UR QL: NOT DETECTED
MEPERIDINE UR QL: NOT DETECTED
METHADONE UR QL: PRESENT
METHAMPHET UR QL: NOT DETECTED
MIDAZOLAM UR QL SCN: NOT DETECTED
MORPHINE UR QL: NOT DETECTED
NORBUPRENORPHINE UR QL CFM: NOT DETECTED
NORDIAZEPAM UR QL: NOT DETECTED
NORFENTANYL UR QL: NOT DETECTED
NORHYDROCODONE UR QL CFM: NOT DETECTED
NOROXYCODONE UR QL CFM: PRESENT
NOROXYMORPHONE: PRESENT
OXAZEPAM UR QL: NOT DETECTED
OXYCODONE UR QL: PRESENT
OXYMORPHONE UR QL: NOT DETECTED
PATHOLOGY STUDY: NORMAL
PCP UR QL: NOT DETECTED
PHENTERMINE UR QL: NOT DETECTED
PROPOXYPH UR QL: NOT DETECTED
SERVICE CMNT-IMP: NORMAL
TAPENTADOL UR QL SCN: NOT DETECTED
TAPENTADOL-O-SULF: NOT DETECTED
TEMAZEPAM UR QL: NOT DETECTED
TRAMADOL UR QL: NOT DETECTED
ZOLPIDEM UR QL: NOT DETECTED

## 2018-05-29 NOTE — TELEPHONE ENCOUNTER
----- Message from Sandra Montoya sent at 5/29/2018  3:06 PM CDT -----  Type: Needs Medical Advice    Who Called: Hesham Mandujano Call Back Number: [phone]  Additional Information: Calling about medication, personal, no refills needed. Please call. Thanks

## 2018-05-29 NOTE — TELEPHONE ENCOUNTER
Spoke to patient who requested to know if Dr. Ramos was back in the office seeing patients. Patient advised Dr. Ramos was out of the office. Patient verbalized understanding.

## 2018-05-31 ENCOUNTER — TELEPHONE (OUTPATIENT)
Dept: FAMILY MEDICINE | Facility: CLINIC | Age: 68
End: 2018-05-31

## 2018-05-31 NOTE — TELEPHONE ENCOUNTER
"Spoke to patient who states he was prescribed Doxycycline 100 mg by Jenny Bang. States he was only prescribed 10 tablets and he believes it wasn't enough. Patient states "She gave me only 10 tablets and they are such a low mg. Those antibiotics weren't worth a hoot." Patient states his phlegm is green. Requesting prescription for antibiotics. Please send to [facility] in [place] if approved.   "

## 2018-05-31 NOTE — TELEPHONE ENCOUNTER
----- Message from Ellen Zhou sent at 5/31/2018  1:37 PM CDT -----  Contact: Self  Type:  RX Refill Request    Who Called: Patient  Refill or New Rx:  Refill   RX Name and Strength:mirabegron (MYRBETRIQ) 50 mg Tb24  How is the patient currently taking it? Take 1 tablet (50 mg total) by mouth once daily. - Oral  Is this a 30 day or 90 day RX: 90  Preferred Pharmacy with phone number:    [facility] [address] [place] [address] AT [facility] [address] [address]  Phone: [phone] Fax: [phone]    Local or Mail Order:  Local   Ordering Provider:  Anna Mandujano Call Back Number: [phone] (home)     Additional Information:  Patient needs a call back when this is done

## 2018-05-31 NOTE — TELEPHONE ENCOUNTER
----- Message from Ellen Zhou sent at 5/31/2018  1:56 PM CDT -----  Contact: Self  Patient needs to speak to Ally about his antibiotic medication      Please call back [phone] (home)

## 2018-06-01 ENCOUNTER — TELEPHONE (OUTPATIENT)
Dept: FAMILY MEDICINE | Facility: CLINIC | Age: 68
End: 2018-06-01

## 2018-06-01 NOTE — TELEPHONE ENCOUNTER
----- Message from Gail Alvarez sent at 6/1/2018  3:38 PM CDT -----  Contact: [phone]  Patient requesting a refill on antibiotics, still coughing and requesting more than qty 10.      Patient will be using   [facility] [address] [place] [address] AT [address]  [address] [place] [address]  Phone: [phone] Fax: [phone]    Please call patient at [phone].     Thanks!

## 2018-06-02 DIAGNOSIS — J44.1 COPD EXACERBATION: ICD-10-CM

## 2018-06-04 RX ORDER — DOXYCYCLINE HYCLATE 100 MG
TABLET ORAL
Qty: 10 TABLET | Refills: 0 | OUTPATIENT
Start: 2018-06-04

## 2018-06-05 ENCOUNTER — TELEPHONE (OUTPATIENT)
Dept: FAMILY MEDICINE | Facility: CLINIC | Age: 68
End: 2018-06-05

## 2018-06-05 NOTE — TELEPHONE ENCOUNTER
Spoke to patient who requested to follow up on message sent 5-31-18 requesting antibiotics. Advised patient no response has been received as of yet. Will contact patient once response received. Patient verbalized understanding.

## 2018-06-05 NOTE — TELEPHONE ENCOUNTER
----- Message from Cinda Ayala sent at 6/5/2018  9:04 AM CDT -----  Contact: patient   Patient calling to speak to the Nurse regarding prescription for doxycycline 100mg. Please advise.   Call back    Thanks!

## 2018-06-05 NOTE — TELEPHONE ENCOUNTER
----- Message from RT Alice sent at 6/5/2018  9:20 AM CDT -----  Contact: pt    Called pod, pt , returned Nurse Misti's missed call, thanks.

## 2018-06-08 ENCOUNTER — TELEPHONE (OUTPATIENT)
Dept: FAMILY MEDICINE | Facility: CLINIC | Age: 68
End: 2018-06-08

## 2018-06-08 ENCOUNTER — HOSPITAL ENCOUNTER (OUTPATIENT)
Dept: RADIOLOGY | Facility: CLINIC | Age: 68
Discharge: HOME | End: 2018-06-08
Attending: NURSE PRACTITIONER
Payer: MEDICARE

## 2018-06-08 ENCOUNTER — PROCEDURE VISIT (OUTPATIENT)
Dept: PHYSICAL MEDICINE AND REHAB | Facility: CLINIC | Age: 68
End: 2018-06-08
Payer: MEDICARE

## 2018-06-08 DIAGNOSIS — R20.0 RIGHT LEG NUMBNESS: ICD-10-CM

## 2018-06-08 DIAGNOSIS — G62.9 NEUROPATHY: ICD-10-CM

## 2018-06-08 DIAGNOSIS — R22.1 LUMP ON NECK: ICD-10-CM

## 2018-06-08 PROCEDURE — 95908 NRV CNDJ TST 3-4 STUDIES: CPT | Mod: PBBFAC,PN | Performed by: PHYSICAL MEDICINE & REHABILITATION

## 2018-06-08 PROCEDURE — 95886 MUSC TEST DONE W/N TEST COMP: CPT | Mod: 26,S$PBB,, | Performed by: PHYSICAL MEDICINE & REHABILITATION

## 2018-06-08 PROCEDURE — 76536 US EXAM OF HEAD AND NECK: CPT | Mod: TC,PO

## 2018-06-08 PROCEDURE — 95886 MUSC TEST DONE W/N TEST COMP: CPT | Mod: PBBFAC,PN | Performed by: PHYSICAL MEDICINE & REHABILITATION

## 2018-06-08 PROCEDURE — 95908 NRV CNDJ TST 3-4 STUDIES: CPT | Mod: 26,S$PBB,, | Performed by: PHYSICAL MEDICINE & REHABILITATION

## 2018-06-08 PROCEDURE — 76536 US EXAM OF HEAD AND NECK: CPT | Mod: 26,,, | Performed by: RADIOLOGY

## 2018-06-08 NOTE — PROCEDURES
Procedures   [facility]  Lawanda Staley D.O.  Physical Medicine and Rehab  Phone: [phone]  Fax: [phone]        Patient: Hesham Serna   Patient ID: [id number]   Sex:     Date of Birth:     Age:     Notes:     Last visit date: 6/8/2018             Visit date and time: 6/8/2018 11:56   Patient Age on Visit Date:     Referring Physician:     Diagnoses:        Leg numbness       Sensory NCS      Nerve / Sites Rec. Site Onset Lat Peak Lat Ref. NP Amp Ref. PP Amp Ref. Segments Distance Velocity     ms ms ms µV µV µV µV  cm m/s   R Sural - Ankle (Calf)      Calf Ankle NR NR ?4.20 NR ?5.0 NR ?5.0 Calf - Ankle 14 NR   R Superficial peroneal - Ankle      Lat leg Ankle NR NR ?4.40 NR ?5.0 NR ?5.0 Lat leg - Ankle 14 NR           Motor NCS      Nerve / Sites Muscle Latency Ref. Amplitude Ref. Amp % Duration Segments Distance Lat Diff Velocity Ref.     ms ms mV mV % ms  cm ms m/s m/s   R Peroneal - EDB      Ankle EDB 5.16 ?6.20 1.4 ?2.0 100 5.68 Ankle - EDB 8         Fib head EDB 13.75  1.1  80.3  Fib head - Ankle 30 8.59 35 ?39      Pop fossa EDB 15.31  1.0  73.5  Pop fossa - Fib head 10 1.56 64 ?39   R Tibial - AH      Ankle AH 4.53 ?6.00 4.2 ?3.0 100 4.90 Ankle - AH 8         Pop fossa AH 16.04  0.3  7.64  Pop fossa - Ankle 36 11.51 31 ?39           F  Wave      Nerve Fmin Ref.    ms ms   R Tibial - AH 64.27 ?58.00           EMG          EMG Summary Table     Spontaneous MUAP Recruitment   Muscle IA Fib PSW Fasc H.F. Amp Dur. PPP Pattern   R. Vastus lateralis N None None None None N N N N   R. Rectus femoris N None None None None N N N N   R. Biceps femoris (short head) N None None None None N N N N   R. Gastrocnemius (Medial head) N None None None None N N N N   R. Tibialis anterior N None None None None N N N N           Summary    The motor conduction test had results outside of the specified normal range in all 2 of the tested nerves:   In the R Peroneal - EDB study  o the peak amplitude result was  reduced for Ankle stimulation  o the take off velocity result was reduced for Fib head - Ankle segment   In the R Tibial - AH study  o the take off velocity result was reduced for Pop fossa - Ankle segment    The sensory conduction test had results outside of the specified normal range in all 2 of the tested nerves:   In the R Sural - Ankle (Calf) study  o the response was considered absent for Calf stimulation   In the R Superficial peroneal - Ankle study  o the response was considered absent for Lat leg stimulation    The F wave study had results outside of the specified normal range in all 1 of the tested nerves:   In the R Tibial - AH study  o cursor 1 latency result was increased    The needle EMG study was normal in all 5 tested muscles: R. Vastus lateralis, R. Rectus femoris, R. Biceps femoris (short head), R. Gastrocnemius (Medial head), R. Tibialis anterior.              Conclusion:     Moderate sensorimotor predominantly axonal neuropathy          ____________________________  Lawanda Staley D.O.

## 2018-06-08 NOTE — TELEPHONE ENCOUNTER
Please see attached message. No noted forms from Home Health for patient. Requested forms be fax again to office. Fax number confirmed.       ----- Message from Zeenat Talamantes sent at 6/7/2018  3:29 PM CDT -----  Contact: giovanni/[facility] [phone]  States that she is calling to speak to nurse regarding orders that she needs to get signed. Please call back at [phone]//thank you acc

## 2018-06-11 ENCOUNTER — TELEPHONE (OUTPATIENT)
Dept: UROLOGY | Facility: CLINIC | Age: 68
End: 2018-06-11

## 2018-06-11 DIAGNOSIS — L72.3 SEBACEOUS CYST: Primary | ICD-10-CM

## 2018-06-11 NOTE — TELEPHONE ENCOUNTER
Forms received from [facility] [employer]. Forms reviewed and signed by Dr. Ramos. Faxed forms to [facility] at [phone].

## 2018-06-11 NOTE — TELEPHONE ENCOUNTER
----- Message from Prabha Zimmer sent at 6/11/2018 10:43 AM CDT -----  Contact: patient  Type:  Same Day Appointment Request    Caller is requesting a same day appointment.  Caller declined first available appointment listed below.      Name of Caller:  Patient  When is the first available appointment?  06/12  Symptoms:    Best Call Back Number:  [phone]  Additional Information:   Requesting a call back,patient is schedule for tomorrow.

## 2018-06-11 NOTE — TELEPHONE ENCOUNTER
Spoke with patient informed requesting appointment today. Advised patient no available appointments today patient will keep appointment tomorrow morning at 9:30am with . Patient did not have a reason for wanting to be seen earlier

## 2018-06-12 ENCOUNTER — APPOINTMENT (OUTPATIENT)
Dept: LAB | Facility: HOSPITAL | Age: 68
End: 2018-06-12
Attending: UROLOGY
Payer: MEDICARE

## 2018-06-12 ENCOUNTER — TELEPHONE (OUTPATIENT)
Dept: UROLOGY | Facility: CLINIC | Age: 68
End: 2018-06-12

## 2018-06-12 ENCOUNTER — TELEPHONE (OUTPATIENT)
Dept: FAMILY MEDICINE | Facility: CLINIC | Age: 68
End: 2018-06-12

## 2018-06-12 ENCOUNTER — HOSPITAL ENCOUNTER (OUTPATIENT)
Dept: RADIOLOGY | Facility: CLINIC | Age: 68
Discharge: HOME | End: 2018-06-12
Attending: UROLOGY
Payer: MEDICARE

## 2018-06-12 ENCOUNTER — OFFICE VISIT (OUTPATIENT)
Dept: UROLOGY | Facility: CLINIC | Age: 68
End: 2018-06-12
Payer: MEDICARE

## 2018-06-12 VITALS
SYSTOLIC BLOOD PRESSURE: 134 MMHG | WEIGHT: 190.94 LBS | TEMPERATURE: 98 F | BODY MASS INDEX: 29.97 KG/M2 | HEIGHT: 67 IN | HEART RATE: 80 BPM | DIASTOLIC BLOOD PRESSURE: 88 MMHG

## 2018-06-12 DIAGNOSIS — Z12.5 ENCOUNTER FOR SCREENING FOR MALIGNANT NEOPLASM OF PROSTATE: ICD-10-CM

## 2018-06-12 DIAGNOSIS — N32.81 OAB (OVERACTIVE BLADDER): ICD-10-CM

## 2018-06-12 DIAGNOSIS — N40.0 BENIGN PROSTATIC HYPERPLASIA, UNSPECIFIED WHETHER LOWER URINARY TRACT SYMPTOMS PRESENT: ICD-10-CM

## 2018-06-12 DIAGNOSIS — R31.0 GROSS HEMATURIA: ICD-10-CM

## 2018-06-12 DIAGNOSIS — N40.0 BENIGN NON-NODULAR PROSTATIC HYPERPLASIA WITHOUT LOWER URINARY TRACT SYMPTOMS: ICD-10-CM

## 2018-06-12 DIAGNOSIS — N39.0 RECURRENT UTI: Primary | ICD-10-CM

## 2018-06-12 LAB
BACTERIA #/AREA URNS HPF: ABNORMAL /HPF
BILIRUB SERPL-MCNC: ABNORMAL MG/DL
BILIRUB UR QL STRIP: NEGATIVE
BLOOD URINE, POC: 50
CLARITY UR: ABNORMAL
COLOR UR: YELLOW
COLOR, POC UA: ABNORMAL
GLUCOSE UR QL STRIP: ABNORMAL
GLUCOSE UR QL STRIP: NEGATIVE
HGB UR QL STRIP: ABNORMAL
HYALINE CASTS #/AREA URNS LPF: 0 /LPF
KETONES UR QL STRIP: ABNORMAL
KETONES UR QL STRIP: ABNORMAL
LEUKOCYTE ESTERASE UR QL STRIP: ABNORMAL
LEUKOCYTE ESTERASE URINE, POC: ABNORMAL
MICROSCOPIC COMMENT: ABNORMAL
NITRITE UR QL STRIP: NEGATIVE
NITRITE, POC UA: ABNORMAL
PH UR STRIP: 7 [PH] (ref 5–8)
PH, POC UA: 5
PROT UR QL STRIP: ABNORMAL
PROTEIN, POC: ABNORMAL
RBC #/AREA URNS HPF: 10 /HPF (ref 0–4)
SP GR UR STRIP: >=1.03 (ref 1–1.03)
SPECIFIC GRAVITY, POC UA: 1020
SQUAMOUS #/AREA URNS HPF: 2 /HPF
URN SPEC COLLECT METH UR: ABNORMAL
UROBILINOGEN UR STRIP-ACNC: NEGATIVE EU/DL
UROBILINOGEN, POC UA: ABNORMAL
WBC #/AREA URNS HPF: >100 /HPF (ref 0–5)

## 2018-06-12 PROCEDURE — 99215 OFFICE O/P EST HI 40 MIN: CPT | Mod: PBBFAC,PN,25 | Performed by: UROLOGY

## 2018-06-12 PROCEDURE — 74018 RADEX ABDOMEN 1 VIEW: CPT | Mod: TC,FY,PO

## 2018-06-12 PROCEDURE — 51701 INSERT BLADDER CATHETER: CPT | Mod: PBBFAC,PN | Performed by: UROLOGY

## 2018-06-12 PROCEDURE — 81002 URINALYSIS NONAUTO W/O SCOPE: CPT | Mod: PBBFAC,PN | Performed by: UROLOGY

## 2018-06-12 PROCEDURE — 74018 RADEX ABDOMEN 1 VIEW: CPT | Mod: 26,,, | Performed by: RADIOLOGY

## 2018-06-12 PROCEDURE — 87086 URINE CULTURE/COLONY COUNT: CPT

## 2018-06-12 PROCEDURE — 81000 URINALYSIS NONAUTO W/SCOPE: CPT

## 2018-06-12 PROCEDURE — 99999 PR PBB SHADOW E&M-EST. PATIENT-LVL V: CPT | Mod: PBBFAC,,, | Performed by: UROLOGY

## 2018-06-12 PROCEDURE — 87088 URINE BACTERIA CULTURE: CPT

## 2018-06-12 PROCEDURE — 87077 CULTURE AEROBIC IDENTIFY: CPT

## 2018-06-12 PROCEDURE — 87186 SC STD MICRODIL/AGAR DIL: CPT

## 2018-06-12 PROCEDURE — 99214 OFFICE O/P EST MOD 30 MIN: CPT | Mod: S$PBB,,, | Performed by: UROLOGY

## 2018-06-12 RX ORDER — SILODOSIN 8 MG/1
8 CAPSULE ORAL DAILY
Qty: 90 CAPSULE | Refills: 3 | Status: SHIPPED | OUTPATIENT
Start: 2018-06-12 | End: 2018-07-16 | Stop reason: SDUPTHER

## 2018-06-12 NOTE — PATIENT INSTRUCTIONS
Gross hematuria workup w intermittent abnormal urine cytology, most recent one negative  -could be related to bladder herniating into testicular causing bladder irritation. However they want to hold off on treatment of this.   -cystoscopy 8/28/17 showed irritation c/w this. ctu 8/12/17 showed a nonobstructing stone which should not be the cause and he's had UTIs which can cause this too.   -repeat urine cytology in April was negative. No recent gross hematuria.     Recurrent UTI  -likely related to in and out caths but today c/o symptoms.   -repeating cath urine culture today and if + will treat, c/o oab and want to check psa.     OAB (overactive bladder)  -continue myrbetriq  -send cath ua and urine culture   -stop drinking multiple coffees and cokes (still drinking 4 cokes a day)    BPH with h/o Elevated urine residuals  -still do not think he needs to catheterize, emptying his bladder  -pvr by in and out cath post void today was 5cc. Tried 12fr and 16fr coude silicone and they went easy. He said he uses 14 fr straight silicone but I tried this and met too much resistance.   -continue rapaflo, refill given today     Nephrolithiasis  -LMP 6mm stone. Will repeat kub and possibly discuss treating  -last ucx showed proteus    psa after uti treated         F/u 3 months to review kub and psa

## 2018-06-12 NOTE — TELEPHONE ENCOUNTER
----- Message from Kymberly De Paz sent at 6/12/2018 12:55 PM CDT -----  Contact: Patient  Type: Needs Medical Advice    Who Called:  lalita Dahl  Symptoms (please be specific):  Patient states he had a catheter put in today and he cannot stop urinating   How long has patient had these symptoms:  Since 11 am  Pharmacy name and phone #:    Best Call Back Number: [phone]  Additional Information: Placed call to POD, no answer. Patient hung up on me.

## 2018-06-12 NOTE — PROGRESS NOTES
[facility] Progress Note - [place]  Urology Group: Anna/Robert/Cassie/Radha  [facility]    Chief Complaint: GH    Subjective:        HPI: Hesham Serna is a 67 y.o. male presents with active hep C      Gross hematuria and abnormal urine cytology  ct urogram 11/14/16: herniation into left testicle, 4mm left renal stone. Repeat ctu 8/21/17 showed a 6mm stone in his left kidney and herniating bladder into testicle. GHould be related to bladder herniating into testicular causing bladder irritation. However they want to hold off on treatment of this.   urine cultures have been positive in the past, ua today suspicious. Pt does CIC.   urine cytology 9/26/16 - negative .7/17/17 - negative,  8/10/17 - urothelial cell atypia, supicious for urothelial high grade tumor.He has a h/o smoking (55 packs, 2-3 packs a day). Repeat urine cytology 8/17/17 abnormal  cystoscopy 8/28/17 urethral meatus with tear, small prostate and no b lobar hypertrophy, + inflammation on posterior bladder wall and floor that was biopsied. Path showed cystitis cystica.   Voided urine 9/21/17: Highly atypical single cells are present, suspicious for transitional cell carcinoma.These features can also be seen in the context of instrumentation. Clinical correlation is essential. This was discussed with Dr. Castillo on October 2, 2017, at 8:40 AM.  Catheterized urine 4/9/18:Negative for malignant cells.    H/o Elevated residuals and OAB, BPH  Pt with history of urinary retention when he first started seeing me. UDS initially  showed atonic bladder. He had gone from doing cic intermittently then up to 10x a day for frequency and urgency. He has been on myrbetriq at least since 2016. He has had intermittent recurrent uti's and gross hematuria (likely from uti's but could be from stones, workup initially showed abnormal cytology but repeated 4/9/18 and was negative). We changed him from flomax to  rapaflo at some point and after residuals were checked a few times we discontinued CIC. His residuals by in and out cath have remained less than 20cc (checked 4/9/18 and 6/13/18).   He had a cysto in 8/2017 which showed no enlarged prostate but states when he stops rapaflo his flow slows. He's had uroflows with avg flow flow of 7mL/s.     On last visit and this visit he's c/o frequeny q1 hour and uti symptoms with ua that shows possible infection. He's also been c/o UUI and has been wearing 1-2 depends a day. Possibly could have uti and c/o pain with urination. See ua below. Still on myrbetriq and rapaflo.     Recurrent uti  -he comes in today stating he feels like he has a uti. C/o dysuria, urgency, UUI.   -RF: CIC, stone    Urinalysis today void: 1.020/5/2+leuk/1+prot/50 blood (dysuria, urgency, UUI)  ua cath: sent for culture, pvr 5cc     Urine culture   4/19/18 Cath:P.mirabilis, void: tr leuk/50 blood/1+glucose, cath: none (asymptomatic). I&o cath with 16fr: 20cc  10/17/17 Ng, void: tr blood  9/12/17 E.cloacae  8/28/17 ng  8/21/17            k.pneumonia  8/10/17                      ng  1/16/17  ng  10/21/16  E.cloacae  9/19/16  P.mirabilis  7/20/16  s.haemolyticus  3/23/16   Ng  12/16/15  ng     Nephrolithiasis  CTRSS on 6/4/15 showed left 4mm renal stone. KUB on 4/12/16 showed stone is radioopaque. Ctu 11/14/16 showed this again. ctu 8/21/17 showed stone is now 6mm stone in his left kidney  Scheduled for eswl but never proceeded with surgery. Stated he wanted to hold off until his back is fixed. Still no c/o left flank pain     Erectile dysfunction  -pt states that he has not had an erection in over 3 years. He has tried meds in the past and they work. He has over 20 viagra's at home.       REVIEW OF SYSTEMS:  General ROS: no fevers, no chills  Psychological ROS: no depression  Endocrine ROS: no heat or cold  Respiratory ROS: + SOB  Cardiovascular ROS: no CP  Gastrointestinal ROS: no abdominal pain, +  constipation is worsening on lactulose, no diarrhea, no BRBPR  Musculoskeletal ROS: no muscle pain, wears back brace  Neurological ROS: no headaches  Dermatological ROS: no rashes  HEENT: noglasses, no sinus   ROS: per HPI    The past medical, surgical, social and family hx were reviewed. There have been any changes. He says he's had 3 root canals done  Cataracts? none    Urologic meds: myrbetriq 50mg and rapaflo  Anticoagulation: No    Objective:     Vitals:    06/12/18 0916   BP: 134/88   Pulse: 80   Temp: 98.1 °F (36.7 °C)          Physical Exam   Constitutional: He is oriented to person, place, and time. He appears well-developed and well-nourished. He is cooperative. No distress.   HENT:   Head: Normocephalic and atraumatic.   Eyes: Pupils are equal, round, and reactive to light.   Cardiovascular: Normal rate and regular rhythm.    Pulmonary/Chest: Effort normal.   Abdominal: Soft. Normal appearance.   Musculoskeletal: Normal range of motion.   Neurological: He is alert and oriented to person, place, and time. He has normal reflexes.   Skin: Skin is intact.     Psychiatric: He has a normal mood and affect.         gu 6/13/18  MOON today: 30g  pvr by in and out cath post void today was 5cc. Tried 12fr and 16fr coude silicone and they went easy. He said he uses 14 fr straight silicone but I tried this and met too much resistance. Cath urine sent for culture    Labs:    PSA   5/9/17                       0.45  5/26/15                      0.27  7/14/14                      0.26     Cr  4/3/17 1.0    Path:   Catheterized urine 4/9/18:Negative for malignant cells.  Voided urine 9/21/17: Highly atypical single cells are present, suspicious for transitional cell carcinoma.These features can also be seen in the context of instrumentation. Clinical correlation is essential. This was discussed with Dr. Castillo on October 2, 2017, at 8:40 AM.  Bladder, biopsy 9/4/17: Cystitis cystica.  Urine, voided, cytology  8/21/17: Urothelial atypia, suspicious cells present, see comment.  URINE (VOIDED) 8/10/17: Urothelial cell atypia, suspicious for urothelial high-grade dysplasia/carcinoma in situ  Voided urine 7/17/17: Negative for malignant cells.  9/26/16 URINE, VOIDED (CYTOLOGY):Negative for malignant cellsm Abundant neutrophils, Degenerative changes     Rads:   ctu 8/21/17  1.  Stable examination demonstrating bilateral inguinal hernias, the left of which is a direct inguinal hernia containing a portion of the urinary bladder. There is urinary bladder wall thickening involving the anterior bladder wall and herniated portion of the bladder which is nonspecific but likely reactive in this setting. Right indirect fat-containing inguinal hernia also again noted.  2. Additional stable findings:  -Nonobstructive left nephrolithiasis  -Colonic diverticulosis  -Chronic severe L1 compression fracture status post vertebroplasty/kyphoplasty     kub 8/21/17 Left 4mm (6mm on ct) mid pole stone (L3). Note to self: recommend eswl, stone growing  ctu 11/14/16: 4mm left renal stone, no renal mass, no hydro, prtial herniation of the bladder into the LIH. Prostate not enlarged. A fat containing RIH. Severe L1 compression fracture  kub 4/12/16 - left renal stone 5mm  ctrss 6/4/15 - 4mm left midpole stone    Assessment:       1. Recurrent UTI    2. Benign non-nodular prostatic hyperplasia without lower urinary tract symptoms    3. OAB (overactive bladder)    4. Benign prostatic hyperplasia, unspecified whether lower urinary tract symptoms present    5. Gross hematuria        Plan:       Gross hematuria workup w intermittent abnormal urine cytology, most recent one negative  -could be related to bladder herniating into testicular causing bladder irritation. However they want to hold off on treatment of this.   -cystoscopy 8/28/17 showed irritation c/w this. ctu 8/12/17 showed a nonobstructing stone which should not be the cause and he's had UTIs  which can cause this too.   -repeat urine cytology in April was negative. No recent gross hematuria.     Recurrent UTI  -likely related to in and out caths but today c/o symptoms.   -repeating cath urine culture today and if + will treat, c/o oab and want to check psa.     OAB (overactive bladder)  -continue myrbetriq  -send cath ua and urine culture   -stop drinking multiple coffees and cokes (still drinking 4 cokes a day)    BPH with h/o Elevated urine residuals  -still do not think he needs to catheterize, emptying his bladder  -pvr by in and out cath post void today was 5cc. Tried 12fr and 16fr coude silicone and they went easy. He said he uses 14 fr straight silicone but I tried this and met too much resistance.   -continue rapaflo, refill given today     Nephrolithiasis  -LMP 6mm stone. Will repeat kub and possibly discuss treating  -last ucx showed proteus    psa after uti treated         F/u 3 months to review kub and psa

## 2018-06-12 NOTE — TELEPHONE ENCOUNTER
Spoke with patient she states he cannot hold his urine since visit this morning and has had three accidents. Per  informed patient may be related to a uti will wait for urine culture results and if positive will prescribe antibiotics. Patient verbally voiced understanding.

## 2018-06-12 NOTE — TELEPHONE ENCOUNTER
Patient present in lobby of laboratory. States he is worried because it looks like it is about to rain and he has to drive all the way back to [place]. Writer sat with patient while he waited for his appointment. Patient calmed down while writer was sitting with him.

## 2018-06-12 NOTE — TELEPHONE ENCOUNTER
----- Message from Gali Boykin sent at 6/12/2018 10:23 AM CDT -----  Contact: Hesham Serna   Mr. Serna is asking someone give him a call    [phone]

## 2018-06-14 ENCOUNTER — TELEPHONE (OUTPATIENT)
Dept: UROLOGY | Facility: CLINIC | Age: 68
End: 2018-06-14

## 2018-06-14 NOTE — TELEPHONE ENCOUNTER
Spoke with patient informed him results not available at this time will call once available. Patient verbally voiced understanding. Patient states they found two lumps in his neck which was found to be cancerous and is scheduled to have them removed next Friday. I see where a cysts was found and he has a appointment with general surgery next Friday.

## 2018-06-15 ENCOUNTER — TELEPHONE (OUTPATIENT)
Dept: SURGERY | Facility: CLINIC | Age: 68
End: 2018-06-15

## 2018-06-15 LAB — BACTERIA UR CULT: NORMAL

## 2018-06-15 NOTE — TELEPHONE ENCOUNTER
----- Message from Prabha Zimmer sent at 6/15/2018  8:12 AM CDT -----  Contact: Patient  Type: Needs Medical Advice    Who Called:  Patient  Symptoms (please be specific):    How long has patient had these symptoms:    Pharmacy name and phone #:    Best Call Back Number: [phone]  Additional Information: have questions regarding upcoming appointment. Call back

## 2018-06-15 NOTE — TELEPHONE ENCOUNTER
----- Message from Ellen Zhou sent at 6/15/2018  3:52 PM CDT -----  Contact: Self  Type:  Test Results    Who Called:  Patient   Name of Test Xray   Date of Test:  6/12  Ordering Provider:  MESSI  Where the test was performed:  [facility] Call Back Number:  [phone] (home)     Additional Information:  Test Results

## 2018-06-15 NOTE — TELEPHONE ENCOUNTER
Patient was advised that Dr Bernard needs to look at the area and then he can decide if it can be done in the office or not.  Patient voiced understanding.

## 2018-06-15 NOTE — TELEPHONE ENCOUNTER
Returned call and spoke with patient, X-ray results given, he states that he is still urinating on himself since the office visit, message to be given to MD to advise, patient verbally understood.

## 2018-06-15 NOTE — TELEPHONE ENCOUNTER
Patient has a cyst on his neck, had an ultrasound done.  He is scheduled to come in next Friday and he wants to know if you can take it out in the office.  Advised that he possibly can but we will know definitely once he sees it.  Requesting the doctor to look at the report and let him know if it can be done in the office.

## 2018-06-16 ENCOUNTER — TELEPHONE (OUTPATIENT)
Dept: UROLOGY | Facility: CLINIC | Age: 68
End: 2018-06-16

## 2018-06-16 RX ORDER — AMOXICILLIN AND CLAVULANATE POTASSIUM 875; 125 MG/1; MG/1
1 TABLET, FILM COATED ORAL 2 TIMES DAILY
Qty: 14 TABLET | Refills: 0 | Status: SHIPPED | OUTPATIENT
Start: 2018-06-16 | End: 2018-06-23

## 2018-06-16 NOTE — TELEPHONE ENCOUNTER
Please let pt know his urine culture grew proteus sensitive to augmentin  He can take augmentin bid x 7d  He needs to let us know if his symptoms do not improve on augmentin

## 2018-06-19 ENCOUNTER — TELEPHONE (OUTPATIENT)
Dept: SURGERY | Facility: CLINIC | Age: 68
End: 2018-06-19

## 2018-06-19 NOTE — TELEPHONE ENCOUNTER
----- Message from Indu Whitt sent at 6/19/2018 12:49 PM CDT -----  Contact: Patient  Patient would like to speak with someone regarding his surgery.  Call Back#[phone]  Thanks

## 2018-06-19 NOTE — TELEPHONE ENCOUNTER
Patient calling to confirm that Dr Bernard can do the procedure he needs in the office.  Advised that he will examine him and if he can do it in the office he will, it depends on where it is and how deep.

## 2018-06-20 ENCOUNTER — OFFICE VISIT (OUTPATIENT)
Dept: PSYCHIATRY | Facility: CLINIC | Age: 68
End: 2018-06-20
Payer: MEDICARE

## 2018-06-20 ENCOUNTER — TELEPHONE (OUTPATIENT)
Dept: PSYCHIATRY | Facility: CLINIC | Age: 68
End: 2018-06-20

## 2018-06-20 VITALS
HEIGHT: 67 IN | DIASTOLIC BLOOD PRESSURE: 86 MMHG | HEART RATE: 89 BPM | BODY MASS INDEX: 30.93 KG/M2 | SYSTOLIC BLOOD PRESSURE: 141 MMHG | WEIGHT: 197.06 LBS

## 2018-06-20 DIAGNOSIS — F11.20 OPIATE DEPENDENCE, CONTINUOUS: ICD-10-CM

## 2018-06-20 DIAGNOSIS — F41.1 GAD (GENERALIZED ANXIETY DISORDER): ICD-10-CM

## 2018-06-20 DIAGNOSIS — F39 MOOD DISORDER: ICD-10-CM

## 2018-06-20 PROCEDURE — 99214 OFFICE O/P EST MOD 30 MIN: CPT | Mod: S$PBB,,, | Performed by: PSYCHIATRY & NEUROLOGY

## 2018-06-20 PROCEDURE — 99213 OFFICE O/P EST LOW 20 MIN: CPT | Mod: PBBFAC,PN | Performed by: PSYCHIATRY & NEUROLOGY

## 2018-06-20 PROCEDURE — 99999 PR PBB SHADOW E&M-EST. PATIENT-LVL III: CPT | Mod: PBBFAC,,, | Performed by: PSYCHIATRY & NEUROLOGY

## 2018-06-20 RX ORDER — ESCITALOPRAM OXALATE 5 MG/1
5 TABLET ORAL DAILY
Qty: 30 TABLET | Refills: 1 | Status: ON HOLD | OUTPATIENT
Start: 2018-06-20 | End: 2018-09-08

## 2018-06-20 RX ORDER — ZIPRASIDONE HYDROCHLORIDE 20 MG/1
CAPSULE ORAL
Qty: 180 CAPSULE | Refills: 0 | Status: SHIPPED | OUTPATIENT
Start: 2018-06-20 | End: 2018-08-01 | Stop reason: DRUGHIGH
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
## 2018-06-20 NOTE — TELEPHONE ENCOUNTER
I spoke to the patient's mother (as per his request) who was lisa on the phone.  She reports she is 87 yo old and is managing her health issues, maintaining her home which she prefers to keep orderly, and taking care of a dog.  She is tired of having patient come to her home frequently and does not feel she should have to clean and cook for him.  She is setting boundaries with him and thinks pt should move into senior living (discussed with pt today and he refused). She reports pt has a difficult personality and does not want to spend extended time with him.  He does not have relationships with others and she prefers to be alone and live peacefully at this stage of her life.  She reports pt will stay at her home until Friday when he has another MD appt, and then she will ask him to leave.  She was calm, logical, and articulate on the phone.      I will continue to encourage patient to consider move into an assisted living facility.  Pt was not willing to discuss today.

## 2018-06-21 ENCOUNTER — TELEPHONE (OUTPATIENT)
Dept: SURGERY | Facility: CLINIC | Age: 68
End: 2018-06-21

## 2018-06-21 NOTE — TELEPHONE ENCOUNTER
----- Message from Anushka Saldivar sent at 6/21/2018 10:13 AM CDT -----  Contact: self  Patient [phone] is calling to speak with Nurse/he wants to hear from the Nurse that he can drive himself to his appt tomorrow 06 22 18 but I did tell him that per Nurse Bianca he can drive himself in his car

## 2018-06-21 NOTE — TELEPHONE ENCOUNTER
----- Message from Jovana Banks sent at 6/21/2018  9:37 AM CDT -----  Pt is scheduled for surgery tomorrow /asking for some information / call [phone] (home)

## 2018-06-21 NOTE — TELEPHONE ENCOUNTER
Left message, advised patient that if Dr Bernard is able to remove the lump he will be able to drive himself home because he will not get any anesthesia that would make him drowsy.

## 2018-06-22 ENCOUNTER — OFFICE VISIT (OUTPATIENT)
Dept: SURGERY | Facility: CLINIC | Age: 68
End: 2018-06-22
Payer: MEDICARE

## 2018-06-22 ENCOUNTER — APPOINTMENT (OUTPATIENT)
Dept: LAB | Facility: HOSPITAL | Age: 68
End: 2018-06-22
Attending: SURGERY
Payer: MEDICARE

## 2018-06-22 VITALS — BODY MASS INDEX: 29.97 KG/M2 | HEIGHT: 67 IN | TEMPERATURE: 98 F | WEIGHT: 190.94 LBS

## 2018-06-22 DIAGNOSIS — R22.0 MASS OF CHIN: Primary | ICD-10-CM

## 2018-06-22 PROCEDURE — 11643 EXC F/E/E/N/L MAL+MRG 2.1-3: CPT | Mod: S$PBB,,, | Performed by: SURGERY

## 2018-06-22 PROCEDURE — 21012 EXC FACE LES SBQ 2 CM/>: CPT | Mod: PBBFAC,PO | Performed by: SURGERY

## 2018-06-22 PROCEDURE — 99214 OFFICE O/P EST MOD 30 MIN: CPT | Mod: 25,S$PBB,, | Performed by: SURGERY

## 2018-06-22 PROCEDURE — 88305 TISSUE EXAM BY PATHOLOGIST: CPT | Mod: 26,,, | Performed by: PATHOLOGY

## 2018-06-22 PROCEDURE — 99213 OFFICE O/P EST LOW 20 MIN: CPT | Mod: PBBFAC,PO,25 | Performed by: SURGERY

## 2018-06-22 PROCEDURE — 88305 TISSUE EXAM BY PATHOLOGIST: CPT | Performed by: PATHOLOGY

## 2018-06-22 PROCEDURE — 99999 PR PBB SHADOW E&M-EST. PATIENT-LVL III: CPT | Mod: PBBFAC,,, | Performed by: SURGERY

## 2018-06-22 RX ORDER — AMOXICILLIN AND CLAVULANATE POTASSIUM 875; 125 MG/1; MG/1
TABLET, FILM COATED ORAL
Qty: 14 TABLET | Refills: 0 | OUTPATIENT
Start: 2018-06-22

## 2018-06-22 NOTE — LETTER
June 22, 2018      [facility]  [address]  [facility] [address]           [facility]  [address]  [facility] [address]  Phone: [phone]          Patient: Hesham Serna   MR Number: [id number]   YOB: 1950   Date of Visit: 6/22/2018       Dear Jenny Bang:    Thank you for referring Hesham Serna to me for evaluation. Attached you will find relevant portions of my assessment and plan of care.    If you have questions, please do not hesitate to call me. I look forward to following Hesham Serna along with you.    Sincerely,    Hesham Bernard MD    Enclosure  CC:  No Recipients    If you would like to receive this communication electronically, please contact [email or web address] or [phone] to request more information on [employer] Link access.    For providers and/or their staff who would like to refer a patient to [facility], please contact us through our one-stop-shop provider referral line, [facility] , at [phone].    If you feel you have received this communication in error or would no longer like to receive these types of communications, please e-mail [email or web address]

## 2018-06-22 NOTE — PROGRESS NOTES
Subjective:       Patient ID: Hesham eSrna is a 67 y.o. male.    Chief Complaint: Consult (lump under chin, desires removal)    Referred by Jenny Bang for lump under right chin.    Ultrasound non diagnostic.  Would like it removed.    Past Medical History:  No date: Allergy  No date: Arthritis  No date: Asthma  No date: Cancer      Comment: skin DR Isrrael Yun  No date: COPD (chronic obstructive pulmonary disease)  No date: Degenerative disc disease  No date: Depression      Comment: bipolar dis  No date: Fracture of vertebra due to osteoporosis  No date: Hepatitis C  No date: Hypertension  No date: Liver disease  No date: Nasal bone fx-closed  No date: Nephrolith  No date: Rosacea  No date: Skin cancer  Past Surgical History:  No date: jaw surgey  No date: KNEE SURGERY  No date: NECK SURGERY      Comment: 8 procedures on neck  No date: RHIZOTOMY W/ RADIOFREQUENCY ABLATION  No date: TONSILLECTOMY  No date: tumors      Comment: 10 removed from scalp      Current Outpatient Prescriptions:   albuterol 90 mcg/actuation inhaler, Inhale 2 puffs into the lungs every 6 (six) hours as needed for Wheezing., Disp: 1 each, Rfl: 5  albuterol-ipratropium 2.5mg-0.5mg/3mL (DUO-NEB) 0.5 mg-3 mg(2.5 mg base)/3 mL nebulizer solution, Take 3 mLs by nebulization every 6 (six) hours while awake., Disp: 180 mL, Rfl: 3  amoxicillin-clavulanate 875-125mg (AUGMENTIN) 875-125 mg per tablet, Take 1 tablet by mouth 2 (two) times daily., Disp: 14 tablet, Rfl: 0  clonazePAM (KLONOPIN) 2 MG Tab, TAKE 1 TABLET (2 MG TOTAL) BY MOUTH 3 (THREE) TIMES DAILY AS NEEDED., Disp: 90 tablet, Rfl: 2  escitalopram oxalate (LEXAPRO) 5 MG Tab, Take 1 tablet (5 mg total) by mouth once daily., Disp: 30 tablet, Rfl: 1  fentaNYL (SUBSYS) 200 mcg/spray Spry, Place 200 mcg under the tongue 2 (two) times daily as needed., Disp: , Rfl:   fluocinonide 0.05% (LIDEX) 0.05 % cream, , Disp: , Rfl:   fluticasone (FLONASE) 50 mcg/actuation nasal spray, SNIFF 1  SPRAY INTO EACH NOSTRIL ONCE DAILY, Disp: 16 g, Rfl: 3  fluticasone-salmeterol 500-50 mcg/dose (ADVAIR DISKUS) 500-50 mcg/dose DsDv diskus inhaler, INHALE 1 PUFF TWO TIMES A DAY, Disp: 180 each, Rfl: 3  lactulose (CHRONULAC) 10 gram/15 mL solution, TAKE 30 MLS BY MOUTH THREE TIMES DAILY, Disp: 1200 mL, Rfl: 3  lubiprostone (AMITIZA) 8 MCG Cap, Take 1 capsule (8 mcg total) by mouth 2 (two) times daily., Disp: 180 capsule, Rfl: 0  methadone (DOLOPHINE) 10 MG tablet, Take 1 tablet (10 mg total) by mouth every 6 (six) hours as needed. Two tablets every morning, two tablets every 12 pm, one-two tablets every evening (Patient taking differently: Take 10 mg by mouth every 8 (eight) hours as needed. ), Disp: , Rfl:   mirabegron (MYRBETRIQ) 50 mg Tb24, Take 1 tablet (50 mg total) by mouth once daily., Disp: 90 tablet, Rfl: 3  oxycodone (ROXICODONE) 15 MG Tab, , Disp: , Rfl:   predniSONE (DELTASONE) 10 MG tablet, Take 2 tablets (20 mg total) by mouth once daily., Disp: 60 tablet, Rfl: 11  silodosin (RAPAFLO) 8 mg Cap capsule, Take 1 capsule (8 mg total) by mouth once daily., Disp: 90 capsule, Rfl: 3  spironolactone (ALDACTONE) 25 MG tablet, Take 1 tablet (25 mg total) by mouth once daily., Disp: 90 tablet, Rfl: 3  tiotropium (SPIRIVA WITH HANDIHALER) 18 mcg inhalation capsule, INHALE THE CONTENTS OF 1 CAPSULE BY MOUTH AS PER HANDIHALER, Disp: 90 capsule, Rfl: 3  ziprasidone (GEODON) 20 MG Cap, Take one capsule PO BID with meals, Disp: 180 capsule, Rfl: 0  celecoxib (CELEBREX) 200 MG capsule, Take 1 capsule (200 mg total) by mouth daily as needed for Pain., Disp: 45 capsule, Rfl: 3    Review of patient's allergies indicates:   -- Codeine -- Other (See Comments)   -- Morphine     --  Pt denies this 1-10-13   -- Bactrim (sulfamethoxazole-trimethoprim) -- Other (See Comments)    --  Pt cannot take with Methadone   -- Ciprofloxacin -- Other (See Comments)    --  Patient cannot take with methadone    Review of patient's  family history indicates:  Problem: Heart disease      Relation: Father       Age of Onset: (Not Specified)   Problem: No Known Problems      Relation: Mother       Age of Onset: (Not Specified)   Problem: Cancer      Relation: Paternal Uncle       Age of Onset: (Not Specified)       Comment: prostate  Problem: Cancer      Relation: Paternal Aunt       Age of Onset: (Not Specified)       Comment: breast  Problem: Eczema      Relation: Neg Hx       Age of Onset: (Not Specified)   Problem: Lupus      Relation: Neg Hx       Age of Onset: (Not Specified)   Problem: Psoriasis      Relation: Neg Hx       Age of Onset: (Not Specified)   Problem: Melanoma      Relation: Neg Hx       Age of Onset: (Not Specified)   Problem: Glaucoma      Relation: Neg Hx       Age of Onset: (Not Specified)     Social History    Marital status:             Spouse name:                       Years of education:                 Number of children:               Occupational History    None on file    Social History Main Topics    Smoking status: Current Every Day Smoker                                                     Packs/day: 0.50      Years: 30.00          Types: Cigarettes    Smokeless tobacco: Never Used                        Comment: down to 6 a day    Alcohol use: No              Drug use: No              Sexual activity: Not Currently        Other Topics            Concern    None on file    Social History Narrative    None on file          Review of Systems   Constitutional: Negative for activity change, appetite change, chills, fatigue, fever and unexpected weight change.   HENT: Negative for congestion, dental problem, hearing loss, nosebleeds, sinus pressure, sore throat and voice change.    Eyes: Negative for pain and visual disturbance.   Respiratory: Positive for cough and shortness of breath. Negative for chest tightness.    Cardiovascular: Negative for chest pain, palpitations and leg swelling.   Gastrointestinal:  Negative for abdominal distention, abdominal pain, constipation, diarrhea, nausea and vomiting.   Endocrine: Negative for cold intolerance and heat intolerance.   Genitourinary: Negative for difficulty urinating, flank pain, frequency and hematuria.   Musculoskeletal: Positive for back pain. Negative for arthralgias, gait problem, joint swelling, myalgias, neck pain and neck stiffness.   Skin: Negative for rash.   Allergic/Immunologic: Negative for immunocompromised state.   Neurological: Negative for dizziness, tremors, seizures, syncope, weakness, light-headedness, numbness and headaches.   Hematological: Negative for adenopathy. Does not bruise/bleed easily.   Psychiatric/Behavioral: Negative for confusion, decreased concentration, hallucinations, sleep disturbance and suicidal ideas. The patient is not nervous/anxious.        Objective:      Physical Exam   Constitutional: He is oriented to person, place, and time. He appears well-developed and well-nourished.   HENT:   Head: Normocephalic and atraumatic.       Right Ear: External ear normal.   Left Ear: External ear normal.   Eyes: Conjunctivae are normal. Pupils are equal, round, and reactive to light.   Neck: Normal range of motion.   Cardiovascular: Normal rate and regular rhythm.    Pulmonary/Chest: No respiratory distress. He exhibits no tenderness.   Abdominal: Soft. He exhibits no distension and no mass.   Musculoskeletal: He exhibits no edema or tenderness.   Neurological: He is alert and oriented to person, place, and time. He has normal reflexes. No cranial nerve deficit.   Skin: Skin is warm and dry. No rash noted. No erythema. No pallor.   Psychiatric: He has a normal mood and affect. His behavior is normal. Judgment and thought content normal.   Nursing note and vitals reviewed.      Assessment:       1. Mass of chin        Plan:       See procedure note

## 2018-06-22 NOTE — PROCEDURES
"Exc, Tumor Soft Tissue  Date/Time: 6/22/2018 12:09 PM  Performed by: KATARINA ALLEN.  Authorized by: KATARINA ALLEN.     Consent Done?:  Yes (Verbal)  Time out: Immediately prior to procedure a "time out" was called to verify the correct patient, procedure, equipment, support staff and site/side marked as required.      STAFF:  Assistants?: No    I was present for the entire procedure.  INDICATIONS:: mass.  LOCATION:  Body area:  Face/facial  Face area:  Chinright    PREP:  Patient was prepped and draped in the normal sterile fashion.Position:  Supine    ANESTHESIA:  Anesthesia:  Local infiltration  Local anesthetic:  Lidocaine 1% with epinephrine    PROCEDURE DETAILS:  Excision type:  Tumor  Excision depth:  Subcutaneous  Excision size (cm):  2.2  Scalpel size:  15  Incision type:  Single straight  Specimens?: Yes    Specimens submitted to pathology.  Hemostasis was obtained.  Estimated blood loss (cc):  5  Wound closure:  Simple  Wound repair size (cm):  2.2  Sutures:  3-0 Vicryl  Sterile dressings:  Steri-strips  Post-op diagnosis: Same as pre-op diagnosis.  Needle, instrument, and sponge counts were correct.  Patient tolerated the procedure well with no immediate complications.  Post-operative instructions were provided for the patient.      "

## 2018-06-25 ENCOUNTER — TELEPHONE (OUTPATIENT)
Dept: SURGERY | Facility: CLINIC | Age: 68
End: 2018-06-25

## 2018-06-25 NOTE — TELEPHONE ENCOUNTER
----- Message from Erinn Valadez sent at 6/25/2018  9:26 AM CDT -----  Type: Needs Medical Advice    Who Called:  Vance Mandujano Call Back Number: [phone]  Additional Information: Patient wants to talk to office concerning his labs scheduled on 7/3/18.

## 2018-06-25 NOTE — TELEPHONE ENCOUNTER
Advised that he can shower then dry well, can leave open to air, the sutures will dissolve.  Path report is not back, it takes about a week, I will call him when it is ready.  He will call back when he is ready to schedule removal of the spots on his scalp.

## 2018-06-25 NOTE — TELEPHONE ENCOUNTER
Spoke with patient informed him her is scheduled for a blood test on 7/3. Patient verbally voiced understanding and states he needs refill on his myrbetriq

## 2018-06-25 NOTE — TELEPHONE ENCOUNTER
----- Message from Yamile Charles sent at 6/25/2018  3:52 PM CDT -----  Contact: pt  Pt calling wanting to speak to the office on the results of his test ,whether it is cancer. He is in allot pain also about if he needs to come in to have the stiches removed...[phone] (home)

## 2018-06-25 NOTE — TELEPHONE ENCOUNTER
----- Message from Oanh Castaneda sent at 6/25/2018  4:12 PM CDT -----  Contact: self  Call place to POD    Patient stating bandages have came off and he has dissolvable stitches   Patient needing to speak with nurse Patient had procedure done 6/22    Please call [phone]

## 2018-06-27 ENCOUNTER — TELEPHONE (OUTPATIENT)
Dept: SURGERY | Facility: CLINIC | Age: 68
End: 2018-06-27

## 2018-06-27 NOTE — TELEPHONE ENCOUNTER
----- Message from Letty Ziegler sent at 6/27/2018  3:52 PM CDT -----  Test results.  Please call patient at [phone].

## 2018-06-27 NOTE — TELEPHONE ENCOUNTER
I spoke with patient, notified the report came back as a basal cell carcinoma.  He will need re-excision of that area but Dr Bernard would not be able to do it.  Dr Bernard will find out who he should see and I will call him back.

## 2018-06-28 ENCOUNTER — TELEPHONE (OUTPATIENT)
Dept: SURGERY | Facility: CLINIC | Age: 68
End: 2018-06-28

## 2018-06-28 NOTE — TELEPHONE ENCOUNTER
Patient was notified twice yesterday that Dr Bernard would not be able to do the procedure needed and I will call back.

## 2018-06-28 NOTE — TELEPHONE ENCOUNTER
----- Message from Kymberly De Paz sent at 6/28/2018  8:42 AM CDT -----  Contact: Patient  Type: Needs Medical Advice    Who Called:  Hesham patient   Symptoms (please be specific):  N/A  How long has patient had these symptoms:  N/A  Pharmacy name and phone #:  N/A  Best Call Back Number: [phone]  Additional Information: Patient only wants to speak with the nurse, states it is important but did not want to go into detail. Placed call to office, no answer. Please advise. Thanks.

## 2018-06-28 NOTE — TELEPHONE ENCOUNTER
Requesting path report and doctors note be mailed to his home.  Will put in the mail this morning.

## 2018-06-28 NOTE — TELEPHONE ENCOUNTER
----- Message from Anushka Saldivar sent at 6/28/2018  7:30 AM CDT -----  Contact: self                                  attn:  Bianca  Patient [phone] is calling to schedule another procedure to have more tissue removed as what he had removed was cancerous per patient/please call patient this morning per patient

## 2018-06-29 ENCOUNTER — TELEPHONE (OUTPATIENT)
Dept: FAMILY MEDICINE | Facility: CLINIC | Age: 68
End: 2018-06-29

## 2018-06-29 DIAGNOSIS — C44.91 BASAL CELL CARCINOMA, UNSPECIFIED SITE: Primary | ICD-10-CM

## 2018-06-29 NOTE — TELEPHONE ENCOUNTER
Please see attached message from patient. Patient stating he had 2 cancerous tumors removed from his neck by Dr. Bernard. States he would like a personal phone call from Dr. Ramos. Patient states he is very upset and would appreciate it if Dr. Ramos is to discuss this matter and also recommendation of potential oncologist. Please be advised.

## 2018-06-29 NOTE — TELEPHONE ENCOUNTER
----- Message from Levar Cox sent at 6/29/2018  1:38 PM CDT -----  Type: Needs Medical Advice    Who Called:  Self   Symptoms (please be specific):  NA  How long has patient had these symptoms:  GRAHAM  Pharmacy name and phone #:  [facility]  Best Call Back Number: [phone]  Additional Information: Patient states he had surgery performed by Dr Bernard, the tumor is cancerous. Patient  asking for recommendation to see hematologist/oncologist. Call was placed to the pod.

## 2018-06-29 NOTE — TELEPHONE ENCOUNTER
----- Message from Lima Bush sent at 6/29/2018  3:23 PM CDT -----  Type: Needs Medical Advice    Who Called:  patient  Symptoms (please be specific):  Candy  How long has patient had these symptoms: Candy  Pharmacy name and phone #:  [facility]  Best Call Back Number:[phone] (home)     Additional Information: Saw Dr. Bernard on 6/22 and he took out 2 spots that were cancerous in his head, advised to not continue with anymore and wanted to speak to office regarding

## 2018-07-02 ENCOUNTER — TELEPHONE (OUTPATIENT)
Dept: SURGERY | Facility: CLINIC | Age: 68
End: 2018-07-02

## 2018-07-02 ENCOUNTER — TELEPHONE (OUTPATIENT)
Dept: FAMILY MEDICINE | Facility: CLINIC | Age: 68
End: 2018-07-02

## 2018-07-02 DIAGNOSIS — J44.1 COPD EXACERBATION: ICD-10-CM

## 2018-07-02 DIAGNOSIS — J96.11 CHRONIC RESPIRATORY FAILURE WITH HYPOXIA: ICD-10-CM

## 2018-07-02 RX ORDER — PREDNISONE 20 MG/1
20 TABLET ORAL 2 TIMES DAILY
Qty: 10 TABLET | Refills: 0 | Status: SHIPPED | OUTPATIENT
Start: 2018-07-02 | End: 2018-07-11 | Stop reason: SDUPTHER

## 2018-07-02 NOTE — TELEPHONE ENCOUNTER
Please see attached message from Dr. Ramos. Patient notified. Verbalized understanding. Patient notified a message will be sent to Dr. Barton's staff to request patient with scheduling appointment.

## 2018-07-02 NOTE — TELEPHONE ENCOUNTER
----- Message from Anushka Saldivar sent at 7/2/2018 11:05 AM CDT -----  Contact: self  Patient [phone] is calling to speak with Nurse Bianca as he is saying that he needs some more work on his jaw/please call

## 2018-07-02 NOTE — TELEPHONE ENCOUNTER
----- Message from Ellen Zhou sent at 7/2/2018  8:09 AM CDT -----  Contact: Self  Patient is requesting to speak to the nurse about getting an ultrasound and xray done     Please call back [phone]

## 2018-07-02 NOTE — TELEPHONE ENCOUNTER
----- Message from Kymberly De Paz sent at 7/2/2018  9:31 AM CDT -----  Contact: Patient  Hesham, patient [phone] calling because he had a surgery with Dr. Bernard 2 weeks ago to have tumors removed and he would like to know if Dr. Ramos would put orders in for an xray to have everything looked at. Please advise. Thanks.

## 2018-07-02 NOTE — TELEPHONE ENCOUNTER
I have spoken with him regarding Basal Cell carcinoma.He will require Dr Abarca service. [facility]. He is the best with skin cancer.

## 2018-07-02 NOTE — TELEPHONE ENCOUNTER
----- Message from Zaida Teixeira sent at 7/2/2018 10:43 AM CDT -----  Contact: self  Type: Needs Medical Advice    Who Called:  self  Best Call Back Number: [phone]  Additional Information: Patient would like to speak to Courtney. Thanks!

## 2018-07-03 ENCOUNTER — TELEPHONE (OUTPATIENT)
Dept: DERMATOLOGY | Facility: CLINIC | Age: 68
End: 2018-07-03

## 2018-07-03 ENCOUNTER — LAB VISIT (OUTPATIENT)
Dept: LAB | Facility: HOSPITAL | Age: 68
End: 2018-07-03
Attending: UROLOGY
Payer: MEDICARE

## 2018-07-03 DIAGNOSIS — N32.81 OAB (OVERACTIVE BLADDER): ICD-10-CM

## 2018-07-03 DIAGNOSIS — N40.0 BENIGN NON-NODULAR PROSTATIC HYPERPLASIA WITHOUT LOWER URINARY TRACT SYMPTOMS: ICD-10-CM

## 2018-07-03 DIAGNOSIS — Z12.5 ENCOUNTER FOR SCREENING FOR MALIGNANT NEOPLASM OF PROSTATE: ICD-10-CM

## 2018-07-03 DIAGNOSIS — N39.0 RECURRENT UTI: ICD-10-CM

## 2018-07-03 DIAGNOSIS — N40.0 BENIGN PROSTATIC HYPERPLASIA, UNSPECIFIED WHETHER LOWER URINARY TRACT SYMPTOMS PRESENT: ICD-10-CM

## 2018-07-03 DIAGNOSIS — R31.0 GROSS HEMATURIA: ICD-10-CM

## 2018-07-03 LAB — COMPLEXED PSA SERPL-MCNC: 0.6 NG/ML

## 2018-07-03 PROCEDURE — 36415 COLL VENOUS BLD VENIPUNCTURE: CPT | Mod: PO

## 2018-07-03 PROCEDURE — 84153 ASSAY OF PSA TOTAL: CPT

## 2018-07-03 NOTE — TELEPHONE ENCOUNTER
----- Message from Gregory Earl sent at 7/3/2018 10:24 AM CDT -----  Contact: Patient   Needs Advice    Reason for call: Pt referred for basal cell carcinoma, requesting to speak to staff regarding consultation      Communication Preference:phone# [phone]  Additional Information:

## 2018-07-03 NOTE — TELEPHONE ENCOUNTER
I spoke with patient, advised that Dr Bernard is recommends he see Dr Jonas Valle regarding the basal cell from his chin.  Patient states Dr Ramos recommends Dr Penny.  He said he is going to try the doctor recommended by Dr Ramos and if it doesn't work out I gave him Dr Valle's number and address. He will call me back if needed.

## 2018-07-03 NOTE — TELEPHONE ENCOUNTER
7-3-18 Called patient with path report and he is scheduled for a consult with Dr. Barton on 7-18-18

## 2018-07-05 ENCOUNTER — TELEPHONE (OUTPATIENT)
Dept: DERMATOLOGY | Facility: CLINIC | Age: 68
End: 2018-07-05

## 2018-07-05 NOTE — TELEPHONE ENCOUNTER
7-5-18 returned patient call and gave is directions on how to get to the office for his appointment on 7-18-18

## 2018-07-06 ENCOUNTER — TELEPHONE (OUTPATIENT)
Dept: FAMILY MEDICINE | Facility: CLINIC | Age: 68
End: 2018-07-06

## 2018-07-06 DIAGNOSIS — J44.1 ACUTE EXACERBATION OF CHRONIC OBSTRUCTIVE PULMONARY DISEASE (COPD): ICD-10-CM

## 2018-07-06 RX ORDER — BROMPHENIRAMINE MALEATE, PSEUDOEPHEDRINE HYDROCHLORIDE, AND DEXTROMETHORPHAN HYDROBROMIDE 2; 30; 10 MG/5ML; MG/5ML; MG/5ML
SYRUP ORAL
Qty: 240 ML | Refills: 0 | Status: SHIPPED | OUTPATIENT
Start: 2018-07-06 | End: 2018-08-01

## 2018-07-06 NOTE — TELEPHONE ENCOUNTER
----- Message from Esteban Rodriguez sent at 7/6/2018 11:57 AM CDT -----  Contact: Patient  Hesham, [phone]. Calling in regards to refill request for his cough syrup sent by [facility]. Would like a call when it's sent. Says the medicine he had was making him sick. Please advise. Thanks.    [facility]  [address] [place] [address]  Phone: [phone] Fax: [phone]

## 2018-07-06 NOTE — TELEPHONE ENCOUNTER
Call placed to [facility] related to attached message. Advised patient is requesting refill of cough rx. States an electronic request was sent to Dr. Ramos on today's date. Writer advised pharmacy MD in clinic, will review asap.

## 2018-07-06 NOTE — TELEPHONE ENCOUNTER
----- Message from Jovana Banks sent at 7/6/2018  1:22 PM CDT -----  Pt requesting a refill for a cough syrup (has a constant cough) ..call pt at [phone] (home)

## 2018-07-06 NOTE — TELEPHONE ENCOUNTER
----- Message from Regina Taveras sent at 7/6/2018  1:28 PM CDT -----  [facility]  Pharmacy    Calling  About a  [facility]   Cough   Syrup // [facility]  For  Details [phone]

## 2018-07-08 DIAGNOSIS — J44.1 COPD WITH ACUTE EXACERBATION: ICD-10-CM

## 2018-07-09 ENCOUNTER — TELEPHONE (OUTPATIENT)
Dept: FAMILY MEDICINE | Facility: CLINIC | Age: 68
End: 2018-07-09

## 2018-07-09 RX ORDER — SPIRONOLACTONE 25 MG/1
TABLET ORAL
Qty: 90 TABLET | Refills: 0 | Status: SHIPPED | OUTPATIENT
Start: 2018-07-09 | End: 2018-08-09 | Stop reason: SDUPTHER

## 2018-07-09 NOTE — TELEPHONE ENCOUNTER
Spoke to patient who states he wanted to review Dr. Ramos's order for Prednisone. Please refer to encounter dated 7-2-18 from Dr. Ramos. Writer reviewed order written for Prednisone. Patient verbalized understanding.

## 2018-07-09 NOTE — TELEPHONE ENCOUNTER
----- Message from Yamile Charles sent at 7/9/2018  9:47 AM CDT -----  Contact: pt  Pt calling states he needs to speak to the nurse regarding Dr Randle told him to take predniSONE (DELTASONE)  tablet take 2 - 10 mg once in the morning and he needs some clarification ,please cause Dr Ramos wanted him to Take 1 tablet (20 mg total) by mouth 2 (two) times daily...[phone] (home)

## 2018-07-10 RX ORDER — FLUTICASONE PROPIONATE AND SALMETEROL 50; 500 UG/1; UG/1
POWDER RESPIRATORY (INHALATION)
Qty: 180 EACH | Refills: 3 | Status: SHIPPED | OUTPATIENT
Start: 2018-07-10 | End: 2018-07-11 | Stop reason: SDUPTHER

## 2018-07-11 ENCOUNTER — OFFICE VISIT (OUTPATIENT)
Dept: PULMONOLOGY | Facility: CLINIC | Age: 68
End: 2018-07-11
Payer: MEDICARE

## 2018-07-11 ENCOUNTER — TELEPHONE (OUTPATIENT)
Dept: PULMONOLOGY | Facility: CLINIC | Age: 68
End: 2018-07-11

## 2018-07-11 VITALS
WEIGHT: 199.94 LBS | DIASTOLIC BLOOD PRESSURE: 75 MMHG | HEIGHT: 67 IN | OXYGEN SATURATION: 94 % | HEART RATE: 85 BPM | BODY MASS INDEX: 31.38 KG/M2 | SYSTOLIC BLOOD PRESSURE: 134 MMHG

## 2018-07-11 DIAGNOSIS — J96.11 CHRONIC RESPIRATORY FAILURE WITH HYPOXIA: ICD-10-CM

## 2018-07-11 DIAGNOSIS — J44.9 COPD MIXED TYPE: ICD-10-CM

## 2018-07-11 DIAGNOSIS — J41.8 MIXED SIMPLE AND MUCOPURULENT CHRONIC BRONCHITIS: Chronic | ICD-10-CM

## 2018-07-11 DIAGNOSIS — Z99.81 OXYGEN DEPENDENT: ICD-10-CM

## 2018-07-11 DIAGNOSIS — Z92.241 STEROID-DEPENDENT COPD: Primary | ICD-10-CM

## 2018-07-11 DIAGNOSIS — J44.9 STEROID-DEPENDENT COPD: Primary | ICD-10-CM

## 2018-07-11 PROCEDURE — 99999 PR PBB SHADOW E&M-EST. PATIENT-LVL IV: CPT | Mod: PBBFAC,,, | Performed by: INTERNAL MEDICINE

## 2018-07-11 PROCEDURE — 99214 OFFICE O/P EST MOD 30 MIN: CPT | Mod: PBBFAC,PO | Performed by: INTERNAL MEDICINE

## 2018-07-11 PROCEDURE — 99214 OFFICE O/P EST MOD 30 MIN: CPT | Mod: S$PBB,,, | Performed by: INTERNAL MEDICINE

## 2018-07-11 RX ORDER — PREDNISONE 10 MG/1
10 TABLET ORAL DAILY
Qty: 30 TABLET | Refills: 11 | Status: SHIPPED | OUTPATIENT
Start: 2018-07-11 | End: 2018-11-01 | Stop reason: SDUPTHER

## 2018-07-11 RX ORDER — AMOXICILLIN AND CLAVULANATE POTASSIUM 875; 125 MG/1; MG/1
1 TABLET, FILM COATED ORAL 2 TIMES DAILY
Qty: 20 TABLET | Refills: 3 | Status: SHIPPED | OUTPATIENT
Start: 2018-07-11 | End: 2018-08-01 | Stop reason: ALTCHOICE

## 2018-07-11 RX ORDER — FLUTICASONE PROPIONATE AND SALMETEROL 500; 50 UG/1; UG/1
1 POWDER RESPIRATORY (INHALATION) 2 TIMES DAILY
Qty: 180 EACH | Refills: 3 | Status: SHIPPED | OUTPATIENT
Start: 2018-07-11 | End: 2018-07-13 | Stop reason: SDUPTHER

## 2018-07-11 NOTE — LETTER
[facility]  [address]  [place] [address]  Phone: [phone]  Fax: [phone]   2018      To Whom It May Concern:    Re: Heshamkelly JoKareem,  54    Mr Serna has very severe copd and chronic hypoxia respiratory failure and nerve disorder and chronic pain/narcotic use and severe back problems-- pt should have home health nurse check 3 times weekly.      Thank You,       Kareem Randle MD

## 2018-07-11 NOTE — PROGRESS NOTES
"7/11/2018    Hesham Serna       Chief Complaint   Patient presents with    Follow-up       HPI:   July 11, 2018- pt relates has cancer below right jaw- would not say who dx dz but needs to go to [facility] to follow.  Pt wearing his chronic back brace.  Pt has been on 10 mg daily last yrs- advair out but renewed.  Pt bipolar and seems compensated somewhat.  Still smokes minimally. Mom doing poorly with freq fall/obs.  Has stumble in office with near fall.  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  Still smokes 3-5 daily, lives alone, gets by frozen dinners.  In [place]. Still drives.  Has chr back brace,  Sleeps with ox.    On prednisone over 10 yrs, has bad back after fall in tub washing dog.  No osteoporosis rx. Mom with lung dz at 88, pt with bad lungs since chemical spills- stopped working age 57.      Chr sinus drip and and poor sense smell, no asa use,  No cough or wheeze noted,  Had pft with Dr Judge past.       The chief compliant  problem is new to me",   PFSH:  Past Medical History:   Diagnosis Date    Allergy     Arthritis     Asthma     Cancer     skin DR Isrrael Yun    COPD (chronic obstructive pulmonary disease)     Degenerative disc disease     Depression     bipolar dis    Fracture of vertebra due to osteoporosis     Hepatitis C     Hypertension     Liver disease     Nasal bone fx-closed     Nephrolith     Rosacea     Skin cancer          Past Surgical History:   Procedure Laterality Date    jaw surgey      KNEE SURGERY      NECK SURGERY      8 procedures on neck    RHIZOTOMY W/ RADIOFREQUENCY ABLATION      TONSILLECTOMY      tumors      10 removed from scalp     Social History   Substance Use Topics    Smoking status: Current Every Day Smoker     Packs/day: 0.50     Years: 30.00     Types: Cigarettes    Smokeless tobacco: Never Used      Comment: down to 6 a day    Alcohol use No     Family History   Problem Relation Age of " "Onset    Heart disease Father     No Known Problems Mother     Cancer Paternal Uncle         prostate    Cancer Paternal Aunt         breast    Eczema Neg Hx     Lupus Neg Hx     Psoriasis Neg Hx     Melanoma Neg Hx     Glaucoma Neg Hx      Review of patient's allergies indicates:   Allergen Reactions    Codeine Other (See Comments)    Morphine      Pt denies this 1-10-13     Bactrim [sulfamethoxazole-trimethoprim] Other (See Comments)     Pt cannot take with Methadone    Ciprofloxacin Other (See Comments)     Patient cannot take with methadone       Performance Status:The patient's activity level is housebound activities.      Review of Systems:  a review of eleven systems covering constitutional, Eye, HEENT, Psych, Respiratory, Cardiac, GI, , Musculoskeletal, Endocrine, Dermatologic was negative except for pertinent findings as listed ABOVE and below:   pertinent positive as above, rest is good  No liver dz.  Chr back pain - h/o cirrhosis,    Exam:Comprehensive exam done. /75 (BP Location: Left arm, Patient Position: Sitting)   Pulse 85   Ht 5' 7" (1.702 m)   Wt 90.7 kg (199 lb 15.3 oz)   SpO2 (!) 94%   BMI 31.32 kg/m²   Exam included Vitals as listed, and patient's appearance and affect and alertness and mood, oral exam for yeast and hygiene and pharynx lesions and Mallapatti (M) score, neck with inspection for jvd and masses and thyroid abnormalities and lymph nodes (supraclavicular and infraclavicular nodes and axillary also examined and noted if abn), chest exam included symmetry and effort and fremitus and percussion and auscultation, cardiac exam included rhythm and gallops and murmur and rubs and jvd and edema, abdominal exam for mass and hepatosplenomegaly and tenderness and hernias and bowel sounds, Musculoskeletal exam with muscle tone and posture and mobility/gait and  strength, and skin for rashes and cyanosis and pallor and turgor, extremity for clubbing.  Findings were " normal except for pertinent findings listed below:   M3, dry mouth, chest is symmetric, no distress, normal percussion, normal fremitus and good normal breath sounds- decrease bs, no edema, clubbing-      Radiographs (ct chest and cxr) reviewed: view by direct vision  Ct spine viewed- Jan 2018 nad poor study    Labs reviewed   PFT fev1 56% in feb 2017 with high rv.         Plan:  Clinical impression is apparently straight forward and impression with management as below.    Hesham was seen today for follow-up.    Diagnoses and all orders for this visit:    Steroid-dependent COPD  -     predniSONE (DELTASONE) 10 MG tablet; Take 1 tablet (10 mg total) by mouth once daily.  -     amoxicillin-clavulanate 875-125mg (AUGMENTIN) 875-125 mg per tablet; Take 1 tablet by mouth 2 (two) times daily.  -     fluticasone-salmeterol 500-50 mcg/dose (ADVAIR DISKUS) 500-50 mcg/dose DsDv diskus inhaler; Inhale 1 puff into the lungs 2 (two) times daily. Controller    Oxygen dependent  -     predniSONE (DELTASONE) 10 MG tablet; Take 1 tablet (10 mg total) by mouth once daily.  -     amoxicillin-clavulanate 875-125mg (AUGMENTIN) 875-125 mg per tablet; Take 1 tablet by mouth 2 (two) times daily.  -     fluticasone-salmeterol 500-50 mcg/dose (ADVAIR DISKUS) 500-50 mcg/dose DsDv diskus inhaler; Inhale 1 puff into the lungs 2 (two) times daily. Controller    Chronic respiratory failure with hypoxia  -     predniSONE (DELTASONE) 10 MG tablet; Take 1 tablet (10 mg total) by mouth once daily.  -     amoxicillin-clavulanate 875-125mg (AUGMENTIN) 875-125 mg per tablet; Take 1 tablet by mouth 2 (two) times daily.  -     fluticasone-salmeterol 500-50 mcg/dose (ADVAIR DISKUS) 500-50 mcg/dose DsDv diskus inhaler; Inhale 1 puff into the lungs 2 (two) times daily. Controller    Mixed simple and mucopurulent chronic bronchitis  -     predniSONE (DELTASONE) 10 MG tablet; Take 1 tablet (10 mg total) by mouth once daily.  -     amoxicillin-clavulanate  875-125mg (AUGMENTIN) 875-125 mg per tablet; Take 1 tablet by mouth 2 (two) times daily.  -     fluticasone-salmeterol 500-50 mcg/dose (ADVAIR DISKUS) 500-50 mcg/dose DsDv diskus inhaler; Inhale 1 puff into the lungs 2 (two) times daily. Controller    COPD mixed type  -     predniSONE (DELTASONE) 10 MG tablet; Take 1 tablet (10 mg total) by mouth once daily.  -     amoxicillin-clavulanate 875-125mg (AUGMENTIN) 875-125 mg per tablet; Take 1 tablet by mouth 2 (two) times daily.  -     fluticasone-salmeterol 500-50 mcg/dose (ADVAIR DISKUS) 500-50 mcg/dose DsDv diskus inhaler; Inhale 1 puff into the lungs 2 (two) times daily. Controller        Follow-up in about 3 months (around 10/11/2018), or if symptoms worsen or fail to improve.    Discussed with patient above for education the following:      Patient Instructions   Use amoxil/clav for yellow mucous    Use prednisone 10 mg daily     Continue advair and spiriva    See letter for home health.

## 2018-07-11 NOTE — PATIENT INSTRUCTIONS
Use amoxil/clav for yellow mucous    Use prednisone 10 mg daily     Continue advair and spiriva    See letter for home health.

## 2018-07-11 NOTE — TELEPHONE ENCOUNTER
Spoke with Kadi at [facility] and they will not be seeing the patient 3 times a week but will see him for a few more visits because of his medication changes. Kadi also stated that she has explained this to the patient.    ----- Message from Gian Cardoos sent at 7/11/2018 12:59 PM CDT -----  Contact: Kadi/[facility]   Kadi called in regarding the attached patient and stated she needs some clarification on a note patient gave her that was from Dr. Randle.    Kadi's call back number is [phone]

## 2018-07-12 ENCOUNTER — TELEPHONE (OUTPATIENT)
Dept: FAMILY MEDICINE | Facility: CLINIC | Age: 68
End: 2018-07-12

## 2018-07-12 ENCOUNTER — TELEPHONE (OUTPATIENT)
Dept: DERMATOLOGY | Facility: CLINIC | Age: 68
End: 2018-07-12

## 2018-07-12 DIAGNOSIS — M50.30 DDD (DEGENERATIVE DISC DISEASE), CERVICAL: Chronic | ICD-10-CM

## 2018-07-12 RX ORDER — LUBIPROSTONE 8 UG/1
CAPSULE, GELATIN COATED ORAL
Qty: 180 CAPSULE | Refills: 0 | Status: SHIPPED | OUTPATIENT
Start: 2018-07-12 | End: 2018-10-24 | Stop reason: SDUPTHER

## 2018-07-12 NOTE — TELEPHONE ENCOUNTER
----- Message from Levar Cox sent at 7/12/2018 11:37 AM CDT -----  Type:  Sooner Apoointment Request    Caller is requesting a sooner appointment.  Caller declined first available appointment listed below.  Caller will not accept being placed on the waitlist and is requesting a message be sent to doctor.    Name of Caller: self   When is the first available appointment?  NA   Symptoms:  NA   Best Call Back Number:  [phone]  Additional Information:  Patient asking to come in at an earlier time  on 08/09/2018

## 2018-07-12 NOTE — TELEPHONE ENCOUNTER
----- Message from Anushka Cox sent at 7/12/2018  8:17 AM CDT -----  Contact: patient  Please call above patient need to speak with the nurse

## 2018-07-13 DIAGNOSIS — Z92.241 STEROID-DEPENDENT COPD: ICD-10-CM

## 2018-07-13 DIAGNOSIS — J43.9 PULMONARY EMPHYSEMA, UNSPECIFIED EMPHYSEMA TYPE: ICD-10-CM

## 2018-07-13 DIAGNOSIS — J44.9 COPD MIXED TYPE: ICD-10-CM

## 2018-07-13 DIAGNOSIS — J44.9 STEROID-DEPENDENT COPD: ICD-10-CM

## 2018-07-13 DIAGNOSIS — Z99.81 OXYGEN DEPENDENT: ICD-10-CM

## 2018-07-13 DIAGNOSIS — J41.8 MIXED SIMPLE AND MUCOPURULENT CHRONIC BRONCHITIS: Chronic | ICD-10-CM

## 2018-07-13 DIAGNOSIS — J96.11 CHRONIC RESPIRATORY FAILURE WITH HYPOXIA: ICD-10-CM

## 2018-07-13 RX ORDER — FLUTICASONE PROPIONATE AND SALMETEROL 500; 50 UG/1; UG/1
1 POWDER RESPIRATORY (INHALATION) 2 TIMES DAILY
Qty: 180 EACH | Refills: 3 | Status: SHIPPED | OUTPATIENT
Start: 2018-07-13 | End: 2018-08-16 | Stop reason: SDUPTHER

## 2018-07-13 RX ORDER — TIOTROPIUM BROMIDE 18 UG/1
CAPSULE ORAL; RESPIRATORY (INHALATION)
Qty: 90 CAPSULE | Refills: 3 | Status: SHIPPED | OUTPATIENT
Start: 2018-07-13 | End: 2018-11-01 | Stop reason: SDUPTHER

## 2018-07-13 NOTE — TELEPHONE ENCOUNTER
Tiotropium Inhaler--LR--4-13-18  Fluticasone--LR--9-20-17  LOV--5-24-18  FOV--8-9-18      Amitiza sent to pharmacy on 7-12-18. Celecoxib last refilled on 5-16-18 with 3 refills--not due.                   ----- Message from José Kwon sent at 7/13/2018  8:18 AM CDT -----  Contact: Pt  Type:  RX Refill Request    Who Called:  Pt  Refill or New Rx:  Refil  RX Name and Strength: AMITIZA 8 mcg Cap and celecoxib (CELEBREX) 200 MG capsule and tiotropium (SPIRIVA WITH HANDIHALER) 18 mcg inhalation capsule and  fluticasone (FLONASE) 50 mcg/actuation nasal spray   How is the patient currently taking it? (ex. 1XDay):    Is this a 30 day or 90 day RX:    Preferred Pharmacy with phone number:    [facility] [address] [place] [address] AT [facility] [address]  [address] [place] [address]  Phone: [phone] Fax: [phone]    Best Call Back Number:  [phone]       Pt requests a call back to know when he is able to go  his meds.

## 2018-07-16 ENCOUNTER — TELEPHONE (OUTPATIENT)
Dept: UROLOGY | Facility: CLINIC | Age: 68
End: 2018-07-16

## 2018-07-16 DIAGNOSIS — N40.0 BENIGN NON-NODULAR PROSTATIC HYPERPLASIA WITHOUT LOWER URINARY TRACT SYMPTOMS: ICD-10-CM

## 2018-07-16 RX ORDER — SILODOSIN 8 MG/1
8 CAPSULE ORAL DAILY
Qty: 90 CAPSULE | Refills: 3 | Status: SHIPPED | OUTPATIENT
Start: 2018-07-16 | End: 2018-09-17 | Stop reason: SDUPTHER

## 2018-07-16 NOTE — TELEPHONE ENCOUNTER
Spoke with patient medication sent to her pharmacy and recommendations. Patient verbally voiced understanding.

## 2018-07-16 NOTE — TELEPHONE ENCOUNTER
Spoke with patient informed him of normal psa results. Results also mailed to patient. Patient also requesting refill on myrbetriq and rapaflo. Please advise

## 2018-07-16 NOTE — TELEPHONE ENCOUNTER
----- Message from Indu Whitt sent at 7/16/2018 12:34 PM CDT -----  Contact: Patient  Type:  Test Results    Who Called:  Patient   Name of Test (Lab/Mammo/Etc):  Lab work  Date of Test:  7/3  Ordering Provider:  Estrellita  Where the test was performed:    Best Call Back Number:    Additional Information:

## 2018-07-16 NOTE — TELEPHONE ENCOUNTER
Let him know he has both written for a year, refilled again today  Can increase his risk of worsening of his liver dz but he's been on it for while and unchanged. Most recent liver function NORMAL  Recommend he continues the meds

## 2018-07-17 ENCOUNTER — TELEPHONE (OUTPATIENT)
Dept: SURGERY | Facility: CLINIC | Age: 68
End: 2018-07-17

## 2018-07-17 NOTE — TELEPHONE ENCOUNTER
----- Message from Sophia Martinez sent at 7/17/2018  9:10 AM CDT -----  Contact: [facility], Kadi Wallis Want to know if you can fax patient medical records to [phone], any questions please call back at [phone]

## 2018-07-18 ENCOUNTER — INITIAL CONSULT (OUTPATIENT)
Dept: DERMATOLOGY | Facility: CLINIC | Age: 68
End: 2018-07-18
Payer: MEDICARE

## 2018-07-18 DIAGNOSIS — C44.41 BASAL CELL CARCINOMA OF NECK: Primary | ICD-10-CM

## 2018-07-18 PROCEDURE — 99212 OFFICE O/P EST SF 10 MIN: CPT | Mod: PBBFAC,PO | Performed by: DERMATOLOGY

## 2018-07-18 PROCEDURE — 99999 PR PBB SHADOW E&M-EST. PATIENT-LVL II: CPT | Mod: PBBFAC,,, | Performed by: DERMATOLOGY

## 2018-07-18 PROCEDURE — 99214 OFFICE O/P EST MOD 30 MIN: CPT | Mod: S$PBB,,, | Performed by: DERMATOLOGY

## 2018-07-18 NOTE — LETTER
July 19, 2018      Hesham Bernard MD  [address]  [facility] [address]           [facility]  [address] [facility] [address] [place] [address]  Phone: [phone]          Patient: Hesham Serna   MR Number: [id number]   YOB: 1950   Date of Visit: 7/18/2018       Dear Dr. Hesham Bernard:    Thank you for referring Hesham Serna to me for evaluation. Attached you will find relevant portions of my assessment and plan of care.    If you have questions, please do not hesitate to call me. I look forward to following Hesham Serna along with you.    Sincerely,    Waldo Barton MD    Enclosure  CC:  No Recipients    If you would like to receive this communication electronically, please contact [email or web address] or [phone] to request more information on [employer] access.    For providers and/or their staff who would like to refer a patient to [facility], please contact us through our one-stop-shop provider referral line, [facility] , at [phone].    If you feel you have received this communication in error or would no longer like to receive these types of communications, please e-mail [email or web address]

## 2018-07-18 NOTE — PROGRESS NOTES
ALLERGIES:  Codeine; Morphine; Bactrim [sulfamethoxazole-trimethoprim]; and Ciprofloxacin    CHIEF COMPLAINT:  This 67 y.o. male comes for evaluation for Mohs' Micrographic Surgery, Fresh Tissue Technique, for treatment of a biopsy-proven basal cell carcinoma on the Chin. Consultation requested by Hesham Bernard MD    HISTORY OF PRESENT ILLNESS:   Location: Chin  Duration: Patient not aware  Quality: persistent  Context: status post excision by Hesham Bernard M.D.; path = basal cell carcinoma; pathology accession #[id number], [facility]    See also the handwritten notes/diagrams scanned to chart for additional details.    Defibrillator: No  Pacemaker: No  Artificial heart valves: No  Artificial joints: No    REVIEW OF SYSTEMS:   General: general health fair  Skin: has previous history of skin cancer(s); prior Mohs surgery right middle finger tip  Prior history of excision of multiple lesions on his face/scalp; these reportedly have been benign  CV: has hypertension, no artificial valves, has no chest pain  Resp: has no shortness of breath; on 24 hour O2 via nasal cannula  Endo: has no diabetes  Hem/Lymph: not taking prescribed anticoagulants-, has easy bruising/bleeding  Allergy/Immuno: has  allergies as noted above  GI: has Hepatitis C  MS: history of vertebral fracture; chronic pain    PAST MEDICAL HISTORY:  Past Medical History:   Diagnosis Date    Allergy     Arthritis     Asthma     Cancer     skin DR Isrrael Yun    COPD (chronic obstructive pulmonary disease)     Degenerative disc disease     Depression     bipolar dis    Fracture of vertebra due to osteoporosis     Hepatitis C     Hypertension     Liver disease     Nasal bone fx-closed     Nephrolith     Rosacea     Skin cancer        PAST SURGICAL HISTORY:  Past Surgical History:   Procedure Laterality Date    jaw surgey      KNEE SURGERY      NECK SURGERY      8 procedures on neck    RHIZOTOMY W/ RADIOFREQUENCY ABLATION      TONSILLECTOMY       tumors      10 removed from scalp        SOCIAL HISTORY:  Dependencies: smoking status as noted below  Social History   Substance Use Topics    Smoking status: Current Every Day Smoker     Packs/day: 0.50     Years: 30.00     Types: Cigarettes    Smokeless tobacco: Never Used      Comment: down to 6 a day    Alcohol use No       PERTINENT MEDICATIONS:  See medications list.    Current Outpatient Prescriptions:     albuterol 90 mcg/actuation inhaler, Inhale 2 puffs into the lungs every 6 (six) hours as needed for Wheezing., Disp: 1 each, Rfl: 5    albuterol-ipratropium 2.5mg-0.5mg/3mL (DUO-NEB) 0.5 mg-3 mg(2.5 mg base)/3 mL nebulizer solution, Take 3 mLs by nebulization every 6 (six) hours while awake., Disp: 180 mL, Rfl: 3    AMITIZA 8 mcg Cap, TAKE 1 CAPSULE(8 MCG) BY MOUTH TWICE DAILY, Disp: 180 capsule, Rfl: 0    amoxicillin-clavulanate 875-125mg (AUGMENTIN) 875-125 mg per tablet, Take 1 tablet by mouth 2 (two) times daily., Disp: 20 tablet, Rfl: 3    celecoxib (CELEBREX) 200 MG capsule, Take 1 capsule (200 mg total) by mouth daily as needed for Pain., Disp: 45 capsule, Rfl: 3    clonazePAM (KLONOPIN) 2 MG Tab, TAKE 1 TABLET (2 MG TOTAL) BY MOUTH 3 (THREE) TIMES DAILY AS NEEDED., Disp: 90 tablet, Rfl: 2    escitalopram oxalate (LEXAPRO) 5 MG Tab, Take 1 tablet (5 mg total) by mouth once daily., Disp: 30 tablet, Rfl: 1    fentaNYL (SUBSYS) 200 mcg/spray Spry, Place 200 mcg under the tongue 2 (two) times daily as needed., Disp: , Rfl:     fluocinonide 0.05% (LIDEX) 0.05 % cream, , Disp: , Rfl:     fluticasone (FLONASE) 50 mcg/actuation nasal spray, SNIFF 1 SPRAY INTO EACH NOSTRIL ONCE DAILY, Disp: 16 g, Rfl: 3    fluticasone-salmeterol 500-50 mcg/dose (ADVAIR DISKUS) 500-50 mcg/dose DsDv diskus inhaler, Inhale 1 puff into the lungs 2 (two) times daily. Controller, Disp: 180 each, Rfl: 3    lactulose (CHRONULAC) 10 gram/15 mL solution, TAKE 30 MLS BY MOUTH THREE TIMES DAILY, Disp: 1200 mL, Rfl:  3    methadone (DOLOPHINE) 10 MG tablet, Take 1 tablet (10 mg total) by mouth every 6 (six) hours as needed. Two tablets every morning, two tablets every 12 pm, one-two tablets every evening (Patient taking differently: Take 10 mg by mouth every 8 (eight) hours as needed. ), Disp: , Rfl:     mirabegron (MYRBETRIQ) 50 mg Tb24, Take 1 tablet (50 mg total) by mouth once daily., Disp: 90 tablet, Rfl: 3    oxycodone (ROXICODONE) 15 MG Tab, , Disp: , Rfl:     predniSONE (DELTASONE) 10 MG tablet, Take 1 tablet (10 mg total) by mouth once daily., Disp: 30 tablet, Rfl: 11    silodosin (RAPAFLO) 8 mg Cap capsule, Take 1 capsule (8 mg total) by mouth once daily., Disp: 90 capsule, Rfl: 3    spironolactone (ALDACTONE) 25 MG tablet, TAKE 1 TABLET BY MOUTH EVERY DAY, Disp: 90 tablet, Rfl: 0    tiotropium (SPIRIVA WITH HANDIHALER) 18 mcg inhalation capsule, INHALE THE CONTENTS OF 1 CAPSULE BY MOUTH AS PER HANDIHALER, Disp: 90 capsule, Rfl: 3    ziprasidone (GEODON) 20 MG Cap, Take one capsule PO BID with meals, Disp: 180 capsule, Rfl: 0    brompheniramine-pseudoeph-DM 2-30-10 mg/5 mL Syrp, TAKE 5 ML BY MOUTH FOUR TIMES DAILY AS NEEDED, Disp: 240 mL, Rfl: 0    ALLERGIES:  Codeine; Morphine; Bactrim [sulfamethoxazole-trimethoprim]; and Ciprofloxacin    EXAM:  See also the handwritten notes/diagrams scanned to chart for additional details.  Constitutional  General appearance: well-developed, well-nourished, somewhat disheveled older white male    He is carrying an O2 nasal cannula device  Eyes  Inspection of conjunctivae and lids reveals no abnormalities; sclerae anicteric  Neurologic/Psychiatric  Alert,  normal orientation to time, place, person  Has a somewhat agitated affect; no evidence of depression, anxiety  Skin: see photo(s)  Head: background marked solar damage to exposed areas of skin; he has multiple less than 1.5 cm sessile, flesh-colored growths on his upper face and scalp, which are clinically suggestive of  possible adnexal tumors, such as cylindromas  Neck: examination reveals a healed, 4-5 cm linear scar on his right upper neck in the submental area, as noted in the photo below  Right upper extremity: examination reveals marked chronic solar damage; marked ecchymosis/ecchymoses and dyschromia  Left upper extremity: examination reveals marked chronic solar damage; marked ecchymosis/ecchymoses and dyschromia            ASSESSMENT: biopsy-proven, incompletely-excised basal cell carcinoma of the right submental neck  chronic solar damage to areas as noted above  personal history of non-melanoma skin cancer    PLAN:  The diagnosis and management options, and risks and benefits of the alternatives, including observation/non-treatment, radiation treatment, excision with vertical frozen section or paraffin-embedded section margin evaluation, and Mohs' Micrographic Surgery, Fresh Tissue Technique, were discussed at length with the patient. In particular, the discussion included, but was not limited to, the following:    One alternative at this point would be to defer further treatment and observe the lesion. With small skin cancers of this kind, it is possible that a biopsy can be sufficient to definitively treat a small skin cancer of this kind. Alternatively, some skin cancers are slow growing and do not require immediate treatment. The potential advantage of this choice would be to avoid the need for possibly unnecessary additional surgery. Among the potential disadvantages of this would be the possibility of enlargement of the lesion, more extensive spread of the lesion or recurrence at a later date, which might necessitate a larger and more complex surgery.    Radiation treatment can be an effective treatment for this type of skin cancer. The usual course of treatment is every weekday for several weeks. Local irritation will result from treatment, although no systemic side effects are expected. The potential advantage of  radiation treatment is that it avoids the need for surgery. Among the disadvantages of radiation treatment are the length of treatment, the local inflammatory response, the absence of pathologic confirmation of the removal of the skin cancer, a possible increased risk of additional skin cancer in the treated area in later years, and a somewhat increased risk of recurrence at a later date.     Excisional surgery can be an effective treatment for this type of skin cancer. This would involve excision of the lesion with margin evaluation by submitting the specimen to a pathologist for either immediate marginal assessment via frozen section processing, or delayed marginal assessment by fixed-tissue processing. The potential advantage of this technique is that it offers a way of treating the lesion with some degree of histologic confirmation of tumor removal. Among the disadvantages of this treatment are the possible need for re-excision if marginal involvement is identified, a somewhat greater likelihood of recurrence as compared to Mohs' surgery because of the less comprehensive margin evaluation inherent in the technique, and the general potential risks of surgery, including allergic reactions to the anesthetic and other materials used, infection, injury to nerves in the area with consequent loss of sensation or muscle function, and scarring or distortion of surrounding structures.    Mohs' surgery is a very effective treatment for this type of skin cancer. The potential advantage of Mohs' surgery is that this technique offers the greatest possible certainty of knowing that the skin cancer has been completely removed, with the removal of the least amount of normal tissue. The potential disadvantages of Mohs' surgery include the duration of the surgery, the possible need for a separate surgery for reconstruction following tumor removal, and scarring as a result. In addition, general potential risks of surgery as noted  above also apply to treatment via Mohs' surgery.    In light of the incompletely-excised nature of this tumor,  Mohs' micrographic surgery was thought to be the most appropriate management choice, and this diagnosis is appropriate for treatment by Mohs' micrographic surgery.     Sufficient time was available for questions, and all questions were answered to his satisfaction. He fully understands the aims, risks, alternatives, and possible complications, and has elected to proceed with the surgery, and verbally consented to do so. The procedure will be scheduled in the near future.    Routine pre-op instructions were given to him.    A consultation report will be sent to Dr. Bernard.  --------------------------------------  Note: Some or all of this note may have been generated using voice recognition software. There may be voice recognition errors including grammatical and/or spelling errors found in the text. Attempts were made to correct these errors prior to signature.

## 2018-07-19 ENCOUNTER — TELEPHONE (OUTPATIENT)
Dept: DERMATOLOGY | Facility: CLINIC | Age: 68
End: 2018-07-19

## 2018-07-19 NOTE — TELEPHONE ENCOUNTER
----- Message from Zaida De La Paz sent at 7/19/2018 11:34 AM CDT -----  Contact: pt at [phone]  PWS pt-Rosa- Pt was seen yesterday.  Asking  For you.

## 2018-07-20 ENCOUNTER — TELEPHONE (OUTPATIENT)
Dept: DERMATOLOGY | Facility: CLINIC | Age: 68
End: 2018-07-20

## 2018-07-20 ENCOUNTER — TELEPHONE (OUTPATIENT)
Dept: FAMILY MEDICINE | Facility: CLINIC | Age: 68
End: 2018-07-20

## 2018-07-20 NOTE — TELEPHONE ENCOUNTER
----- Message from Zaida Teixeira sent at 7/20/2018  9:22 AM CDT -----  Contact: self  Type: Needs Medical Advice    Who Called:  self  Best Call Back Number: [phone]  Additional Information: patient needs to speak to Ally regarding his medication. Patient recently diagnosis with cancer. Please call patient. Thanks!

## 2018-07-20 NOTE — TELEPHONE ENCOUNTER
----- Message from Gregory Earl sent at 7/20/2018  9:13 AM CDT -----  Contact: Patient   Patient Returning Call from [facility]    Who Left Message for Patient:Esme  Communication Preference:[phone]  Additional Information:

## 2018-07-20 NOTE — TELEPHONE ENCOUNTER
He has to stop blood thinners . This has been fully explained to the patient, who indicates understanding.

## 2018-07-20 NOTE — TELEPHONE ENCOUNTER
Message forwarded to Dr. Ramos related to this matter earlier today. Awaiting MD response.         ----- Message from RT Alice sent at 7/20/2018  1:35 PM CDT -----  Contact: pt    pt , requesting a call back from Nurse Chappell concerning medication interactions, thanks.

## 2018-07-20 NOTE — TELEPHONE ENCOUNTER
Spoke to patient who states he has Cancer. States he is scheduled to have a procedure on 9-18-18 per Dr. Barton and was instructed to stop taking aspirin 2 weeks prior to the procedure. Patient states he will not be able to take Celecoxib either. Patient requesting to know if he stop taking this medication now in advance of the instructions to stop 2 weeks prior to procedure would he suffer from detoxing from this medication. Please advise.

## 2018-07-23 ENCOUNTER — TELEPHONE (OUTPATIENT)
Dept: DERMATOLOGY | Facility: CLINIC | Age: 68
End: 2018-07-23

## 2018-07-23 NOTE — TELEPHONE ENCOUNTER
----- Message from Zaida Ana Cristina sent at 7/23/2018  8:27 AM CDT -----  Contact: pt at [phone]  PWS pt-Rosa-Pt has appt  And wants to talk to you

## 2018-07-23 NOTE — TELEPHONE ENCOUNTER
7-23-18 Returned patient call and told him that we do not have a sooner date for his surgery and that he could get around after surgery. Rosa

## 2018-07-24 ENCOUNTER — TELEPHONE (OUTPATIENT)
Dept: DERMATOLOGY | Facility: CLINIC | Age: 68
End: 2018-07-24

## 2018-07-24 NOTE — TELEPHONE ENCOUNTER
Tonya called from [facility] office at wound care. patient was confused about surgery date and amount of oxygen needed. she recomends 3 tanks.

## 2018-07-24 NOTE — TELEPHONE ENCOUNTER
----- Message from Anushka Cox sent at 7/24/2018 10:10 AM CDT -----  Contact: patient  Please call above patient need to speak with the nurse

## 2018-07-25 NOTE — TELEPHONE ENCOUNTER
----- Message from Zaida De La Paz sent at 7/25/2018  9:58 AM CDT -----  Contact: pt at [phone]  PWS pt-Rosa-Pt is asking for you.  Has left message before .

## 2018-07-25 NOTE — TELEPHONE ENCOUNTER
7-25-18 returned patient call and answered all his questions.I gave him the number for wecurity for him to call him he gets here so they can go help hi.

## 2018-07-27 ENCOUNTER — TELEPHONE (OUTPATIENT)
Dept: FAMILY MEDICINE | Facility: CLINIC | Age: 68
End: 2018-07-27

## 2018-07-27 ENCOUNTER — TELEPHONE (OUTPATIENT)
Dept: DERMATOLOGY | Facility: CLINIC | Age: 68
End: 2018-07-27

## 2018-07-27 NOTE — TELEPHONE ENCOUNTER
"Patient states he used a saline nasal spray this morning and noted yellow mucous afterwards. Patient requesting a message be sent to Dr. Ramos requesting to know if he thinks antibiotics need to be taken. States he has an upcoming " Cancer procedure" scheduled for next month and wants to know if antibiotics need to be taken prior to this procedure. Writer spoke to Dr. Ramos regarding this matter. Dr. Ramos advised for patient to use saline nasal spray 2-3 times a day to clear the sinuses. States no antibiotic therapy is needed or advised at this time. Patient notified. Verbalized understanding.   "

## 2018-07-27 NOTE — TELEPHONE ENCOUNTER
Call placed to patient regarding the attached message. No answer received. Left message requesting return call to office.

## 2018-07-27 NOTE — TELEPHONE ENCOUNTER
----- Message from Zaida Ana Cristina sent at 7/27/2018  8:12 AM CDT -----  Contact: pt at [phone]  \[facility] [place] [facility]\""-Rosa-Please call pt.  Left message the other day alsoNeeds Advice    Reason for call:  Advice.  Left message already    Communication Preference:call  Additional Information:

## 2018-07-27 NOTE — TELEPHONE ENCOUNTER
----- Message from Zaida Ana Cristina sent at 7/27/2018  8:12 AM CDT -----  Contact: pt at [phone]  [facility]-Rosa-Please call pt.  Left message the other day alsoNeeds Advice    Reason for call:  Advice.  Left message already    Communication Preference:call  Additional Information:

## 2018-07-27 NOTE — TELEPHONE ENCOUNTER
----- Message from Letty Ziegler sent at 7/27/2018  9:15 AM CDT -----  Please call patient in regards to medication.  Please call patient at [phone].

## 2018-07-27 NOTE — TELEPHONE ENCOUNTER
7-27-18 Returned patient call and I told him he would need to call his PCP doctor to see if he needed to take the antibiotic for a sinus infection. Rosa

## 2018-08-01 ENCOUNTER — OFFICE VISIT (OUTPATIENT)
Dept: PSYCHIATRY | Facility: CLINIC | Age: 68
End: 2018-08-01
Payer: MEDICARE

## 2018-08-01 VITALS
BODY MASS INDEX: 29.76 KG/M2 | HEART RATE: 91 BPM | DIASTOLIC BLOOD PRESSURE: 82 MMHG | HEIGHT: 67 IN | SYSTOLIC BLOOD PRESSURE: 145 MMHG | WEIGHT: 189.63 LBS

## 2018-08-01 DIAGNOSIS — F60.9 PERSONALITY DISORDER: ICD-10-CM

## 2018-08-01 DIAGNOSIS — F41.1 GAD (GENERALIZED ANXIETY DISORDER): ICD-10-CM

## 2018-08-01 DIAGNOSIS — F39 MOOD DISORDER: ICD-10-CM

## 2018-08-01 DIAGNOSIS — Z79.899 HIGH RISK MEDICATION USE: ICD-10-CM

## 2018-08-01 PROCEDURE — 99213 OFFICE O/P EST LOW 20 MIN: CPT | Mod: PBBFAC,PN | Performed by: PSYCHIATRY & NEUROLOGY

## 2018-08-01 PROCEDURE — 99214 OFFICE O/P EST MOD 30 MIN: CPT | Mod: S$PBB,,, | Performed by: PSYCHIATRY & NEUROLOGY

## 2018-08-01 PROCEDURE — 99999 PR PBB SHADOW E&M-EST. PATIENT-LVL III: CPT | Mod: PBBFAC,,, | Performed by: PSYCHIATRY & NEUROLOGY

## 2018-08-01 RX ORDER — ZIPRASIDONE HYDROCHLORIDE 40 MG/1
40 CAPSULE ORAL 2 TIMES DAILY WITH MEALS
Qty: 60 CAPSULE | Refills: 1 | Status: SHIPPED | OUTPATIENT
Start: 2018-08-01 | End: 2019-08-01

## 2018-08-02 ENCOUNTER — NURSE TRIAGE (OUTPATIENT)
Dept: ADMINISTRATIVE | Facility: CLINIC | Age: 68
End: 2018-08-02

## 2018-08-02 ENCOUNTER — TELEPHONE (OUTPATIENT)
Dept: PSYCHIATRY | Facility: CLINIC | Age: 68
End: 2018-08-02

## 2018-08-02 ENCOUNTER — TELEPHONE (OUTPATIENT)
Dept: ADMINISTRATIVE | Facility: CLINIC | Age: 68
End: 2018-08-02

## 2018-08-02 NOTE — TELEPHONE ENCOUNTER
Patient called and spoke to me after he had argument with Rae PEREIRA and I explained it all to him again and he scheduled the blood test for tomorrow and he will go have the EKG when he can I informed him where to go and what days he can go he understood and apologized for being ugly to Rae because I explained that he should of not been ugly to her as she was only letting him know what Dr Queen was requesting and wanted for his care and treatment he apologized again an he will be going to get it all done.

## 2018-08-02 NOTE — TELEPHONE ENCOUNTER
Returned patient call who questions lab or HgbA1C and EKG that was ordered yesterday at appointment with Dr. Queen    Patient upset because he states they were ordered without his permission and would like to know why    Advised patient that the orders were done yesterday because he is taking Geodon and we need to monitor his blood sugar    Patient immediately interupts before I can finish. Yelling that the doctor made a mistake because no one orders labs with out patient permission and that he can not schedule them right now    Advised patient that he can call us back when he is ready to schedule.    Patient upset again yelling that he is not gonna be treated this way. This was not his mistake    Asked patient to not yell    He gets louder- yelling that no one orders labs without his permission    Advised patient that I will not argue with him. To please call us back when he is ready to schedule    Patient because to yell again  Phone call ended

## 2018-08-02 NOTE — TELEPHONE ENCOUNTER
Home Health SOC 05/30/2018 to 07/28/2018 with [facility], Dr Samuel Ramos. ,[facility].

## 2018-08-02 NOTE — TELEPHONE ENCOUNTER
Pt stated his dr wants him to have a test to check his sugar.  Reason for Disposition   Nursing judgment    Protocols used: ST NO PROTOCOL CALL: INFORMATION ONLY-A-[place]

## 2018-08-02 NOTE — TELEPHONE ENCOUNTER
Noted. Pt has been very disruptive in clinic in wait room and towards registration staff member yesterday. Pt's behavior was loud, disruptive, and hostile.  Another patient had to be removed from waiting room due to increased anxiety related to this.  Today, pt has been verbally abusive on phone to staff member.  Pt previously warned about disruptive behavior in clinic. Given behavior yesterday at appointment which impacted the care of another patient and continued verbal abuse of staff, I will proceed with action for patient to seek care elsewhere.

## 2018-08-03 ENCOUNTER — LAB VISIT (OUTPATIENT)
Dept: LAB | Facility: HOSPITAL | Age: 68
End: 2018-08-03
Attending: PSYCHIATRY & NEUROLOGY
Payer: MEDICARE

## 2018-08-03 ENCOUNTER — TELEPHONE (OUTPATIENT)
Dept: PSYCHIATRY | Facility: CLINIC | Age: 68
End: 2018-08-03

## 2018-08-03 DIAGNOSIS — T40.2X5A CONSTIPATION DUE TO OPIOID THERAPY: ICD-10-CM

## 2018-08-03 DIAGNOSIS — Z79.899 HIGH RISK MEDICATION USE: ICD-10-CM

## 2018-08-03 DIAGNOSIS — K59.03 CONSTIPATION DUE TO OPIOID THERAPY: ICD-10-CM

## 2018-08-03 LAB
ESTIMATED AVG GLUCOSE: 105 MG/DL
HBA1C MFR BLD HPLC: 5.3 %

## 2018-08-03 PROCEDURE — 36415 COLL VENOUS BLD VENIPUNCTURE: CPT | Mod: PO

## 2018-08-03 PROCEDURE — 83036 HEMOGLOBIN GLYCOSYLATED A1C: CPT

## 2018-08-03 RX ORDER — LACTULOSE 10 G/15ML
SOLUTION ORAL; RECTAL
Qty: 1200 ML | Refills: 0 | Status: SHIPPED | OUTPATIENT
Start: 2018-08-03 | End: 2018-08-03 | Stop reason: SDUPTHER

## 2018-08-03 RX ORDER — LACTULOSE 10 G/15ML
SOLUTION ORAL; RECTAL
Qty: 1200 ML | Refills: 3 | Status: SHIPPED | OUTPATIENT
Start: 2018-08-03 | End: 2018-09-25 | Stop reason: SDUPTHER

## 2018-08-03 NOTE — TELEPHONE ENCOUNTER
Please advise the patient that his hemoglobin A1c test is within normal range - no diabetes or prediabetes.

## 2018-08-03 NOTE — TELEPHONE ENCOUNTER
Patient called and left voice mail and I spoke to him about his blood work he went and had drawn this morning.  He states he had a 1/2 bowl of grits 3 hrs before he had the test done

## 2018-08-03 NOTE — TELEPHONE ENCOUNTER
Patient returned call and I informed him of the blood test being normal and he understood and will try to have a good weekend

## 2018-08-05 PROBLEM — F39 MOOD DISORDER: Status: ACTIVE | Noted: 2018-08-05

## 2018-08-05 PROBLEM — F41.1 GAD (GENERALIZED ANXIETY DISORDER): Status: ACTIVE | Noted: 2018-08-05

## 2018-08-05 PROBLEM — Z79.899 HIGH RISK MEDICATION USE: Status: ACTIVE | Noted: 2018-08-05

## 2018-08-05 PROBLEM — F60.9 PERSONALITY DISORDER: Status: ACTIVE | Noted: 2018-08-05

## 2018-08-06 ENCOUNTER — TELEPHONE (OUTPATIENT)
Dept: PSYCHIATRY | Facility: CLINIC | Age: 68
End: 2018-08-06

## 2018-08-06 NOTE — TELEPHONE ENCOUNTER
Patient called asking about when to go for EKG.  Called patient back and explained that needs to go Mon thru Friday to [facility] patient registration at the hospital and they will do the test. He stated he will go as soon as he is feeling better. Explained he can go whenever was best for him to get there.  He understood and will call office when he has it done

## 2018-08-07 ENCOUNTER — OUTPATIENT CASE MANAGEMENT (OUTPATIENT)
Dept: ADMINISTRATIVE | Facility: OTHER | Age: 68
End: 2018-08-07

## 2018-08-07 ENCOUNTER — TELEPHONE (OUTPATIENT)
Dept: PSYCHIATRY | Facility: CLINIC | Age: 68
End: 2018-08-07

## 2018-08-07 ENCOUNTER — HOSPITAL ENCOUNTER (OUTPATIENT)
Dept: CARDIOLOGY | Facility: HOSPITAL | Age: 68
Discharge: HOME | End: 2018-08-07
Attending: PSYCHIATRY & NEUROLOGY
Payer: MEDICARE

## 2018-08-07 DIAGNOSIS — F41.1 GAD (GENERALIZED ANXIETY DISORDER): Primary | ICD-10-CM

## 2018-08-07 DIAGNOSIS — Z79.899 HIGH RISK MEDICATION USE: ICD-10-CM

## 2018-08-07 PROCEDURE — 93010 ELECTROCARDIOGRAM REPORT: CPT | Mod: ,,, | Performed by: INTERNAL MEDICINE

## 2018-08-07 PROCEDURE — 93005 ELECTROCARDIOGRAM TRACING: CPT

## 2018-08-07 NOTE — TELEPHONE ENCOUNTER
Patient called and left 2 messages on voice mail @ 140 pm and 144 pm wanting to know the results of his test.  I called pateint back and explained that as soon as DR Queen gets them and reviews them she will release the results for myself or martina to call him with the results. Explained that not always do we get them the same day the test is taken. I explained again we will get with him as soon as Dr Queen has reviewed them and she is in clinic and will check to see if have gotten them later this evening as clinic is done.  Patient understood and states will await the call.

## 2018-08-07 NOTE — TELEPHONE ENCOUNTER
I discussed with patient results of his ECG - normal QTc interval (442 ms), NSR, left anterior fasicular block - read as not significantly changed from prior ECG 2015.  Pt is taking multiple meds which may prolong QTc interval which is why I ordered the test.  I will ask his PCP to review the results as well.     Secondly, I discussed pt's recent behavior in the clinic with him.  His recent agitation and high anxiety was distressing to others in wait room.  Discussed with pt that in order to continue seeing him, I can only see him at 1 pm (to avoid any wait time for him which increased his agitation) and discussed that agitated behavior directed towards staff/other patients will not be tolerated.  Pt advised if this occurs again, I will discharge him from my practice.  He verbalized an understanding.

## 2018-08-07 NOTE — TELEPHONE ENCOUNTER
Patient called and stated he went and had his 12 lead EKG, He wanted Dr Queen to know so she can look for the results.

## 2018-08-07 NOTE — PROGRESS NOTES
Thank you for the referral. The following patient has been assigned to Aleena Díaz RN with Outpatient Complex Care Management for high risk screening.    Reason for referral: FRANCISCO (generalized anxiety disorder)    Please contact [facility] at [phone] with any questions.    Thank you,    Debora Kelsey    Outpatient Case Management

## 2018-08-08 NOTE — TELEPHONE ENCOUNTER
Called and spoke to patient after he called and left voice mail messages this morning @ 1144 and 1147 am.  He states he wanted to let me know he has had several appointments that are scheduled in the next couple of days. We also rescheduled his next appointment with our office to 09/13/18 @ 1 pm as discussed yesterday between Dr Queen and patient.

## 2018-08-09 ENCOUNTER — OFFICE VISIT (OUTPATIENT)
Dept: FAMILY MEDICINE | Facility: CLINIC | Age: 68
End: 2018-08-09
Payer: MEDICARE

## 2018-08-09 ENCOUNTER — TELEPHONE (OUTPATIENT)
Dept: PSYCHIATRY | Facility: CLINIC | Age: 68
End: 2018-08-09

## 2018-08-09 VITALS
HEIGHT: 67 IN | TEMPERATURE: 98 F | SYSTOLIC BLOOD PRESSURE: 138 MMHG | WEIGHT: 198.44 LBS | BODY MASS INDEX: 31.15 KG/M2 | HEART RATE: 94 BPM | DIASTOLIC BLOOD PRESSURE: 87 MMHG

## 2018-08-09 DIAGNOSIS — E66.9 OBESITY, UNSPECIFIED CLASSIFICATION, UNSPECIFIED OBESITY TYPE, UNSPECIFIED WHETHER SERIOUS COMORBIDITY PRESENT: ICD-10-CM

## 2018-08-09 DIAGNOSIS — K70.30 ALCOHOLIC CIRRHOSIS OF LIVER WITHOUT ASCITES: Primary | ICD-10-CM

## 2018-08-09 DIAGNOSIS — I15.0 RENOVASCULAR HYPERTENSION: ICD-10-CM

## 2018-08-09 DIAGNOSIS — J44.1 COPD WITH ACUTE EXACERBATION: ICD-10-CM

## 2018-08-09 PROCEDURE — 99213 OFFICE O/P EST LOW 20 MIN: CPT | Mod: S$PBB,,, | Performed by: FAMILY MEDICINE

## 2018-08-09 PROCEDURE — 99213 OFFICE O/P EST LOW 20 MIN: CPT | Mod: PBBFAC,PO | Performed by: FAMILY MEDICINE

## 2018-08-09 PROCEDURE — 99999 PR PBB SHADOW E&M-EST. PATIENT-LVL III: CPT | Mod: PBBFAC,,, | Performed by: FAMILY MEDICINE

## 2018-08-09 RX ORDER — SPIRONOLACTONE 25 MG/1
25 TABLET ORAL DAILY
Qty: 90 TABLET | Refills: 2 | Status: SHIPPED | OUTPATIENT
Start: 2018-08-09

## 2018-08-09 NOTE — TELEPHONE ENCOUNTER
Patient called and was asking about his medication Geodon not being at the pharmacy.  I explained it was sent there so I would call and ask the pharmacy if they have it.  Called the pharmacy and they said the patient picked up the medication on Wed 08/01/18. Called patient back and told him he picked the medication up and he went over his medications and realized he did have the medication.   So he was all good now and hung up.

## 2018-08-10 ENCOUNTER — OUTPATIENT CASE MANAGEMENT (OUTPATIENT)
Dept: ADMINISTRATIVE | Facility: OTHER | Age: 68
End: 2018-08-10

## 2018-08-10 NOTE — PROGRESS NOTES
08/10/18-Attempted to screen patient for outpatient case management. Patient has my name and phone number to call me back if I can assist him in the future. 1'st attempt to screen patient.

## 2018-08-11 DIAGNOSIS — K59.03 CONSTIPATION DUE TO OPIOID THERAPY: ICD-10-CM

## 2018-08-11 DIAGNOSIS — T40.2X5A CONSTIPATION DUE TO OPIOID THERAPY: ICD-10-CM

## 2018-08-13 ENCOUNTER — TELEPHONE (OUTPATIENT)
Dept: DERMATOLOGY | Facility: CLINIC | Age: 68
End: 2018-08-13

## 2018-08-13 ENCOUNTER — TELEPHONE (OUTPATIENT)
Dept: FAMILY MEDICINE | Facility: CLINIC | Age: 68
End: 2018-08-13

## 2018-08-13 RX ORDER — LACTULOSE 10 G/15ML
SOLUTION ORAL; RECTAL
Qty: 1200 ML | Refills: 3 | Status: SHIPPED | OUTPATIENT
Start: 2018-08-13 | End: 2018-09-25 | Stop reason: SDUPTHER

## 2018-08-13 RX ORDER — FLUTICASONE PROPIONATE 50 MCG
SPRAY, SUSPENSION (ML) NASAL
Qty: 16 G | Refills: 3 | Status: SHIPPED | OUTPATIENT
Start: 2018-08-13 | End: 2018-11-01 | Stop reason: SDUPTHER

## 2018-08-13 NOTE — TELEPHONE ENCOUNTER
----- Message from Zeenat Talamantes sent at 8/13/2018 10:54 AM CDT -----  Contact: self [phone]  States that he needs a refill on lactulose and myrbetriq sr 50mg. Pt uses     [id number] - [place] - [address] [address]  Phone: [phone] Fax: [phone]    Please call back at [phone]//thank you acc

## 2018-08-13 NOTE — TELEPHONE ENCOUNTER
Please see attached message from patient. Refill request for Flonase nasal spray forwarded to provider. Spiriva inhaler e-scribed to pharmacy on 7-13-18 with 3 additional refills. Patient notified. Verbalized understanding.

## 2018-08-13 NOTE — TELEPHONE ENCOUNTER
----- Message from Anushka Cox sent at 8/13/2018  8:09 AM CDT -----  Contact: patient  Please call above patient need to speak with the nurse

## 2018-08-13 NOTE — TELEPHONE ENCOUNTER
----- Message from Lima Bush sent at 8/13/2018 12:27 PM CDT -----  Type:  RX Refill Request    Who Called: Patient  Refill or New Rx:  refill  RX Name and Strength:    fluticasone (FLONASE) 50 mcg/actuation nasal spray  tiotropium (SPIRIVA WITH HANDIHALER) 18 mcg inhalation capsule  How is the patient currently taking it? (ex. 1XDay):  Na  Is this a 30 day or 90 day RX:  90  Preferred Pharmacy with phone number:      [facility] [address] [place] [address] - [address] [place] [address] AT [facility] [address]  [address]  [place] [address]  Phone: [phone] Fax: [phone]  Local or Mail Order: local  Ordering Provider:  Rachel Mandujano Call Back Number:  [phone] (home)     Additional Information:  Would like to speak to a nurse regarding the medications first

## 2018-08-13 NOTE — TELEPHONE ENCOUNTER
Spoke to patient who requested to know if he was due a refill of Spiriva. Writer advised patient last refill was sent to pharmacy on 7-13-18 with 3 refills. Advised patient to contact pharmacy for a refill of this medication. Patient states he already spoke to pharmacy and was told he can't pick that prescription up until 8-28-18.  Writer encouraged patient to contact pharmacy on 8-28-18 as they advised to  this medication. Patient verbalized understanding.           ----- Message from José Kwon sent at 8/13/2018  4:09 PM CDT -----  Contact: Pt  Type:  Patient Returning Call    Who Called:  pt  Who Left Message for Patient:  Ally  Does the patient know what this is regarding?:    Best Call Back Number:    Additional Information:

## 2018-08-13 NOTE — TELEPHONE ENCOUNTER
Patient requesting a refill of Flonase Nasal [place].   LR--9-20-17  LOV--8-9-18  FOV--12-11-18

## 2018-08-13 NOTE — TELEPHONE ENCOUNTER
Patient requesting a refill of Lactulose and Myrbetriq. Upon further inspection it was noted these medications were already e-scribed to pharmacy. Lactulose was e-scribed on 8-3-18 with 3 additional refills to [facility],  Myrbetriq was e-scribed on 7-16-18 for 90 day supply with 3 additional refills to [facility]. Patient notified. Verbalized understanding.

## 2018-08-14 ENCOUNTER — TELEPHONE (OUTPATIENT)
Dept: DERMATOLOGY | Facility: CLINIC | Age: 68
End: 2018-08-14

## 2018-08-14 NOTE — TELEPHONE ENCOUNTER
----- Message from Liudmila Peñaloza sent at 8/14/2018  7:55 AM CDT -----  Contact: Pt  Pt is requesting a callback says he is going to be coming by his self for the visit and need to know if he can come alone and how long will it take to heal    Pt can be reached at [phone]    Thanks

## 2018-08-14 NOTE — TELEPHONE ENCOUNTER
Interface, Kerryripts Jovanni Baker Staff             An error occurred while processing the e-prescribing message.     The original refill request for this medication has been updated with new information, but the message was not sent electronically to the requested pharmacy. Call the pharmacy and verify that the updated prescription was received.

## 2018-08-14 NOTE — TELEPHONE ENCOUNTER
Returned patient call and answered all of his answers. I also gave him security number to call for help when he gets here.

## 2018-08-14 NOTE — TELEPHONE ENCOUNTER
Please see attached message. Lactulose prescription phoned in to [facility] as written by Dr. Ramos on 8-13-18.

## 2018-08-15 ENCOUNTER — TELEPHONE (OUTPATIENT)
Dept: FAMILY MEDICINE | Facility: CLINIC | Age: 68
End: 2018-08-15

## 2018-08-15 ENCOUNTER — OUTPATIENT CASE MANAGEMENT (OUTPATIENT)
Dept: ADMINISTRATIVE | Facility: OTHER | Age: 68
End: 2018-08-15

## 2018-08-15 DIAGNOSIS — J44.9 COPD MIXED TYPE: ICD-10-CM

## 2018-08-15 DIAGNOSIS — Z99.81 OXYGEN DEPENDENT: ICD-10-CM

## 2018-08-15 DIAGNOSIS — J41.8 MIXED SIMPLE AND MUCOPURULENT CHRONIC BRONCHITIS: Chronic | ICD-10-CM

## 2018-08-15 DIAGNOSIS — J96.11 CHRONIC RESPIRATORY FAILURE WITH HYPOXIA: ICD-10-CM

## 2018-08-15 DIAGNOSIS — F31.0 BIPOLAR AFFECTIVE DISORDER, CURRENT EPISODE HYPOMANIC: ICD-10-CM

## 2018-08-15 DIAGNOSIS — Z92.241 STEROID-DEPENDENT COPD: ICD-10-CM

## 2018-08-15 DIAGNOSIS — J44.9 STEROID-DEPENDENT COPD: ICD-10-CM

## 2018-08-15 DIAGNOSIS — F39 MOOD DISORDER: ICD-10-CM

## 2018-08-15 RX ORDER — CLONAZEPAM 2 MG/1
TABLET ORAL
Qty: 90 TABLET | Refills: 2 | Status: CANCELLED | OUTPATIENT
Start: 2018-08-15

## 2018-08-15 NOTE — TELEPHONE ENCOUNTER
This matter was handled in a separate encounter. Refill request forwarded to Dr. Ramos in a separate encounter.

## 2018-08-15 NOTE — TELEPHONE ENCOUNTER
----- Message from Aleena Díaz RN sent at 8/15/2018  2:47 PM CDT -----  Contact: Aleena Díaz RN OPCM EXT. [id number]  Patient is asking for a prescription for his Klonopin 2 mg  be sent to [facility]  so he can pick it up in the morning. Please call and advise patient.     Thank you for your assistance,   Aleena Díaz RN OPCM

## 2018-08-15 NOTE — TELEPHONE ENCOUNTER
----- Message from Levar Cox sent at 8/15/2018 11:47 AM CDT -----  Type:  RX Refill Request    Who Called:  Self   Refill or New Rx: refill   RX Name and Strength:  clonazePAM (KLONOPIN) 2 MG Tab  How is the patient currently taking it? (ex. 1XDay):  3 x day  Is this a 30 day or 90 day RX:  30 day supply  Preferred Pharmacy with phone number: [facility]   Local or Mail Order:  Local   Ordering Provider:  Dr Ramos  [facility] Call Back Number:  [phone]  Additional Information:

## 2018-08-15 NOTE — PROGRESS NOTES
08/15/18-Called and spoke to patient. He has home health that takes his blood pressure, temperature and sets up his medications. Asking for a message to be sent to his PCP for his klonopin prescription to be sent to [facility] in [place] so he can pick it up in the morning. Patient has my name and phone number if I can assist him in the future.

## 2018-08-15 NOTE — TELEPHONE ENCOUNTER
----- Message from Mateus Gautam sent at 8/15/2018  9:06 AM CDT -----  Contact: Pt  Can the clinic reply in [facility]: no      Please refill the medication(s) listed below. Please call the patient when the prescription(s) is ready for  at this phone number  [phone] (home)       Medication #1 clonazePAM (KLONOPIN) 2 MG Tab      Preferred Pharmacy:     [id number] [address] [address]  Phone: [phone] Fax: [phone]

## 2018-08-15 NOTE — TELEPHONE ENCOUNTER
Patient requesting a refill of Clonazepam.  LR--5-24-18  LOV--8-9-18  FOV--12-11-18  [facility] Drug Screen--5-24-18  Pain Contract--5-24-18

## 2018-08-15 NOTE — TELEPHONE ENCOUNTER
----- Message from Ellen Zhou sent at 8/15/2018  9:17 AM CDT -----  Contact: Self  Patient needs to speak to the nurse about his medication clonazePAM (KLONOPIN) 2 MG Tab  Please call back [phone] (home)

## 2018-08-16 ENCOUNTER — OUTPATIENT CASE MANAGEMENT (OUTPATIENT)
Dept: ADMINISTRATIVE | Facility: OTHER | Age: 68
End: 2018-08-16

## 2018-08-16 ENCOUNTER — TELEPHONE (OUTPATIENT)
Dept: FAMILY MEDICINE | Facility: CLINIC | Age: 68
End: 2018-08-16

## 2018-08-16 DIAGNOSIS — F39 MOOD DISORDER: ICD-10-CM

## 2018-08-16 DIAGNOSIS — F31.0 BIPOLAR AFFECTIVE DISORDER, CURRENT EPISODE HYPOMANIC: ICD-10-CM

## 2018-08-16 RX ORDER — CLONAZEPAM 2 MG/1
TABLET ORAL
Qty: 90 TABLET | Refills: 2 | Status: SHIPPED | OUTPATIENT
Start: 2018-08-16 | End: 2018-08-27 | Stop reason: SDUPTHER

## 2018-08-16 RX ORDER — CLONAZEPAM 2 MG/1
TABLET ORAL
Qty: 90 TABLET | Refills: 2 | Status: CANCELLED | OUTPATIENT
Start: 2018-08-16

## 2018-08-16 NOTE — TELEPHONE ENCOUNTER
----- Message from Jovana Banks sent at 8/16/2018  8:44 AM CDT -----  Please call pt at [phone]  / gave wrong information on medication ... States he must speak to nurse as soon as he can to correct this problem  ... [facility] care is coming to his home this morning (juan miguel) please call

## 2018-08-16 NOTE — PROGRESS NOTES
08/16/18-Received two phone calls from patient that he wants to make sure his prescription has been called into the [facility]. Called [facility] and they are not open yet. Called [facility] at 915 am and spoke to Gali. The Klonopin will be ready in one hour for patient to . Called and updated patient that his klonopin prescription will be ready in one hour.

## 2018-08-16 NOTE — TELEPHONE ENCOUNTER
----- Message from Levar Cox sent at 8/16/2018  7:42 AM CDT -----  Contact: self [phone]  Type: Needs Medical Advice    Who Called:  Self   Symptoms (please be specific):  NA  How long has patient had these symptoms:  NAPharmacy name and phone #:  [facility]  Best Call Back Number: [phone]  Additional Information: Patient states he need rx clonazepam,when the home health nurse visit the patient today.

## 2018-08-16 NOTE — TELEPHONE ENCOUNTER
----- Message from Ally Fonseca sent at 8/16/2018  8:34 AM CDT -----  Type:  RX Refill Request    Who Called:  Patient  Refill or New Rx:  refill  RX Name and Strength: clonazePAM (KLONOPIN) 2 MG Tab      How is the patient currently taking it? (ex. 1XDay):  2 x day  Is this a 30 day or 90 day RX:   Preferred Pharmacy with phone number:    [facility] [address] [place], [place] - [address] [place] [address] AT [facility] [address]  [address] [address]  Phone: [phone] Fax: [phone]    [id number] - [place], [place] - [address] [facility]  [address] [facility] [address]  Phone: [phone] Fax: [phone]  Local or Mail Order:  local  Ordering Provider:  Samuel Ramos  Best Call Back Number:  [phone]  Additional Information:  Patient is requesting refill this morning due to home health coming today

## 2018-08-16 NOTE — TELEPHONE ENCOUNTER
----- Message from Yamile Charles sent at 8/16/2018  9:12 AM CDT -----  Contact: pt  Pt calling states that he needs to speak to the nurse regarding some medication that he didn't get. The [place] home health care is coming out so they want it there..[phone] ([place])

## 2018-08-16 NOTE — TELEPHONE ENCOUNTER
Patient requesting refill of Clonazepam. Writer advised patient a refill request has been sent to Dr. Ramos, awaiting MD response. Patient anxious stating he can not wait for MD to fill this medication. States he needed this on yesterday. Writer advised patient medication will be filled as soon as possible and that writer will call him for notification once prescription has been sent to pharmacy.

## 2018-08-16 NOTE — TELEPHONE ENCOUNTER
----- Message from Erinn Valadez sent at 8/16/2018  8:05 AM CDT -----  Type: Needs Medical Advice    Who Called:  Patient  Pharmacy name and phone #:      [id number] - [place] - [address] [facility]  [address] [facility] [address]  Phone: [phone] Fax: [phone]    Best Call Back Number: [phone]  Additional Information: Patient states that doctor forgot to refill some of his medications and needs them filled because his home health is coming today to count them and put them in his boxes. He needs his clonazePAM (KLONOPIN) 2 MG Tab and tiotropium (SPIRIVA WITH HANDIHALER) 18 mcg inhalation capsule. Please call patient back when completed.

## 2018-08-17 ENCOUNTER — TELEPHONE (OUTPATIENT)
Dept: PULMONOLOGY | Facility: CLINIC | Age: 68
End: 2018-08-17

## 2018-08-17 NOTE — TELEPHONE ENCOUNTER
Pt just got a 90 day refill in July and is not due again until October.     ----- Message from Negrita Vazqeuz sent at 8/17/2018 11:58 AM CDT -----  Contact: self  Needs refill on albuterol.  Would also like to speak to a nurse regarding something personal. Please call back at [phone] (home)     [facility] [address] [place] [address] AT [facility] [address]  [address] [place] [address]  Phone: [phone] Fax: [phone]

## 2018-08-17 NOTE — TELEPHONE ENCOUNTER
Advised pt no refills on duo neb till October.   ----- Message from Sophia Martinez sent at 8/17/2018 12:45 PM CDT -----  Contact: self  Placed call to pod, patient miss call from your office please call back at [phone] (home)

## 2018-08-18 DIAGNOSIS — J44.1 ACUTE EXACERBATION OF CHRONIC OBSTRUCTIVE PULMONARY DISEASE (COPD): ICD-10-CM

## 2018-08-18 RX ORDER — FLUTICASONE PROPIONATE AND SALMETEROL 500; 50 UG/1; UG/1
1 POWDER RESPIRATORY (INHALATION) 2 TIMES DAILY
Qty: 180 EACH | Refills: 3 | Status: SHIPPED | OUTPATIENT
Start: 2018-08-18 | End: 2018-11-01 | Stop reason: SDUPTHER

## 2018-08-18 RX ORDER — BROMPHENIRAMINE MALEATE, PSEUDOEPHEDRINE HYDROCHLORIDE, AND DEXTROMETHORPHAN HYDROBROMIDE 2; 30; 10 MG/5ML; MG/5ML; MG/5ML
SYRUP ORAL
Qty: 240 ML | Refills: 0 | Status: SHIPPED | OUTPATIENT
Start: 2018-08-18 | End: 2018-09-25

## 2018-08-20 ENCOUNTER — TELEPHONE (OUTPATIENT)
Dept: FAMILY MEDICINE | Facility: CLINIC | Age: 68
End: 2018-08-20

## 2018-08-20 NOTE — TELEPHONE ENCOUNTER
Spoke to patient who states his prescription for Brompheniramine-Pseudoeph-DM Syrup is too expensive ($65). Requesting a cheaper alternative. Please advise.                 ----- Message from Ally Campo sent at 8/20/2018  8:24 AM CDT -----  Type:  RX Refill Request    Who Called:  Patient  RX Name and Strength:  brompheniramine-pseudoeph-DM 2-30-10 mg/5 mL Syrp  Preferred Pharmacy with phone number:  [facility] in [place] at [phone] (Phone)  Local or Mail Order:  local  Ordering Provider:  Dr. Rachel Mandujano Call Back Number:  [phone]  Additional Information:

## 2018-08-20 NOTE — TELEPHONE ENCOUNTER
Please advice to increase fluids, and nasal saline washes for congestion. The use of Tylenol for aches and or fevers. Please consider Coricidin HBP for cough and congestion.OTC Delsym.

## 2018-08-20 NOTE — TELEPHONE ENCOUNTER
Patient requesting refill of Lactulose. Upon further inspection it was noted this prescription was e-scribed to [facility] on 8-3-18 and to [facility] on 8-13-18. Patient notified. Verbalized understanding.             ----- Message from Gian Cardoso sent at 8/20/2018  3:07 PM CDT -----  Contact: same  Patient called in and stated his pharmacy needs Dr. Ramos to call about his Rx for lactulose (CHRONULAC) 10 gram/15 mL solution.    [id number] - [place], [place] - [address] [address]  Phone: [phone] Fax: [phone]    Patient's call back number is [phone]

## 2018-08-24 ENCOUNTER — TELEPHONE (OUTPATIENT)
Dept: DERMATOLOGY | Facility: CLINIC | Age: 68
End: 2018-08-24

## 2018-08-24 NOTE — TELEPHONE ENCOUNTER
----- Message from Zaida De La Paz sent at 8/24/2018 12:39 PM CDT -----  Contact: pt at [phone]  [facility] pt Rosa-Pt is asking four you.  Has a questions about how many tanks of oxygen to bring.  And how long surgery will take,  Surgery  Is on aug. 31

## 2018-08-27 ENCOUNTER — TELEPHONE (OUTPATIENT)
Dept: DERMATOLOGY | Facility: CLINIC | Age: 68
End: 2018-08-27

## 2018-08-27 ENCOUNTER — TELEPHONE (OUTPATIENT)
Dept: ADMINISTRATIVE | Facility: CLINIC | Age: 68
End: 2018-08-27

## 2018-08-27 DIAGNOSIS — F39 MOOD DISORDER: ICD-10-CM

## 2018-08-27 DIAGNOSIS — F31.0 BIPOLAR AFFECTIVE DISORDER, CURRENT EPISODE HYPOMANIC: ICD-10-CM

## 2018-08-27 NOTE — TELEPHONE ENCOUNTER
Home Health Recert with [facility]-Dr. Samuel Ramos. Pt received  services.

## 2018-08-27 NOTE — PROGRESS NOTES
Anxiety: Patient complains of anxiety disorder, bipolar disorder, obsessive compulsive disorder, panic attacks, post traumatic stress  disorder and sleep disturbance.  He has the following symptoms: difficulty concentrating, dizziness, fatigue, feelings of losing control, insomnia, irritable, palpitations, racing thoughts, shortness of breath, sweating. Onset of symptoms was approximately several years ago, unchanged since that time. He denies current suicidal and homicidal ideation. Family history significant for depression, heart disease and substance abuse.Possible organic causes contributing are: medications, drug abuse, endocrine/metabolic, neuro. Risk factors: positive family history in  mother, previous episode of depression and personality disorder Previous treatment includes Prozac and Xanax and individual therapy and medication.  He complains of the following side effects from the treatment: dry mouth, GI disturbance, headache and weight gain.  Gastrointestinal ROS: positive for - abdominal pain, appetite loss, change in bowel habits, constipation, gas/bloating and heartburn  Abdominal exam: soft, nontender, nondistended, no masses or organomegaly  no rebound tenderness noted  hepatomegaly 18 cms.  splenomegaly  no bladder distension noted  no abdominal bruits  no pulsatile masses  no CVA tenderness  no inguinal adenopathy  no hernias noted.    Alcoholic cirrhosis of liver without ascites  -     Comprehensive metabolic panel; Future  -     CBC auto differential; Future    Obesity, unspecified classification, unspecified obesity type, unspecified whether serious comorbidity present    Renovascular hypertension    COPD with acute exacerbation  -     spironolactone (ALDACTONE) 25 MG tablet; Take 1 tablet (25 mg total) by mouth once daily.  Dispense: 90 tablet; Refill: 2

## 2018-08-28 ENCOUNTER — TELEPHONE (OUTPATIENT)
Dept: DERMATOLOGY | Facility: CLINIC | Age: 68
End: 2018-08-28

## 2018-08-28 ENCOUNTER — TELEPHONE (OUTPATIENT)
Dept: FAMILY MEDICINE | Facility: CLINIC | Age: 68
End: 2018-08-28

## 2018-08-28 RX ORDER — CLONAZEPAM 2 MG/1
TABLET ORAL
Qty: 90 TABLET | Refills: 0 | Status: SHIPPED | OUTPATIENT
Start: 2018-09-14 | End: 2018-09-28 | Stop reason: SDUPTHER

## 2018-08-28 NOTE — TELEPHONE ENCOUNTER
----- Message from Jovana Banks sent at 8/28/2018  8:15 AM CDT -----  Call [phone] / Romelia Dent / [facility] / asking to discuss his procedure on Friday ... Asking if he needs someone with him and post op care

## 2018-08-28 NOTE — TELEPHONE ENCOUNTER
Called and talke to Mr. Serna home health nurse explained everything with her abnd how to take of the surgery site. Rosa

## 2018-08-28 NOTE — TELEPHONE ENCOUNTER
Spoke to patient who states he is scheduled to have outpatient surgery on 8-31-18.  States his home health ([facility]) is due to be canceled on tomorrow. Call placed to home health at number provided by patient ([phone]). Advised patient is not scheduled to be discharged from services. States patient is scheduled to be seen by the nurse today. States a re-certification will happen in September. Patient notified. Verbalized understanding.           ----- Message from Negrita Vazquez sent at 8/28/2018 12:31 PM CDT -----  Contact: self  Needs advice on what to do after his surgery with  Dr Barton  on 8/31. States he lives alone and has a broken back. Possibly home health. Please call back at [phone] (home)

## 2018-08-29 ENCOUNTER — TELEPHONE (OUTPATIENT)
Dept: PSYCHIATRY | Facility: CLINIC | Age: 68
End: 2018-08-29

## 2018-08-29 RX ORDER — HYDROXYZINE PAMOATE 25 MG/1
25 CAPSULE ORAL EVERY 8 HOURS PRN
Qty: 15 CAPSULE | Refills: 0 | Status: SHIPPED | OUTPATIENT
Start: 2018-08-29 | End: 2018-10-17 | Stop reason: SDUPTHER

## 2018-08-29 NOTE — TELEPHONE ENCOUNTER
Called and spoke to patient and informed him of what Dr Queen advised and he understood about the new medication and he will let his surgeon know he has started this medication.   He will update our office after the surgery and on how he is doing.

## 2018-08-29 NOTE — TELEPHONE ENCOUNTER
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 He states it will be out patient.  He stated he wanted to make Dr Queen aware of this and to see if there was anything she can give him for this anxiety even if 2 or 4 pills to hold him until after the surgery  Please advise

## 2018-08-29 NOTE — TELEPHONE ENCOUNTER
Please advise the patient that I called him in a small prescription for a medication called Vistaril (also known as Hydroxyzine) 25 mg three times daily as needed for anxiety.  It is not a habit forming type medication - I cannot prescribe additional medication like this since he is getting Rx for clonazepam from Dr Ramos.  Please advise him to use this med with caution - it can cause sedation.  Please also advise him to let his surgeon know about this Rx.  Please let him know that I hope his surgery and recovery goes well.

## 2018-08-30 NOTE — PROGRESS NOTES
ALLERGIES:   Codeine; Morphine; Bactrim [sulfamethoxazole-trimethoprim]; and Ciprofloxacin      Current Outpatient Medications:     albuterol 90 mcg/actuation inhaler, Inhale 2 puffs into the lungs every 6 (six) hours as needed for Wheezing., Disp: 1 each, Rfl: 5    albuterol-ipratropium 2.5mg-0.5mg/3mL (DUO-NEB) 0.5 mg-3 mg(2.5 mg base)/3 mL nebulizer solution, Take 3 mLs by nebulization every 6 (six) hours while awake., Disp: 180 mL, Rfl: 3    AMITIZA 8 mcg Cap, TAKE 1 CAPSULE(8 MCG) BY MOUTH TWICE DAILY, Disp: 180 capsule, Rfl: 0    brompheniramine-pseudoeph-DM 2-30-10 mg/5 mL Syrp, TAKE 5 ML BY MOUTH FOUR TIMES DAILY AS NEEDED, Disp: 240 mL, Rfl: 0    [START ON 9/14/2018] clonazePAM (KLONOPIN) 2 MG Tab, TAKE 1 TABLET BY MOUTH 3 TIMES A DAY, Disp: 90 tablet, Rfl: 0    escitalopram oxalate (LEXAPRO) 5 MG Tab, Take 1 tablet (5 mg total) by mouth once daily., Disp: 30 tablet, Rfl: 1    fentaNYL (SUBSYS) 200 mcg/spray Spry, Place 200 mcg under the tongue 2 (two) times daily as needed., Disp: , Rfl:     fluocinonide 0.05% (LIDEX) 0.05 % cream, , Disp: , Rfl:     fluticasone (FLONASE) 50 mcg/actuation nasal spray, SNIFF 1 SPRAY INTO EACH NOSTRIL ONCE DAILY, Disp: 16 g, Rfl: 3    fluticasone-salmeterol 500-50 mcg/dose (ADVAIR DISKUS) 500-50 mcg/dose DsDv diskus inhaler, Inhale 1 puff into the lungs 2 (two) times daily. Controller, Disp: 180 each, Rfl: 3    hydrOXYzine pamoate (VISTARIL) 25 MG Cap, Take 1 capsule (25 mg total) by mouth every 8 (eight) hours as needed., Disp: 15 capsule, Rfl: 0    lactulose (CHRONULAC) 10 gram/15 mL solution, TAKE 30 MLS BY MOUTH THREE TIMES DAILY, Disp: 1200 mL, Rfl: 3    lactulose (CHRONULAC) 10 gram/15 mL solution, TAKE 30 ML BY MOUTH THREE TIMES DAILY, Disp: 1200 mL, Rfl: 3    methadone (DOLOPHINE) 10 MG tablet, Take 1 tablet (10 mg total) by mouth every 6 (six) hours as needed. Two tablets every morning, two tablets every 12 pm, one-two tablets every evening (Patient  taking differently: Take 10 mg by mouth every 8 (eight) hours as needed. ), Disp: , Rfl:     mirabegron (MYRBETRIQ) 50 mg Tb24, Take 1 tablet (50 mg total) by mouth once daily., Disp: 90 tablet, Rfl: 3    oxycodone (ROXICODONE) 15 MG Tab, , Disp: , Rfl:     predniSONE (DELTASONE) 10 MG tablet, Take 1 tablet (10 mg total) by mouth once daily., Disp: 30 tablet, Rfl: 11    silodosin (RAPAFLO) 8 mg Cap capsule, Take 1 capsule (8 mg total) by mouth once daily., Disp: 90 capsule, Rfl: 3    spironolactone (ALDACTONE) 25 MG tablet, Take 1 tablet (25 mg total) by mouth once daily., Disp: 90 tablet, Rfl: 2    tiotropium (SPIRIVA WITH HANDIHALER) 18 mcg inhalation capsule, INHALE THE CONTENTS OF 1 CAPSULE BY MOUTH AS PER HANDIHALER, Disp: 90 capsule, Rfl: 3    ziprasidone (GEODON) 40 MG Cap, Take 1 capsule (40 mg total) by mouth 2 (two) times daily with meals., Disp: 60 capsule, Rfl: 1  -------------------------------------------------------------  PROCEDURE: Mohs' Micrographic Surgery    SITE: right chin/neck    INDICATION: basal cell carcinoma, incompletely-excised    CASE NUMBER: [id number]      ANESTHETIC: 8 mL 1% Lidocaine with Epinephrine 1:100,000    SURGICAL PREP: Ethanol and Hibiclens    SURGEON: Waldo Barton MD    ASSISTANTS: Ketan Hinton CST     PREOPERATIVE DIAGNOSIS: basal cell carcinoma    POSTOPERATIVE DIAGNOSIS: basal cell carcinoma    PATHOLOGIC DIAGNOSIS: basal cell carcinoma    STAGES OF MOHS' SURGERY PERFORMED: one    TUMOR-FREE PLANE ACHIEVED: yes    HEMOSTASIS: Hyfrecation     SPECIMENS: three (three in stage A)    INITIAL LESION SIZE: 1.7 x 5.5 cm    FINAL DEFECT SIZE: 1.7 x 5.9 cm    WOUND REPAIR/DISPOSITION: see below    NARRATIVE:    The patient is a 68 y.o.male referred by Hesham Bernard MD with a history of cancer on the right chin/neck which was excised - pathology accession #[id number], [facility]. Findings revealed basal cell carcinoma. Examination revealed a somewhat  hypopigmented linear scar on the right chin/neck area at the site of prior excision, which was confirmed by reference to the photograph taken at the previous patient visit. In light of the nature of this tumor and the location on the neck, Mohs' micrographic surgery was thought to be the most appropriate management choice, and this diagnosis is appropriate for treatment by Mohs' micrographic surgery.  I discussed it with the patient and he fully understands the aims, risks, alternatives, and possible complications, and elects to proceed.  There are no medical or surgical contraindications to the procedure.     A signed informed consent was obtained.    PROCEDURE:  The patient was placed in the semi-recumbent position on the operating table in the Mohs' Surgery Suite. The area in question was thoroughly prepped with ethanol and Hibiclens. A sterile surgical marker was used to outline the clinically apparent margins of the involved area, and a narrow margin of normal-appearing skin. Reference marks were made at the periphery of the outlined area with the surgical marker. The proposed area of excision was measured and photographed. Local anesthesia as noted above was administered.  The total volume of anesthetic used throughout this portion of the procedure was as documented above. The area was prepared and draped in the standard manner. All of the grossly identifiable area of clinically abnormal tissue and an underlying/peripheral layer was taken and processed by the Mohs' technique.  Hemostasis was obtained with the hyfrecator. Tissue was taken from any areas of residual marginal involvement (if present) and processed by the Mohs' technique in as many stages as needed until a tumor-free plane was achieved.    Colors of inks used in the reference nicks at epidermal margins (if present) and/or inking of non-epithelial edges, if applicable, is represented on the Mohs map as follows: solid lines represent red ink, dots  "represent blue ink, jagged lines represent black ink, curlicues represent green ink, "xxx" represents yellow ink.    The first Mohs' layer consisted of three section(s) with 18 slide(s) evaluated. No residual tumor was noted at the margins of the first Mohs' layer. Histology of the specimen(s) showed changes consistent with chronic solar damage.     A total of three section(s) and 18 slide(s) were examined under the microscope via the Mohs technique.  A cancer free plane was reached after layer number one. Defect final size was as noted above.     The wound was covered with a nonadherent dressing between stages, and the patient allowed to wait in the waiting area during these periods. The final defect was photographed at the completion of the Mohs' procedure.    See the separate procedure note which follows regarding repair of the defect following Mohs' surgery.     -----------------------------------------------    REPAIR FOLLOWING MOHS' MICROGRAPHIC SURGERY    PREOPERATIVE DIAGNOSIS: defect following Mohs' surgery for a basal cell carcinoma    POSTOPERATIVE DIAGNOSIS: same    PROCEDURE PERFORMED: intermediate (layered) closure     ANESTHETIC: 7 mL 1% Lidocaine with Epinephrine 1:100,000     SURGICAL PREP: Hibiclens    SURGEON: Waldo Barton MD     ASSISTANTS: as above    LOCATION: right chin/neck      INDICATIONS:  Earlier in the day, the patient underwent Mohs' micrographic surgical excision of a basal cell carcinoma on the right chin/neck. Tumor free margins were achieved after layer number one.  Later in the day, the management of the resulting wound was addressed with the patient. I discussed the various wound management options with the patient and he fully understands the aims, risks, alternatives, and possible complications of the alternatives, and he elects to proceed with closure of the defect in the manner noted below.  There are no medical or surgical contraindications to the procedure.    A signed " informed consent was previously obtained.    PROCEDURE:  Repair via intermediate closure:  The patient was returned to the procedure room following completion of the Mohs' procedure and final slide review. Because of the size, shape and location of the defect, simple closure could not be achieved without excessive tension on the wound margins and an unacceptable risk of wound dehiscence and standing cone deformities. After surgical prepping, additional anesthetic was infiltrated into the tissues surrounding the defect and the anticipated area of repair, to maintain anesthesia during the procedure. Preparation of the site was carried out by extending the defect through excision of small triangles of superfluous tissue on either side of the wound to square the shoulders of the defect and to allow closure without distortion by standing cone deformities, creating a fusiform defect measuring 1.7 x 8.5 cm in size. Wound margins were minimally undermined to allow closure with minimal tension. After hemostasis was achieved with the hyfrecator, closure was accomplished in layered fashion with:      Multiple #4-0 buried interrupted Vicryl suture(s) and    several #4-0 simple interrupted and vertical mattress Prolene suture(s) and    one #4-0 running locked and one #4-0 simple running Prolene suture(s) for final approximation of the wound margins.    Total length of the final closure was 8.5 cm.     The site was photographed following completion of the repair. Final dressing consisted of petrolatum, Telfa and tape.    Estimated blood loss for the total procedure was less than 10 mL.    Total operative time including tissue processing in the Mohs' laboratory and microscopic Mohs' frozen section slide review was 2 hour(s). Verbal and written wound care instructions were given to the patient, and he expressed understanding of these instructions. The patient tolerated the procedure well and left the operating room in good  condition; he is to return in 7 days for suture removal.     Dr. Barton's cell phone number was given to the patient with instructions to call prn with any problems.

## 2018-08-31 ENCOUNTER — TELEPHONE (OUTPATIENT)
Dept: DERMATOLOGY | Facility: CLINIC | Age: 68
End: 2018-08-31

## 2018-08-31 ENCOUNTER — PROCEDURE VISIT (OUTPATIENT)
Dept: DERMATOLOGY | Facility: CLINIC | Age: 68
End: 2018-08-31
Payer: MEDICARE

## 2018-08-31 ENCOUNTER — TELEPHONE (OUTPATIENT)
Dept: PSYCHIATRY | Facility: CLINIC | Age: 68
End: 2018-08-31

## 2018-08-31 DIAGNOSIS — C44.41 BASAL CELL CARCINOMA OF NECK: Primary | ICD-10-CM

## 2018-08-31 PROCEDURE — 12044 INTMD RPR N-HF/GENIT7.6-12.5: CPT | Mod: PBBFAC,PO | Performed by: DERMATOLOGY

## 2018-08-31 PROCEDURE — 17311 MOHS 1 STAGE H/N/HF/G: CPT | Mod: S$PBB,,, | Performed by: DERMATOLOGY

## 2018-08-31 PROCEDURE — 99499 UNLISTED E&M SERVICE: CPT | Mod: S$PBB,,, | Performed by: DERMATOLOGY

## 2018-08-31 PROCEDURE — 12044 INTMD RPR N-HF/GENIT7.6-12.5: CPT | Mod: S$PBB,51,, | Performed by: DERMATOLOGY

## 2018-08-31 PROCEDURE — 17311 MOHS 1 STAGE H/N/HF/G: CPT | Mod: PBBFAC,PO | Performed by: DERMATOLOGY

## 2018-08-31 NOTE — TELEPHONE ENCOUNTER
----- Message from Elana Rivera sent at 8/31/2018  8:35 AM CDT -----  Contact: patient   Needs Advice    Reason for call: Patient states that he would like to speak to nurse.        Communication Preference: [phone]     Post Anesthesia Note





- EVALUATION WITHIN 48HRS OF ANESTHETIC


Vital Signs in Normal Range: Yes


Patient Participated in Evaluation: Yes


Respiratory Function Stable: Yes


Airway Patent: Yes


Cardiovascular Function Stable: Yes


Hydration Status Stable: Yes


Pain Control Satisfactory: Yes


Nausea and Vomiting Control Satisfactory: Yes


Mental Status Recovered: Yes





- COMMENTS/OBSERVATIONS


Free Text/Narrative:: 





no anesthesia complications noted

## 2018-09-04 ENCOUNTER — TELEPHONE (OUTPATIENT)
Dept: DERMATOLOGY | Facility: CLINIC | Age: 68
End: 2018-09-04

## 2018-09-04 ENCOUNTER — NURSE TRIAGE (OUTPATIENT)
Dept: ADMINISTRATIVE | Facility: CLINIC | Age: 68
End: 2018-09-04

## 2018-09-04 NOTE — TELEPHONE ENCOUNTER
9-4-18 Called patient back, he called and spoke with Jossie the RN at [facility], he stated that he needed to be seen because his surgery site is infected, she sent the message to us and I called him and he did not remember calling . I told him that if he thinks it is infected he needs to be seen today, he said no, I told him if he thinks it is infected he needed to be seen before Friday but he denies it and does not want to come in, I told him to make sure to keep it clean with peroxide and vasailine and keep it covered and to do it every day until Friday.

## 2018-09-04 NOTE — TELEPHONE ENCOUNTER
Reason for Disposition   Nursing judgment    Protocols used: ST NO PROTOCOL AVAILABLE - INFORMATION ONLY-A-[place]    Mr. Serna states he needs to have an incision looked at today. Patient states he had a procedure done on 8/31/18 and the area looks infected. Patient states that he needs to be seen immediately. Advised patient to go to the ED. Patient declined to go to the ED. Mr. Serna is requesting to speak with Dr. Ramos or Dr. Barton.

## 2018-09-04 NOTE — TELEPHONE ENCOUNTER
Contacted patient regarding attached message. Patient stated he was on the other line with Dr. Barton's office regarding this matter.

## 2018-09-05 ENCOUNTER — TELEPHONE (OUTPATIENT)
Dept: DERMATOLOGY | Facility: CLINIC | Age: 68
End: 2018-09-05

## 2018-09-05 NOTE — TELEPHONE ENCOUNTER
----- Message from Gail lAvarez sent at 9/4/2018  3:49 PM CDT -----  Contact: [phone]  Patient is requesting a call back from the nurse didn't want to leave details.    Please call the patient upon request at phone number [phone].

## 2018-09-07 ENCOUNTER — CLINICAL SUPPORT (OUTPATIENT)
Dept: DERMATOLOGY | Facility: CLINIC | Age: 68
End: 2018-09-07
Payer: MEDICARE

## 2018-09-07 VITALS — BODY MASS INDEX: 31.08 KG/M2 | WEIGHT: 198 LBS | HEIGHT: 67 IN

## 2018-09-07 DIAGNOSIS — Z48.02 VISIT FOR SUTURE REMOVAL: Primary | ICD-10-CM

## 2018-09-07 PROCEDURE — 99024 POSTOP FOLLOW-UP VISIT: CPT | Mod: POP,,, | Performed by: DERMATOLOGY

## 2018-09-07 PROCEDURE — 99999 PR PBB SHADOW E&M-EST. PATIENT-LVL II: CPT | Mod: PBBFAC,,,

## 2018-09-07 PROCEDURE — 99212 OFFICE O/P EST SF 10 MIN: CPT | Mod: PBBFAC,PO

## 2018-09-07 NOTE — PROGRESS NOTES
Suture Removal note:  CC: 68 y.o. male patient is here for suture removal.     HPI: Patient is s/p excision of BCC from the right chin/neck on 8/31/2018.  Patient reports no problems.    WOUND PE:  Sutures intact.  Wound healing well.  Good approximation of skin edges.  No signs or symptoms of infection.    IMPRESSION:  margins clear per Mohs.    PLAN:  Sutures removed today.  Continue wound care.

## 2018-09-08 ENCOUNTER — HOSPITAL ENCOUNTER (OUTPATIENT)
Facility: HOSPITAL | Age: 68
Discharge: HOME | End: 2018-09-09
Attending: FAMILY MEDICINE | Admitting: INTERNAL MEDICINE
Payer: MEDICARE

## 2018-09-08 DIAGNOSIS — J18.9 PNEUMONIA OF LEFT LOWER LOBE DUE TO INFECTIOUS ORGANISM: Primary | ICD-10-CM

## 2018-09-08 DIAGNOSIS — R41.82 ALTERED MENTAL STATUS: ICD-10-CM

## 2018-09-08 LAB
ALLENS TEST: NORMAL
AMMONIA PLAS-SCNC: 31 UMOL/L
ANION GAP SERPL CALC-SCNC: 9 MMOL/L
BASOPHILS # BLD AUTO: 0.03 K/UL
BASOPHILS NFR BLD: 0.2 %
BUN SERPL-MCNC: 15 MG/DL
CALCIUM SERPL-MCNC: 9.4 MG/DL
CHLORIDE SERPL-SCNC: 100 MMOL/L
CK SERPL-CCNC: 226 U/L
CO2 SERPL-SCNC: 30 MMOL/L
CPAPPEEP: NORMAL
CREAT SERPL-MCNC: 1.2 MG/DL
DIFFERENTIAL METHOD: ABNORMAL
EOSINOPHIL # BLD AUTO: 0.1 K/UL
EOSINOPHIL NFR BLD: 0.8 %
ERYTHROCYTE [DISTWIDTH] IN BLOOD BY AUTOMATED COUNT: 12.7 %
ERYTHROCYTE [SEDIMENTATION RATE] IN BLOOD BY WESTERGREN METHOD: NORMAL MM/H
EST. GFR  (AFRICAN AMERICAN): >60 ML/MIN/1.73 M^2
EST. GFR  (NON AFRICAN AMERICAN): >60 ML/MIN/1.73 M^2
GLUCOSE SERPL-MCNC: 100 MG/DL
HCO3 UR-SCNC: 30.9 MMOL/L
HCT VFR BLD AUTO: 41.4 %
HGB BLD-MCNC: 13.9 G/DL
IMM GRANULOCYTES # BLD AUTO: 0.08 K/UL
IMM GRANULOCYTES NFR BLD AUTO: 0.7 %
LACTATE SERPL-SCNC: 2.5 MMOL/L
LYMPHOCYTES # BLD AUTO: 1 K/UL
LYMPHOCYTES NFR BLD: 8 %
MCH RBC QN AUTO: 33.3 PG
MCHC RBC AUTO-ENTMCNC: 33.6 G/DL
MCV RBC AUTO: 99 FL
MODE: NORMAL
MONOCYTES # BLD AUTO: 0.8 K/UL
MONOCYTES NFR BLD: 6.2 %
NEUTROPHILS # BLD AUTO: 10.3 K/UL
NEUTROPHILS NFR BLD: 84.1 %
NRBC BLD-RTO: 0 /100 WBC
O2DEVICE: NORMAL
PCO2 BLDA: 44.4 MM[HG]
PH SMN: 7.46 [PH]
PIP: NORMAL
PLATELET # BLD AUTO: 179 K/UL
PMV BLD AUTO: 9.5 FL
PO2 BLDA: 63.6 MM[HG]
POC BE: 6.2
POC FIO2: 28
POC FLOW: 2
POC O2 PERCENT: NORMAL %
POC SATURATED O2: 93.7
POC TCO2: 32.2
POC TEMPERATURE: 37
POCT GLUCOSE: 79 MG/DL (ref 70–110)
POTASSIUM SERPL-SCNC: 4.2 MMOL/L
PRESSURE SUPPORT: NORMAL
RBC # BLD AUTO: 4.17 M/UL
SITE: NORMAL
SODIUM SERPL-SCNC: 139 MMOL/L
VT: NORMAL
WBC # BLD AUTO: 12.26 K/UL

## 2018-09-08 PROCEDURE — 83605 ASSAY OF LACTIC ACID: CPT | Mod: 91

## 2018-09-08 PROCEDURE — 96375 TX/PRO/DX INJ NEW DRUG ADDON: CPT

## 2018-09-08 PROCEDURE — G0378 HOSPITAL OBSERVATION PER HR: HCPCS

## 2018-09-08 PROCEDURE — 25000242 PHARM REV CODE 250 ALT 637 W/ HCPCS: Performed by: FAMILY MEDICINE

## 2018-09-08 PROCEDURE — 99900035 HC TECH TIME PER 15 MIN (STAT)

## 2018-09-08 PROCEDURE — 36600 WITHDRAWAL OF ARTERIAL BLOOD: CPT

## 2018-09-08 PROCEDURE — 82550 ASSAY OF CK (CPK): CPT

## 2018-09-08 PROCEDURE — 71045 X-RAY EXAM CHEST 1 VIEW: CPT | Mod: 26,76,, | Performed by: RADIOLOGY

## 2018-09-08 PROCEDURE — 96376 TX/PRO/DX INJ SAME DRUG ADON: CPT

## 2018-09-08 PROCEDURE — 82140 ASSAY OF AMMONIA: CPT

## 2018-09-08 PROCEDURE — 71045 X-RAY EXAM CHEST 1 VIEW: CPT | Mod: TC,FY

## 2018-09-08 PROCEDURE — 63600175 PHARM REV CODE 636 W HCPCS: Performed by: FAMILY MEDICINE

## 2018-09-08 PROCEDURE — 80048 BASIC METABOLIC PNL TOTAL CA: CPT

## 2018-09-08 PROCEDURE — 87040 BLOOD CULTURE FOR BACTERIA: CPT | Mod: 59

## 2018-09-08 PROCEDURE — 99285 EMERGENCY DEPT VISIT HI MDM: CPT | Mod: 25

## 2018-09-08 PROCEDURE — 94640 AIRWAY INHALATION TREATMENT: CPT

## 2018-09-08 PROCEDURE — 96374 THER/PROPH/DIAG INJ IV PUSH: CPT

## 2018-09-08 PROCEDURE — 96361 HYDRATE IV INFUSION ADD-ON: CPT

## 2018-09-08 PROCEDURE — 85025 COMPLETE CBC W/AUTO DIFF WBC: CPT | Mod: 91

## 2018-09-08 PROCEDURE — 25000003 PHARM REV CODE 250: Performed by: FAMILY MEDICINE

## 2018-09-08 PROCEDURE — 96372 THER/PROPH/DIAG INJ SC/IM: CPT | Mod: 59

## 2018-09-08 RX ORDER — IPRATROPIUM BROMIDE AND ALBUTEROL SULFATE 2.5; .5 MG/3ML; MG/3ML
3 SOLUTION RESPIRATORY (INHALATION)
Status: DISCONTINUED | OUTPATIENT
Start: 2018-09-09 | End: 2018-09-09 | Stop reason: HOSPADM

## 2018-09-08 RX ORDER — LUBIPROSTONE 8 UG/1
8 CAPSULE ORAL
Status: DISCONTINUED | OUTPATIENT
Start: 2018-09-09 | End: 2018-09-09 | Stop reason: HOSPADM

## 2018-09-08 RX ORDER — SPIRONOLACTONE 25 MG/1
25 TABLET ORAL DAILY
Status: DISCONTINUED | OUTPATIENT
Start: 2018-09-09 | End: 2018-09-09 | Stop reason: HOSPADM

## 2018-09-08 RX ORDER — FLUTICASONE PROPIONATE 50 MCG
2 SPRAY, SUSPENSION (ML) NASAL DAILY
Status: DISCONTINUED | OUTPATIENT
Start: 2018-09-09 | End: 2018-09-09 | Stop reason: HOSPADM

## 2018-09-08 RX ORDER — LACTULOSE 10 G/15ML
20 SOLUTION ORAL 3 TIMES DAILY
Status: DISCONTINUED | OUTPATIENT
Start: 2018-09-08 | End: 2018-09-09 | Stop reason: HOSPADM

## 2018-09-08 RX ORDER — HYDROMORPHONE HYDROCHLORIDE 2 MG/ML
1 INJECTION, SOLUTION INTRAMUSCULAR; INTRAVENOUS; SUBCUTANEOUS EVERY 4 HOURS PRN
Status: DISCONTINUED | OUTPATIENT
Start: 2018-09-08 | End: 2018-09-09 | Stop reason: HOSPADM

## 2018-09-08 RX ORDER — ZIPRASIDONE HYDROCHLORIDE 40 MG/1
40 CAPSULE ORAL 2 TIMES DAILY WITH MEALS
Status: DISCONTINUED | OUTPATIENT
Start: 2018-09-09 | End: 2018-09-09 | Stop reason: HOSPADM

## 2018-09-08 RX ORDER — ESCITALOPRAM OXALATE 5 MG/1
5 TABLET ORAL DAILY
Status: DISCONTINUED | OUTPATIENT
Start: 2018-09-09 | End: 2018-09-09 | Stop reason: HOSPADM

## 2018-09-08 RX ORDER — ONDANSETRON 2 MG/ML
4 INJECTION INTRAMUSCULAR; INTRAVENOUS EVERY 8 HOURS PRN
Status: DISCONTINUED | OUTPATIENT
Start: 2018-09-08 | End: 2018-09-09 | Stop reason: HOSPADM

## 2018-09-08 RX ORDER — NALOXONE HCL 0.4 MG/ML
0.4 VIAL (ML) INJECTION
Status: COMPLETED | OUTPATIENT
Start: 2018-09-08 | End: 2018-09-08

## 2018-09-08 RX ORDER — IBUPROFEN 200 MG
24 TABLET ORAL
Status: DISCONTINUED | OUTPATIENT
Start: 2018-09-08 | End: 2018-09-09 | Stop reason: HOSPADM

## 2018-09-08 RX ORDER — GLUCAGON 1 MG
1 KIT INJECTION
Status: DISCONTINUED | OUTPATIENT
Start: 2018-09-08 | End: 2018-09-09 | Stop reason: HOSPADM

## 2018-09-08 RX ORDER — SODIUM CHLORIDE 9 MG/ML
INJECTION, SOLUTION INTRAVENOUS CONTINUOUS
Status: DISCONTINUED | OUTPATIENT
Start: 2018-09-08 | End: 2018-09-09 | Stop reason: HOSPADM

## 2018-09-08 RX ORDER — IPRATROPIUM BROMIDE AND ALBUTEROL SULFATE 2.5; .5 MG/3ML; MG/3ML
3 SOLUTION RESPIRATORY (INHALATION)
Status: COMPLETED | OUTPATIENT
Start: 2018-09-08 | End: 2018-09-08

## 2018-09-08 RX ORDER — KETOROLAC TROMETHAMINE 30 MG/ML
15 INJECTION, SOLUTION INTRAMUSCULAR; INTRAVENOUS EVERY 6 HOURS PRN
Status: DISCONTINUED | OUTPATIENT
Start: 2018-09-08 | End: 2018-09-09 | Stop reason: HOSPADM

## 2018-09-08 RX ORDER — ENOXAPARIN SODIUM 100 MG/ML
40 INJECTION SUBCUTANEOUS EVERY 24 HOURS
Status: DISCONTINUED | OUTPATIENT
Start: 2018-09-08 | End: 2018-09-09 | Stop reason: HOSPADM

## 2018-09-08 RX ORDER — IBUPROFEN 200 MG
16 TABLET ORAL
Status: DISCONTINUED | OUTPATIENT
Start: 2018-09-08 | End: 2018-09-09 | Stop reason: HOSPADM

## 2018-09-08 RX ADMIN — LACTULOSE 20 G: 20 SOLUTION ORAL at 11:09

## 2018-09-08 RX ADMIN — SODIUM CHLORIDE: 9 INJECTION, SOLUTION INTRAVENOUS at 10:09

## 2018-09-08 RX ADMIN — NALOXONE HYDROCHLORIDE 0.4 MG: 0.4 INJECTION, SOLUTION INTRAMUSCULAR; INTRAVENOUS; SUBCUTANEOUS at 07:09

## 2018-09-08 RX ADMIN — IPRATROPIUM BROMIDE AND ALBUTEROL SULFATE 3 ML: .5; 3 SOLUTION RESPIRATORY (INHALATION) at 07:09

## 2018-09-08 RX ADMIN — ENOXAPARIN SODIUM 40 MG: 40 INJECTION, SOLUTION INTRAVENOUS; SUBCUTANEOUS at 11:09

## 2018-09-08 NOTE — ED PROVIDER NOTES
Encounter Date: 9/8/2018       History     Chief Complaint   Patient presents with    Altered Mental Status     Patient arrived via EMS, was in the ER previously today with an episode of hypoglycemia and altered mental status.  Patient returns with decreased LOC per EMS staff, generalized weakness.  Glucose was over 100 per EMS, patient complains of shortness of breath and fatigue.  Review of labs and previous workup done, patient's white blood cell count is higher limits of normal, chest x-ray showed no evidence of pneumonia.  Will repeat labs, will perform ABGs due to patient's history of COPD, rule out elevated CO2.          Review of patient's allergies indicates:   Allergen Reactions    Codeine Other (See Comments)    Morphine      Pt denies this 1-10-13     Bactrim [sulfamethoxazole-trimethoprim] Other (See Comments)     Pt cannot take with Methadone    Ciprofloxacin Other (See Comments)     Patient cannot take with methadone     Past Medical History:   Diagnosis Date    Allergy     Arthritis     Asthma     Cancer     skin DR Isrrael Yun    COPD (chronic obstructive pulmonary disease)     Degenerative disc disease     Depression     bipolar dis    Fracture of vertebra due to osteoporosis     Hepatitis C     Hypertension     Liver disease     Nasal bone fx-closed     Nephrolith     Rosacea     Skin cancer      Past Surgical History:   Procedure Laterality Date    CYSTOSCOPY N/A 8/28/2017    [facility]    CYSTOSCOPY N/A 10/10/2016    [facility] at [facility] OR    jaw surgey      KNEE SURGERY      NECK SURGERY      8 procedures on neck    RHIZOTOMY W/ RADIOFREQUENCY ABLATION      TONSILLECTOMY      TRANSRECTAL ULTRASOUND N/A 8/28/2017    [facility] at [facility] OR    tumors      10 removed from scalp     Family History   Problem Relation Age of Onset    Heart disease Father     No Known  Problems Mother     Cancer Paternal Uncle         prostate    Cancer Paternal Aunt         breast    Eczema Neg Hx     Lupus Neg Hx     Psoriasis Neg Hx     Melanoma Neg Hx     Glaucoma Neg Hx      Social History     Tobacco Use    Smoking status: Current Every Day Smoker     Packs/day: 0.50     Years: 30.00     Pack years: 15.00     Types: Cigarettes    Smokeless tobacco: Never Used    Tobacco comment: down to 6 a day   Substance Use Topics    Alcohol use: No    Drug use: No     Review of Systems   Constitutional: Positive for fatigue.   HENT: Negative.    Eyes: Negative.    Respiratory: Positive for shortness of breath.    Cardiovascular: Negative.    Gastrointestinal: Negative.    Endocrine: Negative.    Genitourinary: Negative.    Musculoskeletal: Negative.    Neurological: Negative.    Hematological: Negative.    Psychiatric/Behavioral: Negative.        Physical Exam     Initial Vitals   BP Pulse Resp Temp SpO2   -- -- -- -- --      MAP       --         Physical Exam    Nursing note and vitals reviewed.  Constitutional: He appears well-developed and well-nourished. He is not diaphoretic. No distress.   Ill-appearing obese white male in no distress.   HENT:   Head: Normocephalic and atraumatic.   Eyes: Pupils are equal, round, and reactive to light.   Neck: Normal range of motion. Neck supple.   Cardiovascular: Normal rate, regular rhythm, normal heart sounds and intact distal pulses. Exam reveals no gallop and no friction rub.    No murmur heard.  Pulmonary/Chest: Breath sounds normal. No respiratory distress. He has no wheezes. He has no rhonchi. He has no rales. He exhibits no tenderness.   Abdominal: Soft. Bowel sounds are normal. He exhibits no distension. There is no tenderness. There is no rebound and no guarding.   Musculoskeletal: Normal range of motion. He exhibits no edema.   Neurological: He is alert and oriented to person, place, and time. He has normal strength.   Skin: Skin is warm and  dry. Capillary refill takes less than 2 seconds.   Psychiatric: He has a normal mood and affect. His behavior is normal. Judgment and thought content normal.         ED Course   Procedures  Labs Reviewed   CBC W/ AUTO DIFFERENTIAL   BASIC METABOLIC PANEL   CK          Imaging Results    None                            ED Course as of Sep 09 0152   Sat Sep 08, 2018   1913 Repeat ABG's show improvement, PCO2 44.2 (56.4), pH 7.46 (7.391)  [MD]   1917 Pt remains slightly hypoxic, pO2 63.6  [MD]   2018 Spoke with Dr. Russell, will admit for abx, pain control with dilaudid. Will not resume home opiates due to respiratory depression.   [MD]   2042 Repeat chest x-ray shows haziness left costophrenic angle, consistent with effusion versus infiltrate.  [MD]      ED Course User Index  [MD] Sharon Cisneros MD     Clinical Impression:   The primary encounter diagnosis was Pneumonia of left lower lobe due to infectious organism. A diagnosis of Altered mental status was also pertinent to this visit.                             Sharon Cisneros MD  09/08/18 2041       Sharon Cisneros MD  09/09/18 0152

## 2018-09-09 VITALS
DIASTOLIC BLOOD PRESSURE: 73 MMHG | OXYGEN SATURATION: 95 % | HEART RATE: 71 BPM | TEMPERATURE: 97 F | SYSTOLIC BLOOD PRESSURE: 119 MMHG | WEIGHT: 193.56 LBS | BODY MASS INDEX: 30.38 KG/M2 | HEIGHT: 67 IN | RESPIRATION RATE: 16 BRPM

## 2018-09-09 PROBLEM — K76.82 HEPATIC ENCEPHALOPATHY: Status: ACTIVE | Noted: 2018-09-09

## 2018-09-09 LAB
ANION GAP SERPL CALC-SCNC: 5 MMOL/L
BASOPHILS # BLD AUTO: 0.04 K/UL
BASOPHILS NFR BLD: 0.5 %
BUN SERPL-MCNC: 15 MG/DL
CALCIUM SERPL-MCNC: 8.3 MG/DL
CHLORIDE SERPL-SCNC: 102 MMOL/L
CO2 SERPL-SCNC: 31 MMOL/L
CREAT SERPL-MCNC: 1 MG/DL
DIFFERENTIAL METHOD: ABNORMAL
EOSINOPHIL # BLD AUTO: 0.1 K/UL
EOSINOPHIL NFR BLD: 0.6 %
ERYTHROCYTE [DISTWIDTH] IN BLOOD BY AUTOMATED COUNT: 12.8 %
EST. GFR  (AFRICAN AMERICAN): >60 ML/MIN/1.73 M^2
EST. GFR  (NON AFRICAN AMERICAN): >60 ML/MIN/1.73 M^2
GLUCOSE SERPL-MCNC: 89 MG/DL
HCT VFR BLD AUTO: 38.3 %
HGB BLD-MCNC: 13.3 G/DL
IMM GRANULOCYTES # BLD AUTO: 0.04 K/UL
IMM GRANULOCYTES NFR BLD AUTO: 0.5 %
LACTATE SERPL-SCNC: 1.3 MMOL/L
LYMPHOCYTES # BLD AUTO: 1.4 K/UL
LYMPHOCYTES NFR BLD: 16.4 %
MCH RBC QN AUTO: 34 PG
MCHC RBC AUTO-ENTMCNC: 34.7 G/DL
MCV RBC AUTO: 98 FL
MONOCYTES # BLD AUTO: 0.7 K/UL
MONOCYTES NFR BLD: 8.5 %
NEUTROPHILS # BLD AUTO: 6.2 K/UL
NEUTROPHILS NFR BLD: 73.5 %
NRBC BLD-RTO: 0 /100 WBC
PLATELET # BLD AUTO: 145 K/UL
PMV BLD AUTO: 9.7 FL
POTASSIUM SERPL-SCNC: 3.9 MMOL/L
RBC # BLD AUTO: 3.91 M/UL
SODIUM SERPL-SCNC: 138 MMOL/L
WBC # BLD AUTO: 8.44 K/UL

## 2018-09-09 PROCEDURE — 63600175 PHARM REV CODE 636 W HCPCS

## 2018-09-09 PROCEDURE — 83605 ASSAY OF LACTIC ACID: CPT

## 2018-09-09 PROCEDURE — 96365 THER/PROPH/DIAG IV INF INIT: CPT

## 2018-09-09 PROCEDURE — 25000003 PHARM REV CODE 250

## 2018-09-09 PROCEDURE — 63600175 PHARM REV CODE 636 W HCPCS: Performed by: FAMILY MEDICINE

## 2018-09-09 PROCEDURE — 25000003 PHARM REV CODE 250: Performed by: FAMILY MEDICINE

## 2018-09-09 PROCEDURE — 96375 TX/PRO/DX INJ NEW DRUG ADDON: CPT

## 2018-09-09 PROCEDURE — G0378 HOSPITAL OBSERVATION PER HR: HCPCS

## 2018-09-09 PROCEDURE — 80048 BASIC METABOLIC PNL TOTAL CA: CPT

## 2018-09-09 PROCEDURE — 36415 COLL VENOUS BLD VENIPUNCTURE: CPT

## 2018-09-09 PROCEDURE — 96366 THER/PROPH/DIAG IV INF ADDON: CPT

## 2018-09-09 PROCEDURE — 96361 HYDRATE IV INFUSION ADD-ON: CPT

## 2018-09-09 PROCEDURE — 85025 COMPLETE CBC W/AUTO DIFF WBC: CPT

## 2018-09-09 PROCEDURE — 96376 TX/PRO/DX INJ SAME DRUG ADON: CPT

## 2018-09-09 PROCEDURE — 25000242 PHARM REV CODE 250 ALT 637 W/ HCPCS: Performed by: FAMILY MEDICINE

## 2018-09-09 RX ADMIN — KETOROLAC TROMETHAMINE 15 MG: 30 INJECTION, SOLUTION INTRAMUSCULAR at 04:09

## 2018-09-09 RX ADMIN — IPRATROPIUM BROMIDE AND ALBUTEROL SULFATE 3 ML: 2.5; .5 SOLUTION RESPIRATORY (INHALATION) at 07:09

## 2018-09-09 RX ADMIN — PIPERACILLIN AND TAZOBACTAM 3.38 G: 3; .375 INJECTION, POWDER, LYOPHILIZED, FOR SOLUTION INTRAVENOUS; PARENTERAL at 06:09

## 2018-09-09 RX ADMIN — HYDROMORPHONE HYDROCHLORIDE 1 MG: 2 INJECTION, SOLUTION INTRAMUSCULAR; INTRAVENOUS; SUBCUTANEOUS at 01:09

## 2018-09-09 RX ADMIN — SPIRONOLACTONE 25 MG: 25 TABLET ORAL at 09:09

## 2018-09-09 RX ADMIN — LACTULOSE 20 G: 20 SOLUTION ORAL at 09:09

## 2018-09-09 RX ADMIN — HYDROMORPHONE HYDROCHLORIDE 1 MG: 2 INJECTION, SOLUTION INTRAMUSCULAR; INTRAVENOUS; SUBCUTANEOUS at 07:09

## 2018-09-09 RX ADMIN — ONDANSETRON HYDROCHLORIDE 4 MG: 2 INJECTION, SOLUTION INTRAMUSCULAR; INTRAVENOUS at 04:09

## 2018-09-09 NOTE — SUBJECTIVE & OBJECTIVE
Past Medical History:   Diagnosis Date    Allergy     Arthritis     Asthma     Cancer     skin DR Isrrael Yun    COPD (chronic obstructive pulmonary disease)     Degenerative disc disease     Depression     bipolar dis    Fracture of vertebra due to osteoporosis     Hepatitis C     Hypertension     Liver disease     Nasal bone fx-closed     Nephrolith     Rosacea     Skin cancer        Past Surgical History:   Procedure Laterality Date    CYSTOSCOPY N/A 8/28/2017    Performed by [facility]illo [facility] at [facility]    CYSTOSCOPY N/A 10/10/2016    Per[facility] at [facility]    jaw surgey      KNEE SURGERY      NECK SURGERY      8 procedures on neck    RHIZOTOMY W/ RADIOFREQUENCY ABLATION      TONSILLECTOMY      TRANSRECTAL ULTRASOUND N/A 8/28/2017    Performed by [facility] Castillo [facility] at [facility]    tumors      10 removed from scalp       Review of patient's allergies indicates:   Allergen Reactions    Codeine Other (See Comments)    Morphine      Pt denies this 1-10-13     Bactrim [sulfamethoxazole-trimethoprim] Other (See Comments)     Pt cannot take with Methadone    Ciprofloxacin Other (See Comments)     Patient cannot take with methadone       Current Facility-Administered Medications on File Prior to Encounter   Medication    [COMPLETED] dextrose 50 % in water (D50W) injection 12.5 g     Current Outpatient Medications on File Prior to Encounter   Medication Sig    albuterol-ipratropium 2.5mg-0.5mg/3mL (DUO-NEB) 0.5 mg-3 mg(2.5 mg base)/3 mL nebulizer solution Take 3 mLs by nebulization every 6 (six) hours while awake.    AMITIZA 8 mcg Cap TAKE 1 CAPSULE(8 MCG) BY MOUTH TWICE DAILY    brompheniramine-pseudoeph-DM 2-30-10 mg/5 mL Syrp TAKE 5 ML BY MOUTH FOUR TIMES DAILY AS NEEDED    [START ON 9/14/2018] clonazePAM (KLONOPIN) 2 MG Tab TAKE 1 TABLET BY MOUTH 3 TIMES A DAY    fentaNYL (SUBSYS) 200 mcg/spray Spry Place 200 mcg under the tongue 2 (two)  times daily as needed.    fluticasone (FLONASE) 50 mcg/actuation nasal spray SNIFF 1 SPRAY INTO EACH NOSTRIL ONCE DAILY    fluticasone-salmeterol 500-50 mcg/dose (ADVAIR DISKUS) 500-50 mcg/dose DsDv diskus inhaler Inhale 1 puff into the lungs 2 (two) times daily. Controller    lactulose (CHRONULAC) 10 gram/15 mL solution TAKE 30 MLS BY MOUTH THREE TIMES DAILY    methadone (DOLOPHINE) 10 MG tablet Take 1 tablet (10 mg total) by mouth every 6 (six) hours as needed. Two tablets every morning, two tablets every 12 pm, one-two tablets every evening (Patient taking differently: Take 10 mg by mouth every 8 (eight) hours as needed. )    mirabegron (MYRBETRIQ) 50 mg Tb24 Take 1 tablet (50 mg total) by mouth once daily.    oxycodone (ROXICODONE) 15 MG Tab Take 15 mg by mouth every 8 (eight) hours as needed for Pain.     predniSONE (DELTASONE) 10 MG tablet Take 1 tablet (10 mg total) by mouth once daily.    silodosin (RAPAFLO) 8 mg Cap capsule Take 1 capsule (8 mg total) by mouth once daily.    spironolactone (ALDACTONE) 25 MG tablet Take 1 tablet (25 mg total) by mouth once daily.    tiotropium (SPIRIVA WITH HANDIHALER) 18 mcg inhalation capsule INHALE THE CONTENTS OF 1 CAPSULE BY MOUTH AS PER HANDIHALER    ziprasidone (GEODON) 40 MG Cap Take 1 capsule (40 mg total) by mouth 2 (two) times daily with meals.    albuterol 90 mcg/actuation inhaler Inhale 2 puffs into the lungs every 6 (six) hours as needed for Wheezing.    hydrOXYzine pamoate (VISTARIL) 25 MG Cap Take 1 capsule (25 mg total) by mouth every 8 (eight) hours as needed.    lactulose (CHRONULAC) 10 gram/15 mL solution TAKE 30 ML BY MOUTH THREE TIMES DAILY     Family History     Problem Relation (Age of Onset)    Cancer Paternal Uncle, Paternal Aunt    Heart disease Father    No Known Problems Mother        Tobacco Use    Smoking status: Current Every Day Smoker     Packs/day: 0.50     Years: 30.00     Pack years: 15.00     Types: Cigarettes     Smokeless tobacco: Never Used    Tobacco comment: down to 6 a day   Substance and Sexual Activity    Alcohol use: No    Drug use: No    Sexual activity: Not Currently     Review of Systems   Unable to perform ROS: Acuity of condition   Constitutional: Negative for activity change, appetite change, chills, diaphoresis, fatigue, fever and unexpected weight change.   HENT: Negative for congestion, drooling, hearing loss and trouble swallowing.    Eyes: Negative for pain and visual disturbance.   Respiratory: Negative.  Negative for apnea, cough, choking, chest tightness, shortness of breath and wheezing.    Cardiovascular: Negative for chest pain, palpitations and leg swelling.   Gastrointestinal: Negative for abdominal distention, abdominal pain, blood in stool, constipation, diarrhea, nausea and vomiting.   Endocrine: Negative for polydipsia, polyphagia and polyuria.   Genitourinary: Negative for difficulty urinating, dysuria and flank pain.   Musculoskeletal: Negative for arthralgias, back pain, gait problem, joint swelling, neck pain and neck stiffness.   Skin: Negative for color change, rash and wound.   Allergic/Immunologic: Negative for environmental allergies, food allergies and immunocompromised state.   Neurological: Positive for weakness. Negative for dizziness, syncope, numbness and headaches.   Hematological: Negative for adenopathy.   Psychiatric/Behavioral: Negative for agitation, confusion and sleep disturbance. The patient is not nervous/anxious.         Lethargic limited responses altered mental status     Objective:     Vital Signs (Most Recent):  Temp: 98.6 °F (37 °C) (09/09/18 0320)  Pulse: 76 (09/09/18 0320)  Resp: 19 (09/09/18 0320)  BP: (!) 167/76 (09/09/18 0320)  SpO2: 97 % (09/09/18 0320) Vital Signs (24h Range):  Temp:  [97.9 °F (36.6 °C)-98.7 °F (37.1 °C)] 98.6 °F (37 °C)  Pulse:  [64-94] 76  Resp:  [12-24] 19  SpO2:  [91 %-98 %] 97 %  BP: ()/(66-79) 167/76     Weight: 87.8 kg (193  lb 9 oz)  Body mass index is 30.32 kg/m².    Physical Exam   Constitutional: He is oriented to person, place, and time. He appears well-developed and well-nourished.   HENT:   Head: Normocephalic and atraumatic.   Right Ear: External ear normal.   Left Ear: External ear normal.   Nose: Nose normal.   Eyes: Conjunctivae, EOM and lids are normal. Pupils are equal, round, and reactive to light.   Neck: Trachea normal, normal range of motion and full passive range of motion without pain. Neck supple.   Cardiovascular: Normal rate, regular rhythm, S1 normal, S2 normal, normal heart sounds, intact distal pulses and normal pulses.   Pulmonary/Chest: Effort normal.   Decreased breath sounds bases bilaterally   Abdominal: Soft. Normal aorta and bowel sounds are normal.   Musculoskeletal: Normal range of motion.   Neurological: He is alert and oriented to person, place, and time. He has normal reflexes.   Skin: Skin is warm, dry and intact.   Psychiatric: He has a normal mood and affect. His speech is normal and behavior is normal. Judgment and thought content normal. Cognition and memory are normal.   Nursing note and vitals reviewed.        CRANIAL NERVES     CN III, IV, VI   Pupils are equal, round, and reactive to light.  Extraocular motions are normal.        Significant Labs:   Blood Culture:   Recent Labs   Lab  09/08/18   0120   LABBLOO  No Growth to date     CBC:   Recent Labs   Lab  09/08/18   1200  09/08/18   1848   WBC  11.72  12.26   HGB  14.3  13.9*   HCT  43.9  41.4   PLT  180  179     CMP:   Recent Labs   Lab  09/08/18   1200  09/08/18   1848   NA  138  139   K  4.5  4.2   CL  94*  100   CO2  37*  30*   GLU  65*  100   BUN  12  15   CREATININE  0.9  1.2   CALCIUM  8.8  9.4   PROT  6.3   --    ALBUMIN  3.5   --    BILITOT  0.5   --    ALKPHOS  58   --    AST  26   --    ALT  21   --    ANIONGAP  7*  9   EGFRNONAA  >60.0  >60.0     Cardiac Markers:   Recent Labs   Lab  09/08/18   1200   BNP  68     Lactic Acid:    Recent Labs   Lab  09/08/18   1200  09/08/18   1848  09/09/18   0145   LACTATE  1.9  2.5*  1.3     Urine Studies:   Recent Labs   Lab  09/08/18   1237   COLORU  Yellow   APPEARANCEUA  Clear   PHUR  7.0   SPECGRAV  1.025   PROTEINUA  Negative   GLUCUA  Negative   KETONESU  Negative   BILIRUBINUA  Negative   OCCULTUA  1+*   NITRITE  Negative   UROBILINOGEN  Negative   LEUKOCYTESUR  Negative   RBCUA  7*     All pertinent labs within the past 24 hours have been reviewed.    Significant Imaging: CT: I have reviewed all pertinent results/findings within the past 24 hours and my personal findings are:  Normal  CXR: I have reviewed all pertinent results/findings within the past 24 hours and my personal findings are:  COPD minimal left lower lung infiltrate  EKG: I have reviewed all pertinent results/findings within the past 24 hours and my personal findings are: Normal sinus  I have reviewed all pertinent imaging results/findings within the past 24 hours.

## 2018-09-09 NOTE — DISCHARGE SUMMARY
[facility]  Hospital Medicine  Discharge Summary      Patient Name: Hesham Serna  MRN: [id number]  Admission Date: 9/8/2018  Hospital Length of Stay: 0 days  Discharge Date and Time:  09/09/2018 11:44 AM  Attending Physician: Musa Russell MD   Discharging Provider: Musa Russell MD  Primary Care Provider: Samuel Ramos MD      HPI:   Patient was seen in emergency room on 09/08/2018 by Dr. mcbride and myself.  The patient is a will be discharged at that time.  He has been picking up by the ambulance service apparently the neighbors had called in not seen in several days.  Patient was found bed sound asleep difficult to arouse brought to the emergency room was noted to be hypoglycemic treated and released.  Patient return to the emergency room in the p.m. evaluated by Dr. Cisneros who felt the patient had left lower lobe pneumonia patient does have longstanding COPD chest x-ray minimal infiltrate left lower lobe.  Altered mental status still present.  Review the drug the patient takes at home in his drug screen was reviewed use only positive for benzos.    * No surgery found *      Hospital Course:   Patient will be given IV fluids IV antibiotics Respiratory Care lactic acid will be monitored laboratory values be monitored.   consult PT OT consult.  TB skin test will be applied.  9/9/18.  The patient has multiple questions about why he was admitted what was wrong with him including whether not the infection secondary to skin cancer removed off his neck.  The patient has known history of alcoholic cirrhosis.  At this time the patient has the only finding of slightly altered mental status not sure if this is related to hepatic encephalopathy related to bipolar chronic medications over use of drugs he refuses to go the nursing home at this time.  Chest x-ray is normal there is no signs of left lower lobe pneumonia on chest x-ray.  Will discharge the patient  home since that is what he desires.  The patient is on a multitude of medications that probably are not beneficial to him on the need to be adjusted by his PCP.  The patient has 3 doctors I discussed with him that he needs to be route seen by all 3 doctors over the next couple of weeks and his medications adjusted.  I feel if medications are not adjusted recidivism will continue.  The patient would not allow me to adjust his medications at this time.     Consults:   Consults (From admission, onward)        Status Ordering Provider     Inpatient consult to Registered Dietitian/Nutritionist  Once     Provider:  (Not yet assigned)    Acknowledged NAOMI MUÑOZ     Inpatient consult to   Once     Provider:  (Not yet assigned)    Acknowledged NAOMI MUÑOZ.     IP consult to case management  Once     Provider:  (Not yet assigned)    Acknowledged NAOMI MUÑOZ.          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  Ammonia levels within normal limits.  Baseline laboratory levels were normal.  Discussed the patient's situation him at length and informed him that he does not have an underlying illness that requires hospitalization at this time.  He would most likely benefit from [facility].  Longstanding problems may be difficult to amend.  I discussed with the patient he needs to visit history primary care physicians and have his medications adjusted.  Patient will not allow me to adjust his medications at this time.          Pneumonia of left lower lobe due to infectious organism    Pneumonia is not present on chest x-ray.  Laboratory values within normal limits.  Patient will be discharged home.  Follow-up by PCP.            Final Active Diagnoses:    Diagnosis Date Noted POA    PRINCIPAL PROBLEM:  Hepatic encephalopathy [K72.90] 09/09/2018 Yes    Pneumonia of left  lower lobe due to infectious organism [J18.1] 09/08/2018 Yes      Problems Resolved During this Admission:       Discharged Condition: fair    Disposition:     Follow Up:  Follow-up Information     Samuel Ramos MD.    Specialty:  Family Medicine  Contact information:  [address] [id number] [phone]                 Patient Instructions:   No discharge procedures on file.    Significant Diagnostic Studies: Labs:   BMP:   Recent Labs   Lab  09/08/18 1200 09/08/18 1848 09/09/18   0620   GLU  65*  100  89   NA  138  139  138   K  4.5  4.2  3.9   CL  94*  100  102   CO2  37*  30*  31*   BUN  12  15  15   CREATININE  0.9  1.2  1.0   CALCIUM  8.8  9.4  8.3*   MG  1.8   --    --    , CMP   Recent Labs   Lab  09/08/18 1200 09/08/18 1848 09/09/18   0620   NA  138  139  138   K  4.5  4.2  3.9   CL  94*  100  102   CO2  37*  30*  31*   GLU  65*  100  89   BUN  12  15  15   CREATININE  0.9  1.2  1.0   CALCIUM  8.8  9.4  8.3*   PROT  6.3   --    --    ALBUMIN  3.5   --    --    BILITOT  0.5   --    --    ALKPHOS  58   --    --    AST  26   --    --    ALT  21   --    --    ANIONGAP  7*  9  5*   ESTGFRAFRICA  >60.0  >60.0  >60.0   EGFRNONAA  >60.0  >60.0  >60.0   , CBC   Recent Labs   Lab  09/08/18 1200 09/08/18 1848 09/09/18   0620   WBC  11.72  12.26  8.44   HGB  14.3  13.9*  13.3*   HCT  43.9  41.4  38.3*   PLT  180  179  145*    and All labs within the past 24 hours have been reviewed  Radiology: CT scan: CT of the head shows microvascular changes mild ischemia.    Pending Diagnostic Studies:     None         Medications:  Reconciled Home Medications:      Medication List      CHANGE how you take these medications    methadone 10 MG tablet  Commonly known as:  DOLOPHINE  Take 1 tablet (10 mg total) by mouth every 6 (six) hours as needed. Two tablets every morning, two tablets every 12 pm, one-two tablets every evening  What changed:    · when to take this  · additional instructions         CONTINUE taking these medications    albuterol 90 mcg/actuation inhaler  Inhale 2 puffs into the lungs every 6 (six) hours as needed for Wheezing.     albuterol-ipratropium 2.5 mg-0.5 mg/3 mL nebulizer solution  Commonly known as:  DUO-NEB  Take 3 mLs by nebulization every 6 (six) hours while awake.     AMITIZA 8 MCG Cap  Generic drug:  lubiprostone  TAKE 1 CAPSULE(8 MCG) BY MOUTH TWICE DAILY     brompheniramine-pseudoeph-DM 2-30-10 mg/5 mL Syrp  TAKE 5 ML BY MOUTH FOUR TIMES DAILY AS NEEDED     clonazePAM 2 MG Tab  Commonly known as:  KLONOPIN  TAKE 1 TABLET BY MOUTH 3 TIMES A DAY  Start taking on:  9/14/2018     fluticasone 50 mcg/actuation nasal spray  Commonly known as:  FLONASE  SNIFF 1 SPRAY INTO EACH NOSTRIL ONCE DAILY     fluticasone-salmeterol 500-50 mcg/dose 500-50 mcg/dose Dsdv diskus inhaler  Commonly known as:  ADVAIR DISKUS  Inhale 1 puff into the lungs 2 (two) times daily. Controller     hydrOXYzine pamoate 25 MG Cap  Commonly known as:  VISTARIL  Take 1 capsule (25 mg total) by mouth every 8 (eight) hours as needed.     * lactulose 10 gram/15 mL solution  Commonly known as:  CHRONULAC  TAKE 30 MLS BY MOUTH THREE TIMES DAILY     * lactulose 10 gram/15 mL solution  Commonly known as:  CHRONULAC  TAKE 30 ML BY MOUTH THREE TIMES DAILY     mirabegron 50 mg Tb24  Commonly known as:  MYRBETRIQ  Take 1 tablet (50 mg total) by mouth once daily.     oxyCODONE 15 MG Tab  Commonly known as:  ROXICODONE  Take 15 mg by mouth every 8 (eight) hours as needed for Pain.     predniSONE 10 MG tablet  Commonly known as:  DELTASONE  Take 1 tablet (10 mg total) by mouth once daily.     silodosin 8 mg Cap capsule  Commonly known as:  RAPAFLO  Take 1 capsule (8 mg total) by mouth once daily.     spironolactone 25 MG tablet  Commonly known as:  ALDACTONE  Take 1 tablet (25 mg total) by mouth once daily.     SUBSYS 200 mcg/spray Spry  Generic drug:  fentaNYL  Place 200 mcg under the tongue 2 (two) times daily as needed.      tiotropium 18 mcg inhalation capsule  Commonly known as:  SPIRIVA WITH HANDIHALER  INHALE THE CONTENTS OF 1 CAPSULE BY MOUTH AS PER HANDIHALER     ziprasidone 40 MG Cap  Commonly known as:  GEODON  Take 1 capsule (40 mg total) by mouth 2 (two) times daily with meals.         * This list has 2 medication(s) that are the same as other medications prescribed for you. Read the directions carefully, and ask your doctor or other care provider to review them with you.            STOP taking these medications    escitalopram oxalate 5 MG Tab  Commonly known as:  LEXAPRO     fluocinonide 0.05% 0.05 % cream  Commonly known as:  LIDEX            Indwelling Lines/Drains at time of discharge:   Lines/Drains/Airways          None          Time spent on the discharge of patient: 50 minutes  Patient was seen and examined on the date of discharge and determined to be suitable for discharge.         Musa Russell MD  Department of Hospital Medicine  [facility] - Med Surg

## 2018-09-09 NOTE — PLAN OF CARE
Problem: Patient Care Overview  Goal: Interdisciplinary Rounds/Family Conf  Outcome: Ongoing (interventions implemented as appropriate)   09/09/18 0024   Interdisciplinary Rounds/Family Conf   Participants nursing

## 2018-09-09 NOTE — PLAN OF CARE
Problem: Pneumonia (Adult)  Intervention: Maximize Oxygenation/Ventilation/Perfusion   09/09/18 0023   Positioning   Head of Bed (HOB) HOB at 45 degrees   Respiratory Interventions   Airway/Ventilation Management airway patency maintained

## 2018-09-09 NOTE — HPI
Patient was seen in emergency room on 09/08/2018 by Dr. mcbride and myself.  The patient is a will be discharged at that time.  He has been picking up by the ambulance service apparently the neighbors had called in not seen in several days.  Patient was found bed sound asleep difficult to arouse brought to the emergency room was noted to be hypoglycemic treated and released.  Patient return to the emergency room in the p.m. evaluated by Dr. Cisneros who felt the patient had left lower lobe pneumonia patient does have longstanding COPD chest x-ray minimal infiltrate left lower lobe.  Altered mental status still present.  Review the drug the patient takes at home in his drug screen was reviewed use only positive for benzos.

## 2018-09-09 NOTE — PLAN OF CARE
Problem: Pneumonia (Adult)  Intervention: Monitor/Manage Fluid Electrolyte Balance   09/09/18 0024   Nutrition Interventions   Fluid/Electrolyte Management intravenous fluid replacement initiated

## 2018-09-09 NOTE — NURSING
Discharge orders received and reviewed with patient and caregivers. Pt/caregivers verbalized understanding. 22G L Shoulder dc'ed, catheter intact, and pressure dressing applied. Pt tolerated procedure well. Pt medications returned to patient/caregivers. Pt's back brace and home O2 placed on patient. Pt transported off unit to POV via WC.

## 2018-09-09 NOTE — PLAN OF CARE
Problem: Pneumonia (Adult)  Goal: Signs and Symptoms of Listed Potential Problems Will be Absent, Minimized or Managed (Pneumonia)  Signs and symptoms of listed potential problems will be absent, minimized or managed by discharge/transition of care (reference Pneumonia (Adult) CPG).  Outcome: Ongoing (interventions implemented as appropriate)   09/09/18 0020   Pneumonia   Problems Assessed (Pneumonia) fluid/electrolyte imbalance;respiratory compromise

## 2018-09-09 NOTE — NURSING
lew present. No vein found attempted to draw lactic level unsuccessful. Notified charli supervisor.

## 2018-09-09 NOTE — H&P
[facility] Medicine  History & Physical    Patient Name: Hesham Serna  MRN: [id number]  Admission Date: 9/8/2018  Attending Physician: Musa Russell MD   Primary Care Provider: Samuel Ramos MD         Patient information was obtained from patient, EMS personnel and ER records.     Subjective:     Principal Problem:<principal problem not specified>    Chief Complaint:   Chief Complaint   Patient presents with    Altered Mental Status        HPI: Patient was seen in emergency room on 09/08/2018 by Dr. mcbride and myself.  The patient is a will be discharged at that time.  He has been picking up by the ambulance service apparently the neighbors had called in not seen in several days.  Patient was found bed sound asleep difficult to arouse brought to the emergency room was noted to be hypoglycemic treated and released.  Patient return to the emergency room in the p.m. evaluated by Dr. Cisneros who felt the patient had left lower lobe pneumonia patient does have longstanding COPD chest x-ray minimal infiltrate left lower lobe.  Altered mental status still present.  Review the drug the patient takes at home in his drug screen was reviewed use only positive for benzos.    Past Medical History:   Diagnosis Date    Allergy     Arthritis     Asthma     Cancer     skin DR Isrrael Yun    COPD (chronic obstructive pulmonary disease)     Degenerative disc disease     Depression     bipolar dis    Fracture of vertebra due to osteoporosis     Hepatitis C     Hypertension     Liver disease     Nasal bone fx-closed     Nephrolith     Rosacea     Skin cancer        Past Surgical History:   Procedure Laterality Date    CYSTOSCOPY N/A 8/28/2017    [facility]    CYSTOSCOPY N/A 10/10/2016    [facility]    jaw surgey      KNEE SURGERY      NECK SURGERY      8 procedures on neck     RHIZOTOMY W/ RADIOFREQUENCY ABLATION      TONSILLECTOMY      TRANSRECTAL ULTRASOUND N/A 8/28/2017    [facility]    tumors      10 removed from scalp       Review of patient's allergies indicates:   Allergen Reactions    Codeine Other (See Comments)    Morphine      Pt denies this 1-10-13     Bactrim [sulfamethoxazole-trimethoprim] Other (See Comments)     Pt cannot take with Methadone    Ciprofloxacin Other (See Comments)     Patient cannot take with methadone       Current Facility-Administered Medications on File Prior to Encounter   Medication    [COMPLETED] dextrose 50 % in water (D50W) injection 12.5 g     Current Outpatient Medications on File Prior to Encounter   Medication Sig    albuterol-ipratropium 2.5mg-0.5mg/3mL (DUO-NEB) 0.5 mg-3 mg(2.5 mg base)/3 mL nebulizer solution Take 3 mLs by nebulization every 6 (six) hours while awake.    AMITIZA 8 mcg Cap TAKE 1 CAPSULE(8 MCG) BY MOUTH TWICE DAILY    brompheniramine-pseudoeph-DM 2-30-10 mg/5 mL Syrp TAKE 5 ML BY MOUTH FOUR TIMES DAILY AS NEEDED    [START ON 9/14/2018] clonazePAM (KLONOPIN) 2 MG Tab TAKE 1 TABLET BY MOUTH 3 TIMES A DAY    fentaNYL (SUBSYS) 200 mcg/spray [place] 200 mcg under the tongue 2 (two) times daily as needed.    fluticasone (FLONASE) 50 mcg/actuation nasal spray SNIFF 1 SPRAY INTO EACH NOSTRIL ONCE DAILY    fluticasone-salmeterol 500-50 mcg/dose (ADVAIR DISKUS) 500-50 mcg/dose DsDv diskus inhaler Inhale 1 puff into the lungs 2 (two) times daily. Controller    lactulose (CHRONULAC) 10 gram/15 mL solution TAKE 30 MLS BY MOUTH THREE TIMES DAILY    methadone (DOLOPHINE) 10 MG tablet Take 1 tablet (10 mg total) by mouth every 6 (six) hours as needed. Two tablets every morning, two tablets every 12 pm, one-two tablets every evening (Patient taking differently: Take 10 mg by mouth every 8 (eight) hours as needed. )    mirabegron (MYRBETRIQ) 50 mg Tb24 Take 1 tablet (50 mg total) by  mouth once daily.    oxycodone (ROXICODONE) 15 MG Tab Take 15 mg by mouth every 8 (eight) hours as needed for Pain.     predniSONE (DELTASONE) 10 MG tablet Take 1 tablet (10 mg total) by mouth once daily.    silodosin (RAPAFLO) 8 mg Cap capsule Take 1 capsule (8 mg total) by mouth once daily.    spironolactone (ALDACTONE) 25 MG tablet Take 1 tablet (25 mg total) by mouth once daily.    tiotropium (SPIRIVA WITH HANDIHALER) 18 mcg inhalation capsule INHALE THE CONTENTS OF 1 CAPSULE BY MOUTH AS PER HANDIHALER    ziprasidone (GEODON) 40 MG Cap Take 1 capsule (40 mg total) by mouth 2 (two) times daily with meals.    albuterol 90 mcg/actuation inhaler Inhale 2 puffs into the lungs every 6 (six) hours as needed for Wheezing.    hydrOXYzine pamoate (VISTARIL) 25 MG Cap Take 1 capsule (25 mg total) by mouth every 8 (eight) hours as needed.    lactulose (CHRONULAC) 10 gram/15 mL solution TAKE 30 ML BY MOUTH THREE TIMES DAILY     Family History     Problem Relation (Age of Onset)    Cancer Paternal Uncle, Paternal Aunt    Heart disease Father    No Known Problems Mother        Tobacco Use    Smoking status: Current Every Day Smoker     Packs/day: 0.50     Years: 30.00     Pack years: 15.00     Types: Cigarettes    Smokeless tobacco: Never Used    Tobacco comment: down to 6 a day   Substance and Sexual Activity    Alcohol use: No    Drug use: No    Sexual activity: Not Currently     Review of Systems   Unable to perform ROS: Acuity of condition   Constitutional: Negative for activity change, appetite change, chills, diaphoresis, fatigue, fever and unexpected weight change.   HENT: Negative for congestion, drooling, hearing loss and trouble swallowing.    Eyes: Negative for pain and visual disturbance.   Respiratory: Negative.  Negative for apnea, cough, choking, chest tightness, shortness of breath and wheezing.    Cardiovascular: Negative for chest pain, palpitations and leg swelling.   Gastrointestinal:  Negative for abdominal distention, abdominal pain, blood in stool, constipation, diarrhea, nausea and vomiting.   Endocrine: Negative for polydipsia, polyphagia and polyuria.   Genitourinary: Negative for difficulty urinating, dysuria and flank pain.   Musculoskeletal: Negative for arthralgias, back pain, gait problem, joint swelling, neck pain and neck stiffness.   Skin: Negative for color change, rash and wound.   Allergic/Immunologic: Negative for environmental allergies, food allergies and immunocompromised state.   Neurological: Positive for weakness. Negative for dizziness, syncope, numbness and headaches.   Hematological: Negative for adenopathy.   Psychiatric/Behavioral: Negative for agitation, confusion and sleep disturbance. The patient is not nervous/anxious.         Lethargic limited responses altered mental status     Objective:     Vital Signs (Most Recent):  Temp: 98.6 °F (37 °C) (09/09/18 0320)  Pulse: 76 (09/09/18 0320)  Resp: 19 (09/09/18 0320)  BP: (!) 167/76 (09/09/18 0320)  SpO2: 97 % (09/09/18 0320) Vital Signs (24h Range):  Temp:  [97.9 °F (36.6 °C)-98.7 °F (37.1 °C)] 98.6 °F (37 °C)  Pulse:  [64-94] 76  Resp:  [12-24] 19  SpO2:  [91 %-98 %] 97 %  BP: ()/(66-79) 167/76     Weight: 87.8 kg (193 lb 9 oz)  Body mass index is 30.32 kg/m².    Physical Exam   Constitutional: He is oriented to person, place, and time. He appears well-developed and well-nourished.   HENT:   Head: Normocephalic and atraumatic.   Right Ear: External ear normal.   Left Ear: External ear normal.   Nose: Nose normal.   Eyes: Conjunctivae, EOM and lids are normal. Pupils are equal, round, and reactive to light.   Neck: Trachea normal, normal range of motion and full passive range of motion without pain. Neck supple.   Cardiovascular: Normal rate, regular rhythm, S1 normal, S2 normal, normal heart sounds, intact distal pulses and normal pulses.   Pulmonary/Chest: Effort normal.   Decreased breath sounds bases  bilaterally   Abdominal: Soft. Normal aorta and bowel sounds are normal.   Musculoskeletal: Normal range of motion.   Neurological: He is alert and oriented to person, place, and time. He has normal reflexes.   Skin: Skin is warm, dry and intact.   Psychiatric: He has a normal mood and affect. His speech is normal and behavior is normal. Judgment and thought content normal. Cognition and memory are normal.   Nursing note and vitals reviewed.        CRANIAL NERVES     CN III, IV, VI   Pupils are equal, round, and reactive to light.  Extraocular motions are normal.        Significant Labs:   Blood Culture:   Recent Labs   Lab  09/08/18   0120   LABBLOO  No Growth to date     CBC:   Recent Labs   Lab  09/08/18   1200  09/08/18   1848   WBC  11.72  12.26   HGB  14.3  13.9*   HCT  43.9  41.4   PLT  180  179     CMP:   Recent Labs   Lab  09/08/18   1200  09/08/18   1848   NA  138  139   K  4.5  4.2   CL  94*  100   CO2  37*  30*   GLU  65*  100   BUN  12  15   CREATININE  0.9  1.2   CALCIUM  8.8  9.4   PROT  6.3   --    ALBUMIN  3.5   --    BILITOT  0.5   --    ALKPHOS  58   --    AST  26   --    ALT  21   --    ANIONGAP  7*  9   EGFRNONAA  >60.0  >60.0     Cardiac Markers:   Recent Labs   Lab  09/08/18   1200   BNP  68     Lactic Acid:   Recent Labs   Lab  09/08/18   1200  09/08/18   1848  09/09/18   0145   LACTATE  1.9  2.5*  1.3     Urine Studies:   Recent Labs   Lab  09/08/18   1237   COLORU  Yellow   APPEARANCEUA  Clear   PHUR  7.0   SPECGRAV  1.025   PROTEINUA  Negative   GLUCUA  Negative   KETONESU  Negative   BILIRUBINUA  Negative   OCCULTUA  1+*   NITRITE  Negative   UROBILINOGEN  Negative   LEUKOCYTESUR  Negative   RBCUA  7*     All pertinent labs within the past 24 hours have been reviewed.    Significant Imaging: CT: I have reviewed all pertinent results/findings within the past 24 hours and my personal findings are:  Normal  CXR: I have reviewed all pertinent results/findings within the past 24 hours and my  personal findings are:  COPD minimal left lower lung infiltrate  EKG: I have reviewed all pertinent results/findings within the past 24 hours and my personal findings are: Normal sinus  I have reviewed all pertinent imaging results/findings within the past 24 hours.    Assessment/Plan:     Pneumonia of left lower lobe due to infectious organism                VTE Risk Mitigation (From admission, onward)        Ordered     enoxaparin injection 40 mg  Daily      09/08/18 2154     IP VTE HIGH RISK PATIENT  Once      09/08/18 2154             Musa Russell MD  Department of Hospital Medicine   [facility] - Med Surg

## 2018-09-09 NOTE — ASSESSMENT & PLAN NOTE
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  Ammonia levels within normal limits.  Baseline laboratory levels were normal.  Discussed the patient's situation him at length and informed him that he does not have an underlying illness that requires hospitalization at this time.  He would most likely benefit from [facility] psych.  Longstanding problems may be difficult to amend.  I discussed with the patient he needs to visit history primary care physicians and have his medications adjusted.  Patient will not allow me to adjust his medications at this time.

## 2018-09-09 NOTE — PLAN OF CARE
Problem: Patient Care Overview  Goal: Plan of Care Review  Outcome: Ongoing (interventions implemented as appropriate)   09/09/18 0022   Coping/Psychosocial   Plan Of Care Reviewed With patient

## 2018-09-09 NOTE — PLAN OF CARE
Problem: Fall Risk (Adult)  Goal: Absence of Falls  Patient will demonstrate the desired outcomes by discharge/transition of care.  Outcome: Ongoing (interventions implemented as appropriate)   09/09/18 1551   Fall Risk (Adult)   Absence of Falls making progress toward outcome

## 2018-09-09 NOTE — PLAN OF CARE
Problem: Fall Risk (Adult)  Goal: Absence of Falls  Patient will demonstrate the desired outcomes by discharge/transition of care.  Outcome: Ongoing (interventions implemented as appropriate)   09/09/18 0022   Fall Risk (Adult)   Absence of Falls making progress toward outcome

## 2018-09-09 NOTE — ASSESSMENT & PLAN NOTE
Pneumonia is not present on chest x-ray.  Laboratory values within normal limits.  Patient will be discharged home.  Follow-up by PCP.

## 2018-09-09 NOTE — HOSPITAL COURSE
Patient will be given IV fluids IV antibiotics Respiratory Care lactic acid will be monitored laboratory values be monitored.   consult PT OT consult.  TB skin test will be applied.  9/9/18.  The patient has multiple questions about why he was admitted what was wrong with him including whether not the infection secondary to skin cancer removed off his neck.  The patient has known history of alcoholic cirrhosis.  At this time the patient has the only finding of slightly altered mental status not sure if this is related to hepatic encephalopathy related to bipolar chronic medications over use of drugs he refuses to go the nursing home at this time.  Chest x-ray is normal there is no signs of left lower lobe pneumonia on chest x-ray.  Will discharge the patient home since that is what he desires.  The patient is on a multitude of medications that probably are not beneficial to him on the need to be adjusted by his PCP.  The patient has 3 doctors I discussed with him that he needs to be route seen by all 3 doctors over the next couple of weeks and his medications adjusted.  I feel if medications are not adjusted recidivism will continue.  The patient would not allow me to adjust his medications at this time.

## 2018-09-09 NOTE — PLAN OF CARE
Problem: Fall Risk (Adult)  Intervention: Monitor/Assist with Self Care   09/08/18 1027   Functional Level Current   Ambulation 3 - assistive equipment and person   Transferring 3 - assistive equipment and person   Toileting 3 - assistive equipment and person   Bathing 3 - assistive equipment and person   Dressing 3 - assistive equipment and person   Eating 0 - independent   Communication 0 - understands/communicates without difficulty   Swallowing 0 - swallows foods/liquids without difficulty   Activity   Activity Assistance Provided assistance, 1 person

## 2018-09-10 ENCOUNTER — TELEPHONE (OUTPATIENT)
Dept: FAMILY MEDICINE | Facility: CLINIC | Age: 68
End: 2018-09-10

## 2018-09-10 NOTE — PLAN OF CARE
09/10/18 1522   Final Note   Assessment Type Final Discharge Note   Discharge Disposition Home   What phone number can be called within the next 1-3 days to see how you are doing after discharge? [phone]   Hospital Follow Up  Appt(s) scheduled? No   Discharge plans and expectations educations in teach back method with documentation complete? Yes   Attempt to make follow up appointment with Dr Ramos. No appointment before Oct 4 so someone from his office will patient with appointment. Patient notified that someone will call him. Demonstrated understanding by verbal feedback. Denies any other discharge needs.

## 2018-09-10 NOTE — TELEPHONE ENCOUNTER
----- Message from Yamile Charles sent at 9/10/2018  3:19 PM CDT -----  Contact: [facility] calling state pt needs a 1-2 week hospital follow up . Pt was discharged 9/9/17 and nothing is coming up for anyone in the pod till 10/4.Please notify the pt at .[phone] (home)

## 2018-09-11 ENCOUNTER — OFFICE VISIT (OUTPATIENT)
Dept: UROLOGY | Facility: CLINIC | Age: 68
End: 2018-09-11
Payer: MEDICARE

## 2018-09-11 ENCOUNTER — APPOINTMENT (OUTPATIENT)
Dept: LAB | Facility: HOSPITAL | Age: 68
End: 2018-09-11
Attending: UROLOGY
Payer: MEDICARE

## 2018-09-11 ENCOUNTER — TELEPHONE (OUTPATIENT)
Dept: PSYCHIATRY | Facility: CLINIC | Age: 68
End: 2018-09-11

## 2018-09-11 VITALS
HEIGHT: 67 IN | DIASTOLIC BLOOD PRESSURE: 82 MMHG | TEMPERATURE: 98 F | WEIGHT: 203.06 LBS | HEART RATE: 75 BPM | SYSTOLIC BLOOD PRESSURE: 136 MMHG | BODY MASS INDEX: 31.87 KG/M2

## 2018-09-11 DIAGNOSIS — N20.0 NEPHROLITHIASIS: ICD-10-CM

## 2018-09-11 DIAGNOSIS — R82.89 ABNORMAL URINE CYTOLOGY: ICD-10-CM

## 2018-09-11 DIAGNOSIS — N32.81 OAB (OVERACTIVE BLADDER): ICD-10-CM

## 2018-09-11 DIAGNOSIS — N39.0 RECURRENT UTI: Primary | ICD-10-CM

## 2018-09-11 DIAGNOSIS — N40.0 BENIGN PROSTATIC HYPERPLASIA, UNSPECIFIED WHETHER LOWER URINARY TRACT SYMPTOMS PRESENT: ICD-10-CM

## 2018-09-11 DIAGNOSIS — R31.0 GROSS HEMATURIA: ICD-10-CM

## 2018-09-11 LAB
BILIRUB SERPL-MCNC: ABNORMAL MG/DL
BLOOD URINE, POC: ABNORMAL
COLOR, POC UA: YELLOW
GLUCOSE UR QL STRIP: ABNORMAL
KETONES UR QL STRIP: ABNORMAL
LEUKOCYTE ESTERASE URINE, POC: ABNORMAL
NITRITE, POC UA: ABNORMAL
PH, POC UA: 6
PROTEIN, POC: ABNORMAL
SPECIFIC GRAVITY, POC UA: 1.01
UROBILINOGEN, POC UA: ABNORMAL

## 2018-09-11 PROCEDURE — 99214 OFFICE O/P EST MOD 30 MIN: CPT | Mod: PBBFAC,PN,25 | Performed by: UROLOGY

## 2018-09-11 PROCEDURE — 81002 URINALYSIS NONAUTO W/O SCOPE: CPT | Mod: PBBFAC,PN | Performed by: UROLOGY

## 2018-09-11 PROCEDURE — 99999 PR PBB SHADOW E&M-EST. PATIENT-LVL IV: CPT | Mod: PBBFAC,,, | Performed by: UROLOGY

## 2018-09-11 PROCEDURE — 88112 CYTOPATH CELL ENHANCE TECH: CPT | Performed by: PATHOLOGY

## 2018-09-11 PROCEDURE — 87086 URINE CULTURE/COLONY COUNT: CPT

## 2018-09-11 PROCEDURE — 99214 OFFICE O/P EST MOD 30 MIN: CPT | Mod: S$PBB,,, | Performed by: UROLOGY

## 2018-09-11 NOTE — TELEPHONE ENCOUNTER
JUST FYI  FOR DR QUEEN WHEN  BACK IN CLINIC    Patient called and stated he wanted to talk to Dr Queen and let her know what happen to him  He states that he was in the hospital with his cancer and he had to have 28 stitches and they removed his stitches and he was sent home. Then he was home and he states his friend found him on the floor passed out and they called 911 and he went to the hospital [facility]. He states they were trying to find out why he was passed out and they couldn't find anything but 2 hours after he was there he went out again and they had to bring him back. He states that he was dead and they brought him back. He states he was in the hospital for 4 days and they sent him home stated they didn't know what was wrong or what happen. Patient states that he had an appointment with Dr Queen on 09/13/18 but cancelled it do to he didn't know when he would be better from the cancer surgery, so he wants Dr Queen to call him. I explained that she was out of clinic on a family emergency and will be back in clinic tomorrow.  I looked and saw that we had a cancellation for an appointment on 09/13/18 and offered to the patient he stated he could not make it due to feeling weak and cannot drive so he wanted Dr queen to call him for his appointment and I explained that Dr Queen does not do phone appointments.  So patient said to make for sometime in October so scheduled patient for 10/22/18 @ 11 am.  Patient was ok with this appointment and just letting Dr Queen know about what happen.  As we were hanging up he said I will hear from Dr Queen tomorrow? I explained that Dr Queen does not do phone appointments again to patient and he was ok he said he will see her when he is doing better and can drive in October.  He said he was staying at his moms and he will be ok and see Dr Queen in October and hung up.

## 2018-09-11 NOTE — PROGRESS NOTES
[facility] Progress Note - [place]  Urology Group: Anna/Robert/Cassie/Radha  PCP: Dr.Baez [facility]    Chief Complaint: GH    Subjective:        HPI: Hesham Serna is a 68 y.o. male presents with active hep     Recently discharged (9/9/18) from hospital for suspected pneumoniae, but f/u cxr was negative, hypoglycemia and possible hepatic encephalopathy. Feels tired. No fevers. He also has had basal cell carcinoma removed.      Gross hematuria and abnormal urine cytology  ct urogram 11/14/16: herniation into left testicle, 4mm left renal stone. Repeat ctu 8/21/17 showed a 6mm stone in his left kidney and herniating bladder into testicle. GHould be related to bladder herniating into testicular causing bladder irritation. However they want to hold off on treatment of this.   urine cultures have been positive in the past, ua today suspicious. Pt does CIC.   urine cytology 9/26/16 - negative .7/17/17 - negative,  8/10/17 - urothelial cell atypia, supicious for urothelial high grade tumor.He has a h/o smoking (55 packs, 2-3 packs a day). Repeat urine cytology 8/17/17 abnormal  cystoscopy 8/28/17 urethral meatus with tear, small prostate and no b lobar hypertrophy, + inflammation on posterior bladder wall and floor that was biopsied. Path showed cystitis cystica.   Voided urine 9/21/17: Highly atypical single cells are present, suspicious for transitional cell carcinoma.These features can also be seen in the context of instrumentation.   Catheterized urine 4/9/18:Negative for malignant cells.     Denies any gross hematuria recently. No frequency or urgency. Still smoking.     H/o Elevated residuals and OAB, BPH  Pt with history of urinary retention when he first started seeing me. UDS initially  showed atonic bladder. He had gone from doing cic intermittently then up to 10x a day for frequency and urgency. He has been on myrbetriq at least since 2016. He has had  intermittent recurrent uti's and gross hematuria (likely from uti's but could be from stones, workup initially showed abnormal cytology but repeated 4/9/18 and was negative). We changed him from flomax to rapaflo at some point and after residuals were checked a few times we discontinued CIC. His residuals by in and out cath have remained less than 20cc (checked 4/9/18 and 6/13/18).   He had a cysto in 8/2017 which showed no enlarged prostate but states when he stops rapaflo his flow slows. He's had uroflows with avg flow flow of 7mL/s.     No incontinence. No longer catheterizing. Voiding 5x a day. Feels like he is emptying. pvr 6/18 was 5cc. Still on myrbetriq and rapaflo      Recurrent uti  -no c/o uti today, last 2 urines showed proteus  -last imaging was kub 6/12/18 which showed left kidney stone c/w ct 8/2017 showing Left kidney stone  -RF: CIC, stone    Urinalysis today void: tr blood  Urine culture   9/8/18  No cx, 7 rbcs  6/12/18 P.mirabilis, void&cath: 2+leuk/1+prot/50 blood -  (dysuria, urgency, UUI)  4/19/18 Cath:P.mirabilis, void: tr leuk/50 blood/1+glucose, cath: none (asymptomatic). I&o cath with 16fr: 20cc  10/17/17 Ng, void: tr blood  9/12/17 E.cloacae  8/28/17 ng  8/21/17            k.pneumonia  8/10/17                      ng  1/16/17  ng  10/21/16  E.cloacae  9/19/16  P.mirabilis  7/20/16  s.haemolyticus  3/23/16   Ng  12/16/15  ng     Nephrolithiasis  CTRSS on 6/4/15 showed left 4mm renal stone. KUB on 4/12/16 showed stone is radioopaque. Ctu 11/14/16 showed this again. ctu 8/21/17 showed stone is now 6mm stone in his left kidney  Scheduled for eswl but never proceeded with surgery. Stated he wanted to hold off until his back is fixed. Still no c/o left flank pain          REVIEW OF SYSTEMS:  General ROS: no fevers, no chills  Psychological ROS: no depression  Endocrine ROS: no heat or cold  Respiratory ROS: + SOB  Cardiovascular ROS: no CP  Gastrointestinal ROS: no abdominal pain, + constipation  is worsening on lactulose, no diarrhea, no BRBPR  Musculoskeletal ROS: no muscle pain, wears back brace  Neurological ROS: no headaches  Dermatological ROS: no rashes  HEENT: noglasses, no sinus   ROS: per HPI    The past medical, surgical, social and family hx were reviewed. There have been any changes. He says he's had 3 root canals done  Cataracts? none    Urologic meds: myrbetriq 50mg and rapaflo  Anticoagulation: No    Objective:     Vitals:    09/11/18 0901   BP: 136/82   Pulse: 75   Temp: 97.9 °F (36.6 °C)          Physical Exam   Constitutional: He is oriented to person, place, and time. He appears well-developed and well-nourished. He is cooperative. No distress.   HENT:   Head: Normocephalic and atraumatic.   Eyes: Pupils are equal, round, and reactive to light.   Cardiovascular: Normal rate and regular rhythm.    Pulmonary/Chest: Effort normal.   Abdominal: Soft. Normal appearance.   Musculoskeletal: Normal range of motion.   Neurological: He is alert and oriented to person, place, and time. He has normal reflexes.   Skin: Skin is intact.     Psychiatric: He has a normal mood and affect.         gu 6/13/18  MOON today: 30g  pvr by in and out cath post void today was 5cc. Tried 12fr and 16fr coude silicone and they went easy. He said he uses 14 fr straight silicone but I tried this and met too much resistance. Cath urine sent for culture    Labs:    PSA   7/3/18  0.60  5/9/17  0.45  5/26/15 0.27  7/14/14  0.26     Cr  4/3/17 1.0    Path:   Catheterized urine 4/9/18:Negative for malignant cells.  Voided urine 9/21/17: Highly atypical single cells are present, suspicious for transitional cell carcinoma.These features can also be seen in the context of instrumentation. Clinical correlation is essential. This was discussed with Dr. Castillo on October 2, 2017, at 8:40 AM.  Bladder, biopsy 9/4/17: Cystitis cystica.  Urine, voided, cytology 8/21/17: Urothelial atypia, suspicious cells present, see  comment.  URINE (VOIDED) 8/10/17: Urothelial cell atypia, suspicious for urothelial high-grade dysplasia/carcinoma in situ  Voided urine 7/17/17: Negative for malignant cells.  9/26/16 URINE, VOIDED (CYTOLOGY):Negative for malignant cells Abundant neutrophils, Degenerative changes     Rads:   kub 6/12/18 done for left renal stone:  4mm left renal stone    ctu 8/21/17  1.  Stable examination demonstrating bilateral inguinal hernias, the left of which is a direct inguinal hernia containing a portion of the urinary bladder. There is urinary bladder wall thickening involving the anterior bladder wall and herniated portion of the bladder which is nonspecific but likely reactive in this setting. Right indirect fat-containing inguinal hernia also again noted.  2. Additional stable findings:  -Nonobstructive left nephrolithiasis  -Colonic diverticulosis  -Chronic severe L1 compression fracture status post vertebroplasty/kyphoplasty     kub 8/21/17 Left 4mm (6mm on ct) mid pole stone (L3). Note to self: recommend eswl, stone growing  ctu 11/14/16: 4mm left renal stone, no renal mass, no hydro, prtial herniation of the bladder into the LIH. Prostate not enlarged. A fat containing RIH. Severe L1 compression fracture  kub 4/12/16 - left renal stone 5mm  ctrss 6/4/15 - 4mm left midpole stone    Assessment:       1. Recurrent UTI    2. Gross hematuria    3. Abnormal urine cytology    4. OAB (overactive bladder)    5. Benign prostatic hyperplasia, unspecified whether lower urinary tract symptoms present    6. Nephrolithiasis        Plan:       Gross hematuria workup w intermittent abnormal urine cytology, most recent one negative  -could be related to bladder herniating into testicular causing bladder irritation. However they want to hold off on treatment of this.   -cystoscopy 8/28/17 showed irritation c/w this and path was negative. ctu 8/12/17 showed a nonobstructing stone which should not be the cause and he's had UTIs which  can cause this too.   -repeat urine cytology in April was negative. No recent gross hematuria. Repeating today and if positive consider repeat cysto, last one over a year ago.     Recurrent UTI  -likely related to in and out caths but today c/o symptoms.   -voided urine for culture today. If + for proteus still may need ultrasound.     OAB (overactive bladder)  -continue myrbetriq. Doing well on this     BPH with h/o Elevated urine residuals  -no longer catheterizing  -pvr by in and out cath 6/12/18 was 5cc. Tried 12fr and 16fr coude silicone and they went easy.   -continue rapaflo, doing well on this    Nephrolithiasis  -LMP 6mm stone. KUb showed stone visible. Poor candidate for eswl.   -last ucx showed proteus, if + still today will check us to ensure not obstructed. Last ct in 8/2017    psa reviewed and was normal    F/u 6 months

## 2018-09-12 ENCOUNTER — TELEPHONE (OUTPATIENT)
Dept: DERMATOLOGY | Facility: CLINIC | Age: 68
End: 2018-09-12

## 2018-09-12 LAB — BACTERIA UR CULT: NO GROWTH

## 2018-09-12 NOTE — TELEPHONE ENCOUNTER
----- Message from Negrita Loera sent at 9/12/2018  9:22 AM CDT -----  Contact: pedro luis Barton -pt- pt is calling to speak with the nurse Rosa can you please call pt at [phone]. Pt is calling to schedule an appt for surgery     DONELL

## 2018-09-12 NOTE — TELEPHONE ENCOUNTER
Please advise the patient that I am aware of the information he provided.  Please advise him to call [phone] if his symptoms recur and to make sure to follow up with primary care.  I look forward to seeing him at next visit.

## 2018-09-12 NOTE — TELEPHONE ENCOUNTER
Returned patient call and told him he did not have an appointment with us but if Dr. Barton needed him to come back I would call him

## 2018-09-13 LAB
BACTERIA BLD CULT: NORMAL

## 2018-09-13 NOTE — TELEPHONE ENCOUNTER
Spoke with patient and we went over his appointment time and date, clinic phone number since he is staying at his mothers house    He requested, once again to speak with Dr. Queen. However clinic staff is having boundaries with this patient. Advised he will feel better and have his appointment in October

## 2018-09-14 DIAGNOSIS — J44.1 COPD EXACERBATION: ICD-10-CM

## 2018-09-14 DIAGNOSIS — J44.9 CHRONIC OBSTRUCTIVE PULMONARY DISEASE, UNSPECIFIED COPD TYPE: ICD-10-CM

## 2018-09-14 DIAGNOSIS — J96.11 CHRONIC RESPIRATORY FAILURE WITH HYPOXIA: ICD-10-CM

## 2018-09-14 LAB — BACTERIA BLD CULT: NORMAL

## 2018-09-14 RX ORDER — IPRATROPIUM BROMIDE AND ALBUTEROL SULFATE 2.5; .5 MG/3ML; MG/3ML
3 SOLUTION RESPIRATORY (INHALATION)
Qty: 180 ML | Refills: 3 | Status: SHIPPED | OUTPATIENT
Start: 2018-09-14 | End: 2018-09-25 | Stop reason: SDUPTHER

## 2018-09-17 ENCOUNTER — TELEPHONE (OUTPATIENT)
Dept: DERMATOLOGY | Facility: CLINIC | Age: 68
End: 2018-09-17

## 2018-09-17 ENCOUNTER — TELEPHONE (OUTPATIENT)
Dept: FAMILY MEDICINE | Facility: CLINIC | Age: 68
End: 2018-09-17

## 2018-09-17 DIAGNOSIS — N40.0 BENIGN NON-NODULAR PROSTATIC HYPERPLASIA WITHOUT LOWER URINARY TRACT SYMPTOMS: ICD-10-CM

## 2018-09-17 RX ORDER — SILODOSIN 8 MG/1
8 CAPSULE ORAL DAILY
Qty: 90 CAPSULE | Refills: 3 | Status: SHIPPED | OUTPATIENT
Start: 2018-09-17 | End: 2019-09-17

## 2018-09-17 NOTE — TELEPHONE ENCOUNTER
This matter was handled in a separate encounter.     ----- Message from Oanh Castaneda sent at 9/17/2018  4:07 PM CDT -----  Contact: self  Patient need to speak to nurse he got disconnected per patient     Call placed to POD      Please call to advice [phone] (home)

## 2018-09-17 NOTE — TELEPHONE ENCOUNTER
----- Message from Eva Linda sent at 9/17/2018 10:23 AM CDT -----  Contact: Pt  PT called to speak to the nurse regarding his care and would like a call back this morning asap.    Pt can be reached at [phone].    Thanks

## 2018-09-17 NOTE — TELEPHONE ENCOUNTER
----- Message from Sophia Martinez sent at 9/17/2018 10:12 AM CDT -----  Contact: self  Patient want to speak with a nurse regarding medical questions please call back at [phone] (home)

## 2018-09-17 NOTE — TELEPHONE ENCOUNTER
Please see attached messages from patient. Call placed to patient. No answer received. Left message requesting return call to office.

## 2018-09-17 NOTE — TELEPHONE ENCOUNTER
Patient reminded that Dr. Barton staff scheduled him for a skin check with Dr. Garcia in November.  No need to see Dr. Barton. Per his staff note.  Has appt November in same building as Rachel for a skin check.  Will send a reminder.

## 2018-09-17 NOTE — TELEPHONE ENCOUNTER
----- Message from Anushka Saldivar sent at 9/17/2018 11:18 AM CDT -----  Patient  [phone] is requesting an appt with Dr Garcia on Friday 09 28 18 to just check the place where his stitches were as he has an appt with Dr Ramos on this date at 8am/please advise as I do not show any appts available on this date?

## 2018-09-17 NOTE — TELEPHONE ENCOUNTER
----- Message from Sophia Martinez sent at 9/17/2018 10:13 AM CDT -----  Contact: self   Patient want to speak with a nurse regarding upcoming appointment please call back at [phone] (home)

## 2018-09-17 NOTE — TELEPHONE ENCOUNTER
Returned patient call and told him that we do n ot need to see him but we would like for him to follow up with Dr. Garcia in 3-4- months. Rosa

## 2018-09-17 NOTE — TELEPHONE ENCOUNTER
----- Message from Gregory Hart sent at 9/17/2018  9:26 AM CDT -----  Patient would like to a call back in regards to a problem with his medication. Please call to advise today. [phone]

## 2018-09-17 NOTE — TELEPHONE ENCOUNTER
Patient requesting to know the names of the providers he has seen in the past for dermatology purposes. States he is looking for one specific dermatologist he saw in the past. Writer and patient reviewed the names of all the dermatologist he as seen. Patient acknowledges he was looking for the name Dr. Wild Hinton. Patient verbalized he didn't have anymore questions.

## 2018-09-18 ENCOUNTER — TELEPHONE (OUTPATIENT)
Dept: DERMATOLOGY | Facility: CLINIC | Age: 68
End: 2018-09-18

## 2018-09-18 NOTE — TELEPHONE ENCOUNTER
----- Message from Ally Campo sent at 9/17/2018  3:52 PM CDT -----  Type: Needs Medical Advice    Who Called:  Patient  Best Call Back Number: [phone]  Additional Information: Patient requesting to speak with nurse (Rosa)has question about which doctor removed his tumors/please call back to advise.

## 2018-09-18 NOTE — TELEPHONE ENCOUNTER
Returned patient call and gave him the name of his dr. Hesham Bernard. He stated he had stroke 2 days after  He had his sutures out. And was in hospital for 4 days.

## 2018-09-21 RX ORDER — CELECOXIB 100 MG/1
CAPSULE ORAL
Qty: 180 CAPSULE | Refills: 0 | OUTPATIENT
Start: 2018-09-21

## 2018-09-24 ENCOUNTER — TELEPHONE (OUTPATIENT)
Dept: SURGERY | Facility: CLINIC | Age: 68
End: 2018-09-24

## 2018-09-24 ENCOUNTER — TELEPHONE (OUTPATIENT)
Dept: PULMONOLOGY | Facility: CLINIC | Age: 68
End: 2018-09-24

## 2018-09-24 NOTE — TELEPHONE ENCOUNTER
Pt could not make a same day appointment. Scheduled pt for appt tomorrow.   ----- Message from Kaycee Moore sent at 9/24/2018  8:04 AM CDT -----  Contact: self  Type:  Same Day Appointment Request    Caller is requesting a same day appointment.  Caller declined first available appointment listed below.      Name of Caller:  patient  When is the first available appointment?  December  Symptoms: Having a very hard time right now with his breathing   Best Call Back Number: [phone] (home)   Additional Information:   Thank you really needs help

## 2018-09-24 NOTE — TELEPHONE ENCOUNTER
----- Message from Anushka Saldivar sent at 9/24/2018 10:19 AM CDT -----  Contact: self  Patient [phone] is needing to change his appt with Dr Bernard from 10 04 18 to 09 27 18/please call patient to discuss as I do not show any appts on 09 27 18

## 2018-09-25 ENCOUNTER — TELEPHONE (OUTPATIENT)
Dept: FAMILY MEDICINE | Facility: CLINIC | Age: 68
End: 2018-09-25

## 2018-09-25 ENCOUNTER — OFFICE VISIT (OUTPATIENT)
Dept: PULMONOLOGY | Facility: CLINIC | Age: 68
End: 2018-09-25
Payer: MEDICARE

## 2018-09-25 VITALS
BODY MASS INDEX: 31.8 KG/M2 | WEIGHT: 202.63 LBS | SYSTOLIC BLOOD PRESSURE: 137 MMHG | OXYGEN SATURATION: 97 % | DIASTOLIC BLOOD PRESSURE: 73 MMHG | HEIGHT: 67 IN | HEART RATE: 81 BPM

## 2018-09-25 DIAGNOSIS — J44.9 CHRONIC OBSTRUCTIVE PULMONARY DISEASE, UNSPECIFIED COPD TYPE: ICD-10-CM

## 2018-09-25 DIAGNOSIS — J96.11 CHRONIC RESPIRATORY FAILURE WITH HYPOXIA: ICD-10-CM

## 2018-09-25 DIAGNOSIS — J18.9 PNEUMONIA OF LEFT LOWER LOBE DUE TO INFECTIOUS ORGANISM: Primary | ICD-10-CM

## 2018-09-25 DIAGNOSIS — J44.1 COPD EXACERBATION: ICD-10-CM

## 2018-09-25 DIAGNOSIS — K59.03 DRUG-INDUCED CONSTIPATION: Primary | ICD-10-CM

## 2018-09-25 PROCEDURE — 99999 PR PBB SHADOW E&M-EST. PATIENT-LVL IV: CPT | Mod: PBBFAC,,, | Performed by: INTERNAL MEDICINE

## 2018-09-25 PROCEDURE — 99214 OFFICE O/P EST MOD 30 MIN: CPT | Mod: PBBFAC,PO | Performed by: INTERNAL MEDICINE

## 2018-09-25 PROCEDURE — 99214 OFFICE O/P EST MOD 30 MIN: CPT | Mod: S$PBB,,, | Performed by: INTERNAL MEDICINE

## 2018-09-25 RX ORDER — IPRATROPIUM BROMIDE AND ALBUTEROL SULFATE 2.5; .5 MG/3ML; MG/3ML
3 SOLUTION RESPIRATORY (INHALATION) EVERY 4 HOURS PRN
Qty: 120 VIAL | Refills: 11 | Status: SHIPPED | OUTPATIENT
Start: 2018-09-25 | End: 2018-11-01 | Stop reason: SDUPTHER

## 2018-09-25 RX ORDER — LACTULOSE 10 G/15ML
30 SOLUTION ORAL; RECTAL 3 TIMES DAILY
Qty: 3784 ML | Refills: 3 | Status: SHIPPED | OUTPATIENT
Start: 2018-09-25

## 2018-09-25 NOTE — TELEPHONE ENCOUNTER
----- Message from Gregory Hart sent at 9/25/2018  8:50 AM CDT -----  Patient states he needs shoulder xray - please call to advise. [phone]

## 2018-09-25 NOTE — TELEPHONE ENCOUNTER
----- Message from Zoraida Godinez sent at 9/25/2018 11:52 AM CDT -----  Contact: Patient  Type: Needs Medical Advice    Who Called:  Patient  Symptoms (please be specific):  na  How long has patient had these symptoms:  cindy  Pharmacy name and phone #:  [facility]  Best Call Back Number:   Additional Information: Calling to request an order for an Xray. Please advise.

## 2018-09-25 NOTE — PROGRESS NOTES
"9/25/2018    Heshamkelly Serna       Chief Complaint   Patient presents with    Cough    Shortness of Breath       HPI:   Sept 25, 2018 - pt , 3 wks ago, may have had sz with neighbor having police kick in door .  Pt yeimy Leahy.  cxr was clear, pt uses spiriva and advair, also prn albuterol.  Uses 02 most all the time.  Has chr constipation using lactulose tid.  Smokes 4 smokes daily but only 1/4 each?      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  Pt wearing his chronic back brace.  Pt has been on 10 mg daily last yrs- advair out but renewed.  Pt bipolar and seems compensated somewhat.  Still smokes minimally. Mom doing poorly with freq fall/obs.  Has stumble in office with near fall.  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  Still smokes 3-5 daily, lives alone, gets by frozen dinners.  In [place]. Still drives.  Has chr back brace,  Sleeps with ox.  On prednisone over 10 yrs, has bad back after fall in tub washing dog.  No osteoporosis rx. Mom with lung dz at 88, pt with bad lungs since chemical spills- stopped working age 57.    Chr sinus drip and and poor sense smell, no asa use,  No cough or wheeze noted,  Had pft with Dr Judge past.     The chief compliant  problem is new to me",   PFSH:  Past Medical History:   Diagnosis Date    Allergy     Arthritis     Asthma     Cancer     skin DR Isrrael Yun    COPD (chronic obstructive pulmonary disease)     Degenerative disc disease     Depression     bipolar dis    Fracture of vertebra due to osteoporosis     Hepatitis C     Hypertension     Liver disease     Nasal bone fx-closed     Nephrolith     Rosacea     Skin cancer          Past Surgical History:   Procedure Laterality Date    CYSTOSCOPY N/A 8/28/2017    [facility]    CYSTOSCOPY N/A 10/10/2016    Performed by Negrita MALDONADO " "[facility]    jaw surgey      KNEE SURGERY      NECK SURGERY      8 procedures on neck    RHIZOTOMY W/ RADIOFREQUENCY ABLATION      TONSILLECTOMY      TRANSRECTAL ULTRASOUND N/A 8/28/2017    [facility]    tumors      10 removed from scalp     Social History     Tobacco Use    Smoking status: Current Every Day Smoker     Packs/day: 0.50     Years: 30.00     Pack years: 15.00     Types: Cigarettes    Smokeless tobacco: Never Used    Tobacco comment: down to 3 to 4 a day   Substance Use Topics    Alcohol use: No    Drug use: No     Family History   Problem Relation Age of Onset    Heart disease Father     No Known Problems Mother     Cancer Paternal Uncle         prostate    Cancer Paternal Aunt         breast    Eczema Neg Hx     Lupus Neg Hx     Psoriasis Neg Hx     Melanoma Neg Hx     Glaucoma Neg Hx      Review of patient's allergies indicates:   Allergen Reactions    Codeine Other (See Comments)    Morphine      Pt denies this 1-10-13     Bactrim [sulfamethoxazole-trimethoprim] Other (See Comments)     Pt cannot take with Methadone    Ciprofloxacin Other (See Comments)     Patient cannot take with methadone       Performance Status:The patient's activity level is housebound activities.      Review of Systems:  a review of eleven systems covering constitutional, Eye, HEENT, Psych, Respiratory, Cardiac, GI, , Musculoskeletal, Endocrine, Dermatologic was negative except for pertinent findings as listed ABOVE and below:   pertinent positive as above, rest is good  No liver dz.  Chr back pain - h/o cirrhosis,    Exam:Comprehensive exam done. /73 (BP Location: Left arm, Patient Position: Sitting)   Pulse 81   Ht 5' 7" (1.702 m)   Wt 91.9 kg (202 lb 9.6 oz)   SpO2 97% Comment: on room air  BMI 31.73 kg/m²   Exam included Vitals as listed, and patient's appearance and affect and alertness and mood, oral exam for yeast and " hygiene and pharynx lesions and Mallapatti (M) score, neck with inspection for jvd and masses and thyroid abnormalities and lymph nodes (supraclavicular and infraclavicular nodes and axillary also examined and noted if abn), chest exam included symmetry and effort and fremitus and percussion and auscultation, cardiac exam included rhythm and gallops and murmur and rubs and jvd and edema, abdominal exam for mass and hepatosplenomegaly and tenderness and hernias and bowel sounds, Musculoskeletal exam with muscle tone and posture and mobility/gait and  strength, and skin for rashes and cyanosis and pallor and turgor, extremity for clubbing.  Findings were normal except for pertinent findings listed below:   M3, dry mouth, chest is symmetric, no distress, normal percussion, normal fremitus and good normal breath sounds- decrease bs, no edema, clubbing-c/o sob but looks compensated.      Radiographs (ct chest and cxr) reviewed: view by direct vision  Ct spine viewed- Jan 2018 nad poor study, sept domingo cxr nad setp 2018  Labs reviewed   PFT fev1 56% in feb 2017 with high rv.         Plan:  Clinical impression is apparently straight forward and impression with management as below.    Hesham was seen today for cough and shortness of breath.    Diagnoses and all orders for this visit:    Drug-induced constipation  -     lactulose (CHRONULAC) 10 gram/15 mL solution; Take 45 mLs (30 g total) by mouth 3 (three) times daily.    Chronic obstructive pulmonary disease, unspecified COPD type  -     albuterol-ipratropium (DUO-NEB) 2.5 mg-0.5 mg/3 mL nebulizer solution; Take 3 mLs by nebulization every 4 (four) hours as needed for Wheezing.    Chronic respiratory failure with hypoxia  -     albuterol-ipratropium (DUO-NEB) 2.5 mg-0.5 mg/3 mL nebulizer solution; Take 3 mLs by nebulization every 4 (four) hours as needed for Wheezing.    COPD exacerbation  -     albuterol-ipratropium (DUO-NEB) 2.5 mg-0.5 mg/3 mL nebulizer solution;  Take 3 mLs by nebulization every 4 (four) hours as needed for Wheezing.        Follow-up in about 3 months (around 12/25/2018), or if symptoms worsen or fail to improve.    Discussed with patient above for education the following:      Patient Instructions   Sent in lactulose, discuss with Dr Ramos bowel constipation.    Re ordered breathing medication with albuterol ipratroprium - use up to 4 daily.    Chest xray was good in early sept.    Continue prednisone 10 mg daily , also spririva and advair.  Continue oxygen.

## 2018-09-25 NOTE — TELEPHONE ENCOUNTER
----- Message from Oanh Castaneda sent at 9/25/2018  9:26 AM CDT -----  Contact: self  Patient would like to speak to nurse Canales regarding Xray      Please call to advice [phone] (home)

## 2018-09-25 NOTE — PATIENT INSTRUCTIONS
Sent in lactulose, discuss with Dr Ramos bowel constipation.    Re ordered breathing medication with albuterol ipratroprium - use up to 4 daily.    Chest xray was good in early sept.    Continue prednisone 10 mg daily , also spririva and advair.  Continue oxygen.

## 2018-09-25 NOTE — TELEPHONE ENCOUNTER
----- Message from RT Alice sent at 9/25/2018 10:21 AM CDT -----  Contact: pt    pt , requesting X-ray orders in and scheduled prior to his appt 09/28/2018, pleae call to confirm, thanks.

## 2018-09-25 NOTE — TELEPHONE ENCOUNTER
----- Message from Regina Taveras sent at 9/25/2018  9:12 AM CDT -----  Patient  Is calling  To get an  Order  For  Xray  // please call [phone]

## 2018-09-25 NOTE — TELEPHONE ENCOUNTER
Patient requesting order for x-ray of his shoulder. Per Dr. Ramos patient needs to be seen. Patient notified no order for x-ray can be granted without appointment. Patient has appointment on 9-28-18.  Patient verbalized understanding.

## 2018-09-26 ENCOUNTER — OFFICE VISIT (OUTPATIENT)
Dept: DERMATOLOGY | Facility: CLINIC | Age: 68
End: 2018-09-26
Payer: MEDICARE

## 2018-09-26 ENCOUNTER — OUTPATIENT CASE MANAGEMENT (OUTPATIENT)
Dept: ADMINISTRATIVE | Facility: OTHER | Age: 68
End: 2018-09-26

## 2018-09-26 ENCOUNTER — TELEPHONE (OUTPATIENT)
Dept: SURGERY | Facility: CLINIC | Age: 68
End: 2018-09-26

## 2018-09-26 VITALS — HEIGHT: 67 IN | BODY MASS INDEX: 31.71 KG/M2 | WEIGHT: 202 LBS

## 2018-09-26 DIAGNOSIS — R22.9 MASS OF SKIN: ICD-10-CM

## 2018-09-26 DIAGNOSIS — D48.9 NEOPLASM OF UNCERTAIN BEHAVIOR: Primary | ICD-10-CM

## 2018-09-26 DIAGNOSIS — Z85.828 HISTORY OF SKIN CANCER: ICD-10-CM

## 2018-09-26 PROCEDURE — 99213 OFFICE O/P EST LOW 20 MIN: CPT | Mod: S$PBB,,, | Performed by: DERMATOLOGY

## 2018-09-26 PROCEDURE — 99212 OFFICE O/P EST SF 10 MIN: CPT | Mod: PBBFAC,PO | Performed by: DERMATOLOGY

## 2018-09-26 PROCEDURE — 99999 PR PBB SHADOW E&M-EST. PATIENT-LVL II: CPT | Mod: PBBFAC,,, | Performed by: DERMATOLOGY

## 2018-09-26 NOTE — TELEPHONE ENCOUNTER
Patient requesting to reschedule his appointment, states he has a spot on his head and his back he wants removed.  Appointment booked 10-18-18

## 2018-09-26 NOTE — PROGRESS NOTES
"  Subjective:       Patient ID:  Hesham Serna is a 68 y.o. male who presents for   Chief Complaint   Patient presents with    Follow-up     recheck surgery site on R jawline    Spot     back      Pt reports of lumps on the scalp.  No symptoms.  Pt reports bx done at [facility] two years ago.         New patient with hx of skin cancer.  Last office visit with Dr Barton on 8/31/2018 for Procedures on the R chin/neck- [facility]    Here today to follow up on the surgery site, some irritation with shaving but overall doing fine.  Also here for a spot on the back, neighbor noticed about a month ago, no sx, no tx. Stated that Dr Barton saw it and stated it was fine "like a big pimple".      Review of Systems   Constitutional: Negative for fever, chills and fatigue.   Skin: Negative for daily sunscreen use, activity-related sunscreen use and wears hat.   Hematologic/Lymphatic: Bruises/bleeds easily.        Objective:    Physical Exam   Constitutional: He appears well-developed and well-nourished. He is on home oxygen.    Neurological: He is alert.   Psychiatric: He has a normal mood and affect.   Skin:   Areas Examined (abnormalities noted in diagram):   Scalp / Hair Palpated and Inspected  Head / Face Inspection Performed  Neck Inspection Performed  RUE Inspected  LUE Inspection Performed                   Diagram Legend     Erythematous scaling macule/papule c/w actinic keratosis       Vascular papule c/w angioma      Pigmented verrucoid papule/plaque c/w seborrheic keratosis      Yellow umbilicated papule c/w sebaceous hyperplasia      Irregularly shaped tan macule c/w lentigo     1-2 mm smooth white papules consistent with Milia      Movable subcutaneous cyst with punctum c/w epidermal inclusion cyst      Subcutaneous movable cyst c/w pilar cyst      Firm pink to brown papule c/w dermatofibroma      Pedunculated fleshy papule(s) c/w skin tag(s)      Evenly pigmented macule c/w junctional nevus     Mildly " variegated pigmented, slightly irregular-bordered macule c/w mildly atypical nevus      Flesh colored to evenly pigmented papule c/w intradermal nevus       Pink pearly papule/plaque c/w basal cell carcinoma      Erythematous hyperkeratotic cursted plaque c/w SCC      Surgical scar with no sign of skin cancer recurrence      Open and closed comedones      Inflammatory papules and pustules      Verrucoid papule consistent consistent with wart     Erythematous eczematous patches and plaques     Dystrophic onycholytic nail with subungual debris c/w onychomycosis     Umbilicated papule    Erythematous-base heme-crusted tan verrucoid plaque consistent with inflamed seborrheic keratosis     Erythematous Silvery Scaling Plaque c/w Psoriasis     See annotation      Assessment / Plan:        Neoplasm of uncertain behavior  Pt unwilling to obtain path records.  We will work on obtaining.  Consider repeat bx of scalp lesion if necessary.    History of skin cancer  S/p Mohs with Mick.  Healing well.  R jawline/neck.    Mass of skin  Pt wants removal.  Discussed with patient the risks of procedure, including scar, recurrence, skin discoloration, and infection.  Right back location.             Follow-up if symptoms worsen or fail to improve, for excision back .

## 2018-09-26 NOTE — PROGRESS NOTES
Thank you for the referral. The following patient has been assigned to Romelia Ching RN with Outpatient Complex Care Management for high risk screening.    Reason for referral: Pneumonia of left lower lobe due to infectious organism    Former patient of Aleena Díaz RN O[facility]. [place] resident.     Please contact [facility]PCM at [phone] with any questions.    Thank you,    Debora Kelsey    Outpatient Case Management

## 2018-09-27 ENCOUNTER — TELEPHONE (OUTPATIENT)
Dept: DERMATOLOGY | Facility: CLINIC | Age: 68
End: 2018-09-27

## 2018-09-27 NOTE — TELEPHONE ENCOUNTER
----- Message from Tomer Redding MD sent at 9/26/2018 10:16 AM CDT -----  Please call [facility] and obtain only path records for the scalp done.    Thanks.

## 2018-09-28 ENCOUNTER — TELEPHONE (OUTPATIENT)
Dept: FAMILY MEDICINE | Facility: CLINIC | Age: 68
End: 2018-09-28

## 2018-09-28 ENCOUNTER — OFFICE VISIT (OUTPATIENT)
Dept: FAMILY MEDICINE | Facility: CLINIC | Age: 68
End: 2018-09-28
Payer: MEDICARE

## 2018-09-28 VITALS
TEMPERATURE: 98 F | HEIGHT: 67 IN | DIASTOLIC BLOOD PRESSURE: 82 MMHG | BODY MASS INDEX: 31.83 KG/M2 | HEART RATE: 76 BPM | WEIGHT: 202.81 LBS | SYSTOLIC BLOOD PRESSURE: 129 MMHG

## 2018-09-28 DIAGNOSIS — F31.0 BIPOLAR AFFECTIVE DISORDER, CURRENT EPISODE HYPOMANIC: ICD-10-CM

## 2018-09-28 DIAGNOSIS — M19.111 OSTEOARTHRITIS OF RIGHT SHOULDER DUE TO ROTATOR CUFF INJURY: Chronic | ICD-10-CM

## 2018-09-28 DIAGNOSIS — F41.1 ANXIETY STATE: Primary | ICD-10-CM

## 2018-09-28 DIAGNOSIS — S46.001S OSTEOARTHRITIS OF RIGHT SHOULDER DUE TO ROTATOR CUFF INJURY: Chronic | ICD-10-CM

## 2018-09-28 DIAGNOSIS — F41.1 GAD (GENERALIZED ANXIETY DISORDER): ICD-10-CM

## 2018-09-28 DIAGNOSIS — F39 EPISODIC MOOD DISORDER: ICD-10-CM

## 2018-09-28 DIAGNOSIS — F39 MOOD DISORDER: ICD-10-CM

## 2018-09-28 DIAGNOSIS — K72.10 CHRONIC LIVER FAILURE WITHOUT HEPATIC COMA: ICD-10-CM

## 2018-09-28 PROCEDURE — 99999 PR PBB SHADOW E&M-EST. PATIENT-LVL III: CPT | Mod: PBBFAC,,, | Performed by: FAMILY MEDICINE

## 2018-09-28 PROCEDURE — 99213 OFFICE O/P EST LOW 20 MIN: CPT | Mod: PBBFAC,PO | Performed by: FAMILY MEDICINE

## 2018-09-28 PROCEDURE — 99214 OFFICE O/P EST MOD 30 MIN: CPT | Mod: S$PBB,,, | Performed by: FAMILY MEDICINE

## 2018-09-28 PROCEDURE — 90662 IIV NO PRSV INCREASED AG IM: CPT | Mod: PBBFAC,PO

## 2018-09-28 RX ORDER — CLONAZEPAM 2 MG/1
TABLET ORAL
Qty: 90 TABLET | Refills: 0 | Status: SHIPPED | OUTPATIENT
Start: 2018-09-28 | End: 2018-09-28 | Stop reason: SDUPTHER

## 2018-09-28 RX ORDER — CLONAZEPAM 2 MG/1
TABLET ORAL
Qty: 90 TABLET | Refills: 0 | Status: SHIPPED | OUTPATIENT
Start: 2018-09-28 | End: 2018-10-19 | Stop reason: SDUPTHER

## 2018-09-28 NOTE — PROGRESS NOTES
SUBJECTIVE:  Hesham Serna is a 68 y.o. male who sustained a right shoulder injury 3-5 day(s) ago. Mechanism of injury: sprain. Immediate symptoms: delayed swelling, was able to use arm directly after injury. Symptoms have been intermittent since that time. Prior history of related problems: previous shoulder injury .undwer Dr Randle due to resp failure  Past Medical History:   Diagnosis Date    Allergy     Arthritis     Asthma     Cancer     skin DR Arcos Dem    COPD (chronic obstructive pulmonary disease)     Degenerative disc disease     Depression     bipolar dis    Fracture of vertebra due to osteoporosis     Hepatitis C     Hypertension     Liver disease     Nasal bone fx-closed     Nephrolith     Rosacea     Skin cancer        OBJECTIVE:VSstable POx 93%  Vital signs as noted above.  Appearance: alert, well appearing, and in no distress, oriented to person, place, and time and obese.  Shoulder exam: subdeltoid tenderness, humeral head tenderness, remainder of shoulder exam is normal, ipsilateral elbow, wrist and hand exam is normal, contralateral shoulder exam is normal.  X-ray: shows DJD changes, likely chronic.    ASSESSMENT:  Shoulder sprain  After consent was obtained, using sterile technique the rt shoulder was prepped and plain Lidocaine 1% was used as local anesthetic. The joint was entered 1 cc  Steroid 40 mg and 2 ml plain Lidocaine was then injected and the needle withdrawn.  The procedure was well tolerated.  The patient is asked to continue to rest the joint for a few more days before resuming regular activities.  It may be more painful for the first 1-2 days.  Watch for fever, or increased swelling or persistent pain in the joint. Call or return to clinic prn if such symptoms occur or there is failure to improve as anticipated.    PLAN:  rest the injured area as much as practical, apply ice packs  See orders for this visit as documented in the electronic medical record.

## 2018-09-28 NOTE — TELEPHONE ENCOUNTER
Spoke to patient, clarified patient's questions related to appointment date and time.         ----- Message from Jess Lewis RT sent at 9/28/2018  9:26 AM CDT -----  Contact: pt   pt , requesting a call back soon he noticed discrepancy information on his discharge paper work from his appt this AM, thanks.

## 2018-10-01 ENCOUNTER — TELEPHONE (OUTPATIENT)
Dept: FAMILY MEDICINE | Facility: CLINIC | Age: 68
End: 2018-10-01

## 2018-10-01 ENCOUNTER — TELEPHONE (OUTPATIENT)
Dept: UROLOGY | Facility: CLINIC | Age: 68
End: 2018-10-01

## 2018-10-01 DIAGNOSIS — K70.30 ALCOHOLIC CIRRHOSIS OF LIVER WITHOUT ASCITES: ICD-10-CM

## 2018-10-01 DIAGNOSIS — I15.0 RENOVASCULAR HYPERTENSION: ICD-10-CM

## 2018-10-01 DIAGNOSIS — B18.2 HEP C W/O COMA, CHRONIC: ICD-10-CM

## 2018-10-01 DIAGNOSIS — J44.9 STEROID-DEPENDENT COPD: ICD-10-CM

## 2018-10-01 DIAGNOSIS — Z99.81 OXYGEN DEPENDENT: ICD-10-CM

## 2018-10-01 DIAGNOSIS — J96.11 CHRONIC RESPIRATORY FAILURE WITH HYPOXIA: Primary | ICD-10-CM

## 2018-10-01 DIAGNOSIS — Z92.241 STEROID-DEPENDENT COPD: ICD-10-CM

## 2018-10-01 PROBLEM — J18.9 PNEUMONIA OF LEFT LOWER LOBE DUE TO INFECTIOUS ORGANISM: Status: RESOLVED | Noted: 2018-09-08 | Resolved: 2018-10-01

## 2018-10-01 NOTE — TELEPHONE ENCOUNTER
Spoke with patient requesting refill on Myrbetriq. Informed patient refill was called in on 9/17 with 3 refills. Patient started counting his pills had over 40 pills left. Informed patient he does not need a refill at this time if only taking once a day. Patient verbally voiced understanding.

## 2018-10-01 NOTE — TELEPHONE ENCOUNTER
----- Message from Mallorie Chambers sent at 10/1/2018  9:24 AM CDT -----  Contact: self   Patient need to speak with nurse Canales regarding a letter that [facility] needs. Please call back at [phone] (home)

## 2018-10-01 NOTE — TELEPHONE ENCOUNTER
----- Message from Leticia Nicole sent at 10/1/2018 11:40 AM CDT -----  Contact: patient  Patient requesting call back regarding one of his medications. Please call [phone]

## 2018-10-01 NOTE — TELEPHONE ENCOUNTER
Patient states letter is required for home health to continue his care. Spoke to Maurice with [facility] who states patient is complaining of confusion related to daily medication/what he needs to take. States needs home health to continue to set up his pill box. Requesting new orders for home health and last office visit notes be faxed to office. Please advise.       ----- Message from Monse Elaine sent at 10/1/2018 10:59 AM CDT -----  Contact: KATARINA MELGOZA [[id number]]            Name of Who is Calling: KATARINA MELGOZA [[id number]]    What is the request in detail: Patient called he stated that he need a letter from the doctor to get Home Health Care. The order can be faxed to [phone]. Please call him      Can the clinic reply by [facility]: no      What Number to Call Back if not in [facility]: [phone]

## 2018-10-01 NOTE — TELEPHONE ENCOUNTER
Patient states [facility] needs re-certification documents signed. Writer spoke with Dr. Ramos verbally regarding this matter.  Dr. Ramos states he signed re-certification electronically. Please be advised.

## 2018-10-02 NOTE — TELEPHONE ENCOUNTER
Home health orders placed by Dr. Ramos. Orders, patient demographics, insurance information, and last clinical notes faxed to [facility] @ [phone]. Fax confirmation received.

## 2018-10-03 ENCOUNTER — TELEPHONE (OUTPATIENT)
Dept: FAMILY MEDICINE | Facility: CLINIC | Age: 68
End: 2018-10-03

## 2018-10-03 ENCOUNTER — OUTPATIENT CASE MANAGEMENT (OUTPATIENT)
Dept: ADMINISTRATIVE | Facility: OTHER | Age: 68
End: 2018-10-03

## 2018-10-03 NOTE — TELEPHONE ENCOUNTER
----- Message from Romelia Ching RN sent at 10/3/2018  4:17 PM CDT -----  Contact: Romelia Ramos/Staff    I completed the initial screen with Mr Serna today for Outpatient Case Management. He reports he is unable to complete a medication reconciliation with me, stating he doesn't read well enough. I then spoke with the psych nurse that admitted him to home health today. He advised he was unable to complete a med reconciliation with the pt when he was in his home and pt had many, many bottles of meds.     Can you please update his med list in his chart? It currently shows he is taking methadone, oxycodone and fentanyl. Thank you.    Kindest Regards,     Romelia Ching RN, [facility]  Outpatient Case Management  [phone]  Ext [phone]  [email or web address]

## 2018-10-03 NOTE — LETTER
October 4, 2018    Hesham Bradleyshayla Jonis  [id number]  [place] [address]             [facility] [address] [place] [address] Dear Mr Serna,         Thank you for your time in getting admitted to Outpatient Case Management. I have included some educational brochures in this mail out along with my business card.    Please review the enclosed literature and call me if you have any questions. I will call you soon to discuss this.    Thanks again for your time and cooperation,        Romelia Ching RN  Outpatient Case Management  [phone]  Ext [phone]  [email or web address]

## 2018-10-03 NOTE — PROGRESS NOTES
"10/3/18 - Summary: Phone contact made with pt today for completion of Initial Screen for [facility]. He is a 67 y/o male with diagnoses of DDD, mood disorder, bipolar affective disorder, personality disorder, FRANCISCO, COPD causing O2 dependence, HTN, chronic narcotic dependence, alcoholic cirrhosis and hepatic encephalopathy. He answers most of the questions on the initial screen for OPCM, but occasionally would refuse to answer or report not understanding questions. His voice was without any change in inflection or tone and he demonstrated difficulty with impulse control, during conversation, frequently interrupting CM or making challenging statements, like, "I told you that already. Did you forget?" He reports living alone in a house owned by his mother that he rents from her. It is a two story house and has steps coming up to the front door. He does not use any type of assistive device with ambulation and reports he has had 2+ falls in the past 12 months. He reports not understanding question when asked what type of diet he follows, but when asked to describe what he eats typically in a day, he reports intake of what sounds like a regular diet. He repeatedly states he has pain all the time and is on pain meds. He pays friends to transport him to provider appts and  meds from the pharmacy. He refuses to perform med reconciliation, stating he has too many meds and does not read well enough to go thru all and tell CM which ones he takes and which ones he doesn't. He does not have an established disaster plan or any advanced directives.     Interventions: Discussed importance of taking meds as ordered and the need to confirm current meds. Discussed home health services and he reports someone visited, but didn't tell him when they would be back and what services he would have. Advised CM will contact [facility] and attempt to confirm current meds with nurse that visited him. Discussed alternatives in care that may be " available to him, like [facility] and/or day programs. He denies that he is currently participating in either of these type programs and agrees to referral to O[facility] LCSW to explore community resources that may be beneficial to him. Phone contact made Juan Diego, the psych nurse with [facility] that completed pt's admission visit this AM. He advised that he also was unable to get the pt to go thru meds to verify which ones are current. He discussed need for pt to plan make arrangements for the pharmacy to begin delivering meds in dose packs or allowing nurse to set meds up in an organizer. He will visit him again in one week and follow up on this. CM sent message to Dr Ramos requesting that he review and update pt's med list on his chart.    Plan: Refer to LCSW to explore community resources that may offer pt support and interaction and educate about and assist with establishing disaster plan and advanced directive, if pt willing. Mail educational literature about home safety and med compliance. Follow up in one week.     Todays O[facility] Self-Management Care Plan was developed with the patients/caregivers input and was based on identified barriers from todays assessment.  Goals were written today with the patient/caregiver and the patient has agreed to work towards these goals to improve his/her overall well-being. Patient verbalized understanding of the care plan, goals, and all of today's instructions. Encouraged patient/caregiver to communicate with his/her physician and health care team about health conditions and the treatment plan.  Provided my contact information today and encouraged patient/caregiver to call me with any questions as needed. Romelia Ching RN

## 2018-10-04 ENCOUNTER — TELEPHONE (OUTPATIENT)
Dept: FAMILY MEDICINE | Facility: CLINIC | Age: 68
End: 2018-10-04

## 2018-10-04 NOTE — PATIENT INSTRUCTIONS
Preventing Falls in the Home  An adult or child can fall for many reasons. If you are an older adult, you may fall because your reaction time slows down. Your muscles and joints may get stiff, weak, or less flexible because of illness, medicines, or a physical condition. These things can also make a child more likely to fall or be injured in a fall.  Other health problems that make falls more likely include:  · Arthritis  · Dizziness or lightheadedness when you get out of bed (orthostatic hypotension)  · History of a stroke  · Dizziness  · Anemia  · Certain medicines taken for mental illness  · Problems with balance or gait  · History of falls with or without an injury  · Changes in vision (vision impairment)  · Changes in thinking skills and memory (cognitive impairment)  Injuries from a fall can include broken bones, dislocated joints, and cuts. When these injuries are serious enough, they can make it impossible for you or a child who is injured in a fall to live on his or her own.  Prevention tips  To help prevent falls and fall-related injuries, follow the tips below.   Floors  Make floors safer by doing the following:   · Put nonskid pads under area rugs.  · Remove throw rugs.  · Replace worn floor coverings.  · Tack carpets firmly to each step on carpeted stairs. Put nonskid strips on the edges of uncarpeted stairs.  · Keep floors and stairs free of clutter and cords.  · Arrange furniture so there are clear pathways.  · Clean up any spills right away.  · Wear shoes that fit.  Bathrooms    Make bathrooms safer by doing the following:   · Install grab bars in the tub or shower.  · Apply nonskid strips or put a nonskid rubber mat in the tub or shower.  · Sit on a bath chair to bathe.  · Use bathmats with nonskid backing.  Lighting and the environment  Improve lighting in your home by doing the following:   · Keep a flashlight in each room. Or put a lamp next to the bed within easy reach.  · Put nightlights in  the bedrooms, hallways, kitchen, and bathrooms.  · Make sure all stairways have good lighting.  · Take your time when going up and down stairs.  · Put handrails on both sides of stairs and in walkways for more support. To prevent injury to your wrist or arm, dont use handrails to pull yourself up.  · Install grab bars to pull yourself up.  · Move or rearrange items that you use often. This will make them easier to find or reach.  · Look at your home to find any safety hazards. Especially look at doorways, walkways, and the driveway. Remove or repair any safety problems that you find.  Date Last Reviewed: 8/1/2016 © 2000-2017 [employer]. [address] [place] [address], [place] [address]. All rights reserved. This information is not intended as a substitute for professional medical care. Always follow your healthcare professional's instructions.        Preventing Falls: Are You At Risk of Falling?     Ask for help to reduce risk of falling in your home.     As you get older, you're not as steady on your feet as you once were. And you may have health problems you didn't have when you were younger. So, it's not surprising that older people are more likely to trip and fall. Falling can be very serious. It can change your overall health and quality of life. That's why it's important to be aware of your own risk of falling.  The dangers of falling  Falls are one of the main causes of injury in people over age 65. An older person who falls may take longer to get better than a younger person. And, after a fall, an older person is more likely to have problems that don't go away. So, preventing falls can help you avoid serious health problems.  Are you at risk of falling?  Answer these questions to rate your level of risk.  · Are you a woman?  · Have you fallen or stumbled in the last year?  · Are you over age 65?  · Are you ever dizzy or lightheaded with standing?  · Do you have a hard time getting in and out of the  "bathtub or on and off the toilet?  · Do you lean on objects to help you get around? Or do you use a cane or walker?  · Do you have vision or hearing problems? For example, do you need new glasses or hearing aids?  · Do you have 2 or more long-lasting (chronic) medical conditions?  · Do you take 3 or more medicines?  · Have you felt depressed recently?  · Have you had more trouble with your memory in recent months?  · Are there hazards in your home that might cause you to fall, such as loose rugs or poor lighting?  · Do you have a pet that jumps on you or might trip you?  · Have you stopped getting regular exercise?  · Do you have diabetes?   · Do you have a neurologic disease, such as Parkinson or Alzheimer disease?   · Do you drink alcohol?  · Do you wear athletic shoes or slippers, or go barefoot at home?  You can help prevent falls  If you answered "yes" to any of the above questions, you should take steps to reduce your risk of a fall. Monitoring health conditions and keeping walkways in your home free of clutter are just 2 ways. Changing is sometimes easier said than done. But keep in mind that even small changes can make you less likely to fall.  The fear of falling  It's normal to be scared of falling, especially if you've fallen before. But being afraid can actually make you more likely to fall. This is because:  · Fear might cause you to become less active. Being less active can lead to a loss of strength and balance.  · Fear can lead to isolation from others, depression, or the use of more medicines or alcohol. And all these things make falling even more likely.  To break the cycle, learn more about ways to avoid falling. As you take control, you may find yourself feeling less afraid.   Date Last Reviewed: 6/12/2015  © 7470-9070 [employer]. [address] [place] [address], [place] [address]. All rights reserved. This information is not intended as a substitute for professional medical care. Always " follow your healthcare professional's instructions.        Preventing Falls: Making Changes in Your Living Space  Is your living space filled with hazards that could cause you to fall? Changes can make you safer. They could even save your life. Take a careful look around your home. Change what you can on your own. Hire someone or ask friends or family to help with harder tasks.      Be sure to add a nonslip mat to the inside of your shower or bathtub. Always keep a nightlight on. Keep a clear path from your bed to the bathroom. Move items from higher shelves to lower ones.   Remove hazards  · Remove things that can trip you, like throw rugs, boxes, piles of paper, or cords.  · Nail down rugs or carpeting if you don't want to remove them.  · Don't store items on stairs.  · Keep walkways clear.  · Clean up spills right away.  · Replace glass tables with wooden ones. They're safer if you fall.  Add safety devices  · Add handrails to both sides of stairs.  · Buy a raised toilet seat.  · Add grab bars near the toilet and in the shower.  · Get grabbers to help you reach things and avoid climbing.  Improve lighting  · Add nightlights to halls, bedrooms, and bathrooms.   · Put light switches at the top and bottom of stairs.  · Be sure each room and flight of stairs has proper lighting.  · Use shades or curtains to cut glare from windows.  · Put flashlights in each room. Replace burned-out bulbs.  · Get glowing light switches for room entrances.  Take other precautions  · Use nonskid floor wax.  · Buy a nonslip mat and a liquid soap dispenser for the shower.  · Tack down carpets or use slip-resistant backing.  · Put most-used items within easy reach.  · Add bright paint or tape on the top front edge of steps.  · Save big jobs, such as moving furniture or other heavy objects, for family or friends.  · Get professional help installing grab bars. They can be unsafe if not installed the right way.  Fix riskier rooms first  Don't  tackle everything at once. Focus on one room at a time. The bathroom is a common spot for falls, so you may start there. Or start with a room you spend lots of time in, such as your bedroom. Make only a few changes at once. This will give you time to adjust to them.     Outside your home  You might arrange for these changes yourself, or you might need to talk to your  or homeowners' association about them.  · Have loose boards on porches or damaged stairs repaired.  · Have rough edges, holes, or large cracks in sidewalks or driveways repaired.  · Have hazards that could trip you, such as hoses or cally, removed.  · Use high-wattage light bulbs (100 or greater) near outside doors and stairs.  · Get handrails added to outside stairs. Have them extend beyond the bottom step.  · Get help in winter weather with ice or snow removal.   Date Last Reviewed: 6/12/2015  © 0423-3922 [employer]. [address] [place] [address]. All rights reserved. This information is not intended as a substitute for professional medical care. Always follow your healthcare professional's instructions.        Be Involved in Your Health Care: Taking Medicines    When medicines are taken as directed, they can greatly improve your health. But if they are not taken as instructed, they may not work. In some cases, not taking them correctly can be harmful. To help make sure that your treatment remains effective and safe, understand your medicines and how to take them.  Reviewing your medicines  Medicines can interact with each other. They can also interact with herbal remedies and supplements. In either case, it can be harmful to your health. This is why your healthcare provider needs to know about every medicine, herb, and supplement that you take. Schedule a visit with your healthcare provider or pharmacist to discuss all your medicines. When you go in for your visit, have either one of the following:  · A bag  filled with bottles of all your medicines. Include all prescription and over-the-counter medicines. Also include any vitamins and herbal remedies that you take. Keep medicines in their labeled bottles.  OR  · A list of all the medicines, vitamins, herbs, and supplements that you take. Be sure to list how much you take and how often you take it.  Your healthcare provider or pharmacist will review your medicines with you. He or she can decide whether you are at risk for any medicine interactions. Depending on what you take, your prescriptions may be adjusted.  Remembering to take medicines  Remembering to take medicines on time can be hard. Here are some tips to help you:  · Develop a routine. For example, take your medicine at the same time each day, such as after you brush your teeth. This helps remind you to take it.  · Schedule reminders on your computer, PDA, watch, or cell phone.  · If you take more than one medicine, use a pillbox. Get one that lists the days of the week. If you take pills more than once a day, get a pillbox that has spaces for morning, noon, and evening. As you fill the pillbox, have the bottles with labels in front of you. This helps you avoid confusing pills that look alike.  · Keep your medicine routine when youre away from home. When traveling, make sure you bring enough for your entire trip. When traveling by air, keep your medicines in your carry-on. Be sure you have your prescription labels (either the original package or a photocopy). To learn more about traveling with medicines, visit: www.tsa.gov.  Working with your healthcare provider  Follow up with your healthcare provider. This lets him or her see how well you are doing on the medicine. It may take a few tries to find the right dosage, medicine, or combination of medicines for you. Make sure you talk with your healthcare provider about the medicines you take and how they make you feel. And never stop taking a medicine without  talking to your healthcare provider first. Some medicines cannot be stopped suddenly. Others can cause problems if they are not taken for the prescribed amount of time.  Taking medicine safely  Below are some tips for taking medicine safely. If you have any questions about your medicines, call your healthcare provider or pharmacist:  · Make sure to refill your prescription while you still have some left, so you dont run out. Ask your healthcare provider for an extra written prescription in case of an emergency. If you use mail order, be sure to order with enough time for the refill to arrive while you still have some left.  · Be sure refills are correct before taking them.  · Hold on to the instructions that come with each medicine.  · Dont take less than prescribed to save money. Talk to your healthcare provider if you cant afford your medicines. There are choices that can help you.  · Never share medicines.  · Store medicines in a cool, dry, dark place. A steamy bathroom is often not the best place.  Date Last Reviewed: 2/13/2016 © 2000-2017 [employer]. [address] [place] [address]. All rights reserved. This information is not intended as a substitute for professional medical care. Always follow your healthcare professional's instructions.        Know the Medicines Youre Taking    You should know certain details about your medicines. This helps you take them correctly and safely. For each medicine, ask your doctor or pharmacist the questions below. Write down the answers so you dont forget. Then fill in the information on your medicine list. Also, ask about anything you dont understand or that seems wrong. For instance, if you get a refill and the pills dont look like the ones from last time, talk to the pharmacist before taking them.  Questions to ask  · What is the medicines name? Find out the brand name as well as the generic name, if any.  · Why am I taking this? What  does it do? How fast will it work?  · How often should I take this? At what time of day?  · How much of the medicine (what dosage) should I take? How many pills is that?  · What should I do if I miss a dose? What are some of the symptoms that may occur if I miss a dose?  · Should I expect any side effects from this medicine? What should I do if I have them?  · Do I follow any special instructions while taking this? Are there any activities, foods, or other medicines I should avoid while taking this?  · How long should I keep taking this? When I run out, should I order more?  · How often should I come in to have this medicine monitored?  Beware of medicine interactions  Vitamins, herbal supplements, and some over-the-counter drugs can be dangerous to take if you use heart medicines. So tell your doctor about all products youre taking. This includes even simple remedies for headaches, allergies, colds, or constipation. Show your medicine list to the pharmacist every time you buy prescription or over-the-counter medicine. They can tell you which drugs to avoid. Also, drinking alcohol while taking heart medicine can be dangerous.  Date Last Reviewed: 6/1/2016  © 7742-9569 [employer]. [address] [place] [address], [place] [address]. All rights reserved. This information is not intended as a substitute for professional medical care. Always follow your healthcare professional's instructions.        Taking Medicine Safely    Medicine is given to help treat or prevent illness. But if you dont take it correctly, it might not help. It might even harm you. Your healthcare provider or pharmacist can help you learn the right way to take your medicine. Listed below are some tips to help you take medicine safely.  Safety tips  Do's and don'ts include the following:   · Have a routine for taking each medicine. Make it part of something you do each day, such as brushing your teeth or eating a meal.  · When you go to the  hospital or your healthcare providers office, bring all your current medicines in their original boxes or bottles. If you cant do that, bring an up-to-date list of your medicines.  · Don't stop taking a prescription medicine unless your healthcare provider tells you to. Doing so could make your condition worse.  · Don't share medicines.  · Let your healthcare provider and pharmacist know of any allergies you have and if there have been any changes in your health since you were last prescribed these medicines.   · Taking prescription medicines with alcohol, street drugs, herbs, supplements, or even some over-the-counter medicines can be harmful. Talk to your healthcare provider or pharmacist before using any of these things while taking a prescription medicine.  · When filling your prescriptions, try using the same pharmacy for all your medicines. If not, let the pharmacist know what medicines you are already on.  · Consider putting a week's worth of medicines in a container marked for each day of the week.   · Keep medicines out of the reach of children and pets. Be sure all medicine bottles have safety caps.  · Keep narcotics and sedatives out of sight or in a locked box or safe.  · Keep your medicines in a dry and cool location (not in the bathroom.)   · Don't use medicine that has  or that doesnt look or smell right. Get rid of it properly. To find out the right way to get rid of medicine:  ¨ Call your [facility] or [facility] household trash and recycling service and ask if a medicine take-back program is available in your community.  ¨ Call your local pharmacy and ask the right way to get rid of the medicine.  ¨ Go to www.fda.gov/ForConsumers/ConsumerUpdates/ceh395209 to learn how to get rid of medicines safely.  · Medicine that comes in a container for a single dose should be used only 1 time. If you use the container a second time, it may have germs in it that can cause illness. These illnesses  include hepatitis B and C. They also include infections of the brain or spinal cord (meningitis and epidural abscess).   Using generic medicines  Medicines have brand names and generic (chemical) names. When a medicine is first made, it is sold only under its brand name. Later, it can be made and sold as a generic. Generic medicines cost less than brand-name medicines and most work just as well. Most people can use the generic medicine instead of the brand-name medicine, unless their healthcare provider says otherwise.   Date Last Reviewed: 8/1/2016 © 2000-2017 [employer]. [address] [place] [address]. All rights reserved. This information is not intended as a substitute for professional medical care. Always follow your healthcare professional's instructions.        Taking Your Medications  You'll likely need to take several types of medicines after a heart attack. You may wonder: Do I really need to take so much medicine? The answer is yes. Medicine can be a vital part of healing. They can also help prevent another heart attack in the future. Be sure to take all medicines as directed.  Getting started    · Keep a list of all your medicines. Know what they are, what they do, what happens if you don't take them, and how to take them. Keep that list with you at all times.  · Know what side effects to expect. If a side effect bothers you, doesn't go away, or gets worse, call your healthcare provider.  · Ask your provider or pharmacist about possible interactions between medicines. Check before taking any over-the-counter medicine, herbs, or supplements.  · Try to get all prescriptions filled at the same pharmacy. This will help reduce the risk of interactions between medicines.  · Talk to your provider if you have any concerns about the cost of medicines. It's important that you take all of them or find alternatives if you can't afford them.  Types of medicines  · Aspirin and other  anticoagulants help prevent blood clots.  · ACE inhibitors help control blood pressure and reduce heart strain and weakening of the heart muscle. They also help with heart remodeling.  · Statins help reduce cholesterol levels.  · Beta blockers help slow the heart rate and lower blood pressure. They can also help the heart pump.  · Nitroglycerin helps reduce the heart's workload and improves blood flow through the heart.  Hints for taking medicines  · Use a pillbox to store all the pills you need for the week.  · To be sure you don't skip or repeat a dose, write down when you take your medicine.  · Don't stop taking your medicines. This can be dangerous to your heart.  · Be sure to refill a prescription before it runs out.  · Take your medicines at the same time every day.  Medications for related conditions  · Blood pressure medicines: High blood pressure is one of the most serious risks for heart attack. Medicine is likely needed to help manage this problem. It might take time to find the best medicine for you. For best results, follow these tips:  ¨ Don't stop taking high blood pressure medicine suddenly. This can make your blood pressure shoot up quickly.  ¨ Keep in mind that medicine works best when you're also eating heart-healthy foods, limiting sodium (salt), and getting regular exercise.  · Diabetes or cholesterol medicine: If health eating and activity aren't enough to manage these conditions, your healthcare provider may prescribe medicines to treat them. Be sure to take them as directed.  Date Last Reviewed: 6/1/2016  © 2176-8642 [employer]. [address] [place] [address], [place] [address]. All rights reserved. This information is not intended as a substitute for professional medical care. Always follow your healthcare professional's instructions.        Tips for Taking Medication  Its easy to forget to take your medicine, especially when you take a lot of pills. But, to get the best  results  from medicines, always take them as directed. The tips on these pages can help you keep track.  Staying on schedule     A pill organizer can help you keep track of the medicines you take each day.   Every medicine has a different purpose. So, each one needs to be taken as prescribed. Dont skip pills or stop taking a medicine, even when you feel fine. To stay on track try to:  · Take your medicine at set times. You could take it each morning with breakfast or right before you go to bed. Some medicines may need to be taken at certain times of the day, or with food. Ask your doctor if this is the case for any of your medicines.  · Find ways to remind yourself to take medicine. Use a pillbox or organize pills for the week. Set your watch or cell phone alarm to go off when youre supposed to take your medicine. Or, put a note on the bathroom mirror to remind yourself.  · Have your prescriptions refilled while you still have plenty of pills left. Keep in mind that certain suppliers, such as mail order pharmacies, may take longer to fill prescriptions.  · When traveling, keep all medicines in your carry-on bag. This way youll have them in case you and your checked luggage get . Also, bring copies of each of your prescriptions when you travel.  Safety tips  Read the warning labels and usage instructions for each medicine you take. Also, keep these safety tips in mind:  · Get help organizing your pills if you need it. Taking more than one medicine can be confusing. A family member or friend can help prevent you from making a mistake that could be dangerous to your health.  · Fill all your prescriptions at the same drug store. This way, your records are all in one place.  · Ask your pharmacist or doctor for a fact sheet or other patient information when you start a new medicine.  · Tell your doctor and pharmacist if you have allergies to any medicine.  · Dont split your pills to save money. Talk to your doctor  if youre having trouble paying for your medicine.  · Never share medicine with anyone.  · Ask your pharmacy how you should dispose of old or  medicine.  · Give a copy of your medicine list to a family member or close friend. Hold copies of each others lists in case of emergency.  · Store medicines in a cool, dry, dark place - not in a steamy bathroom.  · Make sure you tell your healthcare providers if you are taking any other supplements or drugs over-the-counter.  If you have side effects  Some medicines can cause side effects, such as nausea or dizziness. Tell your doctor if you have any side effects. He or she may change the dosage or schedule to reduce effects. Be sure to keep taking your medicine as directed, and always talk to your healthcare team about how you feel. Your feedback will help the doctor find the best medicine plan for you.  Date Last Reviewed: 2016  © 3686-9967 [employer], [employer]. [address] [place] [address], [place] [address]. All rights reserved. This information is not intended as a substitute for professional medical care. Always follow your healthcare professional's instructions.

## 2018-10-04 NOTE — TELEPHONE ENCOUNTER
Patient states he is still having pain in his right shoulder. States he received an injection at his last appointment related to this matter. States he has an appointment on 10-19-18 with Dr. Ramos, patient will follow up regarding this matter at appointment.

## 2018-10-04 NOTE — PROGRESS NOTES
ADDENDUM    DATE OF PROCEDURE CLARIFICATION:  09/08/2018.    The patient was seen by Dr. Cisneros ER physician on 09/08/2018, timing of  2041 hours.  The patient was admitted and I saw the patient after midnight  on 09/09/2018 for the first time and thus date of the note that is a  clarification of service.        BLAKE/SLAVA dd: 10/03/2018 06:32:05 (CDT)   td: 10/03/2018 07:31:04 (CDT)  Doc ID #[id number]   Job ID #[id number]    CC:

## 2018-10-09 ENCOUNTER — TELEPHONE (OUTPATIENT)
Dept: FAMILY MEDICINE | Facility: CLINIC | Age: 68
End: 2018-10-09

## 2018-10-09 NOTE — TELEPHONE ENCOUNTER
Available refills noted at [facility]. Patient notified. Verbalized understanding.           ----- Message from Mallorie Chambers sent at 10/9/2018 11:52 AM CDT -----  Contact: self   Patient need a refill on rx advair, please send to [facility]. Please call back at [phone] (home) if any questions, patient need 3 boxes.      [id number] - [place], [place] - [address] [address]  Phone: [phone] Fax: [phone]

## 2018-10-12 ENCOUNTER — OUTPATIENT CASE MANAGEMENT (OUTPATIENT)
Dept: ADMINISTRATIVE | Facility: OTHER | Age: 68
End: 2018-10-12

## 2018-10-12 NOTE — PROGRESS NOTES
10/13/18 - SUMMARY: Phone contact made with pt today for follow up with OPCM. He reports he has not yet rec'd the literature mailed by this CM. Attempted to discuss meds and need for assistance so that he can take them as ordered. He became defensive that everyone who contacts him just wants to talk about his meds.     INTERVENTION: Attempted to reinforce need for med compliance related to numerous meds. Discussed pre-packaged meds from pharmacy and he advised this is what his home health nurse wants him to do as well. Discussed around the clock O2 use and safety with tubing to prevent falls. Reviewed upcoming provider appts. Advised CM will follow up in two weeks to see if he has rec'd mailed literature and to expect contact from Candis Chicas, hospitals LCSW.     PLAN: Follow up in two weeks to discuss mailed literature. Has he started receiving meds in blister packs?       Todays OPC Self-Management Care Plan was reviewed with the patients/caregivers input based on identified barriers from todays and previous assessments.  Goals were reviewed today with the patient/caregiver and the patient has agreed to continue to work towards these goals to improve his/her overall well-being. Patient verbalized understanding of the care plan, goals, and all of today's instructions. Encouraged patient/caregiver to communicate with his/her physician and health care team about health conditions and the treatment plan.  Provided my contact information today and encouraged patient/caregiver to call me with any questions as needed. Romelia Ching RN

## 2018-10-16 ENCOUNTER — TELEPHONE (OUTPATIENT)
Dept: DERMATOLOGY | Facility: CLINIC | Age: 68
End: 2018-10-16

## 2018-10-16 ENCOUNTER — TELEPHONE (OUTPATIENT)
Dept: FAMILY MEDICINE | Facility: CLINIC | Age: 68
End: 2018-10-16

## 2018-10-16 ENCOUNTER — TELEPHONE (OUTPATIENT)
Dept: ADMINISTRATIVE | Facility: CLINIC | Age: 68
End: 2018-10-16

## 2018-10-16 NOTE — TELEPHONE ENCOUNTER
----- Message from Zaida De La Paz sent at 10/15/2018  4:26 PM CDT -----  Contact: pt at [phone]  OWS/Rosa-Pt needs to talk to you.  About what he had done.

## 2018-10-16 NOTE — TELEPHONE ENCOUNTER
----- Message from Zaida De La Paz sent at 10/12/2018  3:07 PM CDT -----  Contact: pt at [phone]  PWS/Rosa-Call in re to other doctors that he shawn be seeing.  Wants to talk to you.

## 2018-10-16 NOTE — TELEPHONE ENCOUNTER
Prescription for Flonase e-scribed to pharmacy on 8-13-18 with 3 additional refills. Patient notified to contact [facility] to request to  this prescription. Patient verbalized understanding.             ----- Message from Maurice Hearn sent at 10/16/2018  3:29 PM CDT -----  Type:  RX Refill Request    Who Called:  Patient  Refill or New Rx:  Refill  RX Name and Strength:  fluticasone (FLONASE) 50 mcg/actuation nasal spray       Preferred Pharmacy with phone number:        [id number] - [address] [address]  Phone: [phone] Fax: [phone]    Local or Mail Order:  Local  Ordering Provider:  Rachel Mandujano Call Back Number:  [phone]

## 2018-10-17 ENCOUNTER — OUTPATIENT CASE MANAGEMENT (OUTPATIENT)
Dept: ADMINISTRATIVE | Facility: OTHER | Age: 68
End: 2018-10-17

## 2018-10-17 ENCOUNTER — TELEPHONE (OUTPATIENT)
Dept: SURGERY | Facility: CLINIC | Age: 68
End: 2018-10-17

## 2018-10-17 RX ORDER — HYDROXYZINE PAMOATE 25 MG/1
25 CAPSULE ORAL EVERY 8 HOURS PRN
Qty: 30 CAPSULE | Refills: 0 | Status: SHIPPED | OUTPATIENT
Start: 2018-10-17

## 2018-10-17 NOTE — TELEPHONE ENCOUNTER
----- Message from Ally Fonseca sent at 10/17/2018 10:20 AM CDT -----  Type: Needs Medical Advice    Who Called:  Patient   Symptoms (please be specific):  n/a  How long has patient had these symptoms:  n/a  Pharmacy name and phone #:  n/a  Best Call Back Number: [phone]  Additional Information: patient is calling to inform nurse that he in on oxygen, he states he has a surgery tomorrow and is requesting a nurse to call him back..    Thank you

## 2018-10-17 NOTE — TELEPHONE ENCOUNTER
Please notify the patient that Hydroxyzine 25 mg was refilled to [facility].  I am sorry to hear the stress he has been under, but look forward to seeing him soon at next appt on 10/22/18.

## 2018-10-17 NOTE — PROGRESS NOTES
"10/17/2018  Summary:   attempted to complete assessment with patient via telephone.  He was easily agitated and put his mother on the phone during our interview.  She reported that she just wanted to make sure that patient was speaking to someone of "authority".      Pt became agitated when  asked questions re: his income.  He has requested that  meet with him in person in order to complete this assessment.   agreed to meet with him during his appointment on 10/22 at [facility].  He answered a few more questions until his mother instructed him to stop.      Interventions:  Partial assessment completed  Scheduled follow up to complete assessment.    Plan:  Follow up 10/22  Complete assessment.        Todays OPCM Self-Management Care Plan was developed with the patients/caregivers input and was based on identified barriers from todays assessment.  Goals were written today with the patient/caregiver and the patient has agreed to work towards these goals to improve his/her overall well-being. Patient verbalized understanding of the care plan, goals, and all of today's instructions. Encouraged patient/caregiver to communicate with his/her physician and health care team about health conditions and the treatment plan.  Provided my contact information today and encouraged patient/caregiver to call me with any questions as needed.  "

## 2018-10-17 NOTE — TELEPHONE ENCOUNTER
Spoke with patient who was very upset. States he and his mother are fighting a lot and he is feeling overwhelmed.    States he is going through a lot.   Had a cancer operation and  twice on the table  Had to be brought to life.     We confirmed his appointment date and time.  States he does not want to wait- he is on oxygen and should not have to     Advised patient that dr. eric sees more then one patient in the morning. No way to guarantee he will be seen on time. And no doctor appointment can give that guarantee, - patient verbalized understanding    Request refill of vistaril to help with his anxiety  Advised I would call him back when med has been refilled  Patient verbalized understanding

## 2018-10-18 ENCOUNTER — OFFICE VISIT (OUTPATIENT)
Dept: SURGERY | Facility: CLINIC | Age: 68
End: 2018-10-18
Payer: MEDICARE

## 2018-10-18 ENCOUNTER — LAB VISIT (OUTPATIENT)
Dept: LAB | Facility: HOSPITAL | Age: 68
End: 2018-10-18
Attending: FAMILY MEDICINE
Payer: MEDICARE

## 2018-10-18 VITALS
HEIGHT: 67 IN | DIASTOLIC BLOOD PRESSURE: 82 MMHG | WEIGHT: 199.06 LBS | HEART RATE: 97 BPM | BODY MASS INDEX: 31.24 KG/M2 | SYSTOLIC BLOOD PRESSURE: 137 MMHG

## 2018-10-18 DIAGNOSIS — K72.10 CHRONIC LIVER FAILURE WITHOUT HEPATIC COMA: ICD-10-CM

## 2018-10-18 DIAGNOSIS — L72.3 SEBACEOUS CYST: Primary | ICD-10-CM

## 2018-10-18 LAB
ALBUMIN SERPL BCP-MCNC: 4.2 G/DL
ALP SERPL-CCNC: 68 U/L
ALT SERPL W/O P-5'-P-CCNC: 28 U/L
ANION GAP SERPL CALC-SCNC: 10 MMOL/L
AST SERPL-CCNC: 29 U/L
BASOPHILS # BLD AUTO: 0.04 K/UL
BASOPHILS NFR BLD: 0.3 %
BILIRUB SERPL-MCNC: 0.9 MG/DL
BUN SERPL-MCNC: 15 MG/DL
CALCIUM SERPL-MCNC: 10.3 MG/DL
CHLORIDE SERPL-SCNC: 99 MMOL/L
CO2 SERPL-SCNC: 31 MMOL/L
CREAT SERPL-MCNC: 1.1 MG/DL
DIFFERENTIAL METHOD: ABNORMAL
EOSINOPHIL # BLD AUTO: 0.1 K/UL
EOSINOPHIL NFR BLD: 0.7 %
ERYTHROCYTE [DISTWIDTH] IN BLOOD BY AUTOMATED COUNT: 13.5 %
EST. GFR  (AFRICAN AMERICAN): >60 ML/MIN/1.73 M^2
EST. GFR  (NON AFRICAN AMERICAN): >60 ML/MIN/1.73 M^2
GLUCOSE SERPL-MCNC: 84 MG/DL
HCT VFR BLD AUTO: 45.3 %
HGB BLD-MCNC: 15 G/DL
IMM GRANULOCYTES # BLD AUTO: 0.09 K/UL
IMM GRANULOCYTES NFR BLD AUTO: 0.7 %
LACTATE SERPL-SCNC: 1.5 MMOL/L
LYMPHOCYTES # BLD AUTO: 1.9 K/UL
LYMPHOCYTES NFR BLD: 14 %
MCH RBC QN AUTO: 33.9 PG
MCHC RBC AUTO-ENTMCNC: 33.1 G/DL
MCV RBC AUTO: 103 FL
MONOCYTES # BLD AUTO: 0.9 K/UL
MONOCYTES NFR BLD: 6.4 %
NEUTROPHILS # BLD AUTO: 10.8 K/UL
NEUTROPHILS NFR BLD: 77.9 %
NRBC BLD-RTO: 0 /100 WBC
PLATELET # BLD AUTO: 206 K/UL
PMV BLD AUTO: 9.7 FL
POTASSIUM SERPL-SCNC: 4.2 MMOL/L
PROT SERPL-MCNC: 7.8 G/DL
RBC # BLD AUTO: 4.42 M/UL
SODIUM SERPL-SCNC: 140 MMOL/L
WBC # BLD AUTO: 13.8 K/UL

## 2018-10-18 PROCEDURE — 36415 COLL VENOUS BLD VENIPUNCTURE: CPT | Mod: PO

## 2018-10-18 PROCEDURE — 11402 EXC TR-EXT B9+MARG 1.1-2 CM: CPT | Mod: PBBFAC,PO | Performed by: SURGERY

## 2018-10-18 PROCEDURE — 85025 COMPLETE CBC W/AUTO DIFF WBC: CPT

## 2018-10-18 PROCEDURE — 99999 PR PBB SHADOW E&M-EST. PATIENT-LVL III: CPT | Mod: PBBFAC,,, | Performed by: SURGERY

## 2018-10-18 PROCEDURE — 80053 COMPREHEN METABOLIC PANEL: CPT

## 2018-10-18 PROCEDURE — 83605 ASSAY OF LACTIC ACID: CPT

## 2018-10-18 PROCEDURE — 11402 EXC TR-EXT B9+MARG 1.1-2 CM: CPT | Mod: S$PBB,,, | Performed by: SURGERY

## 2018-10-18 PROCEDURE — 99213 OFFICE O/P EST LOW 20 MIN: CPT | Mod: PBBFAC,PO,25 | Performed by: SURGERY

## 2018-10-18 PROCEDURE — 99213 OFFICE O/P EST LOW 20 MIN: CPT | Mod: 25,S$PBB,, | Performed by: SURGERY

## 2018-10-18 NOTE — PROGRESS NOTES
Subjective:       Patient ID: Hesham Serna is a 68 y.o. male.    Chief Complaint: Procedure (lump on back and head, desires removal)    Sebaceous cyst of back he would like removed.  Getting larger, occasionally painful.       Review of Systems   Constitutional: Negative for activity change, appetite change, chills, fatigue, fever and unexpected weight change.   HENT: Negative for congestion, dental problem, hearing loss, nosebleeds, sinus pressure, sore throat and voice change.    Eyes: Negative for pain and visual disturbance.   Respiratory: Positive for shortness of breath and wheezing. Negative for cough, chest tightness and stridor.    Cardiovascular: Negative for chest pain, palpitations and leg swelling.   Gastrointestinal: Negative for abdominal distention, abdominal pain, constipation, diarrhea, nausea and vomiting.   Endocrine: Negative for cold intolerance and heat intolerance.   Genitourinary: Negative for difficulty urinating, flank pain, frequency and hematuria.   Musculoskeletal: Negative for arthralgias, back pain, gait problem, joint swelling, myalgias, neck pain and neck stiffness.   Skin: Negative for rash.   Allergic/Immunologic: Negative for immunocompromised state.   Neurological: Negative for dizziness, tremors, seizures, syncope, weakness, light-headedness, numbness and headaches.   Hematological: Negative for adenopathy. Does not bruise/bleed easily.   Psychiatric/Behavioral: Negative for confusion, decreased concentration, hallucinations, sleep disturbance and suicidal ideas. The patient is not nervous/anxious.        Objective:      Physical Exam   Constitutional: He is oriented to person, place, and time. He appears well-developed and well-nourished.   HENT:   Head: Normocephalic and atraumatic.   Right Ear: External ear normal.   Left Ear: External ear normal.   Eyes: Conjunctivae are normal. Pupils are equal, round, and reactive to light.   Neck: Normal range of motion.    Cardiovascular: Normal rate and regular rhythm.   Pulmonary/Chest: Accessory muscle usage present. Tachypnea noted. He is in respiratory distress (mild - on O2).         He exhibits no tenderness.   Abdominal: Soft. He exhibits no distension and no mass.   Musculoskeletal: He exhibits no edema or tenderness.   Neurological: He is alert and oriented to person, place, and time. He has normal reflexes. No cranial nerve deficit.   Skin: Skin is warm and dry. No rash noted. No erythema. No pallor.   Psychiatric: He has a normal mood and affect. His behavior is normal. Judgment and thought content normal.   Nursing note and vitals reviewed.      Assessment:       1. Sebaceous cyst        Plan:       See procedure note

## 2018-10-18 NOTE — PROCEDURES
"Exc, Cyst  Date/Time: 10/18/2018 9:53 AM  Performed by: Hesham Bernard MD  Authorized by: Hesham Bernard MD     Consent Done?:  Yes (Verbal)  Time out: Immediately prior to procedure a "time out" was called to verify the correct patient, procedure, equipment, support staff and site/side marked as required.      STAFF:  Assistants?: No    I was present for the entire procedure.  INDICATIONS:cyst  LOCATION:  Body area:  Back / flankright    PREP:  Patient was prepped and draped in the normal sterile fashion.Position:  Prone    ANESTHESIA:  Anesthesia:  Local infiltration  Local anesthetic:  Lidocaine 1% with epinephrine    PROCEDURE DETAILS:  Excision type:  Skin  Scalpel size:  15  Incision type:  Elliptical  Specimens?: Yes    Hemostasis was obtained.  Estimated blood loss (cc):  3  Wound closure:  Simple  Wound repair size (cm):  2  Sutures:  4-0 Monocryl  Sterile dressings:  Telfa gauze  Post-op diagnosis: Same as pre-op diagnosis.  Needle, instrument, and sponge counts were correct.  Patient tolerated the procedure well with no immediate complications.  Post-operative instructions were provided for the patient.      "

## 2018-10-19 ENCOUNTER — TELEPHONE (OUTPATIENT)
Dept: FAMILY MEDICINE | Facility: CLINIC | Age: 68
End: 2018-10-19

## 2018-10-19 ENCOUNTER — OFFICE VISIT (OUTPATIENT)
Dept: FAMILY MEDICINE | Facility: CLINIC | Age: 68
End: 2018-10-19
Payer: MEDICARE

## 2018-10-19 VITALS
OXYGEN SATURATION: 95 % | HEART RATE: 80 BPM | DIASTOLIC BLOOD PRESSURE: 87 MMHG | TEMPERATURE: 98 F | SYSTOLIC BLOOD PRESSURE: 135 MMHG | BODY MASS INDEX: 30.79 KG/M2 | HEIGHT: 67 IN | WEIGHT: 196.19 LBS

## 2018-10-19 DIAGNOSIS — F39 MOOD DISORDER: ICD-10-CM

## 2018-10-19 DIAGNOSIS — J44.9 STEROID-DEPENDENT COPD: ICD-10-CM

## 2018-10-19 DIAGNOSIS — M50.30 DDD (DEGENERATIVE DISC DISEASE), CERVICAL: Primary | Chronic | ICD-10-CM

## 2018-10-19 DIAGNOSIS — J96.01 ACUTE RESPIRATORY FAILURE WITH HYPOXEMIA: ICD-10-CM

## 2018-10-19 DIAGNOSIS — Z79.891 USE OF OPIATES FOR THERAPEUTIC PURPOSES: ICD-10-CM

## 2018-10-19 DIAGNOSIS — Z92.241 STEROID-DEPENDENT COPD: ICD-10-CM

## 2018-10-19 DIAGNOSIS — Z23 NEED FOR PNEUMOCOCCAL VACCINATION: ICD-10-CM

## 2018-10-19 DIAGNOSIS — I15.0 RENOVASCULAR HYPERTENSION: ICD-10-CM

## 2018-10-19 DIAGNOSIS — F31.0 BIPOLAR AFFECTIVE DISORDER, CURRENT EPISODE HYPOMANIC: ICD-10-CM

## 2018-10-19 DIAGNOSIS — F41.1 GAD (GENERALIZED ANXIETY DISORDER): ICD-10-CM

## 2018-10-19 PROCEDURE — 90732 PPSV23 VACC 2 YRS+ SUBQ/IM: CPT | Mod: PBBFAC,PO

## 2018-10-19 PROCEDURE — 99213 OFFICE O/P EST LOW 20 MIN: CPT | Mod: PBBFAC,PO | Performed by: FAMILY MEDICINE

## 2018-10-19 PROCEDURE — 99999 PR PBB SHADOW E&M-EST. PATIENT-LVL III: CPT | Mod: PBBFAC,,, | Performed by: FAMILY MEDICINE

## 2018-10-19 PROCEDURE — 99214 OFFICE O/P EST MOD 30 MIN: CPT | Mod: S$PBB,,, | Performed by: FAMILY MEDICINE

## 2018-10-19 RX ORDER — NALOXONE HYDROCHLORIDE 4 MG/.1ML
1 SPRAY NASAL ONCE
Qty: 2 EACH | Refills: 3 | Status: SHIPPED | OUTPATIENT
Start: 2018-10-19 | End: 2018-10-19

## 2018-10-19 RX ORDER — CLONAZEPAM 2 MG/1
TABLET ORAL
Qty: 90 TABLET | Refills: 0 | Status: SHIPPED | OUTPATIENT
Start: 2018-10-26

## 2018-10-19 NOTE — PROGRESS NOTES
"SUBJECTIVE:  Hesham Serna is a 68 y.o. male who sustained a right shoulder injury abcess. Mechanism of I/d 3 days ago.[facility] healing by Dr Bernard.    Hx DJD   Immediate symptoms: delayed swelling, was able to use arm directly after injury. Symptoms have been intermittent since that time. Prior history of related problems: previous shoulder injury .Chronic DDD lumbar and cervical DDD. Chronic opiates use .     COPD follow-up. He is not currently in exacerbation. Symptoms currently include non-productive cough and occur continuously.  Observed precipitants include smoke.  Current limitations in activity from asthma: Oxygen dependent. Walks with a walker, short distance..  Frequency of use of quick-relief meds: Proventil. The patient has been deviating from this regimen as follows: every 3-4 hrs.  He has poor sleeping patterns, chronic insomnia, daytime drowsiness, poor concentration, exertion or dyspnea, and chronic Major depression with partial results with medications.  Chronic smoker for more than 40 packs per year.  He decline smoking cessation referral nor medications.  Respiratory ROS: positive for - cough, orthopnea and shortness of breath  negative for - hemoptysis, pleuritic pain, sputum changes, stridor, tachypnea or wheezing  Rest system neg,except psy  /87 (BP Location: Right arm, Patient Position: Sitting, BP Method: Medium (Automatic))   Pulse 80   Temp 98 °F (36.7 °C) (Oral)   Ht 5' 7" (1.702 m)   Wt 89 kg (196 lb 3.4 oz)   SpO2 95% Comment: On oxygen  BMI 30.73 kg/m²   .Chest: clear to auscultation, no wheezes, rales or rhonchi, symmetric air entry, no tachypnea, retractions or cyanosis, decreased air entry noted .  CVS exam: normal rate, regular rhythm, normal S1, S2, no murmurs, rubs, clicks or gallops, normal rate and regular rhythm, S1 and S2 normal, no murmurs noted, no gallops noted.  Skin exam - normal coloration and turgor, no rashes, no suspicious skin lesions noted  Wound " healing..        Past Medical History:   Diagnosis Date    Allergy     Arthritis     Asthma     Cancer     skin DR Isrrael Yun    COPD (chronic obstructive pulmonary disease)     Degenerative disc disease     Depression     bipolar dis    Fracture of vertebra due to osteoporosis     Hepatitis C     Hypertension     Liver disease     Nasal bone fx-closed     Nephrolith     Rosacea     Skin cancer        ASSESSMENT:  Hesham was seen today for anxiety, copd and back pain.    Diagnoses and all orders for this visit:    DDD (degenerative disc disease), cervical    FRANCISCO (generalized anxiety disorder)    Renovascular hypertension    Steroid-dependent COPD    Need for pneumococcal vaccination  -     (In Office Administered) Pneumococcal Polysaccharide Vaccine (23 Valent) (SQ/IM)    Use of opiates for therapeutic purposes  -     naloxone (NARCAN) 4 mg/actuation Spry; 1 spray (4 mg total) by Nasal route once. for 1 dose    Mood disorder  -     clonazePAM (KLONOPIN) 2 MG Tab; TAKE 1 TABLET BY MOUTH 3 TIMES A DAY    Bipolar affective disorder, current episode hypomanic  -     clonazePAM (KLONOPIN) 2 MG Tab; TAKE 1 TABLET BY MOUTH 3 TIMES A DAY    Acute respiratory failure with hypoxemia  -     Spirometry with/without bronchodilator & DLCO ([facility] Only); Future  -     POCT arterial blood gas; Future        See orders for this visit as documented in the electronic medical record.  The wound is cleansed, debrided of foreign material as much as possible, and dressed. The patient is alerted to watch for any signs of infection (redness, pus, pain, increased swelling or fever) and call if such occurs. Home wound care instructions are provided. Tetanus vaccination status reviewed: last tetanus booster within 10 years.The abscess well healing.

## 2018-10-19 NOTE — TELEPHONE ENCOUNTER
----- Message from Mateus Gautam sent at 10/19/2018  9:16 AM CDT -----  Contact: Pt  Name of Who is Calling: Hesham      What is the request in detail: Patient is calling requesting to speak with a nurse in regards to his medication having the wrong date on it.      Can the clinic reply by [facility]:       [facility] [phone] (home)

## 2018-10-19 NOTE — PATIENT INSTRUCTIONS

## 2018-10-19 NOTE — TELEPHONE ENCOUNTER
"Spoke to patient who states [facility] advised him they need a "special paper or letter from MD" to fill his prescription for Narcan Nasal Spray. Writer contacted [facility] regarding this matter. Advised they do not carry this item patient would need to get this prescription for [facility]. Prescription was originally e-scribed to [facility]. Patient notified this prescription would have to be picked up at [facility]. Patient verbalized understanding.   "

## 2018-10-22 ENCOUNTER — TELEPHONE (OUTPATIENT)
Dept: UROLOGY | Facility: CLINIC | Age: 68
End: 2018-10-22

## 2018-10-22 ENCOUNTER — OUTPATIENT CASE MANAGEMENT (OUTPATIENT)
Dept: ADMINISTRATIVE | Facility: OTHER | Age: 68
End: 2018-10-22

## 2018-10-22 ENCOUNTER — OFFICE VISIT (OUTPATIENT)
Dept: UROLOGY | Facility: CLINIC | Age: 68
End: 2018-10-22
Payer: MEDICARE

## 2018-10-22 ENCOUNTER — TELEPHONE (OUTPATIENT)
Dept: DERMATOLOGY | Facility: CLINIC | Age: 68
End: 2018-10-22

## 2018-10-22 ENCOUNTER — TELEPHONE (OUTPATIENT)
Dept: FAMILY MEDICINE | Facility: CLINIC | Age: 68
End: 2018-10-22

## 2018-10-22 VITALS
HEART RATE: 81 BPM | WEIGHT: 200.38 LBS | HEIGHT: 67 IN | TEMPERATURE: 98 F | SYSTOLIC BLOOD PRESSURE: 139 MMHG | DIASTOLIC BLOOD PRESSURE: 86 MMHG | BODY MASS INDEX: 31.45 KG/M2

## 2018-10-22 DIAGNOSIS — R82.89 ABNORMAL URINE CYTOLOGY: ICD-10-CM

## 2018-10-22 DIAGNOSIS — R31.29 MICROSCOPIC HEMATURIA: ICD-10-CM

## 2018-10-22 DIAGNOSIS — N40.0 BENIGN PROSTATIC HYPERPLASIA, UNSPECIFIED WHETHER LOWER URINARY TRACT SYMPTOMS PRESENT: ICD-10-CM

## 2018-10-22 DIAGNOSIS — N20.0 NEPHROLITHIASIS: ICD-10-CM

## 2018-10-22 LAB
BACTERIA #/AREA URNS HPF: NORMAL /HPF
BILIRUB SERPL-MCNC: ABNORMAL MG/DL
BLOOD URINE, POC: 50
COLOR, POC UA: YELLOW
GLUCOSE UR QL STRIP: ABNORMAL
KETONES UR QL STRIP: ABNORMAL
LEUKOCYTE ESTERASE URINE, POC: ABNORMAL
MICROSCOPIC COMMENT: NORMAL
NITRITE, POC UA: ABNORMAL
PH, POC UA: 8
PROTEIN, POC: ABNORMAL
RBC #/AREA URNS HPF: 2 /HPF (ref 0–4)
SPECIFIC GRAVITY, POC UA: 1
SQUAMOUS #/AREA URNS HPF: 0 /HPF
UROBILINOGEN, POC UA: ABNORMAL
WBC #/AREA URNS HPF: 1 /HPF (ref 0–5)

## 2018-10-22 PROCEDURE — 81002 URINALYSIS NONAUTO W/O SCOPE: CPT | Mod: PBBFAC,PN | Performed by: UROLOGY

## 2018-10-22 PROCEDURE — 87086 URINE CULTURE/COLONY COUNT: CPT

## 2018-10-22 PROCEDURE — 99215 OFFICE O/P EST HI 40 MIN: CPT | Mod: PBBFAC,PN | Performed by: UROLOGY

## 2018-10-22 PROCEDURE — 81000 URINALYSIS NONAUTO W/SCOPE: CPT

## 2018-10-22 PROCEDURE — 81001 URINALYSIS AUTO W/SCOPE: CPT | Mod: PBBFAC,PN | Performed by: UROLOGY

## 2018-10-22 PROCEDURE — 99214 OFFICE O/P EST MOD 30 MIN: CPT | Mod: S$PBB,,, | Performed by: UROLOGY

## 2018-10-22 PROCEDURE — 99999 PR PBB SHADOW E&M-EST. PATIENT-LVL V: CPT | Mod: PBBFAC,,, | Performed by: UROLOGY

## 2018-10-22 NOTE — H&P (VIEW-ONLY)
[facility] Progress Note - [place]  Urology Group: Anna/Robert/Cassie/Radha  PCP: Dr[facility]Baez [facility]    Chief Complaint: GH    Subjective:        HPI: Hesham Serna is a 68 y.o. male presents with active hep       Gross hematuria and abnormal urine cytology  ct urogram 11/14/16: herniation into left testicle, 4mm left renal stone. Repeat ctu 8/21/17 showed a 6mm stone in his left kidney and herniating bladder into testicle. GHould be related to bladder herniating into testicular causing bladder irritation. However they want to hold off on treatment of this.   urine cultures have been positive in the past, ua today suspicious. Pt does CIC.   urine cytology 9/26/16 - negative .7/17/17 - negative,  8/10/17 - urothelial cell atypia, supicious for urothelial high grade tumor.He has a h/o smoking (55 packs, 2-3 packs a day). Repeat urine cytology 8/17/17 abnormal  cystoscopy 8/28/17 urethral meatus with tear, small prostate and no b lobar hypertrophy, + inflammation on posterior bladder wall and floor that was biopsied. Path showed cystitis cystica.   Voided urine 9/21/17: Highly atypical single cells are present, suspicious for transitional cell carcinoma.These features can also be seen in the context of instrumentation.   Catheterized urine 4/9/18:Negative for malignant cells.   Voided urine cytology 9/11/18: suspicious for malignancy     Denies any gross hematuria recently. Still smoking. Voids q1-2 hours    H/o Elevated residuals and OAB, BPH  Pt with history of urinary retention when he first started seeing me. UDS initially  showed atonic bladder. He had gone from doing cic intermittently then up to 10x a day for frequency and urgency. He has been on myrbetriq at least since 2016. He has had intermittent recurrent uti's and gross hematuria (likely from uti's but could be from stones, workup initially showed abnormal cytology but repeated 4/9/18 and was negative). We  changed him from flomax to rapaflo at some point and after residuals were checked a few times we discontinued CIC. His residuals by in and out cath have remained less than 20cc (checked 4/9/18 and 6/13/18).   He had a cysto in 8/2017 which showed no enlarged prostate but states when he stops rapaflo his flow slows. He's had uroflows with avg flow flow of 7mL/s.     No incontinence. No longer catheterizing. Voiding q1-2hours Feels like he is emptying. pvr 6/18 was 5cc. Still on myrbetriq and rapaflo      Recurrent uti  -no c/o uti today  -last imaging was kub 6/12/18 which showed left kidney stone c/w ct 8/2017 showing Left kidney stone  -RF: CIC, stone    Urinalysis today void: tr wbc/50 blood, 2 rbc, 1 wbc - 0   Urine history  9/11/18 Ng, void: tr blood, cytology: suspicious for malignancy  9/8/18  No cx, 7 rbcs  6/12/18 P.mirabilis, void&cath: 2+leuk/1+prot/50 blood -  (dysuria, urgency, UUI), pvr by I&oL 5cc  4/19/18 Cath:P.mirabilis, void: tr leuk/50 blood/1+glucose, cath: none (asymptomatic). I&o cath with 16fr: 20cc, cytology: normal   10/17/17 Ng, void: tr blood  9/12/17 E.cloacae  8/28/17 ng  8/21/17            k.pneumonia  8/10/17                      ng  1/16/17  ng  10/21/16  E.cloacae  9/19/16  P.mirabilis  7/20/16  s.haemolyticus  3/23/16   Ng  12/16/15  ng     Nephrolithiasis  CTRSS on 6/4/15 showed left 4mm renal stone. KUB on 4/12/16 showed stone is radioopaque. Ctu 11/14/16 showed this again. ctu 8/21/17 showed stone is now 6mm stone in his left kidney  Scheduled for eswl but never proceeded with surgery. Stated he wanted to hold off until his back is fixed. Still no c/o left flank pain          REVIEW OF SYSTEMS:  General ROS: no fevers, no chills  Psychological ROS: no depression  Endocrine ROS: no heat or cold  Respiratory ROS: + SOB  Cardiovascular ROS: no CP  Gastrointestinal ROS: no abdominal pain, + constipation is worsening on lactulose, no diarrhea, no BRBPR  Musculoskeletal ROS: no muscle  pain, wears back brace  Neurological ROS: no headaches  Dermatological ROS: no rashes  HEENT: noglasses, no sinus   ROS: per HPI    The past medical, surgical, social and family hx were reviewed. There have been any changes. He says he's had 3 root canals done  Cataracts? none    Urologic meds: myrbetriq 50mg and rapaflo  Anticoagulation: No    Objective:     Vitals:    10/22/18 0901   BP: 139/86   Pulse: 81   Temp: 97.8 °F (36.6 °C)          Physical Exam   Constitutional: He is oriented to person, place, and time. He appears well-developed and well-nourished. He is cooperative. No distress.   HENT:   Head: Normocephalic and atraumatic.   Eyes: Pupils are equal, round, and reactive to light.   Cardiovascular: Normal rate and regular rhythm.    Pulmonary/Chest: Effort normal.   Abdominal: Soft. Normal appearance.   Musculoskeletal: Normal range of motion.   Neurological: He is alert and oriented to person, place, and time. He has normal reflexes.   Skin: Skin is intact.     Psychiatric: He has a normal mood and affect.         gu 6/13/18  MOON: 30g  pvr by in and out cath post void was 5cc. Tried 12fr and 16fr coude silicone and they went easy. He said he uses 14 fr straight silicone but I tried this and met too much resistance. Cath urine sent for culture    Labs:    PSA   7/3/18  0.60  5/9/17  0.45  5/26/15 0.27  7/14/14  0.26     Cr  4/3/17 1.0    Path:   Urine cytology 9/11/18:  -Suspicious for malignancy; hypercellular specimen with numerous atypical urothelial cells, reactive urothelial cells,  mildly atypical squamous cells, few scattered inflammatory cells and numerous spermatozoa (see comment).  COMMENT: There are atypical urothelial cells present which are suspicious for, but not diagnostic of, malignancy.  This patient has a complicated urologic history with testicular herniation into the bladder, repeated catheterizationsand urinary tract infection. The atypical cells seen in this specimen could be  reactive in nature; however, aneoplastic process cannot be excluded. Close clinical follow-up, clinical correlation and additional testing are  recommended.  Catheterized urine 4/9/18:Negative for malignant cells.  Voided urine 9/21/17: Highly atypical single cells are present, suspicious for transitional cell carcinoma.These features can also be seen in the context of instrumentation. Clinical correlation is essential. This was discussed with Dr. Castillo on October 2, 2017, at 8:40 AM.  Bladder, biopsy 9/4/17: Cystitis cystica.  Urine, voided, cytology 8/21/17: Urothelial atypia, suspicious cells present, see comment.  URINE (VOIDED) 8/10/17: Urothelial cell atypia, suspicious for urothelial high-grade dysplasia/carcinoma in situ  Voided urine 7/17/17: Negative for malignant cells.  9/26/16 URINE, VOIDED (CYTOLOGY):Negative for malignant cells Abundant neutrophils, Degenerative changes     Rads:   kub 6/12/18 done for left renal stone:  4mm left renal stone    ctu 8/21/17  1.  Stable examination demonstrating bilateral inguinal hernias, the left of which is a direct inguinal hernia containing a portion of the urinary bladder. There is urinary bladder wall thickening involving the anterior bladder wall and herniated portion of the bladder which is nonspecific but likely reactive in this setting. Right indirect fat-containing inguinal hernia also again noted.  2. Additional stable findings:  -Nonobstructive left nephrolithiasis  -Colonic diverticulosis  -Chronic severe L1 compression fracture status post vertebroplasty/kyphoplasty     kub 8/21/17 Left 4mm (6mm on ct) mid pole stone (L3). Note to self: recommend eswl, stone growing  ctu 11/14/16: 4mm left renal stone, no renal mass, no hydro, prtial herniation of the bladder into the LIH. Prostate not enlarged. A fat containing RIH. Severe L1 compression fracture  kub 4/12/16 - left renal stone 5mm  ctrss 6/4/15 - 4mm left midpole stone    Assessment:       1.  Abnormal urine cytology    2. Benign prostatic hyperplasia, unspecified whether lower urinary tract symptoms present    3. Nephrolithiasis        Plan:       Gross hematuria workup w intermittent abnormal urine cytology, most recent one negative   -cystoscopy 8/28/17 showed irritation c/w this and path was negative. ctu 8/12/17 showed a nonobstructing stone which should not be the cause and he's had UTIs which can cause this too.   -repeat urine cytology was suspicious for atypical urine cytology. He continues to smoke.could be related to bladder herniating into testicular causing bladder irritation. Previously we discussed treatment, However they want to hold off on treatment of this.  -will schedule cytoscopy for October 29th  -will also schedule an ultrasound    Recurrent UTI  -no uti today     OAB (overactive bladder)  -continue myrbetriq. Doing well on this     BPH with h/o Elevated urine residuals  -no longer catheterizing  -pvr by in and out cath 6/12/18 was 5cc. Tried 12fr and 16fr coude silicone and they went easy.   -continue rapaflo, doing well on this    Nephrolithiasis  -LMP 6mm stone. KUb showed stone visible. Poor candidate for eswl.     psa reviewed and was normal    F/u for cystoscopy

## 2018-10-22 NOTE — TELEPHONE ENCOUNTER
----- Message from Ellen Zhou sent at 10/22/2018  1:08 PM CDT -----  Contact: Self  Patient needs to speak to Rosa about his procedure time on 10/29    Please call back [phone]

## 2018-10-22 NOTE — TELEPHONE ENCOUNTER
----- Message from Indu Whitt sent at 10/22/2018 12:19 PM CDT -----  Contact: Patient  Patient is calling to reschedule his procedure for 10/24.  Please call to reschedule.  Call Back#[phone]  Thanks

## 2018-10-22 NOTE — TELEPHONE ENCOUNTER
----- Message from Oanh Castaneda sent at 10/22/2018 12:08 PM CDT -----  Contact: self  Patient need to speak to nurse regarding procedure appt on 10/29     Please call to advice [phone] (home)

## 2018-10-22 NOTE — PROGRESS NOTES
10/22/2018  Summary:   attempted to meet with patient during his 11:00 appointment today; however, nursing reported that patient rescheduled this appointment since he could not be seen earlier than his appointment time.      attempted to contact patient via telephone in order to complete assessment.  Pt reported that he was busy at the time of attempt and requested that  call back tomorrow.      Interventions:  Scheduled follow up for assessment for 10/23      Plan:  Follow up 10/23 in order to complete assessment.

## 2018-10-22 NOTE — PROGRESS NOTES
[facility] Progress Note - [place]  Urology Group: Anna/Robert/Cassie/Radha  PCP: Dr[facility]Baez [facility]    Chief Complaint: GH    Subjective:        HPI: Hesham Serna is a 68 y.o. male presents with active hep       Gross hematuria and abnormal urine cytology  ct urogram 11/14/16: herniation into left testicle, 4mm left renal stone. Repeat ctu 8/21/17 showed a 6mm stone in his left kidney and herniating bladder into testicle. GHould be related to bladder herniating into testicular causing bladder irritation. However they want to hold off on treatment of this.   urine cultures have been positive in the past, ua today suspicious. Pt does CIC.   urine cytology 9/26/16 - negative .7/17/17 - negative,  8/10/17 - urothelial cell atypia, supicious for urothelial high grade tumor.He has a h/o smoking (55 packs, 2-3 packs a day). Repeat urine cytology 8/17/17 abnormal  cystoscopy 8/28/17 urethral meatus with tear, small prostate and no b lobar hypertrophy, + inflammation on posterior bladder wall and floor that was biopsied. Path showed cystitis cystica.   Voided urine 9/21/17: Highly atypical single cells are present, suspicious for transitional cell carcinoma.These features can also be seen in the context of instrumentation.   Catheterized urine 4/9/18:Negative for malignant cells.   Voided urine cytology 9/11/18: suspicious for malignancy     Denies any gross hematuria recently. Still smoking. Voids q1-2 hours    H/o Elevated residuals and OAB, BPH  Pt with history of urinary retention when he first started seeing me. UDS initially  showed atonic bladder. He had gone from doing cic intermittently then up to 10x a day for frequency and urgency. He has been on myrbetriq at least since 2016. He has had intermittent recurrent uti's and gross hematuria (likely from uti's but could be from stones, workup initially showed abnormal cytology but repeated 4/9/18 and was negative). We  changed him from flomax to rapaflo at some point and after residuals were checked a few times we discontinued CIC. His residuals by in and out cath have remained less than 20cc (checked 4/9/18 and 6/13/18).   He had a cysto in 8/2017 which showed no enlarged prostate but states when he stops rapaflo his flow slows. He's had uroflows with avg flow flow of 7mL/s.     No incontinence. No longer catheterizing. Voiding q1-2hours Feels like he is emptying. pvr 6/18 was 5cc. Still on myrbetriq and rapaflo      Recurrent uti  -no c/o uti today  -last imaging was kub 6/12/18 which showed left kidney stone c/w ct 8/2017 showing Left kidney stone  -RF: CIC, stone    Urinalysis today void: tr wbc/50 blood, 2 rbc, 1 wbc - 0   Urine history  9/11/18 Ng, void: tr blood, cytology: suspicious for malignancy  9/8/18  No cx, 7 rbcs  6/12/18 P.mirabilis, void&cath: 2+leuk/1+prot/50 blood -  (dysuria, urgency, UUI), pvr by I&oL 5cc  4/19/18 Cath:P.mirabilis, void: tr leuk/50 blood/1+glucose, cath: none (asymptomatic). I&o cath with 16fr: 20cc, cytology: normal   10/17/17 Ng, void: tr blood  9/12/17 E.cloacae  8/28/17 ng  8/21/17            k.pneumonia  8/10/17                      ng  1/16/17  ng  10/21/16  E.cloacae  9/19/16  P.mirabilis  7/20/16  s.haemolyticus  3/23/16   Ng  12/16/15  ng     Nephrolithiasis  CTRSS on 6/4/15 showed left 4mm renal stone. KUB on 4/12/16 showed stone is radioopaque. Ctu 11/14/16 showed this again. ctu 8/21/17 showed stone is now 6mm stone in his left kidney  Scheduled for eswl but never proceeded with surgery. Stated he wanted to hold off until his back is fixed. Still no c/o left flank pain          REVIEW OF SYSTEMS:  General ROS: no fevers, no chills  Psychological ROS: no depression  Endocrine ROS: no heat or cold  Respiratory ROS: + SOB  Cardiovascular ROS: no CP  Gastrointestinal ROS: no abdominal pain, + constipation is worsening on lactulose, no diarrhea, no BRBPR  Musculoskeletal ROS: no muscle  pain, wears back brace  Neurological ROS: no headaches  Dermatological ROS: no rashes  HEENT: noglasses, no sinus   ROS: per HPI    The past medical, surgical, social and family hx were reviewed. There have been any changes. He says he's had 3 root canals done  Cataracts? none    Urologic meds: myrbetriq 50mg and rapaflo  Anticoagulation: No    Objective:     Vitals:    10/22/18 0901   BP: 139/86   Pulse: 81   Temp: 97.8 °F (36.6 °C)          Physical Exam   Constitutional: He is oriented to person, place, and time. He appears well-developed and well-nourished. He is cooperative. No distress.   HENT:   Head: Normocephalic and atraumatic.   Eyes: Pupils are equal, round, and reactive to light.   Cardiovascular: Normal rate and regular rhythm.    Pulmonary/Chest: Effort normal.   Abdominal: Soft. Normal appearance.   Musculoskeletal: Normal range of motion.   Neurological: He is alert and oriented to person, place, and time. He has normal reflexes.   Skin: Skin is intact.     Psychiatric: He has a normal mood and affect.         gu 6/13/18  MOON: 30g  pvr by in and out cath post void was 5cc. Tried 12fr and 16fr coude silicone and they went easy. He said he uses 14 fr straight silicone but I tried this and met too much resistance. Cath urine sent for culture    Labs:    PSA   7/3/18  0.60  5/9/17  0.45  5/26/15 0.27  7/14/14  0.26     Cr  4/3/17 1.0    Path:   Urine cytology 9/11/18:  -Suspicious for malignancy; hypercellular specimen with numerous atypical urothelial cells, reactive urothelial cells,  mildly atypical squamous cells, few scattered inflammatory cells and numerous spermatozoa (see comment).  COMMENT: There are atypical urothelial cells present which are suspicious for, but not diagnostic of, malignancy.  This patient has a complicated urologic history with testicular herniation into the bladder, repeated catheterizationsand urinary tract infection. The atypical cells seen in this specimen could be  reactive in nature; however, aneoplastic process cannot be excluded. Close clinical follow-up, clinical correlation and additional testing are  recommended.  Catheterized urine 4/9/18:Negative for malignant cells.  Voided urine 9/21/17: Highly atypical single cells are present, suspicious for transitional cell carcinoma.These features can also be seen in the context of instrumentation. Clinical correlation is essential. This was discussed with Dr. Castillo on October 2, 2017, at 8:40 AM.  Bladder, biopsy 9/4/17: Cystitis cystica.  Urine, voided, cytology 8/21/17: Urothelial atypia, suspicious cells present, see comment.  URINE (VOIDED) 8/10/17: Urothelial cell atypia, suspicious for urothelial high-grade dysplasia/carcinoma in situ  Voided urine 7/17/17: Negative for malignant cells.  9/26/16 URINE, VOIDED (CYTOLOGY):Negative for malignant cells Abundant neutrophils, Degenerative changes     Rads:   kub 6/12/18 done for left renal stone:  4mm left renal stone    ctu 8/21/17  1.  Stable examination demonstrating bilateral inguinal hernias, the left of which is a direct inguinal hernia containing a portion of the urinary bladder. There is urinary bladder wall thickening involving the anterior bladder wall and herniated portion of the bladder which is nonspecific but likely reactive in this setting. Right indirect fat-containing inguinal hernia also again noted.  2. Additional stable findings:  -Nonobstructive left nephrolithiasis  -Colonic diverticulosis  -Chronic severe L1 compression fracture status post vertebroplasty/kyphoplasty     kub 8/21/17 Left 4mm (6mm on ct) mid pole stone (L3). Note to self: recommend eswl, stone growing  ctu 11/14/16: 4mm left renal stone, no renal mass, no hydro, prtial herniation of the bladder into the LIH. Prostate not enlarged. A fat containing RIH. Severe L1 compression fracture  kub 4/12/16 - left renal stone 5mm  ctrss 6/4/15 - 4mm left midpole stone    Assessment:       1.  Abnormal urine cytology    2. Benign prostatic hyperplasia, unspecified whether lower urinary tract symptoms present    3. Nephrolithiasis        Plan:       Gross hematuria workup w intermittent abnormal urine cytology, most recent one negative   -cystoscopy 8/28/17 showed irritation c/w this and path was negative. ctu 8/12/17 showed a nonobstructing stone which should not be the cause and he's had UTIs which can cause this too.   -repeat urine cytology was suspicious for atypical urine cytology. He continues to smoke.could be related to bladder herniating into testicular causing bladder irritation. Previously we discussed treatment, However they want to hold off on treatment of this.  -will schedule cytoscopy for October 29th  -will also schedule an ultrasound    Recurrent UTI  -no uti today     OAB (overactive bladder)  -continue myrbetriq. Doing well on this     BPH with h/o Elevated urine residuals  -no longer catheterizing  -pvr by in and out cath 6/12/18 was 5cc. Tried 12fr and 16fr coude silicone and they went easy.   -continue rapaflo, doing well on this    Nephrolithiasis  -LMP 6mm stone. KUb showed stone visible. Poor candidate for eswl.     psa reviewed and was normal    F/u for cystoscopy

## 2018-10-22 NOTE — TELEPHONE ENCOUNTER
Patient states Dr. Castillo notified him he has bladder cancer. States he has to have one of his testicles removed. Patient also wanted to advise Dr. Ramos he has an US Retroperitoneal scheduled for 11-1-18.  Writer assured patient a message would be sent to Dr. Ramos for notification of above information.

## 2018-10-22 NOTE — TELEPHONE ENCOUNTER
----- Message from Regina Taveras sent at 10/22/2018  4:15 PM CDT -----    Pt  Is calling to   Speak to  Ally  about  The  Ultrasound    Finding  // please call to  Discuss  Results // pt  Is upset // placed a call  To pod //[phone]

## 2018-10-23 ENCOUNTER — OUTPATIENT CASE MANAGEMENT (OUTPATIENT)
Dept: ADMINISTRATIVE | Facility: OTHER | Age: 68
End: 2018-10-23

## 2018-10-23 ENCOUNTER — TELEPHONE (OUTPATIENT)
Dept: UROLOGY | Facility: CLINIC | Age: 68
End: 2018-10-23

## 2018-10-23 ENCOUNTER — TELEPHONE (OUTPATIENT)
Dept: FAMILY MEDICINE | Facility: CLINIC | Age: 68
End: 2018-10-23

## 2018-10-23 DIAGNOSIS — M50.30 DDD (DEGENERATIVE DISC DISEASE), CERVICAL: Chronic | ICD-10-CM

## 2018-10-23 DIAGNOSIS — J43.1 PANLOBULAR EMPHYSEMA: ICD-10-CM

## 2018-10-23 DIAGNOSIS — F43.25 ADJUSTMENT DISORDER WITH MIXED DISTURBANCE OF EMOTIONS AND CONDUCT: Primary | ICD-10-CM

## 2018-10-23 DIAGNOSIS — R33.9 URINARY RETENTION WITH INCOMPLETE BLADDER EMPTYING: ICD-10-CM

## 2018-10-23 LAB — BACTERIA UR CULT: NORMAL

## 2018-10-23 NOTE — PROGRESS NOTES
"10/23/2018  Summary:  Second attempt to contact patient in order to complete assessment.   called patient at 12:09 as per our earlier conversation for  to contact him at 12:00.  Pt reported "Oh man, I'm in the middle of filling a prescription" and was not able to complete assessment.    Interventions:  Scheduled another attempt for tomorrow.     Plan:  Attempt to contact patient tomorrow in order to complete assessment.       "

## 2018-10-23 NOTE — PROGRESS NOTES
"10/23/2018  Summary:   attempted to contact patient in order to finish assessment.  He reported that  needs to call him back at 12:00 since he is "doing a breathing test"    Interventions:  Assessment attempt    Plan:  Will call patient back      "

## 2018-10-23 NOTE — TELEPHONE ENCOUNTER
I called pt to tell him again that we are looking for bladder cancer bc he had an abnormal urine cytology. We do not know if he has bladder cancer. I also let him know we will NOT be removing his testicle.  I was trying to explain that his bladder sometimes herniates into his left scrotum and this may cause bladder irritation resulting in an abnormal cytology.     We are doing this awake at the [facility].

## 2018-10-23 NOTE — TELEPHONE ENCOUNTER
Spoke with patient informed him surgery center will call Friday with arrival time. Verbally voiced understanding.

## 2018-10-23 NOTE — TELEPHONE ENCOUNTER
Received the following message from Marlyn Díaz, Patient Access Rep: Kat, I have PT: Hesham Serna on the line requesting to speak with you regarding future surgery he's going to need help in finding somewhere to stay such as a nursing home or care facility. MRN: [id number].    Spoke to patient who indicated he would like to be placed in a nursing home while recuperating from surgery. Patient is scheduled to have a procedure performed by Dr. Castillo. Writer advised patient to decide what facility he would like to be admitted to, and reach out to that facilities admission department to be sure insurance will cover his stay. Advised patient to have the preferred facility fax PCP with forms that need to be completed for admission. Patient indicated understanding.

## 2018-10-23 NOTE — TELEPHONE ENCOUNTER
----- Message from Brooklyn Marshall sent at 10/23/2018  2:34 PM CDT -----  Contact: self [phone]  Please call him regarding the time of his procedure on Monday.  Thank you!

## 2018-10-24 ENCOUNTER — DOCUMENTATION ONLY (OUTPATIENT)
Dept: ADMINISTRATIVE | Facility: OTHER | Age: 68
End: 2018-10-24

## 2018-10-24 ENCOUNTER — OUTPATIENT CASE MANAGEMENT (OUTPATIENT)
Dept: ADMINISTRATIVE | Facility: OTHER | Age: 68
End: 2018-10-24

## 2018-10-24 RX ORDER — LUBIPROSTONE 8 UG/1
CAPSULE, GELATIN COATED ORAL
Qty: 180 CAPSULE | Refills: 0 | Status: SHIPPED | OUTPATIENT
Start: 2018-10-24

## 2018-10-24 NOTE — TELEPHONE ENCOUNTER
He will need  to  regarding which NH and also  him regarding qualifications and limits to no more than 90 days.

## 2018-10-24 NOTE — PROGRESS NOTES
Thank you for the referral. This patient is currently assigned to Romelia Ching RN & John Cano LCSW in Outpatient Case Management. New referral information will be forwarded to the .    Reason for referral: He has schedule cystoscopy due to chronic urinary retention. He requires NH admission due convalesce and stressfully home environment, and difficult to manage medications.    Please contact [facility] at ext. [phone] with any questions.    Thank you,    Debora Kelsey    Outpatient Case Management

## 2018-10-24 NOTE — PROGRESS NOTES
"10/24/2018  Summary:   spoke to patient via telephone in order to complete assessment.  He was referred to this  by OPCM RN, Romelia, due to target symptoms of advanced directives and emergency preparedness.  RN also felt that patient could benefit from additional mental health resources at the time of referral.      Pt reported that he is scheduled for a procedure on 10/29/2018.  He plans to go to his mother's home for a couple of weeks after the procedure.  Pt reported to  that he is "dying" due to his medical diagnosis.     Pt reported that he is currently receiving mental health services with Dr. Queen.  He did not wish to receive nor discuss additional mental health resources at this time.      Pt reported that he will evacuate in the event of a disaster.  He has transportation.    Pt expressed a need for financial resources for medical bills and medication at this time.   to send a referral to Pharmacy Assistance at a later time since patient reported that he will not be available for a couple of weeks.      Pt is in agreement with social work follow up in 2 weeks.      Interventions:  · Collaborate with OPCM RN as appropriate to meet patient needs.  · Discuss and provide Advance Directive Form.  · Discuss and provide [facility] Financial Assistance .  · Discuss patient care needs and potential barriers.  · Provide information on home care services.  · Provide supportive counseling.  Mailed information re: OPCM card, Advanced Directives, [facility] financial assistance, and Medicaid Waiver program.      Plan:  Follow up 11/5/2018  1. Ensure that patient received mailing  2. Refer to Pharmacy Assistance if patient agrees  3. Encourage patient to complete financial assistance application  4. Discuss IOP options if patient is agreeable   5. Explain Waiver program.   6. Encourage patient to complete Advanced Directives.        Todays OPC Self-Management Care Plan was " developed with the patients/caregivers input and was based on identified barriers from todays assessment.  Goals were written today with the patient/caregiver and the patient has agreed to work towards these goals to improve his/her overall well-being. Patient verbalized understanding of the care plan, goals, and all of today's instructions. Encouraged patient/caregiver to communicate with his/her physician and health care team about health conditions and the treatment plan.  Provided my contact information today and encouraged patient/caregiver to call me with any questions as needed.

## 2018-10-25 ENCOUNTER — TELEPHONE (OUTPATIENT)
Dept: UROLOGY | Facility: CLINIC | Age: 68
End: 2018-10-25

## 2018-10-25 ENCOUNTER — OUTPATIENT CASE MANAGEMENT (OUTPATIENT)
Dept: ADMINISTRATIVE | Facility: OTHER | Age: 68
End: 2018-10-25

## 2018-10-25 NOTE — TELEPHONE ENCOUNTER
Spoke with patient informed him surgery center will contact him tomorrow with arrival time. Patient verbally voiced understanding.

## 2018-10-25 NOTE — TELEPHONE ENCOUNTER
Order for outpatient case management placed by Dr. Ramos. Case Management department will contact patient regarding.

## 2018-10-25 NOTE — TELEPHONE ENCOUNTER
----- Message from Lima Bush sent at 10/25/2018  2:11 PM CDT -----  Type: Needs Medical Advice    Who Called: Patient    Best Call Back Number: [phone] (home)     Additional Information: Patient stating he would like to know what time to come in on Monday, 10/29/18

## 2018-10-25 NOTE — PROGRESS NOTES
Please note the following patient has been assigned to Aleena Díaz RN in Outpatient Care Management for high risk screening.    Thank you,    Debora Kelsey    Outpatient Case Management

## 2018-10-26 ENCOUNTER — OUTPATIENT CASE MANAGEMENT (OUTPATIENT)
Dept: ADMINISTRATIVE | Facility: OTHER | Age: 68
End: 2018-10-26

## 2018-10-26 NOTE — PROGRESS NOTES
"10/26/18 - SUMMARY: Phone contact made with pt today for follow up with OPCM. He vacillates during conversation from agitation to avoiding ending call. He answers a question aggressively, then apologizes profusely for being rude. He voices being sick, "mostly mentally" related to the procedure he has coming up Monday. He repeatedly states it is because he has cancer and CM attempts to redirect that it is a scope for diagnostic purposes.   INTERVENTION: CM attempts to discuss meds and whether he is compliant with them as ordered on his chart. He becomes agitated easily when meds are mentioned and will not answer questions related to them. Discussed home safety and fall precautions related to O2 tubing and he related he knows tubing is a trip hazard because it has tripped him before.   PLAN: Attempt to discuss meds, use of organizer or prepackaging to help with compliance.     Todays OPCM Self-Management Care Plan was reviewed with the patients/caregivers input based on identified barriers from todays and previous assessments.  Goals were reviewed today with the patient/caregiver and the patient has agreed to continue to work towards these goals to improve his/her overall well-being. Patient verbalized understanding of the care plan, goals, and all of today's instructions. Encouraged patient/caregiver to communicate with his/her physician and health care team about health conditions and the treatment plan.  Provided my contact information today and encouraged patient/caregiver to call me with any questions as needed. Romelia Ching RN      "

## 2018-10-29 ENCOUNTER — HOSPITAL ENCOUNTER (OUTPATIENT)
Facility: AMBULARY SURGERY CENTER | Age: 68
Discharge: HOME | End: 2018-10-29
Attending: UROLOGY | Admitting: UROLOGY
Payer: MEDICARE

## 2018-10-29 DIAGNOSIS — R82.89 ABNORMAL URINE CYTOLOGY: ICD-10-CM

## 2018-10-29 LAB
BACTERIA SPEC CULT: NORMAL
BILIRUB SERPL-MCNC: NORMAL MG/DL
BLOOD URINE, POC: 250
CASTS: NORMAL
COLOR, POC UA: NORMAL
CRYSTALS: NORMAL
GLUCOSE UR QL STRIP: NORMAL
KETONES UR QL STRIP: NORMAL
LEUKOCYTE ESTERASE URINE, POC: NORMAL
NITRITE, POC UA: NORMAL
PH, POC UA: 6
PROTEIN, POC: NORMAL
RBC CELLS COUNTED: NORMAL
SPECIFIC GRAVITY, POC UA: 1.01
UROBILINOGEN, POC UA: NORMAL
WHITE BLOOD CELLS: NORMAL

## 2018-10-29 PROCEDURE — 52000 CYSTOURETHROSCOPY: CPT | Mod: ,,, | Performed by: UROLOGY

## 2018-10-29 PROCEDURE — G8907 PT DOC NO EVENTS ON DISCHARG: HCPCS | Performed by: UROLOGY

## 2018-10-29 PROCEDURE — 52000 CYSTOURETHROSCOPY: CPT | Performed by: UROLOGY

## 2018-10-29 RX ORDER — LIDOCAINE HYDROCHLORIDE 20 MG/ML
JELLY TOPICAL
Status: DISCONTINUED | OUTPATIENT
Start: 2018-10-29 | End: 2018-10-29 | Stop reason: HOSPADM

## 2018-10-29 RX ORDER — WATER 1000 ML/1000ML
INJECTION, SOLUTION INTRAVENOUS
Status: DISCONTINUED | OUTPATIENT
Start: 2018-10-29 | End: 2018-10-29 | Stop reason: HOSPADM

## 2018-10-29 RX ORDER — LIDOCAINE HYDROCHLORIDE 20 MG/ML
JELLY TOPICAL
Status: DISPENSED
Start: 2018-10-29 | End: 2018-10-30

## 2018-10-29 NOTE — OP NOTE
[facility] Procedure Note- ASC  Date: 10/29/2018    Procedures: Flexible cystourethroscopy     Pre Procedure Diagnosis:abnormal urine cytology    Post Procedure Diagnosis: same, see below for findings    Surgeon: Negrita Castillo MD    Indications: Hesham Serna is a 68 y.o. male with abnormal urine cytology, microhematuria and known bladder herniation. Urine from today reviewed. H&P reviewed.     Specimen: none    Anesthesia: 2% uro-jet lidocaine jelly for local analgesia    Procedure in detail:   Flexible cysto-urethroscopy was performed after consent was obtained.  The risks and benefits were explained.    2% lidocaine urojet was used for local analgesia.  The genitalia was prepped and draped in the sterile fashion with betadine.    The flexible scope was advanced into the urethra and into the bladder.  Bilateral ureteral orifice were evaluated and noted to be normal with clear efflux.  The bladder was completely surveyed in a systematic fashion and the cytoscope was retroflexed.  Cystoscopy findings as listed below.     The patient tolerated the procedure well without complication.    Findings: (pictures were  uploaded into media)  1. Urethra findings - normal  2. Prostate findings - no lobe hypertrophy, no median lobe  3. Bladder findings - no trabeculations, no diverticulum, however you could see where his bladder was herniating into the inguinal canal. I was able to inspect the lining of the herniated area and there was no abnormality of the lining.  4. Other findings: none  No Bladder biopsy:     Plan:  1. Abnormal urine cytology and microhematuria likely as a result of his asymptomatic bladder herniation. Bc of his h/o smoking will proceed with rbus to ensure no obvious lesions or hydro in the kidneys. Otherwise return as needed prior to f/u if he has gross hematuria.   Cipro in recovery    Negrita Castillo MD

## 2018-10-29 NOTE — DISCHARGE SUMMARY
[facility]  Urology  Discharge Note - Short Stay      Patient Name: Hesham Serna  MRN: [id number]  Discharge Date and Time:  10/29/2018 3:17 PM  Attending Physician: Negrita Castillo,*   Discharging Provider: Negrita Castillo MD  Primary Care Physician: Samuel Ramos MD    Final Active Diagnoses:    Diagnosis Date Noted POA    Abnormal urine cytology [R82.8] 10/29/2018 Yes      Problems Resolved During this Admission:       Final Diagnoses: Same as principal problem.    Hospital Course: Patient was admitted for an outpatient procedure and tolerated the procedure well with no complications.*    Procedure(s) (LRB):  CYSTOSCOPY (N/A)     Indwelling Lines/Drains at time of discharge:   Lines/Drains/Airways          None          Discharged Condition: good    Disposition: home    Follow Up:      Patient Instructions:   No discharge procedures on file.    Medications:  Reconciled Home Medications:      Medication List      CHANGE how you take these medications    methadone 10 MG tablet  Commonly known as:  DOLOPHINE  Take 1 tablet (10 mg total) by mouth every 6 (six) hours as needed. Two tablets every morning, two tablets every 12 pm, one-two tablets every evening  What changed:    · when to take this  · additional instructions        CONTINUE taking these medications    albuterol 90 mcg/actuation inhaler  Commonly known as:  PROVENTIL/VENTOLIN HFA  Inhale 2 puffs into the lungs every 6 (six) hours as needed for Wheezing.     albuterol-ipratropium 2.5 mg-0.5 mg/3 mL nebulizer solution  Commonly known as:  DUO-NEB  Take 3 mLs by nebulization every 4 (four) hours as needed for Wheezing.     AMITIZA 8 MCG Cap  Generic drug:  lubiprostone  TAKE 1 CAPSULE BY MOUTH TWICE DAILY     clonazePAM 2 MG Tab  Commonly known as:  KLONOPIN  TAKE 1 TABLET BY MOUTH 3 TIMES A DAY     fluticasone 50 mcg/actuation nasal spray  Commonly known as:  FLONASE  SNIFF 1 SPRAY INTO EACH NOSTRIL ONCE DAILY      fluticasone-salmeterol 500-50 mcg/dose 500-50 mcg/dose Dsdv diskus inhaler  Commonly known as:  ADVAIR DISKUS  Inhale 1 puff into the lungs 2 (two) times daily. Controller     hydrOXYzine pamoate 25 MG Cap  Commonly known as:  VISTARIL  Take 1 capsule (25 mg total) by mouth every 8 (eight) hours as needed.     lactulose 10 gram/15 mL solution  Commonly known as:  CHRONULAC  Take 45 mLs (30 g total) by mouth 3 (three) times daily.     mirabegron 50 mg Tb24  Commonly known as:  MYRBETRIQ  Take 1 tablet (50 mg total) by mouth once daily.     predniSONE 10 MG tablet  Commonly known as:  DELTASONE  Take 1 tablet (10 mg total) by mouth once daily.     silodosin 8 mg Cap capsule  Commonly known as:  RAPAFLO  Take 1 capsule (8 mg total) by mouth once daily.     spironolactone 25 MG tablet  Commonly known as:  ALDACTONE  Take 1 tablet (25 mg total) by mouth once daily.     SUBSYS 200 mcg/spray Spry  Generic drug:  fentaNYL  Place 200 mcg under the tongue 2 (two) times daily as needed.     tiotropium 18 mcg inhalation capsule  Commonly known as:  SPIRIVA WITH HANDIHALER  INHALE THE CONTENTS OF 1 CAPSULE BY MOUTH AS PER HANDIHALER     ziprasidone 40 MG Cap  Commonly known as:  GEODON  Take 1 capsule (40 mg total) by mouth 2 (two) times daily with meals.            Discharge Procedure Orders (must include Diet, Follow-up, Activity):  No discharge procedures on file.         Negrita Castillo MD  Urology  [facility]

## 2018-10-29 NOTE — INTERVAL H&P NOTE
H&P Update:     Indications for today's procedure: abnormal urine cytology and microhematuria  Consented for: cystoscopy    I concur with the findings from the last H&P with studies done since last h&P or changes to the patient's history as below:       Urinalysis today: 250 blood  Urine culture 10/22/18: ng, void: wbc and blood.    Anti-coagulation: none      Lab Results   Component Value Date    WBC 13.80 (H) 10/18/2018    HGB 15.0 10/18/2018    HCT 45.3 10/18/2018     (H) 10/18/2018     10/18/2018     BMP  Lab Results   Component Value Date     10/18/2018    K 4.2 10/18/2018    CL 99 10/18/2018    CO2 31 (H) 10/18/2018    BUN 15 10/18/2018    CREATININE 1.1 10/18/2018    CALCIUM 10.3 10/18/2018    ANIONGAP 10 10/18/2018    ESTGFRAFRICA >60.0 10/18/2018    EGFRNONAA >60.0 10/18/2018         This patient has been cleared for surgery in ambulatory surgical facility.

## 2018-10-29 NOTE — PLAN OF CARE
"Pt requesting to leave. Pt states "i'm in a hurry". Discharge papers given but RN did not have an opportunity to go over discharge papers due to pt wanting to leave and refused to listen to RN directions   "

## 2018-10-30 VITALS
HEIGHT: 67 IN | WEIGHT: 200 LBS | DIASTOLIC BLOOD PRESSURE: 98 MMHG | OXYGEN SATURATION: 100 % | RESPIRATION RATE: 18 BRPM | HEART RATE: 86 BPM | TEMPERATURE: 98 F | SYSTOLIC BLOOD PRESSURE: 154 MMHG | BODY MASS INDEX: 31.39 KG/M2

## 2018-11-01 ENCOUNTER — OFFICE VISIT (OUTPATIENT)
Dept: PULMONOLOGY | Facility: CLINIC | Age: 68
End: 2018-11-01
Payer: MEDICARE

## 2018-11-01 ENCOUNTER — HOSPITAL ENCOUNTER (OUTPATIENT)
Dept: RADIOLOGY | Facility: CLINIC | Age: 68
Discharge: HOME | End: 2018-11-01
Attending: UROLOGY
Payer: MEDICARE

## 2018-11-01 VITALS
DIASTOLIC BLOOD PRESSURE: 78 MMHG | BODY MASS INDEX: 32.04 KG/M2 | WEIGHT: 204.13 LBS | HEART RATE: 70 BPM | HEIGHT: 67 IN | SYSTOLIC BLOOD PRESSURE: 139 MMHG | OXYGEN SATURATION: 93 %

## 2018-11-01 DIAGNOSIS — N20.0 NEPHROLITHIASIS: Primary | ICD-10-CM

## 2018-11-01 DIAGNOSIS — J44.1 ACUTE EXACERBATION OF CHRONIC OBSTRUCTIVE PULMONARY DISEASE (COPD): ICD-10-CM

## 2018-11-01 DIAGNOSIS — F31.0 BIPOLAR AFFECTIVE DISORDER, CURRENT EPISODE HYPOMANIC: ICD-10-CM

## 2018-11-01 DIAGNOSIS — R82.89 ABNORMAL URINE CYTOLOGY: ICD-10-CM

## 2018-11-01 DIAGNOSIS — J43.9 PULMONARY EMPHYSEMA, UNSPECIFIED EMPHYSEMA TYPE: ICD-10-CM

## 2018-11-01 DIAGNOSIS — R09.89 CHRONIC SINUS COMPLAINTS: ICD-10-CM

## 2018-11-01 DIAGNOSIS — F39 MOOD DISORDER: ICD-10-CM

## 2018-11-01 DIAGNOSIS — J44.9 STEROID-DEPENDENT COPD: Primary | ICD-10-CM

## 2018-11-01 DIAGNOSIS — J44.9 CHRONIC OBSTRUCTIVE PULMONARY DISEASE, UNSPECIFIED COPD TYPE: ICD-10-CM

## 2018-11-01 DIAGNOSIS — J44.9 COPD MIXED TYPE: ICD-10-CM

## 2018-11-01 DIAGNOSIS — J44.1 COPD EXACERBATION: ICD-10-CM

## 2018-11-01 DIAGNOSIS — J41.8 MIXED SIMPLE AND MUCOPURULENT CHRONIC BRONCHITIS: Chronic | ICD-10-CM

## 2018-11-01 DIAGNOSIS — Z92.241 STEROID-DEPENDENT COPD: Primary | ICD-10-CM

## 2018-11-01 DIAGNOSIS — Z99.81 OXYGEN DEPENDENT: ICD-10-CM

## 2018-11-01 DIAGNOSIS — J96.11 CHRONIC RESPIRATORY FAILURE WITH HYPOXIA: ICD-10-CM

## 2018-11-01 PROCEDURE — 76770 US EXAM ABDO BACK WALL COMP: CPT | Mod: 26,,, | Performed by: RADIOLOGY

## 2018-11-01 PROCEDURE — 76770 US EXAM ABDO BACK WALL COMP: CPT | Mod: TC,PO

## 2018-11-01 PROCEDURE — 99213 OFFICE O/P EST LOW 20 MIN: CPT | Mod: PBBFAC,25,PO | Performed by: INTERNAL MEDICINE

## 2018-11-01 PROCEDURE — 99214 OFFICE O/P EST MOD 30 MIN: CPT | Mod: S$PBB,,, | Performed by: INTERNAL MEDICINE

## 2018-11-01 PROCEDURE — 99999 PR PBB SHADOW E&M-EST. PATIENT-LVL III: CPT | Mod: PBBFAC,,, | Performed by: INTERNAL MEDICINE

## 2018-11-01 RX ORDER — ALBUTEROL SULFATE 90 UG/1
2 AEROSOL, METERED RESPIRATORY (INHALATION) EVERY 6 HOURS PRN
Qty: 3 EACH | Refills: 3 | Status: SHIPPED | OUTPATIENT
Start: 2018-11-01

## 2018-11-01 RX ORDER — FLUTICASONE PROPIONATE AND SALMETEROL 500; 50 UG/1; UG/1
1 POWDER RESPIRATORY (INHALATION) 2 TIMES DAILY
Qty: 180 EACH | Refills: 3 | Status: SHIPPED | OUTPATIENT
Start: 2018-11-01

## 2018-11-01 RX ORDER — FLUTICASONE PROPIONATE 50 MCG
SPRAY, SUSPENSION (ML) NASAL
Qty: 16 G | Refills: 3 | Status: SHIPPED | OUTPATIENT
Start: 2018-11-01

## 2018-11-01 RX ORDER — IPRATROPIUM BROMIDE AND ALBUTEROL SULFATE 2.5; .5 MG/3ML; MG/3ML
3 SOLUTION RESPIRATORY (INHALATION) EVERY 4 HOURS PRN
Qty: 360 VIAL | Refills: 3 | Status: SHIPPED | OUTPATIENT
Start: 2018-11-01

## 2018-11-01 RX ORDER — PREDNISONE 10 MG/1
10 TABLET ORAL DAILY
Qty: 90 TABLET | Refills: 3 | Status: SHIPPED | OUTPATIENT
Start: 2018-11-01

## 2018-11-01 RX ORDER — TIOTROPIUM BROMIDE 18 UG/1
CAPSULE ORAL; RESPIRATORY (INHALATION)
Qty: 90 CAPSULE | Refills: 3 | Status: SHIPPED | OUTPATIENT
Start: 2018-11-01

## 2018-11-01 RX ORDER — AMOXICILLIN AND CLAVULANATE POTASSIUM 875; 125 MG/1; MG/1
1 TABLET, FILM COATED ORAL 2 TIMES DAILY
Qty: 20 TABLET | Refills: 2 | Status: SHIPPED | OUTPATIENT
Start: 2018-11-01 | End: 2018-11-19 | Stop reason: SDUPTHER

## 2018-11-01 NOTE — PATIENT INSTRUCTIONS
Use augmentin twice daily for 10 days,  Call if not clearing.    Renewed breathing medications.

## 2018-11-01 NOTE — PROGRESS NOTES
"11/1/2018    Hesham Serna       Chief Complaint   Patient presents with    Shortness of Breath       HPI:   Nov 1, 20918- have purulent mucous last 3 days, very sob.  Still smokes.  Uses prednisone 10 /d.  Having lots of chr pain  Sob worse    Sept 25, 2018 - pt , 3 wks ago, may have had sz with neighbor having police kick in door .  Pt yeimy Leahy.  cxr was clear, pt uses spiriva and advair, also prn albuterol.  Uses 02 most all the time.  Has chr constipation using lactulose tid.  Smokes 4 smokes daily but only 1/4 each?      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  Pt wearing his chronic back brace.  Pt has been on 10 mg daily last yrs- advair out but renewed.  Pt bipolar and seems compensated somewhat.  Still smokes minimally. Mom doing poorly with freq fall/obs.  Has stumble in office with near fall.  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  Still smokes 3-5 daily, lives alone, gets by frozen dinners.  In [place]. Still drives.  Has chr back brace,  Sleeps with ox.  On prednisone over 10 yrs, has bad back after fall in tub washing dog.  No osteoporosis rx. Mom with lung dz at 88, pt with bad lungs since chemical spills- stopped working age 57.    Chr sinus drip and and poor sense smell, no asa use,  No cough or wheeze noted,  Had pft with Dr Judge past.     The chief compliant  problem is new to me",   PFSH:  Past Medical History:   Diagnosis Date    Allergy     Arthritis     Asthma     Cancer     skin DR Isrrael Yun    COPD (chronic obstructive pulmonary disease)     Degenerative disc disease     Depression     bipolar dis    Fracture of vertebra due to osteoporosis     Hepatitis C     Hypertension     Liver disease     Nasal bone fx-closed     Nephrolith     Rosacea     Skin cancer          Past Surgical History:   Procedure Laterality Date    CYSTOSCOPY N/A " 10/29/2018    Procedure: CYSTOSCOPY;  Surgeon: Negrita Castillo MD;  Location: [facility] OR;  Service: Urology;  Laterality: N/A;    CYSTOSCOPY N/A 10/29/2018    Performed [facility] at [facility] OR    CYSTOSCOPY N/A 8/28/2017    [facility] at [facility]    CYSTOSCOPY N/A 10/10/2016    [facility] at [facility]    jaw surgey      KNEE SURGERY      NECK SURGERY      8 procedures on neck    RHIZOTOMY W/ RADIOFREQUENCY ABLATION      TONSILLECTOMY      TRANSRECTAL ULTRASOUND N/A 8/28/2017    [facility] at [facility] OR    tumors      10 removed from scalp     Social History     Tobacco Use    Smoking status: Current Every Day Smoker     Packs/day: 0.50     Years: 30.00     Pack years: 15.00     Types: Cigarettes    Smokeless tobacco: Never Used    Tobacco comment: down to 3 to 4 a day   Substance Use Topics    Alcohol use: No    Drug use: No     Family History   Problem Relation Age of Onset    Heart disease Father     No Known Problems Mother     Cancer Paternal Uncle         prostate    Cancer Paternal Aunt         breast    Eczema Neg Hx     Lupus Neg Hx     Psoriasis Neg Hx     Melanoma Neg Hx     Glaucoma Neg Hx      Review of patient's allergies indicates:   Allergen Reactions    Codeine Other (See Comments)    Morphine      Pt denies this 1-10-13     Bactrim [sulfamethoxazole-trimethoprim] Other (See Comments)     Pt cannot take with Methadone    Ciprofloxacin Other (See Comments)     Patient cannot take with methadone       Performance Status:The patient's activity level is housebound activities.      Review of Systems:  a review of eleven systems covering constitutional, Eye, HEENT, Psych, Respiratory, Cardiac, GI, , Musculoskeletal, Endocrine, Dermatologic was negative except for pertinent findings as listed ABOVE and below:   pertinent positive as above, rest is good  No liver dz.  Chr  "back pain - h/o cirrhosis,    Exam:Comprehensive exam done. /78 (BP Location: Left arm, Patient Position: Sitting)   Pulse 70   Ht 5' 7" (1.702 m)   Wt 92.6 kg (204 lb 2.3 oz)   SpO2 (!) 93% Comment: on room air  BMI 31.97 kg/m²   Exam included Vitals as listed, and patient's appearance and affect and alertness and mood, oral exam for yeast and hygiene and pharynx lesions and Mallapatti (M) score, neck with inspection for jvd and masses and thyroid abnormalities and lymph nodes (supraclavicular and infraclavicular nodes and axillary also examined and noted if abn), chest exam included symmetry and effort and fremitus and percussion and auscultation, cardiac exam included rhythm and gallops and murmur and rubs and jvd and edema, abdominal exam for mass and hepatosplenomegaly and tenderness and hernias and bowel sounds, Musculoskeletal exam with muscle tone and posture and mobility/gait and  strength, and skin for rashes and cyanosis and pallor and turgor, extremity for clubbing.  Findings were normal except for pertinent findings listed below:   M3, dry mouth, chest is symmetric, no distress, normal percussion, normal fremitus and good normal breath sounds- decrease bs, no edema, clubbing-c/o sob but looks compensated.  Yellow mucous      Radiographs (ct chest and cxr) reviewed: view by direct vision  Ct spine viewed- Jan 2018 nad poor study, sept domingo cxr nad setp 2018  Labs reviewed   PFT fev1 56% in feb 2017 with high rv.         Plan:  Clinical impression is apparently straight forward and impression with management as below.    Hesham was seen today for shortness of breath.    Diagnoses and all orders for this visit:    Steroid-dependent COPD  -     fluticasone-salmeterol 500-50 mcg/dose (ADVAIR DISKUS) 500-50 mcg/dose DsDv diskus inhaler; Inhale 1 puff into the lungs 2 (two) times daily. Controller  -     predniSONE (DELTASONE) 10 MG tablet; Take 1 tablet (10 mg total) by mouth once " daily.    COPD mixed type  -     fluticasone-salmeterol 500-50 mcg/dose (ADVAIR DISKUS) 500-50 mcg/dose DsDv diskus inhaler; Inhale 1 puff into the lungs 2 (two) times daily. Controller  -     predniSONE (DELTASONE) 10 MG tablet; Take 1 tablet (10 mg total) by mouth once daily.    Oxygen dependent  -     fluticasone-salmeterol 500-50 mcg/dose (ADVAIR DISKUS) 500-50 mcg/dose DsDv diskus inhaler; Inhale 1 puff into the lungs 2 (two) times daily. Controller  -     predniSONE (DELTASONE) 10 MG tablet; Take 1 tablet (10 mg total) by mouth once daily.    Chronic respiratory failure with hypoxia  -     fluticasone-salmeterol 500-50 mcg/dose (ADVAIR DISKUS) 500-50 mcg/dose DsDv diskus inhaler; Inhale 1 puff into the lungs 2 (two) times daily. Controller  -     albuterol (PROVENTIL/VENTOLIN HFA) 90 mcg/actuation inhaler; Inhale 2 puffs into the lungs every 6 (six) hours as needed for Wheezing.  -     albuterol-ipratropium (DUO-NEB) 2.5 mg-0.5 mg/3 mL nebulizer solution; Take 3 mLs by nebulization every 4 (four) hours as needed for Wheezing.  -     predniSONE (DELTASONE) 10 MG tablet; Take 1 tablet (10 mg total) by mouth once daily.    Mixed simple and mucopurulent chronic bronchitis  -     tiotropium (SPIRIVA WITH HANDIHALER) 18 mcg inhalation capsule; INHALE THE CONTENTS OF 1 CAPSULE BY MOUTH AS PER HANDIHALER  -     fluticasone-salmeterol 500-50 mcg/dose (ADVAIR DISKUS) 500-50 mcg/dose DsDv diskus inhaler; Inhale 1 puff into the lungs 2 (two) times daily. Controller  -     predniSONE (DELTASONE) 10 MG tablet; Take 1 tablet (10 mg total) by mouth once daily.    Acute exacerbation of chronic obstructive pulmonary disease (COPD)  -     amoxicillin-clavulanate 875-125mg (AUGMENTIN) 875-125 mg per tablet; Take 1 tablet by mouth 2 (two) times daily.    Pulmonary emphysema, unspecified emphysema type  -     tiotropium (SPIRIVA WITH HANDIHALER) 18 mcg inhalation capsule; INHALE THE CONTENTS OF 1 CAPSULE BY MOUTH AS PER  HANDIHALER  -     albuterol (PROVENTIL/VENTOLIN HFA) 90 mcg/actuation inhaler; Inhale 2 puffs into the lungs every 6 (six) hours as needed for Wheezing.    COPD exacerbation  -     albuterol (PROVENTIL/VENTOLIN HFA) 90 mcg/actuation inhaler; Inhale 2 puffs into the lungs every 6 (six) hours as needed for Wheezing.  -     albuterol-ipratropium (DUO-NEB) 2.5 mg-0.5 mg/3 mL nebulizer solution; Take 3 mLs by nebulization every 4 (four) hours as needed for Wheezing.    Chronic obstructive pulmonary disease, unspecified COPD type  -     albuterol-ipratropium (DUO-NEB) 2.5 mg-0.5 mg/3 mL nebulizer solution; Take 3 mLs by nebulization every 4 (four) hours as needed for Wheezing.    Chronic sinus complaints  -     fluticasone (FLONASE) 50 mcg/actuation nasal spray; SNIFF 1 SPRAY INTO EACH NOSTRIL ONCE DAILY        Follow-up in about 3 months (around 2/1/2019), or if symptoms worsen or fail to improve.    Discussed with patient above for education the following:      Patient Instructions   Use augmentin twice daily for 10 days,  Call if not clearing.    Renewed breathing medications.

## 2018-11-02 ENCOUNTER — OUTPATIENT CASE MANAGEMENT (OUTPATIENT)
Dept: ADMINISTRATIVE | Facility: OTHER | Age: 68
End: 2018-11-02

## 2018-11-02 ENCOUNTER — TELEPHONE (OUTPATIENT)
Dept: FAMILY MEDICINE | Facility: CLINIC | Age: 68
End: 2018-11-02

## 2018-11-02 RX ORDER — CLONAZEPAM 2 MG/1
TABLET ORAL
Qty: 90 TABLET | Refills: 0 | Status: SHIPPED | OUTPATIENT
Start: 2018-11-02 | End: 2018-11-20 | Stop reason: SDUPTHER

## 2018-11-02 NOTE — TELEPHONE ENCOUNTER
Call placed to patient who states he was a little upset with another provider. Patient then received another call and ended call with writer.

## 2018-11-02 NOTE — PROGRESS NOTES
"11/2/18 - SUMMARY: Phone contact made with pt today for follow up with OPCM. He was willing to talk to CM for a few minutes while complaining about being "pissed off" about his urology provider. He feels she should have ordered an Xray(?)  when he was there having an US yesterday. CM listened to pt and attempted to provide support.    INTERVENTION: When CM attempted to redirect conversation to perform Medication Reconciliation, pt became agitated and hostile with his responses. Advised CM was going to end the conversation related to pt's inappropriate responses, but will call him back next week and would like to review all the medicines he has in his home against the list of meds on his chart.     PLAN: Follow up in one week to perform Med Reconciliation, if pt willing. D/C pt if unwilling to perform.     Todays OPCM Self-Management Care Plan was reviewed with the patients/caregivers input based on identified barriers from todays and previous assessments.  Goals were reviewed today with the patient/caregiver and the patient has agreed to continue to work towards these goals to improve his/her overall well-being. Patient verbalized understanding of the care plan, goals, and all of today's instructions. Encouraged patient/caregiver to communicate with his/her physician and health care team about health conditions and the treatment plan.  Provided my contact information today and encouraged patient/caregiver to call me with any questions as needed. Romelia Ching RN      "

## 2018-11-02 NOTE — TELEPHONE ENCOUNTER
----- Message from Lan Alvarez sent at 11/2/2018  2:57 PM CDT -----  Contact: pt   States he's calling rg having some questions about the tests that pt recently had done and can be reached at [phone] //thanks/dbw

## 2018-11-04 ENCOUNTER — HOSPITAL ENCOUNTER (EMERGENCY)
Facility: HOSPITAL | Age: 68
Discharge: HOME | End: 2018-11-04
Attending: FAMILY MEDICINE
Payer: MEDICARE

## 2018-11-04 VITALS
WEIGHT: 204 LBS | HEART RATE: 96 BPM | TEMPERATURE: 100 F | RESPIRATION RATE: 24 BRPM | HEIGHT: 68 IN | DIASTOLIC BLOOD PRESSURE: 109 MMHG | OXYGEN SATURATION: 94 % | SYSTOLIC BLOOD PRESSURE: 144 MMHG | BODY MASS INDEX: 30.92 KG/M2

## 2018-11-04 DIAGNOSIS — R51.9 ACUTE NONINTRACTABLE HEADACHE, UNSPECIFIED HEADACHE TYPE: Primary | ICD-10-CM

## 2018-11-04 PROCEDURE — 63600175 PHARM REV CODE 636 W HCPCS: Performed by: FAMILY MEDICINE

## 2018-11-04 PROCEDURE — 96372 THER/PROPH/DIAG INJ SC/IM: CPT

## 2018-11-04 PROCEDURE — 99284 EMERGENCY DEPT VISIT MOD MDM: CPT | Mod: 25

## 2018-11-04 PROCEDURE — 25000003 PHARM REV CODE 250: Performed by: FAMILY MEDICINE

## 2018-11-04 RX ORDER — ACETAMINOPHEN 325 MG/1
650 TABLET ORAL
Status: COMPLETED | OUTPATIENT
Start: 2018-11-04 | End: 2018-11-04

## 2018-11-04 RX ORDER — FLUTICASONE PROPIONATE 50 MCG
1 SPRAY, SUSPENSION (ML) NASAL 2 TIMES DAILY PRN
Qty: 15 G | Refills: 0 | Status: SHIPPED | OUTPATIENT
Start: 2018-11-04

## 2018-11-04 RX ORDER — KETOROLAC TROMETHAMINE 30 MG/ML
60 INJECTION, SOLUTION INTRAMUSCULAR; INTRAVENOUS
Status: COMPLETED | OUTPATIENT
Start: 2018-11-04 | End: 2018-11-04

## 2018-11-04 RX ADMIN — ACETAMINOPHEN 650 MG: 325 TABLET ORAL at 09:11

## 2018-11-04 RX ADMIN — KETOROLAC TROMETHAMINE 60 MG: 30 INJECTION INTRAMUSCULAR; INTRAVENOUS at 09:11

## 2018-11-04 NOTE — ED PROVIDER NOTES
Encounter Date: 11/4/2018       History     Chief Complaint   Patient presents with    Headache     Pt is a somewhat challenging historian due to his baseline mental status. He presents frequently with back pain from his back injury. Today he is here complaining of a frontal headache. He has no other symptoms. No blurry vision, nausea, or confusion. He has not taken anything for his headache yet.           Review of patient's allergies indicates:   Allergen Reactions    Codeine Other (See Comments)    Morphine      Pt denies this 1-10-13     Bactrim [sulfamethoxazole-trimethoprim] Other (See Comments)     Pt cannot take with Methadone    Ciprofloxacin Other (See Comments)     Patient cannot take with methadone     Past Medical History:   Diagnosis Date    Allergy     Arthritis     Asthma     Cancer     skin DR Isrrael Yun    COPD (chronic obstructive pulmonary disease)     Degenerative disc disease     Depression     bipolar dis    Fracture of vertebra due to osteoporosis     Hepatitis C     Hypertension     Liver disease     Nasal bone fx-closed     Nephrolith     Rosacea     Skin cancer      Past Surgical History:   Procedure Laterality Date    CYSTOSCOPY N/A 10/29/2018    Procedure: CYSTOSCOPY;  Surgeon: Negrita Castillo MD;  Location: [facility] OR;  Service: Urology;  Laterality: N/A;    CYSTOSCOPY N/A 10/29/2018    Performed by Negrita Castillo MD at [facility] OR    CYSTOSCOPY N/A 8/28/2017    Performed by Negrita Castillo MD at [facility] OR    CYSTOSCOPY N/A 10/10/2016    Performed by Negrita Castillo MD at [facility] OR    jaw surgey      KNEE SURGERY      NECK SURGERY      8 procedures on neck    RHIZOTOMY W/ RADIOFREQUENCY ABLATION      TONSILLECTOMY      TRANSRECTAL ULTRASOUND N/A 8/28/2017    Performed by Negrita Castillo MD at [facility] OR    tumors      10 removed from scalp     Family History   Problem Relation Age of Onset    Heart disease Father     No  Known Problems Mother     Cancer Paternal Uncle         prostate    Cancer Paternal Aunt         breast    Eczema Neg Hx     Lupus Neg Hx     Psoriasis Neg Hx     Melanoma Neg Hx     Glaucoma Neg Hx      Social History     Tobacco Use    Smoking status: Current Every Day Smoker     Packs/day: 0.50     Years: 30.00     Pack years: 15.00     Types: Cigarettes    Smokeless tobacco: Never Used    Tobacco comment: down to 3 to 4 a day   Substance Use Topics    Alcohol use: No    Drug use: No     Review of Systems   Constitutional: Negative for chills, fatigue and fever.   HENT: Negative for congestion, ear pain, rhinorrhea, sinus pressure, sinus pain and sore throat.    Eyes: Negative for photophobia and redness.   Respiratory: Negative for cough, shortness of breath and wheezing.    Cardiovascular: Negative for chest pain, palpitations and leg swelling.   Gastrointestinal: Negative for abdominal pain, constipation, diarrhea and nausea.   Endocrine: Negative for polydipsia, polyphagia and polyuria.   Genitourinary: Negative for difficulty urinating, dysuria, flank pain, hematuria and urgency.   Musculoskeletal: Negative for arthralgias, back pain and joint swelling.   Skin: Negative for color change.   Neurological: Positive for headaches. Negative for dizziness, seizures, syncope and weakness.   Hematological: Negative for adenopathy.   Psychiatric/Behavioral: Negative for sleep disturbance and suicidal ideas.   All other systems reviewed and are negative.      Physical Exam     Initial Vitals [11/04/18 0918]   BP Pulse Resp Temp SpO2   (!) 144/109 96 (!) 24 99.6 °F (37.6 °C) (!) 94 %      MAP       --         Physical Exam    Nursing note and vitals reviewed.  Constitutional: He appears well-developed.   HENT:   Head: Normocephalic and atraumatic.   Eyes: Conjunctivae and EOM are normal. Pupils are equal, round, and reactive to light.   Neck: Normal range of motion. Neck supple.   Cardiovascular: Normal  rate, regular rhythm, normal heart sounds and intact distal pulses.   Pulmonary/Chest: Breath sounds normal.   Abdominal: Soft. Bowel sounds are normal.   Musculoskeletal: Normal range of motion.   Neurological: He is alert and oriented to person, place, and time. He has normal strength and normal reflexes.   Skin: Skin is warm and dry. Capillary refill takes less than 2 seconds.   Psychiatric: He has a normal mood and affect. His behavior is normal.   Flat affect but this is his baseline.         ED Course   Procedures  Labs Reviewed - No data to display       Imaging Results    None                               Clinical Impression:   The encounter diagnosis was Acute nonintractable headache, unspecified headache type.                             Rae Dc MD  11/04/18 0982

## 2018-11-04 NOTE — ED TRIAGE NOTES
Pt to ER with c/o headache that began this morning when he woke up. Pt reports headache is across his forehead and is intermittent in nature. Pt rates pain 7/10.

## 2018-11-04 NOTE — ED NOTES
Delay in discharge due to medication administration. Pt verbalized understanding. Calling pt's son for  of pt.

## 2018-11-05 ENCOUNTER — TELEPHONE (OUTPATIENT)
Dept: UROLOGY | Facility: CLINIC | Age: 68
End: 2018-11-05

## 2018-11-05 ENCOUNTER — OUTPATIENT CASE MANAGEMENT (OUTPATIENT)
Dept: ADMINISTRATIVE | Facility: OTHER | Age: 68
End: 2018-11-05

## 2018-11-05 NOTE — TELEPHONE ENCOUNTER
----- Message from Zaida Teixeira sent at 11/5/2018  9:52 AM CST -----  Contact: self  Patient needs  appointment due to kidney stone procedure. Patient needs to speak to Rosa.  Please call patient at [phone]. Thanks!

## 2018-11-05 NOTE — TELEPHONE ENCOUNTER
----- Message from Anushka Walters sent at 11/5/2018  8:22 AM CST -----  Contact: Hesham  Type: Needs Medical Advice    Who Called:  patient  Best Call Back Number: [phone]  Additional Information:  Calling to speak to nurse regarding next step in removing kidney stone. Thanks!

## 2018-11-05 NOTE — PROGRESS NOTES
"11/5/2018  Summary:   spoke to patient via telephone for follow up.  He reported that he was eating at the time.  He has received the information that  mailed to him; however, he is unsure of its location at this time.     Pt reported that he went to the ER yesterday for a "Seizure".  When  questioned this because medical records stated that patient was there for a headache, pt reported "well it felt like it".      Pt unable to review information that was mailed to him since he was eating and not able to locate the information at the time of call.      Interventions:  Verified patient received mailing    Plan:  Follow up 11/5/2018  1. Ensure that patient received mailing- 11/5/2018  2. Refer to Pharmacy Assistance if patient agrees  3. Encourage patient to complete financial assistance application  4. Discuss IOP options if patient is agreeable   5. Explain Waiver program.   6. Encourage patient to complete Advanced Directives.        Todays OPCM Self-Management Care Plan was developed with the patients/caregivers input and was based on identified barriers from todays assessment.  Goals were written today with the patient/caregiver and the patient has agreed to work towards these goals to improve his/her overall well-being. Patient verbalized understanding of the care plan, goals, and all of today's instructions. Encouraged patient/caregiver to communicate with his/her physician and health care team about health conditions and the treatment plan.  Provided my contact information today and encouraged patient/caregiver to call me with any questions as needed.      "

## 2018-11-05 NOTE — TELEPHONE ENCOUNTER
----- Message from Johana Daniels sent at 11/5/2018  9:09 AM CST -----  Contact: pt [phone]  Patient called to talk to the nurse Rosa  About scheduling a procedure . Please call back

## 2018-11-05 NOTE — TELEPHONE ENCOUNTER
Spoke with patient xray scheduled for Friday 11/9 at 8:15am. Patient verbally voiced understanding.

## 2018-11-05 NOTE — TELEPHONE ENCOUNTER
Spoke with patient he wants to know what day  does procedures for kidney stones. Explained to patient once again  did not state he needed a procedure she stated he need to have a xray done. Patient verbally voiced understanding.

## 2018-11-05 NOTE — TELEPHONE ENCOUNTER
Spoke with patient informed him the next step with his kidney stones would be to get a xray(KUB) patient verbally voiced understanding and states he will call back to schedule.

## 2018-11-06 ENCOUNTER — TELEPHONE (OUTPATIENT)
Dept: PULMONOLOGY | Facility: CLINIC | Age: 68
End: 2018-11-06

## 2018-11-06 NOTE — TELEPHONE ENCOUNTER
No answer     ----- Message from Jatinder Smooth sent at 11/6/2018  2:45 PM CST -----  Type:  Patient Returning Call    Who Called: patient   Who Left Message for Patient: nurse   Does the patient know what this is regarding?: medication/breathing   Best Call Back Number:  [phone]  Additional Information: patient needs medication advise with pharmacy and says he is still having trouble breathing.

## 2018-11-09 ENCOUNTER — HOSPITAL ENCOUNTER (OUTPATIENT)
Dept: RADIOLOGY | Facility: CLINIC | Age: 68
Discharge: HOME | End: 2018-11-09
Attending: UROLOGY
Payer: MEDICARE

## 2018-11-09 DIAGNOSIS — N20.0 NEPHROLITHIASIS: ICD-10-CM

## 2018-11-09 PROCEDURE — 74018 RADEX ABDOMEN 1 VIEW: CPT | Mod: 26,,, | Performed by: RADIOLOGY

## 2018-11-09 PROCEDURE — 74018 RADEX ABDOMEN 1 VIEW: CPT | Mod: TC,FY,PO

## 2018-11-12 ENCOUNTER — OUTPATIENT CASE MANAGEMENT (OUTPATIENT)
Dept: ADMINISTRATIVE | Facility: OTHER | Age: 68
End: 2018-11-12

## 2018-11-12 ENCOUNTER — TELEPHONE (OUTPATIENT)
Dept: UROLOGY | Facility: CLINIC | Age: 68
End: 2018-11-12

## 2018-11-12 NOTE — TELEPHONE ENCOUNTER
"----- Message from Romelia Ching RN sent at 11/12/2018 11:30 AM CST -----  Contact: Romelia Castillo/Staff,    I am attempting to work with Mr Serna in Outpatient Case Management. He repeatedly asked this AM that I contact you about wanting to have "that wave" thing to treat his kidney stone. I reinforced what your nurse told him this AM about you recommending no treatment at this time related to no change in kidney stone size. He then advised he is having increased urinary frequency, stating he is "going every 5 minutes". I questioned him about hs frequency and he reported being up 5-10 X Q HS. He reports good compliance with Mybetriq, one pill Q AM. Please advise if you have any further instructions related to this. Thank you.     Kindest Regards,     Romelia Ching RN, [facility]  Outpatient Case Management  [phone]  Ext [phone]  [email or web address]    "

## 2018-11-12 NOTE — PROGRESS NOTES
"11/12/18 - SUMMARY: Phone contact made with pt today for follow up with OPCM. He is again resistant to talking about his meds and changes the subject when CM brings them up. He reports he wants to have the wavelength thing done for treatment of his kidney stone. Advised as noted on his chart from contact by Dr Castillo's staff this AM, his XRays show no change in size of the stone and she recommends no treatment at this time. He advised he is having symptoms of increased frequency of urination and is going "every 5 minutes". He reports compliance with Myrbetriq. He requests that CM notify Dr Castillo  of these ongoing symptoms.   INTERVENTION: Discussed Myrbetriq and he advised he is taking one pill every morning. Advised CM will notify Dr Castillo of increased urinary frequency and request that she or her staff contact him with any new orders or instructions.   PLAN: Follow up in two weeks. Review safety with mobility and related to O2 use. Plan to d/c if unable to complete Med Reconciliation.    Todays OPCM Self-Management Care Plan was reviewed with the patients/caregivers input based on identified barriers from todays and previous assessments.  Goals were reviewed today with the patient/caregiver and the patient has agreed to continue to work towards these goals to improve his/her overall well-being. Patient verbalized understanding of the care plan, goals, and all of today's instructions. Encouraged patient/caregiver to communicate with his/her physician and health care team about health conditions and the treatment plan.  Provided my contact information today and encouraged patient/caregiver to call me with any questions as needed. Romelia Ching RN      "

## 2018-11-13 ENCOUNTER — TELEPHONE (OUTPATIENT)
Dept: UROLOGY | Facility: CLINIC | Age: 68
End: 2018-11-13

## 2018-11-13 NOTE — TELEPHONE ENCOUNTER
----- Message from Erinn Valadez sent at 11/13/2018  8:51 AM CST -----  Type: Needs Medical Advice    Who Called:  Patient  Pharmacy name and phone #:    [id number] - [place], [place] - [address]  [address]  [facility] [address]  Phone: [phone] Fax: [phone]    Best Call Back Number: [phone]  Additional Information: Frequent urination but is taking antibiotics. Feels like it might be a UTI. Please call to advise.

## 2018-11-13 NOTE — TELEPHONE ENCOUNTER
Spoke with patient he states he is having urinary issues and urinating too often. Informed patient I can schedule him to come in nurse schedule to give a urine sample to check for uti. Patient states he will call back to schedule. Patient asked about surgery for kidney stone informed patient at this time he does not need surgery. Verbally voiced understanding.

## 2018-11-13 NOTE — TELEPHONE ENCOUNTER
Spoke with patient he states he is currently taking antibiotics amoxicillin. Informed patient we should still need a urine sample to make sure on correct antibiotics. Patient verbally voiced understanding and will call back at the end of the week

## 2018-11-13 NOTE — TELEPHONE ENCOUNTER
----- Message from Lima Bush sent at 11/13/2018  8:32 AM CST -----  Type: Needs Medical Advice    Who Called:Patient  Symptoms (please be specific):  Urinary issues  How long has patient had these symptoms: Na  Pharmacy name and phone #:    [facility] [id number] [place] [address], [place] - [address] [place] [address] [address]  [address] [place] [address]  Phone: [phone] Fax: [phone]  Best Call Back Number: [phone] (home)     Additional Information: Patient stating having urinary problems, wanted to discuss

## 2018-11-16 ENCOUNTER — TELEPHONE (OUTPATIENT)
Dept: PULMONOLOGY | Facility: CLINIC | Age: 68
End: 2018-11-16

## 2018-11-16 ENCOUNTER — TELEPHONE (OUTPATIENT)
Dept: FAMILY MEDICINE | Facility: CLINIC | Age: 68
End: 2018-11-16

## 2018-11-16 NOTE — TELEPHONE ENCOUNTER
Advised pt that MD is out of office until Monday, but I will have him address it then. advised that if worsens over weekend go to the ED for evaluation. Pt verbalized understanding.     ----- Message from Lima Bush sent at 11/16/2018  4:03 PM CST -----  Type: Needs Medical Advice    Who Called: Patient    Best Call Back Number: [phone] (home)     Additional Information:Patient stating the antibiotics that were prescribed is not working, will need a different one prescribed, please advise

## 2018-11-16 NOTE — TELEPHONE ENCOUNTER
Spoke to Ally at [facility] who states patient last fill prescription for Clonazepam on 11-2-18. Next refill not due until 12-2-18.  Patient notified too soon to refill. Verbalized understanding.         ----- Message from Gian Cardoso sent at 11/16/2018  1:40 PM CST -----  Contact: same  Type:  RX Refill Request    Who Called:  patient  Refill or New Rx:  refill  RX Name and Strength: clonazePAM (KLONOPIN) 2 MG Tab   How is the patient currently taking it? (ex. 1XDay):  TAKE ONE TABLET BY MOUTH THREE TIMES DAILY  Is this a 30 day or 90 day RX:  90 tablets with 0 refills last filled on 11/2/18  Preferred Pharmacy with phone number:    [id number] - [place], [place] - [address] [address]  Phone: [phone] Fax: [phone]    Ordering Provider:  Rachel Mandujano Call Back Number:  [phone]  Additional Information:  n/a

## 2018-11-19 ENCOUNTER — OUTPATIENT CASE MANAGEMENT (OUTPATIENT)
Dept: ADMINISTRATIVE | Facility: OTHER | Age: 68
End: 2018-11-19

## 2018-11-19 ENCOUNTER — TELEPHONE (OUTPATIENT)
Dept: PULMONOLOGY | Facility: CLINIC | Age: 68
End: 2018-11-19

## 2018-11-19 ENCOUNTER — TELEPHONE (OUTPATIENT)
Dept: UROLOGY | Facility: CLINIC | Age: 68
End: 2018-11-19

## 2018-11-19 DIAGNOSIS — J44.1 ACUTE EXACERBATION OF CHRONIC OBSTRUCTIVE PULMONARY DISEASE (COPD): ICD-10-CM

## 2018-11-19 RX ORDER — AMOXICILLIN AND CLAVULANATE POTASSIUM 875; 125 MG/1; MG/1
1 TABLET, FILM COATED ORAL 2 TIMES DAILY
Qty: 20 TABLET | Refills: 2 | Status: SHIPPED | OUTPATIENT
Start: 2018-11-19

## 2018-11-19 NOTE — TELEPHONE ENCOUNTER
----- Message from Anushka Saldivar sent at 11/19/2018  8:50 AM CST -----  Contact: self  Patient - [phone] is calling to make sure he can come in tomorrow 11 20 18 to leave a urine sample/I tried to tell him the office would be opened but he wants to hear it from the nurse/please call

## 2018-11-19 NOTE — TELEPHONE ENCOUNTER
Advised pt that MD sent out augmentin      ----- Message from Maurice Hearn sent at 11/19/2018 12:12 PM CST -----  Type: Needs Medical Advice    Who Called:  Patient    Pharmacy name and phone #:        [id number] - [place], [place] - [address] [facility]  [address] [facility] [address]  Phone: [phone] Fax: [phone]          Best Call Back Number: [phone]  Additional Information: Patient calling.  States that his antibiotics are not working.  Please call to advise

## 2018-11-19 NOTE — PROGRESS NOTES
"11/19/2018  Summary:   spoke to patient via telephone for follow up.  Patient appeared easily agitated and reported that he was having "trouble" with his mother because she "has Alzheimer's".  Pt reported that he did not want to discuss going to IOP because he has too many doctor appointments and doesn't have time.  Pt feels that he is "dying" and having "seizures".      He is upset that  will go to his home in order to assist with Financial Assistance application. When  attempted to arrange to meet with patient at his next appointment, patient responded "oh you are trying to make it easy on yourself".      Interventions:  1. Attempted to offer support  2. Attempted to speak to patient about IOP options.   3. Encouraged patient to bring financial assistance application to his next doctor's appointment.     Plan:  Follow up 12/3  1. Ensure that patient received mailing- 11/5/2018  2. Refer to Pharmacy Assistance if patient agrees  3. Encourage patient to complete financial assistance application-11/19  4. Discuss IOP options if patient is agreeable - 11/19  5. Explain Waiver program.   6. Encourage patient to complete Advanced Directives.          Todays OPCM Self-Management Care Plan was developed with the patients/caregivers input and was based on identified barriers from todays assessment.  Goals were written today with the patient/caregiver and the patient has agreed to work towards these goals to improve his/her overall well-being. Patient verbalized understanding of the care plan, goals, and all of today's instructions. Encouraged patient/caregiver to communicate with his/her physician and health care team about health conditions and the treatment plan.  Provided my contact information today and encouraged patient/caregiver to call me with any questions as needed.        "

## 2018-11-20 ENCOUNTER — TELEPHONE (OUTPATIENT)
Dept: UROLOGY | Facility: CLINIC | Age: 68
End: 2018-11-20

## 2018-11-20 ENCOUNTER — APPOINTMENT (OUTPATIENT)
Dept: LAB | Facility: HOSPITAL | Age: 68
End: 2018-11-20
Attending: UROLOGY
Payer: MEDICARE

## 2018-11-20 ENCOUNTER — CLINICAL SUPPORT (OUTPATIENT)
Dept: UROLOGY | Facility: CLINIC | Age: 68
End: 2018-11-20
Payer: MEDICARE

## 2018-11-20 DIAGNOSIS — F39 MOOD DISORDER: ICD-10-CM

## 2018-11-20 DIAGNOSIS — R82.998 CELLS AND CASTS IN URINE: Primary | ICD-10-CM

## 2018-11-20 DIAGNOSIS — F31.0 BIPOLAR AFFECTIVE DISORDER, CURRENT EPISODE HYPOMANIC: ICD-10-CM

## 2018-11-20 DIAGNOSIS — N30.00 ACUTE CYSTITIS WITHOUT HEMATURIA: ICD-10-CM

## 2018-11-20 LAB
BACTERIA #/AREA URNS HPF: NORMAL /HPF
BILIRUB UR QL STRIP: NEGATIVE
CLARITY UR: CLEAR
COLOR UR: YELLOW
GLUCOSE UR QL STRIP: NEGATIVE
HGB UR QL STRIP: ABNORMAL
KETONES UR QL STRIP: NEGATIVE
LEUKOCYTE ESTERASE UR QL STRIP: NEGATIVE
MICROSCOPIC COMMENT: NORMAL
NITRITE UR QL STRIP: NEGATIVE
PH UR STRIP: >8 [PH] (ref 5–8)
PROT UR QL STRIP: NEGATIVE
RBC #/AREA URNS HPF: 2 /HPF (ref 0–4)
SP GR UR STRIP: 1.01 (ref 1–1.03)
SQUAMOUS #/AREA URNS HPF: 1 /HPF
URN SPEC COLLECT METH UR: ABNORMAL
UROBILINOGEN UR STRIP-ACNC: NEGATIVE EU/DL
WBC #/AREA URNS HPF: 1 /HPF (ref 0–5)

## 2018-11-20 PROCEDURE — 81000 URINALYSIS NONAUTO W/SCOPE: CPT

## 2018-11-20 PROCEDURE — 87086 URINE CULTURE/COLONY COUNT: CPT

## 2018-11-20 PROCEDURE — 99999 PR PBB SHADOW E&M-EST. PATIENT-LVL I: CPT | Mod: PBBFAC,,,

## 2018-11-20 PROCEDURE — 99211 OFF/OP EST MAY X REQ PHY/QHP: CPT | Mod: PBBFAC,PN

## 2018-11-20 PROCEDURE — 99499 UNLISTED E&M SERVICE: CPT | Mod: S$PBB,,, | Performed by: UROLOGY

## 2018-11-20 NOTE — TELEPHONE ENCOUNTER
----- Message from Zaida Teixeira sent at 11/20/2018  7:49 AM CST -----  Contact: self  Patient states he wants Rosa to know he is coming in at 9:30 or 10:00 to give his urine specimen. He states he is very ill and will go to the hospital after the appointment. Please call patient at [phone]. Thanks!

## 2018-11-20 NOTE — TELEPHONE ENCOUNTER
Spoke with patient will come in this morning to give a urine sample. Patient verbally voiced understanding.

## 2018-11-21 ENCOUNTER — TELEPHONE (OUTPATIENT)
Dept: PULMONOLOGY | Facility: CLINIC | Age: 68
End: 2018-11-21

## 2018-11-21 NOTE — TELEPHONE ENCOUNTER
Advised pt that the med was called in to [facility] [place].     ----- Message from Jess Lewis RT sent at 11/21/2018 11:10 AM CST -----  Contact: pt    pt , requesting a call back soon to discuss his prescription issue, been waiting for his medication refill he do not remember the name of it, thanks.

## 2018-11-21 NOTE — TELEPHONE ENCOUNTER
See previous note    ----- Message from Jovana Banks sent at 11/21/2018 10:20 AM CST -----  Please call pt a [phone] asking to discuss antibiotic / states not working and request a different prescription

## 2018-11-22 LAB — BACTERIA UR CULT: NO GROWTH

## 2018-11-22 RX ORDER — CLONAZEPAM 2 MG/1
TABLET ORAL
Qty: 90 TABLET | Refills: 0 | Status: SHIPPED | OUTPATIENT
Start: 2018-11-30

## 2018-11-26 ENCOUNTER — OUTPATIENT CASE MANAGEMENT (OUTPATIENT)
Dept: ADMINISTRATIVE | Facility: OTHER | Age: 68
End: 2018-11-26

## 2018-11-26 NOTE — PROGRESS NOTES
"11/26/18 - SUMMARY: Phone contact made with pt today for follow up with OPCM. When asked how he is doing he advised he is very sick and is trying to get a phone number that he cannot get. CM questioned what phone number he is trying to get or who he is trying to call and he responded, "That's none of your business".   INTERVENTION: Advised CM will d/c from nursing portion of OPCM today related to ineffectiveness in providing any assistance with care management. Pt agreed to nurse CM d/c at this time.   PLAN: Make no further contact.     Todays OPCM Self-Management Care Plan was reviewed with the patients/caregivers input based on identified barriers from todays and previous assessments.  Goals were reviewed today with the patient/caregiver and the patient has agreed to continue to work towards these goals to improve his/her overall well-being. Patient verbalized understanding of the care plan, goals, and all of today's instructions. Encouraged patient/caregiver to communicate with his/her physician and health care team about health conditions and the treatment plan.  Provided my contact information today and encouraged patient/caregiver to call me with any questions as needed. Romelia Ching RN      "

## 2018-11-27 ENCOUNTER — TELEPHONE (OUTPATIENT)
Dept: UROLOGY | Facility: CLINIC | Age: 68
End: 2018-11-27

## 2018-11-27 ENCOUNTER — HOSPITAL ENCOUNTER (EMERGENCY)
Facility: HOSPITAL | Age: 68
Discharge: LEFT BEFORE DISCHARGE | End: 2018-11-27
Attending: FAMILY MEDICINE
Payer: MEDICARE

## 2018-11-27 VITALS
SYSTOLIC BLOOD PRESSURE: 131 MMHG | OXYGEN SATURATION: 94 % | HEIGHT: 67 IN | BODY MASS INDEX: 31.86 KG/M2 | DIASTOLIC BLOOD PRESSURE: 82 MMHG | WEIGHT: 203 LBS | TEMPERATURE: 98 F | HEART RATE: 94 BPM | RESPIRATION RATE: 24 BRPM

## 2018-11-27 DIAGNOSIS — R10.9 ABDOMINAL PAIN, UNSPECIFIED ABDOMINAL LOCATION: Primary | ICD-10-CM

## 2018-11-27 PROCEDURE — 99283 EMERGENCY DEPT VISIT LOW MDM: CPT | Mod: 25

## 2018-11-27 PROCEDURE — 71045 X-RAY EXAM CHEST 1 VIEW: CPT | Mod: 26,,, | Performed by: RADIOLOGY

## 2018-11-27 PROCEDURE — 71045 X-RAY EXAM CHEST 1 VIEW: CPT | Mod: TC,FY

## 2018-11-27 RX ORDER — SODIUM CHLORIDE 9 MG/ML
1000 INJECTION, SOLUTION INTRAVENOUS
Status: DISCONTINUED | OUTPATIENT
Start: 2018-11-27 | End: 2018-11-27 | Stop reason: HOSPADM

## 2018-11-27 NOTE — ED NOTES
Patient returned from the restroom and wants to leave AMA. Does not want blood work done. Advised of the risks of leaving without workup. Patient understands.

## 2018-11-27 NOTE — TELEPHONE ENCOUNTER
----- Message from Zaida Teixeira sent at 11/27/2018  2:32 PM CST -----  Contact: self  Type: Needs Medical Advice    Who Called:  self  Symptoms (please be specific):  constipation  How long has patient had these symptoms:  4 days  Pharmacy name and phone #:  [facility]  Best Call Back Number: [phone]  Additional Information: Patient needs advice. Patient asking about [facility]. Please call patient. Thanks!    [facility] [address] [place] [address] [address]  [address] [place] [address]  Phone: [phone] Fax: [phone]

## 2018-11-28 DIAGNOSIS — F39 MOOD DISORDER: ICD-10-CM

## 2018-11-28 DIAGNOSIS — F31.0 BIPOLAR AFFECTIVE DISORDER, CURRENT EPISODE HYPOMANIC: ICD-10-CM

## 2018-11-28 NOTE — TELEPHONE ENCOUNTER
----- Message from Lima Bush sent at 11/28/2018  4:44 PM CST -----  Type:  RX Refill Request    Who Called:  Patient  Refill or New Rx:  refill  RX Name and Strength:  clonazePAM (KLONOPIN) 2 MG Tab  How is the patient currently taking it? (ex. 1XDay):  Na  Is this a 30 day or 90 day RX:  30  Preferred Pharmacy with phone number:    [id number] - [facility] [address] [address]  Phone: [phone] Fax: [phone]  Local or Mail Order: local  Ordering Provider:  Rachel Mandujano Call Back Number:  [phone] (home)     Additional Information:  Candy

## 2018-11-29 ENCOUNTER — TELEPHONE (OUTPATIENT)
Dept: FAMILY MEDICINE | Facility: CLINIC | Age: 68
End: 2018-11-29

## 2018-11-29 NOTE — TELEPHONE ENCOUNTER
----- Message from Shayla Hernandez sent at 11/29/2018  1:23 PM CST -----  Contact: neighbor  Pt family friend wanted to call and inform the office the patient past away. Please call for further information to [phone]

## 2018-11-29 NOTE — TELEPHONE ENCOUNTER
Patient's family friend and neighbor to patient's mother states patient usually calls his mother every morning at 7 am. Patient did not call this morning. Patient's mother called patient; no answer received. Patient's mother called the 's department. Patient was found  in bed.

## 2018-11-29 NOTE — ED PROVIDER NOTES
Encounter Date: 11/27/2018       History     Chief Complaint   Patient presents with    Constipation     States no bowel movement in 4 days. States use of enema x 2 at home without relief.     68-year-old male presents complaining of constipation, is initial request is for narcotic pain meds, he states he has not had a bowel movement in 4 days but denies any nausea vomiting he does have abdominal cramps he will not state if he currently has been taking narcotic pain meds or any pain meds he denies decrease in appetite he denies chest pain headache or fever, his abdominal cramps are intermittent and localized to the left and right lower quadrants          Review of patient's allergies indicates:   Allergen Reactions    Codeine Other (See Comments)    Morphine      Pt denies this 1-10-13     Bactrim [sulfamethoxazole-trimethoprim] Other (See Comments)     Pt cannot take with Methadone    Ciprofloxacin Other (See Comments)     Patient cannot take with methadone     Past Medical History:   Diagnosis Date    Allergy     Arthritis     Asthma     Cancer     skin DR Isrrael Yun    COPD (chronic obstructive pulmonary disease)     Degenerative disc disease     Depression     bipolar dis    Fracture of vertebra due to osteoporosis     Hepatitis C     Hypertension     Liver disease     Nasal bone fx-closed     Nephrolith     Rosacea     Skin cancer      Past Surgical History:   Procedure Laterality Date    CYSTOSCOPY N/A 10/29/2018    Procedure: CYSTOSCOPY;  Surgeon: Negrita Castillo MD;  Location: [facility] OR;  Service: Urology;  Laterality: N/A;    CYSTOSCOPY N/A 10/29/2018    Performed by Negrita Castillo MD at [facility] OR    CYSTOSCOPY N/A 8/28/2017    Performed by Negrita Castillo MD at [facility] OR    CYSTOSCOPY N/A 10/10/2016    Performed by [facility]ita Castillo MD at [facility] OR    jaw surgey      KNEE SURGERY      NECK SURGERY      8 procedures on neck    RHIZOTOMY W/  RADIOFREQUENCY ABLATION      TONSILLECTOMY      TRANSRECTAL ULTRASOUND N/A 8/28/2017    [facility] at [facility]    tumors      10 removed from scalp     Family History   Problem Relation Age of Onset    Heart disease Father     No Known Problems Mother     Cancer Paternal Uncle         prostate    Cancer Paternal Aunt         breast    Eczema Neg Hx     Lupus Neg Hx     Psoriasis Neg Hx     Melanoma Neg Hx     Glaucoma Neg Hx      Social History     Tobacco Use    Smoking status: Current Every Day Smoker     Packs/day: 0.50     Years: 30.00     Pack years: 15.00     Types: Cigarettes    Smokeless tobacco: Never Used    Tobacco comment: down to 3 to 4 a day   Substance Use Topics    Alcohol use: No    Drug use: No     Review of Systems   Constitutional: Negative for fever.   HENT: Negative for sore throat.    Respiratory: Negative for shortness of breath.    Cardiovascular: Negative for chest pain.   Gastrointestinal: Positive for abdominal distention, abdominal pain and constipation. Negative for nausea and vomiting.   Genitourinary: Negative for dysuria.   Musculoskeletal: Negative for back pain.   Skin: Negative for rash.   Neurological: Negative for weakness.   Hematological: Does not bruise/bleed easily.       Physical Exam     Initial Vitals [11/27/18 1608]   BP Pulse Resp Temp SpO2   131/82 94 (!) 24 97.9 °F (36.6 °C) (!) 94 %      MAP       --         Physical Exam    Nursing note and vitals reviewed.  Constitutional: He appears well-developed and well-nourished. He is not diaphoretic. No distress.   HENT:   Head: Normocephalic and atraumatic.   Right Ear: External ear normal.   Left Ear: External ear normal.   Nose: Nose normal.   Mouth/Throat: Oropharynx is clear and moist. No oropharyngeal exudate.   Eyes: EOM are normal.   Neck: Normal range of motion. Neck supple. No tracheal deviation present.   Cardiovascular: Normal rate and regular rhythm.   No murmur  heard.  Pulmonary/Chest: Breath sounds normal. No stridor. No respiratory distress. He has no rales.   Abdominal: Soft. He exhibits no distension and no mass. There is no tenderness. There is no rebound.   Musculoskeletal: Normal range of motion. He exhibits no edema.   Lymphadenopathy:     He has no cervical adenopathy.   Neurological: He is alert and oriented to person, place, and time. He has normal strength.   Skin: Skin is warm and dry. Capillary refill takes less than 2 seconds. No pallor.   Psychiatric: He has a normal mood and affect.         ED Course   Procedures  Labs Reviewed - No data to display       Imaging Results          X-Ray Chest AP Portable (Final result)  Result time 11/27/18 16:45:42    Final result by Shahriar Srivastava MD (11/27/18 16:45:42)                 Impression:      1. Mild bibasilar dependent atelectasis.  2. Cardiomegaly.      Electronically signed by: Shahriar Srivastava  Date:    11/27/2018  Time:    16:45             Narrative:    EXAMINATION:  XR CHEST AP PORTABLE    CLINICAL HISTORY:  sob;    TECHNIQUE:  Single frontal view of the chest was performed.    COMPARISON:  Chest x-ray 09/08/2018 and 02/29/2016.    FINDINGS:  Mild linear discoid atelectasis is present at the lung bases.  No focal consolidation.  Pulmonary vascularity unremarkable.    Heart size is enlarged.  Mediastinal contours unremarkable.  Trachea midline.    Healed left rib fracture.                                                      Clinical Impression:   The encounter diagnosis was Abdominal pain, unspecified abdominal location.                             Arturo Go MD  11/29/18 9811

## 2018-11-30 RX ORDER — CLONAZEPAM 2 MG/1
2 TABLET ORAL 3 TIMES DAILY
Qty: 90 TABLET | Refills: 0 | OUTPATIENT
Start: 2018-11-30

## 2018-12-03 ENCOUNTER — OUTPATIENT CASE MANAGEMENT (OUTPATIENT)
Dept: ADMINISTRATIVE | Facility: OTHER | Age: 68
End: 2018-12-03

## 2019-02-08 NOTE — TELEPHONE ENCOUNTER
----- Message from Sharonda Alvarez sent at 7/24/2017  3:22 PM CDT -----  Contact: pt  Message for Rajiv Canales requesting appt tomorrow for; stomach pain/ possible allergic reaction  Call back   thanks   Pupils equal, round and reactive to light, Extra-ocular movement intact, eyes are clear b/l

## 2019-06-01 NOTE — TELEPHONE ENCOUNTER
----- Message from Indu Whitt sent at 4/16/2018  1:15 PM CDT -----  Contact: Patient  Patient is calling regarding his celecoxib (CELEBREX) 200 MG capsule, he stated that his prescription was called in as 100MG and he is on the 200MG.  Please call patient to discuss.  Call Back#[phone]  Thanks     [facility] [address] [place] [address] AT [facility] [address] [address]  Phone: [phone] Fax: [phone]       Hospitalist Consult Note  Cherelle Singleton NP  Answering service: [phone] from in house phone  Cell: [phone]   Date of Service:  2019  NAME:  Yvonne Bonilla  :  1966  MRN:  [id number]    Admission Summary:   Pt presented to the ED via EMS after being found slumped over in the stairwell at an extended stay hotel. Patient was a poor historian, unable to give any pertinent history. PMH significant for Alcohol Use Disorder, Liver Cirrhosis, Hepatitis C, Bipolar 1 Disorder, and Primary Hypertension     Hosptialists re-consulted for DM management, Hypertension management and pain control    Interval history / Subjective:      Pt up in dayroom, talking on the phone. Agreeable to interview and assessment. Biggest complaint is that he is hungry, does not get enough food and is starving. Feels as though he is being rude to staf but says \"i'm not gonna go hungry\". Discussed diabetes management and diet control but will compromise and given a higher calorie diet (2200kcals). Also reported he has chronic belly pain and appreciative of tramadol that was ordered early in the day. Assessment & Plan:     Uncontrolled Primary Hypertension.   - pt on norvasc and clonidine now  - add lisinopril tomorrow am    Uncontrolled Type 2 DM with complications including Hyperglycemia  - HgbA1C of 11   - blood glucose range 243-342  - pt receiving lantus (20), meatime (5) and SSI. He was on NPH PTA at 60 units BID. Will stop lantus tomorrow and start NPH 30 units BID and titrate up as indicated. - continue with accuchecks  - not sure pt is ready for teaching but did some reinforcement.     Acute on probable chronic Hepatic encephalopathy due to Liver Cirrhosis with Hyperammonemia present on admission: resolved  - CT head without contrast negative for any acute findings.    - Continue with scheduled lactulose     Chronic Abdominal Pain:   - pt requests tramadol for pain, ordered   - CT reviewed, has umbilical and ventral hernias     Alcoholic Liver Cirrhosis with mild coagulopathy. Hepatitis C:  - CT Abdomen and pelvis with some Splenomegaly and portal hypertension.      Thrombocytopenia probably due to the Alcoholic Liver Disease:   - No bleeding      Nutrition: Continue daily multivitamin/mineral, Thiamine and Folic Acid supplements.     Alcohol Use Disorder. Cessation counseling as per primary team     Suicidal ideation: as per psychiatry    Code status: Full  DVT prophylaxis: none indicated, pt up ad ami  Care Plan discussed with: patient, nurse  Disposition: as per primary team     Hospital Problems  Date Reviewed: 5/26/2019          Codes Class Noted POA    B[facility] disorder, [facility] ICD-10-CM: F31.9  ICD-9-CM: 296.50  5/29/2019 Unknown            Review of Systems:   ++Hungry. + mild HA. No chest pain or pressure. No respiratory complaints. + midline abdominal pain (reports is chronic)    Vital Signs:    Last 24hrs VS reviewed since prior progress note. Most recent are:  Visit Vitals  /80 (BP 1 Location: Right arm)   Pulse 73   Temp 97.9 °F (36.6 °C)   Resp 16   Ht 5' 6\" (1.676 m)   Wt 90.7 kg (200 lb)   SpO2 96%   BMI 32.28 kg/m²     No intake or output data in the 24 hours ending 06/01/19 1423     Physical Examination:         Constitutional:  No acute distress, cooperative, pleasant    ENT:  Oral MM moist, oropharynx benign. Resp:  CTA bilaterally. No wheezing/rhonchi/rales. No accessory muscle use and on RA   CV:  Regular rhythm, normal rate, no murmurs    GI:  Soft, obese, Normoactive bowel sounds, + BM (3x/day)    Musculoskeletal:  No edema, skin warm, 2+ pulses throughout    Neurologic:  Moves all extremities.   AAOx3     Data Review:   Review and/or order of clinical lab test  Review and/or order of tests in the radiology section of CPT  Review and/or order of tests in the medicine section of CPT    Labs:   No results for input(s): WBC, HGB, HCT, PLT, HGBEXT, HCTEXT, PLTEXT in the last 72 hours. No results for input(s): NA, K, CL, CO2, BUN, CREA, GLU, CA, MG, PHOS, URICA in the last 72 hours. No results for input(s): SGOT, GPT, ALT, AP, TBIL, TBILI, TP, ALB, GLOB, GGT, AML, LPSE in the last 72 hours. No lab exists for component: AMYP, HLPSE  No results for input(s): INR, PTP, APTT in the last 72 hours. No lab exists for component: INREXT   No results for input(s): FE, TIBC, PSAT, FERR in the last 72 hours. Lab Results   Component Value Date/Time    Folate >24.0 (H) 08/09/2013 04:30 PM      No results for input(s): PH, PCO2, PO2 in the last 72 hours. No results for input(s): CPK, CKNDX, TROIQ in the last 72 hours.     No lab exists for component: CPKMB  No results found for: CHOL, CHOLX, CHLST, CHOLV, HDL, LDL, LDLC, DLDLP, TGLX, TRIGL, TRIGP, CHHD, CHHDX  Lab Results   Component Value Date/Time    Glucose (POC) 331 (H) 06/01/2019 11:15 AM    Glucose (POC) 243 (H) 06/01/2019 07:21 AM    Glucose (POC) 342 (H) 05/31/2019 07:56 PM    Glucose (POC) 395 (H) 05/31/2019 07:53 PM    Glucose (POC) 202 (H) 05/31/2019 03:56 PM     Lab Results   Component Value Date/Time    Color YELLOW/STRAW 05/25/2019 08:16 PM    Appearance CLEAR 05/25/2019 08:16 PM    Specific gravity 1.025 05/25/2019 08:16 PM    pH (UA) 5.0 05/25/2019 08:16 PM    Protein 30 (A) 05/25/2019 08:16 PM    Glucose >1,000 (A) 05/25/2019 08:16 PM    Ketone NEGATIVE  05/25/2019 08:16 PM    Bilirubin NEGATIVE  05/25/2019 08:16 PM    Urobilinogen 1.0 05/25/2019 08:16 PM    Nitrites NEGATIVE  05/25/2019 08:16 PM    Leukocyte Esterase NEGATIVE  05/25/2019 08:16 PM    Epithelial cells FEW 05/25/2019 08:16 PM    Bacteria NEGATIVE  05/25/2019 08:16 PM    WBC 0-4 05/25/2019 08:16 PM    RBC 10-20 05/25/2019 08:16 PM     Medications Reviewed:     Current Facility-Administered Medications   Medication Dose Route Frequency    traMADol (ULTRAM) tablet 50 mg  50 mg Oral Q8H PRN    naproxen (NAPROSYN) tablet 500 mg  500 mg Oral BID PRN    amLODIPine (NORVASC) tablet 10 mg  10 mg Oral DAILY    citalopram (CELEXA) tablet 20 mg  20 mg Oral DAILY    ziprasidone (GEODON) 20 mg in sterile water (preservative free) 1 mL injection  20 mg IntraMUSCular BID PRN    OLANZapine (ZyPREXA) tablet 5 mg  5 mg Oral Q6H PRN    benztropine (COGENTIN) tablet 2 mg  2 mg Oral BID PRN    benztropine (COGENTIN) injection 2 mg  2 mg IntraMUSCular BID PRN    acetaminophen (TYLENOL) tablet 650 mg  650 mg Oral Q4H PRN    magnesium hydroxide (MILK OF MAGNESIA) 400 mg/5 mL oral suspension 30 mL  30 mL Oral DAILY PRN    nicotine (NICODERM CQ) 21 mg/24 hr patch 1 Patch  1 Patch TransDERmal DAILY PRN    traZODone (DESYREL) tablet 50 mg  50 mg Oral QHS PRN    hydrOXYzine HCl (ATARAX) tablet 50 mg  50 mg Oral Q4H PRN    dextrose (D50W) injection syrg 12.5-25 g  25-50 mL IntraVENous PRN    glucagon (GLUCAGEN) injection 1 mg  1 mg IntraMUSCular PRN    glucose chewable tablet 16 g  4 Tab Oral PRN    insulin lispro (HUMALOG) injection   SubCUTAneous AC&HS    cloNIDine HCl (CATAPRES) tablet 0.3 mg  0.3 mg Oral BID    folic acid (FOLVITE) tablet 1 mg  1 mg Oral DAILY    insulin glargine (LANTUS) injection 20 Units  20 Units SubCUTAneous DAILY    insulin lispro (HUMALOG) injection 5 Units  5 Units SubCUTAneous TIDAC    lactulose (CHRONULAC) 10 gram/15 mL solution 15 mL  10 g Oral BID    pantoprazole (PROTONIX) tablet 40 mg  40 mg Oral DAILY    therapeutic multivitamin (THERAGRAN) tablet 1 Tab  1 Tab Oral DAILY    thiamine HCL (B-1) tablet 100 mg  100 mg Oral DAILY   ______________________________________________________________________  EXPECTED LENGTH OF STAY: - - -  ACTUAL LENGTH OF STAY:          3               Tere Segura NP

## 2019-12-19 NOTE — TELEPHONE ENCOUNTER
----- Message from Yamile Charles sent at 11/12/2018  8:25 AM CST -----  Contact: pt  Pt calling states that he needs a call back from the office regarding results of kidney stones,please..[phone] (home)      DISCHARGE REPORT    Progress reporting period is from 12.12.19 to 12.19.19.       SUBJECTIVE  Subjective changes noted by patient:  Pt reports that knee pain is much better since last visit.  Still notices some stiffness with going up/down stairs but not painful.  Has been doing stretch 2-3 x daily and following with back stretch.  Has not been really noticing any sx's in lower leg this week.    Current Pain level: 0/10.     Initial Pain level: 3/10.   Changes in function:  Yes (See Goal flowsheet attached for changes in current functional level)  Adverse reaction to treatment or activity: None    OBJECTIVE  Objective: L/S AROM:  WNL all planes.  Strength:  Quad Rt 5-/5, Lt 5/5; H-S 5/5 (B).  Quad strength improves to 5/5 after repeated ext.  ROM:  Rt knee 2-135 deg.  Improves to 2-0-128 deg post repeated ext.      ASSESSMENT/PLAN  Updated problem list and treatment plan:   Diagnosis 1:  LBP/Knee and leg pain  Decreased ROM/flexibility - therapeutic exercise and home program  Decreased joint mobility - therapeutic exercise and home program  Impaired muscle performance - neuro re-education and home program  STG/LTGs have been met or progress has been made towards goals:  Yes (See Goal flow sheet completed today.)  Assessment of Progress: The patient's condition is improving.  The patient has met all of their long term goals.  Self Management Plans:  Patient is independent in a home treatment program.  Patient is independent in self management of symptoms.  I have re-evaluated this patient and find that the nature, scope, duration and intensity of the therapy is appropriate for the medical condition of the patient.  Marie continues to require the following intervention to meet STG and LTG's:  PT intervention is no longer required to meet STG/LTG.    Recommendations:  This patient is ready to be discharged from therapy and continue their home treatment program.
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
## 2020-10-27 NOTE — TELEPHONE ENCOUNTER
.The following PPE was donned during all care and treatment procedures for the patient:    Protective Eyewear (_x_)    Surgical Face Mask (_x_)    N95 Mask (__)    Gloves (_x_)    Protective Gown (__)   Phoned patient no answer left message on voicemail to give the office a call back.

## 2020-12-30 NOTE — TELEPHONE ENCOUNTER
----- Message from Gian Cardoso sent at 3/23/2017  2:40 PM CDT -----  Contact: same  Patient called in and is out of his medication.  (patient does not know what)      [facility] [id number] [place] [address] [address] [address] [address]  [address]  [place] [address]  Phone: [phone] Fax: [phone]    Patient call back number is [phone]    
Received message stating he is out of medication but does not know which medication he is out of. Spoke with patient who states he is out of Geodon. Upon further inspection this medication was refilled on 3-11-17. Patient notified, verbalized understanding.  
intact

## 2021-05-27 NOTE — PLAN OF CARE
Problem: Fall Risk (Adult)  Goal: Identify Related Risk Factors and Signs and Symptoms  Related risk factors and signs and symptoms are identified upon initiation of Human Response Clinical Practice Guideline (CPG)  Outcome: Ongoing (interventions implemented as appropriate)   09/09/18 0020   Fall Risk   Related Risk Factors (Fall Risk) age-related changes;confusion/agitation;gait/mobility problems;polypharmacy;environment unfamiliar          Pt presents reporting hx of transient tingling to the left side of the face and right arm today, pt reports an episode of the same sensation yesterday as well. Pt reports mild pressure sensation to the left side of the head. Pt reports hx of complicated migraines.

## 2021-08-15 NOTE — TELEPHONE ENCOUNTER
Patient notified. Verbalized understanding    Patient verbalized worry about getting to his next appointment on time on 10/27/2017    Would prefer a appointment at 9 AM- then states he has appointment with urologist at 9:30 on the same day so that wont work    States he may have to reschedule the day but requesting permission from Dr. Queen to come in at 9 am instead of 8AM  Please advise   15-Aug-2021 10:06

## 2021-09-13 NOTE — TELEPHONE ENCOUNTER
----- Message from Ally Campo sent at 7/24/2017  8:48 AM CDT -----  Patient requesting to speak with nurse (Rosa)please call back at [phone].   Noted. Encounter closed.

## 2022-11-15 NOTE — TELEPHONE ENCOUNTER
This matter was handled in a separate encounter. Patient requesting refill of Klonopin. Message sent to provider.    Voicemail left on patient's cell number. No specifics left. Her blood results look well. Awaiting biopsy results.  NBP

## 2024-09-07 NOTE — TELEPHONE ENCOUNTER
----- Message from Erinn Valadez sent at 11/16/2017  2:52 PM CST -----  Patient is calling to talk to Ally concerning some type of appointment and concerning his medications. Please call back at [phone].   Patient

## 2024-10-10 NOTE — TELEPHONE ENCOUNTER
Adequate control of pain with current oxycodone use; pt strongly declines any taper of dose at this time; cpm;    PATIENT called in into office an cancelled his appointment tomorrow because he has recently had medication changes from pain management doctor an is going through withdrawals an cannot make the appointment he rescheduled for 02/08/17 @ 9 am.   Patient wanted to make sure I would let Dr Queen know that he is having these problems with the withdrawal from the pain medications.   Patient wanted to talk to Dr Queen but I explained she was in clinic an I would pass the message on an I will call him after she reviewed the message.

## 2025-03-03 NOTE — TELEPHONE ENCOUNTER
----- Message from Mateus Gautam sent at 9/26/2018 10:39 AM CDT -----  Contact: Pt  Name of Who is Calling: Hesham      What is the request in detail: Patient is calling states he needs to reschedule his appointment for tomorrow. I don't see an appointment for patient with , but he says he has one      Can the clinic reply by [facility]: no      What Number to Call Back if not in [facility]: [phone] (home)    [Former] : former [TextBox_4] : HPI: 69 y/o male PMH COPD initially referred to me by Dr. Holder and I move the process forward for education and consent will move the process forward for obtaining laboratory data and moving the transplant head as he is acutely ill with shortness of breath and states that he becomes extremely dyspneic with minimal exercise uses oxygen with exertion and is actually passed out at work recently and is quite scared about the outcomes associated with his illness.NYHA class 3-4  Clinic 03/03/2025: reports respiratory exacerbation 11/2024, defined by feeling that his "lungs were heavy with mucus". no recent medication changes. still with EtOH 12-15 drinks/week, favors bourbon. his support person plan is to live with his friend José post-transplant.   PMH: chronic lower back/neck pain. s/p multiple epidurals/injections for treatment. is f/b pain management. hemorrhoids, f/b colorectal.  [place] - uses bilat hearing aids  When/how diagnosed with primary pulm dx?  PSH: hemorrhoidectomy > 20 years ago, abdominal hernia repair with mesh 2024 - [facility],   Social: retired/disability Allergies: NKDA   Oxygen Requirement:  RA at rest, 4lpm NC on exertion,  RA with sleep   High Flow Nasal cannula: [ ] yes [x ] no    Working for income:   [ ] yes [ ] no    Diabetic: [ ] yes [ x] no    Insulin dependent [ ] yes [x ] no   Performs activities of daily living poorly with extreme fatigue at work and as mentioned had passed out in the past and is extremely concerned about his poor quality of life       Occupation: Warehouse   Exposures: no exposures that would impede his lung function or cause contraindication for transplant procedure  Quality of life: Extremely poor quality of life and exercise tolerance cannot play golf cannot go shopping or carry baggage because of severe dyspnea and extreme COPD by FEV1 criteria and by CT and chest x-ray evaluation  Attends Pulmonary Rehab: N, was referred by Dr. Holder and patient verbalizes today a re-commitment to attending  Prior hospitalizations, ICU admission or intubations: none recently over the past year Hx covid infection, blood transfusions or pregnancies: None  Health maintenance/vaccines:   COVID vax:   Family History:  Social History:    Lives with: alone ETOH/tobacco use: former smoker, 2 ppd for 4 years and stopped 2014, daily etoh - approx 10-15 drinks/week DATA REVIEWED:   PFT/JADE:  03/03/25 FVC 2.88, 70% FEV1, 1.25, 40% TLC, 7.60, 106% DLCO, 10.58, 40%  11/07/2024 FVC 2.44, 61% FEV1 0.98, 32%  12/12/2023 FVC 2.85, 66% FEV1 1.30, 38%  6MWT:  02/26/25 - 366 meters on 3lpm  CT CHEST:  03/28/2024 Trachea and central airways are patent. Bilateral upper lobe predominant emphysematous changes. No parenchymal consolidation, pleural effusion or pneumothorax. No bronchiectasis or honeycombing. Scattered calcified granulomas bilaterally. Right middle lobe 0.2 cm nodule, possibly new from prior examination. Unchanged right apical 0.2 cm nodule.  ECHO:  01/30/2025  1. Left ventricular cavity is normal in size. Left ventricular wall thickness is normal. Left ventricular systolic function is normal with an ejection fraction of 57 % by Steel's method of disks. There are no regional wall motion abnormalities seen.  2. Normal left ventricular diastolic function, with normal left ventricular filling pressure.  3. Normal right ventricular cavity size, with normal wall thickness, and normal right ventricular systolic function.  4. No pericardial effusion seen.  5. No prior echocardiogram is available for comparison.  6. There is no evidence of a left ventricular thrombus.  7. There is normal LV mass and normal geometry.  RHC/LHC:  Coronary AngiographyThe coronary circulation is right dominant.  LMLeft main artery: Angiography shows no disease.  LADLeft anterior descending artery: Angiography shows minor irregularities.  CXCircumflex: Angiography shows no disease.  RCARight coronary artery: Angiography shows minor irregularities.  All questions answered, used teach back method, patient verbalized understanding. [TextBox_11] : 2 [TextBox_13] : 40 [YearQuit] : 2014

## (undated) DEVICE — JELLY LUBRICANT STERILE 4 OZ

## (undated) DEVICE — SEE MEDLINE ITEM 152651

## (undated) DEVICE — CONTAINER SPECIMEN STRL 4OZ

## (undated) DEVICE — SOLN BETADINE SWAB AIDS

## (undated) DEVICE — APRON DISPOSP PLASTIC 24INX42

## (undated) DEVICE — SHEET DRAPE MEDIUM

## (undated) DEVICE — WATER STERILE INJ 500ML BAG

## (undated) DEVICE — UNDERGLOVES BIOGEL PI SZ 6 LF

## (undated) DEVICE — SET CYSTO IRRIGATION UNIV SPIK